# Patient Record
Sex: FEMALE | Race: WHITE | NOT HISPANIC OR LATINO | Employment: OTHER | URBAN - METROPOLITAN AREA
[De-identification: names, ages, dates, MRNs, and addresses within clinical notes are randomized per-mention and may not be internally consistent; named-entity substitution may affect disease eponyms.]

---

## 2018-06-13 ENCOUNTER — TELEPHONE (OUTPATIENT)
Dept: TRANSPLANT | Facility: CLINIC | Age: 78
End: 2018-06-13

## 2018-06-13 NOTE — TELEPHONE ENCOUNTER
----- Message from Anay Hyde sent at 6/13/2018 10:52 AM CDT -----  Contact: patient   Sounds like a private pt of Marlon    She has no [facility] records.   ----- Message -----  From: Therese Lin  Sent: 6/13/2018  10:47 AM  To: Yancy CHATMAN Staff    Please call pt at [phone]. Patient was treated by Dr Haines at [facility] about 3 years ago    Thank you

## 2018-07-06 ENCOUNTER — INITIAL CONSULT (OUTPATIENT)
Dept: TRANSPLANT | Facility: CLINIC | Age: 78
End: 2018-07-06
Attending: INTERNAL MEDICINE
Payer: MEDICARE

## 2018-07-06 VITALS
WEIGHT: 172.38 LBS | SYSTOLIC BLOOD PRESSURE: 142 MMHG | BODY MASS INDEX: 30.54 KG/M2 | OXYGEN SATURATION: 93 % | DIASTOLIC BLOOD PRESSURE: 65 MMHG | HEART RATE: 94 BPM | HEIGHT: 63 IN

## 2018-07-06 DIAGNOSIS — G06.1 EPIDURAL INTRASPINAL ABSCESS: ICD-10-CM

## 2018-07-06 DIAGNOSIS — Z79.4 TYPE 2 DIABETES MELLITUS WITH DIABETIC POLYNEUROPATHY, WITH LONG-TERM CURRENT USE OF INSULIN: ICD-10-CM

## 2018-07-06 DIAGNOSIS — E03.9 ACQUIRED HYPOTHYROIDISM: ICD-10-CM

## 2018-07-06 DIAGNOSIS — E11.42 TYPE 2 DIABETES MELLITUS WITH DIABETIC POLYNEUROPATHY, WITH LONG-TERM CURRENT USE OF INSULIN: ICD-10-CM

## 2018-07-06 DIAGNOSIS — I35.0 NONRHEUMATIC AORTIC VALVE STENOSIS: Primary | ICD-10-CM

## 2018-07-06 DIAGNOSIS — E78.5 HYPERLIPIDEMIA LDL GOAL <70: ICD-10-CM

## 2018-07-06 DIAGNOSIS — I25.118 CORONARY ARTERY DISEASE OF NATIVE ARTERY OF NATIVE HEART WITH STABLE ANGINA PECTORIS: ICD-10-CM

## 2018-07-06 DIAGNOSIS — I10 ESSENTIAL HYPERTENSION: ICD-10-CM

## 2018-07-06 PROCEDURE — 99204 OFFICE O/P NEW MOD 45 MIN: CPT | Mod: S$GLB,,, | Performed by: INTERNAL MEDICINE

## 2018-07-06 PROCEDURE — 99999 PR PBB SHADOW E&M-EST. PATIENT-LVL IV: CPT | Mod: PBBFAC,,, | Performed by: INTERNAL MEDICINE

## 2018-07-06 RX ORDER — LETROZOLE 2.5 MG/1
2.5 TABLET, FILM COATED ORAL DAILY
COMMUNITY

## 2018-07-06 RX ORDER — CHOLECALCIFEROL (VITAMIN D3) 25 MCG
TABLET,CHEWABLE ORAL DAILY
Status: ON HOLD | COMMUNITY
End: 2019-11-21 | Stop reason: HOSPADM

## 2018-07-06 RX ORDER — INSULIN ASPART 100 [IU]/ML
INJECTION, SOLUTION INTRAVENOUS; SUBCUTANEOUS
COMMUNITY

## 2018-07-06 RX ORDER — CHOLECALCIFEROL (VITAMIN D3) 25 MCG
1000 TABLET ORAL DAILY
COMMUNITY
End: 2021-01-05

## 2018-07-06 RX ORDER — CLOPIDOGREL BISULFATE 75 MG/1
75 TABLET ORAL DAILY
COMMUNITY
End: 2021-01-05

## 2018-07-06 RX ORDER — ATORVASTATIN CALCIUM 80 MG/1
80 TABLET, FILM COATED ORAL NIGHTLY
COMMUNITY

## 2018-07-06 RX ORDER — ALPRAZOLAM 0.5 MG/1
0.5 TABLET ORAL NIGHTLY PRN
COMMUNITY
Start: 2018-06-23

## 2018-07-06 RX ORDER — FUROSEMIDE 20 MG/1
40 TABLET ORAL 2 TIMES DAILY
Status: ON HOLD | COMMUNITY
End: 2021-05-18 | Stop reason: SDUPTHER

## 2018-07-06 RX ORDER — ACETAMINOPHEN AND PHENYLEPHRINE HCL 325; 5 MG/1; MG/1
1 TABLET ORAL DAILY
Status: ON HOLD | COMMUNITY
End: 2019-11-21 | Stop reason: HOSPADM

## 2018-07-06 RX ORDER — ASPIRIN 81 MG/1
81 TABLET ORAL DAILY
Status: ON HOLD | COMMUNITY
End: 2020-07-28 | Stop reason: HOSPADM

## 2018-07-06 RX ORDER — GABAPENTIN 600 MG/1
1200 TABLET ORAL 3 TIMES DAILY
COMMUNITY
Start: 2018-06-07 | End: 2019-06-21 | Stop reason: SDUPTHER

## 2018-07-06 RX ORDER — RAMIPRIL 10 MG/1
10 CAPSULE ORAL DAILY
COMMUNITY

## 2018-07-06 RX ORDER — LEVOTHYROXINE SODIUM 112 UG/1
112 TABLET ORAL
COMMUNITY
End: 2021-01-05 | Stop reason: SDUPTHER

## 2018-07-06 NOTE — PATIENT INSTRUCTIONS
When aortic velocity above 4 m/sec then would review again    For second opinion for back Dr. Tee Rider [phone]

## 2018-07-08 PROBLEM — E11.42 TYPE 2 DIABETES MELLITUS WITH DIABETIC POLYNEUROPATHY, WITH LONG-TERM CURRENT USE OF INSULIN: Status: ACTIVE | Noted: 2018-07-08

## 2018-07-08 PROBLEM — I10 ESSENTIAL HYPERTENSION: Status: ACTIVE | Noted: 2018-07-08

## 2018-07-08 PROBLEM — E03.9 ACQUIRED HYPOTHYROIDISM: Status: ACTIVE | Noted: 2018-07-08

## 2018-07-08 PROBLEM — Z79.4 TYPE 2 DIABETES MELLITUS WITH DIABETIC POLYNEUROPATHY, WITH LONG-TERM CURRENT USE OF INSULIN: Status: ACTIVE | Noted: 2018-07-08

## 2018-07-08 PROBLEM — G06.1 EPIDURAL INTRASPINAL ABSCESS: Status: ACTIVE | Noted: 2018-07-08

## 2018-07-08 PROBLEM — E78.5 HYPERLIPIDEMIA LDL GOAL <70: Status: ACTIVE | Noted: 2018-07-08

## 2018-07-08 PROBLEM — I25.118 CORONARY ARTERY DISEASE OF NATIVE ARTERY OF NATIVE HEART WITH STABLE ANGINA PECTORIS: Status: ACTIVE | Noted: 2018-07-08

## 2018-07-08 PROBLEM — I35.0 NONRHEUMATIC AORTIC VALVE STENOSIS: Status: ACTIVE | Noted: 2018-07-08

## 2018-07-08 NOTE — PROGRESS NOTES
Subjective:     Patient ID:  Mary Prince is a 77 y.o. female who presents for evaluation of Coronary Artery Disease; Hypertension; Aortic Stenosis; Hyperlipidemia; and Diabetes Mellitus    HPI:  76 yo WF was followed for me for a number of years with CAD and had 2 coronary stents placed while I cared for her though I don't have those records.  Since my departure from [facility] over 4 years ago she has been followed by Dr. JUNO Velazco.  She had 2 additional stents placed in 2015 by Dr. Velazco but not sure if same lesions and no records.  Recently dx with aortic stenosis and was told that when she needed AVR she would be evaluated here for TAVR so she came today to see if that was needed now or could wait as Dr. Velazco recommended.  At this time she has stable angina provoked by anxiety, no CHF, no syncope or pre-syncope.      She underwent spine surgery by Dr. Anguiano complicated by Klebsiella infection of spine requiring 3 additional surgeries by NS from [facility] performed at [facility], spent 15 days in ICU and upon discharge went to [facility] with total hosp+LTAC stay of 3 months.  She has bilateral foot drops, bilateral brachial plexus injury (she dates this to original surgery as well), has considerable pain and debility since that time.  These problems limit her at this time, not her heart.    Past Medical History:   Diagnosis Date    Cancer 2010    left breast XRT + lumpectomy; right lunpectomy + Chemotherapy (Dr. Santana) 8 of 12 treatments, stopped for side effects    Cataract     right eye    Coronary artery disease     total 4 stents over several years, last 2015    Diabetes mellitus, type 2     Hyperlipidemia     Hypertension     Thyroid disease        Past Surgical History:   Procedure Laterality Date    APPENDECTOMY      BREAST SURGERY Bilateral     lumpectomy    EYE SURGERY Bilateral     cataract    HYSTERECTOMY      JOINT REPLACEMENT Right     hip    SPINE SURGERY      laminectomy       History  reviewed. No pertinent family history.    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    atorvastatin (LIPITOR) 80 MG tablet atorvastatin 80 mg tablet    biotin 10,000 mcg Cap Take by mouth once daily.    clopidogrel (PLAVIX) 75 mg tablet clopidogrel 75 mg tablet    cyanocobalamin, vitamin B-12, 2,500 mcg Chew Take by mouth once daily.    furosemide (LASIX) 20 MG tablet Take 20 mg by mouth daily as needed.    gabapentin (NEURONTIN) 600 MG tablet 1,200 mg 3 (three) times daily.     insulin aspart U-100 (NOVOLOG U-100 INSULIN ASPART) 100 unit/mL injection Novolog U-100 Insulin aspart 100 unit/mL subcutaneous solution    letrozole (FEMARA) 2.5 mg Tab Femara 2.5 mg tablet   Take 2 tablets every day by oral route.    levothyroxine (SYNTHROID) 112 MCG tablet Synthroid 112 mcg tablet   Take 1 tablet every day by oral route.    potassium aminobenzoate 500 mg Cap Take by mouth once daily.    ramipril (ALTACE) 10 MG capsule Altace 10 mg capsule   Take 1 capsule every day by oral route.    vitamin D 1000 units Tab Take 1,000 Units by mouth once daily.     Review of patient's allergies indicates:  No Known Allergies      Review of Systems   Constitution: Positive for weakness, malaise/fatigue and weight loss (with illness lost weight, then gained wt but went on diet and lost 6# ). Negative for chills, decreased appetite, diaphoresis, fever,  "night sweats and weight gain.   HENT: Negative for ear discharge, ear pain, hearing loss and hoarse voice.    Eyes: Negative for photophobia and visual disturbance.   Cardiovascular: Positive for chest pain. Negative for dyspnea on exertion, irregular heartbeat, leg swelling, orthopnea, palpitations, paroxysmal nocturnal dyspnea and syncope.   Respiratory: Negative for cough, hemoptysis, shortness of breath, sputum production and wheezing.    Endocrine: Negative for cold intolerance, heat intolerance, polydipsia and polyuria.        A1c now in 6's; thyroid up to date; followed by Dr. Barreto for both   Hematologic/Lymphatic: Negative for bleeding problem. Does not bruise/bleed easily.   Musculoskeletal: Positive for back pain and stiffness. Negative for arthritis, falls (fall risk with bilateral foot drops, wears braces) and joint pain.   Gastrointestinal: Positive for bowel incontinence. Negative for abdominal pain, anorexia, change in bowel habit, dysphagia, heartburn, hematochezia and melena.   Genitourinary: Negative for dysuria, frequency and hematuria.   Neurological: Positive for focal weakness, numbness, paresthesias, sensory change and tremors. Negative for brief paralysis, headaches and seizures.     Objective:   Physical Exam   Constitutional: She is oriented to person, place, and time. She appears well-developed and well-nourished. No distress.   BP (!) 142/65 (BP Location: Right arm, Patient Position: Sitting, BP Method: Medium (Automatic))   Pulse 94   Ht 5' 3" (1.6 m)   Wt 78.2 kg (172 lb 6.4 oz)   SpO2 (!) 93%   BMI 30.54 kg/m²    HENT:   Head: Normocephalic and atraumatic.   Eyes: Conjunctivae are normal. Right eye exhibits no discharge. Left eye exhibits no discharge. No scleral icterus.   Neck: No JVD present. No thyromegaly present.   Cardiovascular: Normal rate and regular rhythm.  Exam reveals no gallop and no friction rub.    Murmur (Grade 2-3/6 ALEX peaking mid systole without definite " diastolic murmur.  Murmur radiates into the neck) heard.  Pulmonary/Chest: Effort normal. No respiratory distress. She has no wheezes. She has no rales.   Abdominal: Soft. Bowel sounds are normal. She exhibits no distension and no mass. There is no tenderness. There is no rebound and no guarding.   Musculoskeletal: She exhibits no edema or tenderness.   Neurological: She is alert and oriented to person, place, and time.   I did not perform neuro exam and am not documenting or evaluating her neurological deficits   Skin: Skin is warm and dry. She is not diaphoretic.   Psychiatric: She has a normal mood and affect. Her behavior is normal. Judgment and thought content normal.      5/30/18 [facility] lab:  CBC WNL  CMP glu 193, BUN 34, Cr 0.9, rest is normal   Total chol 128    HDL 45  LDL 59  6/1/2018 ECHO doppler at [facility]  Peak aortic gradient 38 mm Hg, mean gradient 21 mm Hg, mild AI, marked Ca++ MVA, trace MR, mild TR, normal PA pressure, mild to moderate LVH (in summary though reported wall thickness normal 0.9), normal wall motion and normal LVEF 60-65%.  Mild to moderate LAE, grade I diastolic dysfunction.     Assessment:     1. Nonrheumatic aortic valve stenosis    2. Coronary artery disease of native artery of native heart with stable angina pectoris    3. Hyperlipidemia LDL goal <70    4. Essential hypertension    5. Type 2 diabetes mellitus with diabetic polyneuropathy, with long-term current use of insulin    6. Acquired hypothyroidism    7. Epidural intraspinal abscess      Plan:   At this time I concur with Dr. Velazco that she does not require TAVR.  Would repeat echo yearly and refer once peak velocity exceeds 4 m/sec and if symptomatic  She will continue under the care of Dr. Velazco and her physicians at [facility].  Her neurosurgeon left [facility] and the area but she heard of Dr. Rider here and request consult so I entered one on her behalf.    Thank you for allowing me to see this nice woman in  consultation and do not hesitate to contact me if any questions arise.    Zurdo Haines MD, FACC, VANITA  Medical Director, Cardiomyopathy and Heart Failure Program    CC: Estevan Ortiz and Mora

## 2019-02-27 ENCOUNTER — TELEPHONE (OUTPATIENT)
Dept: SPINE | Facility: CLINIC | Age: 79
End: 2019-02-27

## 2019-02-27 NOTE — TELEPHONE ENCOUNTER
----- Message from Lisa Arambula sent at 2/27/2019  1:10 PM CST -----  Contact: Self/ [phone]  Patient would like a call back to make a appt with the doctor. Patient does have a referral in the system  from Dr. Doty.

## 2019-04-02 ENCOUNTER — TELEPHONE (OUTPATIENT)
Dept: SPINE | Facility: CLINIC | Age: 79
End: 2019-04-02

## 2019-04-02 NOTE — TELEPHONE ENCOUNTER
----- Message from Irene Noel RN sent at 4/2/2019  4:32 PM CDT -----  Contact: Patient @ [phone]      ----- Message -----  From: Abran Walter  Sent: 4/2/2019   3:31 PM  To: Tereso Oliveira Staff    Patient returning a missed and to get an update on the NP appt from the referral, she does have a current MRI, pls call to advise

## 2019-04-02 NOTE — TELEPHONE ENCOUNTER
Staff contacted patient and left a message on VM informing patient that she has been scheduled to see Shu Bowie's PA 05/07/19 at 9am while he's also in clinic on this date. Patient was informed to contact the office if there were in questions or concerns

## 2019-04-26 DIAGNOSIS — M65.811 OTHER SYNOVITIS AND TENOSYNOVITIS, RIGHT SHOULDER: Primary | ICD-10-CM

## 2019-04-26 DIAGNOSIS — M65.812 OTHER SYNOVITIS AND TENOSYNOVITIS, LEFT SHOULDER: ICD-10-CM

## 2019-05-06 ENCOUNTER — TELEPHONE (OUTPATIENT)
Dept: SPINE | Facility: CLINIC | Age: 79
End: 2019-05-06

## 2019-05-06 DIAGNOSIS — M54.5 LOW BACK PAIN, UNSPECIFIED BACK PAIN LATERALITY, UNSPECIFIED CHRONICITY, WITH SCIATICA PRESENCE UNSPECIFIED: Primary | ICD-10-CM

## 2019-05-07 ENCOUNTER — OFFICE VISIT (OUTPATIENT)
Dept: SPINE | Facility: CLINIC | Age: 79
End: 2019-05-07
Attending: INTERNAL MEDICINE
Payer: MEDICARE

## 2019-05-07 VITALS
BODY MASS INDEX: 30.48 KG/M2 | HEART RATE: 83 BPM | TEMPERATURE: 98 F | HEIGHT: 63 IN | DIASTOLIC BLOOD PRESSURE: 64 MMHG | SYSTOLIC BLOOD PRESSURE: 139 MMHG | WEIGHT: 172 LBS

## 2019-05-07 DIAGNOSIS — M54.42 CHRONIC BILATERAL LOW BACK PAIN WITH BILATERAL SCIATICA: ICD-10-CM

## 2019-05-07 DIAGNOSIS — M54.16 LUMBAR RADICULOPATHY: ICD-10-CM

## 2019-05-07 DIAGNOSIS — G89.29 CHRONIC BILATERAL LOW BACK PAIN WITH BILATERAL SCIATICA: ICD-10-CM

## 2019-05-07 DIAGNOSIS — M51.36 DDD (DEGENERATIVE DISC DISEASE), LUMBAR: ICD-10-CM

## 2019-05-07 DIAGNOSIS — M54.41 CHRONIC BILATERAL LOW BACK PAIN WITH BILATERAL SCIATICA: ICD-10-CM

## 2019-05-07 DIAGNOSIS — R29.898 BILATERAL ARM WEAKNESS: Primary | ICD-10-CM

## 2019-05-07 DIAGNOSIS — M47.26 OTHER SPONDYLOSIS WITH RADICULOPATHY, LUMBAR REGION: ICD-10-CM

## 2019-05-07 PROCEDURE — 99204 PR OFFICE/OUTPT VISIT, NEW, LEVL IV, 45-59 MIN: ICD-10-PCS | Mod: S$GLB,,, | Performed by: PHYSICIAN ASSISTANT

## 2019-05-07 PROCEDURE — 99204 OFFICE O/P NEW MOD 45 MIN: CPT | Mod: S$GLB,,, | Performed by: PHYSICIAN ASSISTANT

## 2019-05-07 PROCEDURE — 3078F PR MOST RECENT DIASTOLIC BLOOD PRESSURE < 80 MM HG: ICD-10-PCS | Mod: CPTII,S$GLB,, | Performed by: PHYSICIAN ASSISTANT

## 2019-05-07 PROCEDURE — 99999 PR PBB SHADOW E&M-EST. PATIENT-LVL IV: ICD-10-PCS | Mod: PBBFAC,,, | Performed by: PHYSICIAN ASSISTANT

## 2019-05-07 PROCEDURE — 3075F PR MOST RECENT SYSTOLIC BLOOD PRESS GE 130-139MM HG: ICD-10-PCS | Mod: CPTII,S$GLB,, | Performed by: PHYSICIAN ASSISTANT

## 2019-05-07 PROCEDURE — 1101F PT FALLS ASSESS-DOCD LE1/YR: CPT | Mod: CPTII,S$GLB,, | Performed by: PHYSICIAN ASSISTANT

## 2019-05-07 PROCEDURE — 3078F DIAST BP <80 MM HG: CPT | Mod: CPTII,S$GLB,, | Performed by: PHYSICIAN ASSISTANT

## 2019-05-07 PROCEDURE — 99999 PR PBB SHADOW E&M-EST. PATIENT-LVL IV: CPT | Mod: PBBFAC,,, | Performed by: PHYSICIAN ASSISTANT

## 2019-05-07 PROCEDURE — 3075F SYST BP GE 130 - 139MM HG: CPT | Mod: CPTII,S$GLB,, | Performed by: PHYSICIAN ASSISTANT

## 2019-05-07 PROCEDURE — 1101F PR PT FALLS ASSESS DOC 0-1 FALLS W/OUT INJ PAST YR: ICD-10-PCS | Mod: CPTII,S$GLB,, | Performed by: PHYSICIAN ASSISTANT

## 2019-05-07 NOTE — PROGRESS NOTES
Subjective:     Patient ID:  Mary Prince is a 78 y.o. female.    [facility]    Chief Complaint:  Bilateral arm weakness,  Low back pain and bilateral foot drop    HPI    Mary Prince is a 78 y.o. female who presents with the above CC.  She states she is here for a second opinion from Dr. Maurer for brachial plexus evaluation and low back pain.    Patient had a lumbar laminectomy in September 2016 with Dr. Anguiano.  After that surgery she had a post op wound infection and severe pain.  She states she went to the ED at [facility] and had three lumbar wound washouts with Dr. Maurer.  Was in the ICU and the hospital for three months from sepsis and then to rehab.  She had been on antibiotics for a year after that and has not been on them since the beginning of 2018.  She has improved with her back and legs and feet through PT as far as strength and conditioning.  She has bilateral footdrop, right greater than left that has improved some also.  She was in a wheelchair and now usually uses a cane and walker to get around but can walk alone if she has something to hold onto.  She continues to have chronic low back pain that comes and goes and worse with standing and better with sitting.  Pain in the bilateral lateral legs that comes and goes and electrical shocking in the right anterior and lateral leg to touch.    After her lumbar spine surgeries she noticed that she could not lift her arms above her head or could not put her arms behind her back to wipe herself.  She has had numbness from the shoulders to the hand bilaterally that has been constant since the lumbar spine surgeries.  Neck or radiating arm pain.      The bilateral arm weakness bother her the most at this time.    She has bilateral essential tremors that got worse at the lumbar spine surgeries also.    One cervical fusion spine surgery in 1970.      Patient denies any recent accidents or trauma, no saddle anesthesias, and no bowel or bladder incontinence.    Patient  has difficulty with balance or gait, difficulty tying shoes or buttoning clothes, is dropping things, has difficulty opening containers, and has had a change in handwriting.      Review of Systems:    Review of Systems   Constitutional: Negative for chills, diaphoresis, fever, malaise/fatigue and weight loss.   HENT: Negative for congestion, ear discharge, ear pain, hearing loss, nosebleeds, sinus pain, sore throat and tinnitus.    Eyes: Negative for blurred vision, double vision, photophobia, pain, discharge and redness.   Respiratory: Negative for cough, hemoptysis, sputum production, shortness of breath, wheezing and stridor.    Cardiovascular: Negative for chest pain, palpitations, orthopnea, leg swelling and PND.   Gastrointestinal: Negative for abdominal pain, blood in stool, constipation, diarrhea, heartburn, melena, nausea and vomiting.   Genitourinary: Negative for dysuria, flank pain, frequency, hematuria and urgency.   Musculoskeletal: Positive for back pain, joint pain and myalgias. Negative for falls and neck pain.   Skin: Negative for itching and rash.   Neurological: Positive for tingling, tremors and weakness. Negative for dizziness, sensory change, speech change, seizures, loss of consciousness and headaches.   Endo/Heme/Allergies: Negative for environmental allergies and polydipsia. Does not bruise/bleed easily.   Psychiatric/Behavioral: Negative for depression, hallucinations, memory loss and substance abuse. The patient is not nervous/anxious and does not have insomnia.          Past Medical History:   Diagnosis Date    Cancer 2010    left breast XRT + lumpectomy; right lunpectomy + Chemotherapy (Dr. Santana) 8 of 12 treatments, stopped for side effects    Cataract     right eye    Coronary artery disease     total 4 stents over several years, last 2015    Diabetes mellitus, type 2     Hyperlipidemia     Hypertension     Thyroid disease      Past Surgical History:   Procedure Laterality Date     APPENDECTOMY      BREAST SURGERY Bilateral     lumpectomy    EYE SURGERY Bilateral     cataract    HYSTERECTOMY      JOINT REPLACEMENT Right     hip    SPINE SURGERY      laminectomy     Current Outpatient Medications on File Prior to Visit   Medication Sig Dispense Refill    ALPRAZolam (XANAX) 0.5 MG tablet       aspirin (ECOTRIN LOW STRENGTH) 81 MG EC tablet aspirin 81 mg capsule   Take by oral route.      atorvastatin (LIPITOR) 80 MG tablet atorvastatin 80 mg tablet      biotin 10,000 mcg Cap Take by mouth once daily.      clopidogrel (PLAVIX) 75 mg tablet clopidogrel 75 mg tablet      cyanocobalamin, vitamin B-12, 2,500 mcg Chew Take by mouth once daily.      furosemide (LASIX) 20 MG tablet Take 20 mg by mouth daily as needed.      insulin aspart U-100 (NOVOLOG U-100 INSULIN ASPART) 100 unit/mL injection Novolog U-100 Insulin aspart 100 unit/mL subcutaneous solution      letrozole (FEMARA) 2.5 mg Tab Femara 2.5 mg tablet   Take 2 tablets every day by oral route.      levothyroxine (SYNTHROID) 112 MCG tablet Synthroid 112 mcg tablet   Take 1 tablet every day by oral route.      potassium aminobenzoate 500 mg Cap Take by mouth once daily.      ramipril (ALTACE) 10 MG capsule Altace 10 mg capsule   Take 1 capsule every day by oral route.      vitamin D 1000 units Tab Take 1,000 Units by mouth once daily.       No current facility-administered medications on file prior to visit.      Review of patient's allergies indicates:  No Known Allergies  Social History     Socioeconomic History    Marital status:      Spouse name: Not on file    Number of children: Not on file    Years of education: Not on file    Highest education level: Not on file   Occupational History    Not on file   Social Needs    Financial resource strain: Not on file    Food insecurity:     Worry: Not on file     Inability: Not on file    Transportation needs:     Medical: Not on file     Non-medical: Not on file  "  Tobacco Use    Smoking status: Never Smoker    Smokeless tobacco: Never Used   Substance and Sexual Activity    Alcohol use: Yes     Comment: social    Drug use: No    Sexual activity: Not on file   Lifestyle    Physical activity:     Days per week: Not on file     Minutes per session: Not on file    Stress: Not on file   Relationships    Social connections:     Talks on phone: Not on file     Gets together: Not on file     Attends Anabaptism service: Not on file     Active member of club or organization: Not on file     Attends meetings of clubs or organizations: Not on file     Relationship status: Not on file   Other Topics Concern    Not on file   Social History Narrative    Registered nurse now retired; 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 No pertinent family history.    Objective:      Vitals:    05/07/19 0857   BP: 139/64   Pulse: 83   Temp: 97.9 °F (36.6 °C)   TempSrc: Oral   Weight: 78 kg (172 lb)   Height: 5' 3" (1.6 m)   PainSc:   1   PainLoc: Back         Physical Exam:    General:  Mary Prince is well-developed, well-nourished, appears stated age, in no acute distress, alert and oriented to person, place, and time.    Pulmonary/Chest:  Respiratory effort normal  Abdominal: Exhibits no distension  Psychiatric:  Normal mood and affect.  Behavior is normal.  Judgement and thought content normal    Musculoskeletal:    Patient arises from a sitting to standing position without difficulty.  Patient walks to the door holding onto things.  She can stand on her toes and take a few step.  She cannot stand on her heels.    Cervical ROM:   No pain in cervical spine flexion, extension, right lateral bending, left lateral bending, right rotation, and left rotation.    Cervical Spine Palpation:  No tenderness to cervical spine palpation.    Cervical Spine Inspection:  Healed surgical scars and no visible " rashes.    Lumbar ROM:   No pain in lumbar flexion.  Pain in extension, right lateral bending, and left lateral bending.    Lumbar Spine Inspection:  Healed midline surgical scars and no visible rashes.    Lumbar Spine Palpation:  No tenderness to low back palpation.    SI Joint Palpation:  No tenderness to SI Joint palpation.    Straight Leg Raise:  Negative right and left SLR for pain but positive for paresthesias.    Neurological:  Alert and oriented to person, place, and time    Muscle strength against resistance:     Right Left   Deltoid  5 / 5 4 / 5   Biceps 4 / 5 4 / 5   Triceps 4 / 5 5 / 5   Wrist flexion  4 / 5 4 / 5   Wrist extension 5 / 5 5 / 5   Finger abduction 3 / 5 3 / 5   Thumb opposition 3 / 5 3 / 5   Handgrip 3 / 5 3 / 5   Hip flexion  5 / 5 5 / 5   Hip extension 3 / 5 3 / 5   Hip abduction 4 / 5 4 / 5   Hip adduction 5/ 5 5 / 5   Knee extension  5 / 5 5 / 5   Knee flexion  4 / 5 5 / 5   Dorsiflexion  4 / 5 5 / 5   EHL  3 / 5 4 / 5   Plantar flexion  4 / 5 5 / 5   Inversion of the feet 3 / 5 4 / 5   Eversion of the feet 3 / 5 4 / 5     Reflexes:     Right Left   Triceps 2+ 2+   Biceps 2+ 2+   Brachioradialis 2+ 2+   Patellar 2+ 2+   Achilles 2+ 2+     Babinski: Negative bilaterally  Clonus:  Negative bilaterally  Saucedo: Negative bilaterally  Negative lhermittes sign      On gross examination of the bilateral upper and lower extremities, patient has no signs of clubbing, cyanosis, or edema.     Imaging/EMG results:     Brachial plexus right arm report from 04/2018 states no definite brachial plexus space occupying lesion or edema to suggest nerve injury.    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    EMG/NCS of the bilateral LE from 08/2017 states sensorimotor polyneuropathy of the lower extremities.  Bilateral L5-S1 radiculopathy cannot be excluded.    Assessment:          1. Bilateral arm weakness    2. Chronic bilateral low back  pain with bilateral sciatica    3. DDD (degenerative disc disease), lumbar    4. Other spondylosis with radiculopathy, lumbar region    5. Lumbar radiculopathy            Plan:          Orders Placed This Encounter    EMG W/ ULTRASOUND AND NERVE CONDUCTION TEST 2 Extremities       Patient with bilateral arm weakness after lumbar spine surgery in 2016.  Possibly due to positioning in surgery?.  Will get updated EMG/NCS of the bilateral UE and compare to the previous EMG done in 2017.  Will need to get report of the previous EMG/NCS done in 2017.    I will review this with Dr. Rider once it has been completed.    She will fu with Dr. Maurer regarding her lumbar spine.    Recommend continuing with PT/OT for her UE also.    All of the above discussed and reviewed with Dr. Rider who came in to see the patient today.    Follow-Up:  Follow up if symptoms worsen or fail to improve. If there are any questions prior to this, the patient was instructed to contact the office.       TORRI Chavis PA-C  Neurosurgery  [facility]  [facility]    Addendum:  05/30/19    EMG of the right arm reviewed from 2017 showed right median nerve and ulnar nerve motor and sensory neuroapthies.  Right radial nerve sensory neuropathy, and right C6, C7, C8 radiculopathy cannot be excluded    Left arm showed left median and sensory neuropathy, left ulnar nerve and radial nerve sensory neuropathies, and left C7-8 radiculopathy cannot be excluded..    TORRI Chavis PA-C  [facility]  [facility]  [facility]    Addendum:  08/09/19    I reviewed everything with Dr. Rider including the old EMG and the new EMG.  He stated that there may be brachial plexus injuries based off the EMG finding but no surgery is recommended at this time as it has been too long and surgery for this will not help now.    TORRI Chavis PA-C  Neurosurgery  [facility]  [facility]

## 2019-05-15 ENCOUNTER — CLINICAL SUPPORT (OUTPATIENT)
Dept: REHABILITATION | Facility: HOSPITAL | Age: 79
End: 2019-05-15
Payer: MEDICARE

## 2019-05-15 DIAGNOSIS — R29.898 BILATERAL ARM WEAKNESS: ICD-10-CM

## 2019-05-15 DIAGNOSIS — M96.1 POSTLAMINECTOMY SYNDROME, CERVICAL REGION: ICD-10-CM

## 2019-05-15 DIAGNOSIS — M48.02 SPINAL STENOSIS IN CERVICAL REGION: Primary | ICD-10-CM

## 2019-05-15 PROCEDURE — 97162 PT EVAL MOD COMPLEX 30 MIN: CPT | Mod: PO

## 2019-05-15 PROCEDURE — G8990 OTHER PT/OT CURRENT STATUS: HCPCS | Mod: CM,PO

## 2019-05-15 PROCEDURE — G8991 OTHER PT/OT GOAL STATUS: HCPCS | Mod: CK,PO

## 2019-05-15 NOTE — PLAN OF CARE
Physical Therapy Initial Evaluation     Name: Mary Prince  [facility] Number: [id number]    Diagnosis:   Encounter Diagnosis   Name Primary?    Bilateral arm weakness      Physician: Poncho Saunders MD  Treatment Orders: PT Eval and Treat  Past Medical History:   Diagnosis Date    Cancer 2010    left breast XRT + lumpectomy; right lunpectomy + Chemotherapy (Dr. Santana) 8 of 12 treatments, stopped for side effects    Cataract     right eye    Coronary artery disease     total 4 stents over several years, last 2015    Diabetes mellitus, type 2     Hyperlipidemia     Hypertension     Thyroid disease       Current Outpatient Medications   Medication Sig    ALPRAZolam (XANAX) 0.5 MG tablet     aspirin (ECOTRIN LOW STRENGTH) 81 MG EC tablet aspirin 81 mg capsule   Take by oral route.    atorvastatin (LIPITOR) 80 MG tablet atorvastatin 80 mg tablet    biotin 10,000 mcg Cap Take by mouth once daily.    clopidogrel (PLAVIX) 75 mg tablet clopidogrel 75 mg tablet    cyanocobalamin, vitamin B-12, 2,500 mcg Chew Take by mouth once daily.    furosemide (LASIX) 20 MG tablet Take 20 mg by mouth daily as needed.    gabapentin (NEURONTIN) 600 MG tablet 1,200 mg 3 (three) times daily.     insulin aspart U-100 (NOVOLOG U-100 INSULIN ASPART) 100 unit/mL injection Novolog U-100 Insulin aspart 100 unit/mL subcutaneous solution    letrozole (FEMARA) 2.5 mg Tab Femara 2.5 mg tablet   Take 2 tablets every day by oral route.    levothyroxine (SYNTHROID) 112 MCG tablet Synthroid 112 mcg tablet   Take 1 tablet every day by oral route.    potassium aminobenzoate 500 mg Cap Take by mouth once daily.    ramipril (ALTACE) 10 MG capsule Altace 10 mg capsule   Take 1 capsule every day by oral route.    vitamin D 1000 units Tab Take 1,000 Units by mouth once daily.     No current facility-administered medications for this visit.      Review of patient's allergies indicates:  No Known  "Allergies    Evaluation Date: 5-15-19  Visit # authorized: 1  Authorization period: 4-26-19 to 4-25-19  Plan of care Expiration: 7-15-19  Evaluation Time in/Out: see POC    Subjective     History of condition/Onset Date:  pt presenting with chief complaint of B UE weakness chronic and ongoing since having some medical complications after her back surgery in 2016.   Says she was in the hospital for 3 months but doesn't remember any of it bc she was in the ICU. Notes she was ambulating with a rollator, then a walker now a quad cane but she would like to not be handicapped.  Says her doctor wanted her to come to PT and OT for her brachial plexus injury. Pt does not have an eval for OT currently scheduled but brought the paper script from her doctor with her today.   Pt with extensive history of complications and long term hospitalization following her lumbar laminectomy surgery in 9-2016. See chart for further information.    PRECAUTIONS: insulin pump, previous cancer (not currently active), lumbar laminectomy  Primary complaint: weakness of B UEs.  Current Functional Limitations:  Must use adaptive devices for all ADLs. Cannot reach up or carry household items. Unable to cook or clean as she is used to. Had to install a chair lift.  Prior Functional Status: very active in her Religious, walked without an AD  Easing factors:  n/a  Prior Treatment: PT inpatient rehab following ICU in the hospital. HHPT when discharged from hospital.   Had OT for her hands (in 2018 and 2019) and the brachial plexus injury she says the doctor thinks she has from when she was lying prone on the table to have the "washouts" from sepsis.    Occupation:  Does not work                       Pain Scale 0-10:  Current: 4/10 rated as LBP     Best: 2/10      Worst: 6/10  Pts goals:  To get stronger. To be able to reach up to get clothes out of her closet with her hands normally. Doesn't want to rely on all the devices she has to help her complete " her ADLs.    Objective     Observation/Posture: rounded shoulders  GAIT: using quad cane in L UE    AROM Right Left Comment Normal   Shoulder Flexion: 74 degrees 80 degrees  180 deg   Shoulder Extension: 32 40 degrees  60 deg   Shoulder Abduction: 70 72 degrees *painful in top of shoulder 180 deg   Shoulder ER: Back of head CT junction pain 90 deg   Shoulder IR: L1 sacrum  90 deg     MMT   Right Left Comment   Shoulder flexion: 3/5 3/5    Shoulder Abduction: 3/5 3/5    Shoulder ER: 3/5 3/5    Shoulder IR: 3/5 3/5    Lower Trap: 3+/5 3+/5    Middle Trap: 3+/5 3+/5      Pt/family was provided educational information, including: role of PT, goals for PT, scheduling - pt verbalized understanding.   Discussed insurance limitations with pt.     Functional Limitations Reports - G Codes  Category: Other PT    Tool: FOTO Cervical Survey   Modifier  Impairment Limitation Restriction    CH  0 % impaired, limited or restricted    CI  @ least 1% but less than 20% impaired, limited or restricted    CJ  @ least 20%<40% impaired, limited or restricted    CK  @ least 40%<60% impaired, limited or restricted    CL  @ least 60% <80% impaired, limited or restricted    CM  @ least 80%<100% impaired limited or restricted    CN  100% impaired, limited or restricted     Current/: CM = 80% Limitation   Goal/ : CK = 50% Limitation      History  Co-morbidities and personal factors that may impact the plan of care Co-morbidities:   CAD, diabetes, HTN and prior lumbar surgery    Personal Factors:   attitudes     moderate   Examination  Body Structures and Functions, activity limitations and participation restrictions that may impact the plan of care Body Regions:   upper extremities    Body Systems:    gross symmetry  ROM  strength  balance  gait  transfers    Participation Restrictions:   none    Activity limitations:   Learning and applying knowledge  no deficits    General Tasks and Commands  no deficits    Communication  no  deficits    Mobility  walking    Self care  washing oneself (bathing, drying, washing hands)  caring for body parts (brushing teeth, shaving, grooming)  dressing  looking after one's health     Domestic Life  no deficits    Interactions/Relationships  no deficits    Life Areas  no deficits    Community and Social Life  no deficits         moderate   Clinical Presentation evolving clinical presentation with changing clinical characteristics moderate   Decision Making/ Complexity Score: moderate         TREATMENT     Time In: 1130  Time Out: 1200    PT Evaluation Completed? Yes  Educated pt on treatment goals and plan of care. Pt demo good understanding along with good return demonstration of home exercise program.    Mary received 0 minutes of therapeutic exercise & instruction to improve LOF per flowsheet.   Educated in rationale behind PT POC.    NEXT VISIT:  - UBE fwd and trial bkwds for warm up  - progress B arm strengthening beginning with AAROM  Supine dowel overhead  Flies   Red stick chest press    Pulleys  Standing PB roll on table CW, CCW    Written Home Exercises Provided: Yes  Mary verbalized good understanding of the education provided.   Patient demo good return demo with skill during exercises.    Assessment     Patient displays limitations in B UE mobility and function.  Pt will benefit from skilled outpatient physical therapy to address the above stated deficits, provide pt/family education and to maximize pt's level of independence.  Pt prognosis is Good.      Medical necessity is demonstrated by the following IMPAIRMENTS/PROBLEMS:  - Pain limiting function  - Decreased Segmental Mobility & Decreased ROM  - Decreased Core & B UE strength  - Decreased Flexibility of B UEs  - Decreased Tolerance to Functional Activities; driving, wlaking, volunteering at [employer]  - Inability to participate in vocational pursuits  - Requires skilled supervision to complete and progress HEP    Pt's spiritual,  cultural and educational needs considered and pt agreeable to plan of care and goals as stated below:     Anticipated Barriers for physical therapy: depressed mentality    Short Term GOALS:  1. Pt to report average pain <1/10 with regular ADLs.   2. Pt to be able to sleep >4hrs without interruption due to pain.  3. Pt to begin HEP including specific stretching and strengthening as per their diagnosis to decrease pain and improve flexibility.  Pt to display >100deg B shoulder flexion and ABD, be able to display WFL IR/ER without pain.    Long Term GOALS:   1. Pt to report <1/10, no longer limiting functional activities.  2. Pt to report return to >75% of regular ADLs, work and recreational tasks.   3. Pt to be compliant in advanced HEP to maintain progress gained in therapy thus far.   4. Pt to display >4+/5 gross MMT for B UE and trunk in order to support head and neck throughout ADLs.   5. Pt to report resolution of headaches.    PLAN     Outpatient physical therapy 1-2 times per week x 8 weeks to include: patient education, therapeutic exercises, neuromuscular re-education, pain management/modalities prn, joint mobilizations, and regular update of pt's home exercise program.  Pt may be seen by PTA as part of the rehabilitation team.     Myla Maurer DPT

## 2019-05-15 NOTE — PROGRESS NOTES
Physical Therapy Initial Evaluation     Name: Mary Prince  [facility] Number: [id number]    Diagnosis:   Encounter Diagnosis   Name Primary?    Bilateral arm weakness      Physician: Poncho Saunders MD  Treatment Orders: PT Eval and Treat  Past Medical History:   Diagnosis Date    Cancer 2010    left breast XRT + lumpectomy; right lunpectomy + Chemotherapy (Dr. Santana) 8 of 12 treatments, stopped for side effects    Cataract     right eye    Coronary artery disease     total 4 stents over several years, last 2015    Diabetes mellitus, type 2     Hyperlipidemia     Hypertension     Thyroid disease       Current Outpatient Medications   Medication Sig    ALPRAZolam (XANAX) 0.5 MG tablet     aspirin (ECOTRIN LOW STRENGTH) 81 MG EC tablet aspirin 81 mg capsule   Take by oral route.    atorvastatin (LIPITOR) 80 MG tablet atorvastatin 80 mg tablet    biotin 10,000 mcg Cap Take by mouth once daily.    clopidogrel (PLAVIX) 75 mg tablet clopidogrel 75 mg tablet    cyanocobalamin, vitamin B-12, 2,500 mcg Chew Take by mouth once daily.    furosemide (LASIX) 20 MG tablet Take 20 mg by mouth daily as needed.    gabapentin (NEURONTIN) 600 MG tablet 1,200 mg 3 (three) times daily.     insulin aspart U-100 (NOVOLOG U-100 INSULIN ASPART) 100 unit/mL injection Novolog U-100 Insulin aspart 100 unit/mL subcutaneous solution    letrozole (FEMARA) 2.5 mg Tab Femara 2.5 mg tablet   Take 2 tablets every day by oral route.    levothyroxine (SYNTHROID) 112 MCG tablet Synthroid 112 mcg tablet   Take 1 tablet every day by oral route.    potassium aminobenzoate 500 mg Cap Take by mouth once daily.    ramipril (ALTACE) 10 MG capsule Altace 10 mg capsule   Take 1 capsule every day by oral route.    vitamin D 1000 units Tab Take 1,000 Units by mouth once daily.     No current facility-administered medications for this visit.      Review of patient's allergies indicates:  No Known  "Allergies    Evaluation Date: 5-15-19  Visit # authorized: 1  Authorization period: 4-26-19 to 4-25-19  Plan of care Expiration: 7-15-19  Evaluation Time in/Out: see POC    Subjective     History of condition/Onset Date:  pt presenting with chief complaint of B UE weakness chronic and ongoing since having some medical complications after her back surgery in 2016.   Says she was in the hospital for 3 months but doesn't remember any of it bc she was in the ICU. Notes she was ambulating with a rollator, then a walker now a quad cane but she would like to not be handicapped.  Says her doctor wanted her to come to PT and OT for her brachial plexus injury. Pt does not have an eval for OT currently scheduled but brought the paper script from her doctor with her today.   Pt with extensive history of complications and long term hospitalization following her lumbar laminectomy surgery in 9-2016. See chart for further information.    PRECAUTIONS: insulin pump, previous cancer (not currently active), lumbar laminectomy  Primary complaint: weakness of B UEs.  Current Functional Limitations:  Must use adaptive devices for all ADLs. Cannot reach up or carry household items. Unable to cook or clean as she is used to. Had to install a chair lift.  Prior Functional Status: very active in her [employer], walked without an AD  Easing factors:  n/a  Prior Treatment: PT inpatient rehab following ICU in the hospital. HHPT when discharged from hospital.   Had OT for her hands (in 2018 and 2019) and the brachial plexus injury she says the doctor thinks she has from when she was lying prone on the table to have the "washouts" from sepsis.    Occupation:  Does not work                       Pain Scale 0-10:  Current: 4/10 rated as LBP     Best: 2/10      Worst: 6/10  Pts goals:  To get stronger. To be able to reach up to get clothes out of her closet with her hands normally. Doesn't want to rely on all the devices she has to help her complete " her ADLs.    Objective     Observation/Posture: rounded shoulders  GAIT: using quad cane in L UE    AROM Right Left Comment Normal   Shoulder Flexion: 74 degrees 80 degrees  180 deg   Shoulder Extension: 32 40 degrees  60 deg   Shoulder Abduction: 70 72 degrees *painful in top of shoulder 180 deg   Shoulder ER: Back of head CT junction pain 90 deg   Shoulder IR: L1 sacrum  90 deg     MMT   Right Left Comment   Shoulder flexion: 3/5 3/5    Shoulder Abduction: 3/5 3/5    Shoulder ER: 3/5 3/5    Shoulder IR: 3/5 3/5    Lower Trap: 3+/5 3+/5    Middle Trap: 3+/5 3+/5      Pt/family was provided educational information, including: role of PT, goals for PT, scheduling - pt verbalized understanding.   Discussed insurance limitations with pt.     Functional Limitations Reports - G Codes  Category: Other PT    Tool: FOTO Cervical Survey   Modifier  Impairment Limitation Restriction    CH  0 % impaired, limited or restricted    CI  @ least 1% but less than 20% impaired, limited or restricted    CJ  @ least 20%<40% impaired, limited or restricted    CK  @ least 40%<60% impaired, limited or restricted    CL  @ least 60% <80% impaired, limited or restricted    CM  @ least 80%<100% impaired limited or restricted    CN  100% impaired, limited or restricted     Current/: CM = 80% Limitation   Goal/ : CK = 50% Limitation      History  Co-morbidities and personal factors that may impact the plan of care Co-morbidities:   CAD, diabetes, HTN and prior lumbar surgery    Personal Factors:   attitudes     moderate   Examination  Body Structures and Functions, activity limitations and participation restrictions that may impact the plan of care Body Regions:   upper extremities    Body Systems:    gross symmetry  ROM  strength  balance  gait  transfers    Participation Restrictions:   none    Activity limitations:   Learning and applying knowledge  no deficits    General Tasks and Commands  no deficits    Communication  no  deficits    Mobility  walking    Self care  washing oneself (bathing, drying, washing hands)  caring for body parts (brushing teeth, shaving, grooming)  dressing  looking after one's health     Domestic Life  no deficits    Interactions/Relationships  no deficits    Life Areas  no deficits    Community and Social Life  no deficits         moderate   Clinical Presentation evolving clinical presentation with changing clinical characteristics moderate   Decision Making/ Complexity Score: moderate         TREATMENT     Time In: 1130  Time Out: 1200    PT Evaluation Completed? Yes  Educated pt on treatment goals and plan of care. Pt demo good understanding along with good return demonstration of home exercise program.    Mary received 0 minutes of therapeutic exercise & instruction to improve LOF per flowsheet.   Educated in rationale behind PT POC.    NEXT VISIT:  - UBE fwd and trial bkwds for warm up  - progress B arm strengthening beginning with AAROM  Supine dowel overhead  Flies   Red stick chest press    Pulleys  Standing PB roll on table CW, CCW    Written Home Exercises Provided: Yes  Mary verbalized good understanding of the education provided.   Patient demo good return demo with skill during exercises.    Assessment     Patient displays limitations in B UE mobility and function.  Pt will benefit from skilled outpatient physical therapy to address the above stated deficits, provide pt/family education and to maximize pt's level of independence.  Pt prognosis is Good.      Medical necessity is demonstrated by the following IMPAIRMENTS/PROBLEMS:  - Pain limiting function  - Decreased Segmental Mobility & Decreased ROM  - Decreased Core & B UE strength  - Decreased Flexibility of B UEs  - Decreased Tolerance to Functional Activities; driving, wlaking, volunteering at [employer]  - Inability to participate in vocational pursuits  - Requires skilled supervision to complete and progress HEP    Pt's spiritual,  cultural and educational needs considered and pt agreeable to plan of care and goals as stated below:     Anticipated Barriers for physical therapy: depressed mentality    Short Term GOALS:  1. Pt to report average pain <1/10 with regular ADLs.   2. Pt to be able to sleep >4hrs without interruption due to pain.  3. Pt to begin HEP including specific stretching and strengthening as per their diagnosis to decrease pain and improve flexibility.  Pt to display >100deg B shoulder flexion and ABD, be able to display WFL IR/ER without pain.    Long Term GOALS:   1. Pt to report <1/10, no longer limiting functional activities.  2. Pt to report return to >75% of regular ADLs, work and recreational tasks.   3. Pt to be compliant in advanced HEP to maintain progress gained in therapy thus far.   4. Pt to display >4+/5 gross MMT for B UE and trunk in order to support head and neck throughout ADLs.   5. Pt to report resolution of headaches.    PLAN     Outpatient physical therapy 1-2 times per week x 8 weeks to include: patient education, therapeutic exercises, neuromuscular re-education, pain management/modalities prn, joint mobilizations, and regular update of pt's home exercise program.  Pt may be seen by PTA as part of the rehabilitation team.     Myla Maurer DPT

## 2019-05-20 NOTE — PROGRESS NOTES
Physical Therapy Daily Treatment Note     Name: Mary Prince  Clinic Number: [id number]    Therapy Diagnosis:   Encounter Diagnosis   Name Primary?    Bilateral arm weakness      Physician: Poncho Saunders MD    Visit Date: 5/21/2019  Medical Diagnosis: B arm weakness  Evaluation Date: 5-15-19  DC due: 6-15-19  Authorization Period Expiration: 4-25-20  Plan of Care Certification Period: 7-15-19      Visit #/Visits authorized: 2/      Time In: 0800  Time Out: 0900  Total Billable Time: 30 minutes  TE    Precautions: Standard, Diabetes, Fall and previous cancer, insulin pump.    Subjective     Pt reports: she feels fine today, no pain.  She not given HEP yet. (compliant with home exercise program.)    Patient reports their pain to be 0/10 on a 0-10 scale with 0 being no pain and 10 being the worst pain imaginable.    Functional change: none mentioned yet      Objective     Mary received therapeutic exercises per flowsheet to develop strength, endurance, ROM and flexibility for 30 minutes.   all exercises performed bilat  Sitting:  Bicep curls 4# hammer and in supination 3x10 ea  Standing orange PB roll on medium height stool CW, CCW x30 ea  2# Wrist flexion and ext sitting at plinth edge.3x10  Pulleys flexion x6 mins  UBE, AAROM for B shouldes    Reclined on table:  Supine dowel overhead x30  Flies 2# weights 3x10- very difficult  Red stick chest press 3x10      NEXT VISIT:  - UBE trial bkwds for warm up  - progress B arm strengthening as ginette      Written Home Exercises Provided: Patient instructed to cont prior HEP.  Exercises were reviewed and Mary was able to demonstrate them prior to the end of the session.  Mary demonstrated good  understanding of the education provided.       Assessment     Pt complained of some LBP with too many standing activities in succession this morning. Tolerated better when switching between standing and sitting exercises to strengthen her arms.  Mary is progressing well towards  her goals.   Pt prognosis is Excellent.     Pt will continue to benefit from skilled outpatient physical therapy to address the deficits listed in the problem list box on initial evaluation, provide pt/family education and to maximize pt's level of independence in the home and community environment.     Pt's spiritual, cultural and educational needs considered and pt agreeable to plan of care and goals.    Anticipated barriers to physical therapy: none    Goals:   Short Term GOALS:  1. Pt to report average pain <1/10 with regular ADLs.   2. Pt to be able to sleep >4hrs without interruption due to pain.  3. Pt to begin HEP including specific stretching and strengthening as per their diagnosis to decrease pain and improve flexibility.  Pt to display >100deg B shoulder flexion and ABD, be able to display WFL IR/ER without pain.     Long Term GOALS:   1. Pt to report <1/10, no longer limiting functional activities.  2. Pt to report return to >75% of regular ADLs, work and recreational tasks.   3. Pt to be compliant in advanced HEP to maintain progress gained in therapy thus far.   4. Pt to display >4+/5 gross MMT for B UE and trunk in order to support head and neck throughout ADLs.   5. Pt to report resolution of headaches.     Plan   Cont per POC.    Myla Maurer, PT

## 2019-05-21 ENCOUNTER — CLINICAL SUPPORT (OUTPATIENT)
Dept: REHABILITATION | Facility: HOSPITAL | Age: 79
End: 2019-05-21
Payer: MEDICARE

## 2019-05-21 DIAGNOSIS — R29.898 BILATERAL ARM WEAKNESS: ICD-10-CM

## 2019-05-21 PROCEDURE — 97110 THERAPEUTIC EXERCISES: CPT | Mod: PO

## 2019-05-31 ENCOUNTER — CLINICAL SUPPORT (OUTPATIENT)
Dept: REHABILITATION | Facility: HOSPITAL | Age: 79
End: 2019-05-31
Payer: MEDICARE

## 2019-05-31 DIAGNOSIS — R29.898 BILATERAL ARM WEAKNESS: ICD-10-CM

## 2019-05-31 PROCEDURE — 97110 THERAPEUTIC EXERCISES: CPT | Mod: PO

## 2019-05-31 NOTE — PROGRESS NOTES
"  Physical Therapy Daily Treatment Note     Name: Mary Prince  Clinic Number: [id number]    Therapy Diagnosis:   Encounter Diagnosis   Name Primary?    Bilateral arm weakness      Physician: Poncho Saunders MD    Visit Date: 5/31/2019  Medical Diagnosis: B arm weakness  Evaluation Date: 5-15-19  MT due: 6-15-19  Authorization Period Expiration: 4-25-20  Plan of Care Certification Period: 7-15-19      Visit #/Visits authorized: 3/      Time In: 1100  Time Out: 1200   Total Billable Time: 50 minutes  TE    Precautions: Standard, Diabetes, Fall and previous cancer, insulin pump.    Subjective     Pt reports: she was alittle sore after last session but feels good that she is strenthening muscles in her arms that she hasnt used in awhile.  She not given HEP yet. (compliant with home exercise program.)    Patient reports their pain to be 0/10 on a 0-10 scale with 0 being no pain and 10 being the worst pain imaginable.    Functional change: none mentioned yet      Objective     Mary received therapeutic exercises per flowsheet to develop strength, endurance, ROM and flexibility for 50 minutes.   all exercises performed bilat  Sitting:  Pulleys flexion x6 mins  UBE, AAROM for B shoulders, fwd 8 mins  Bicep curls 4# supinated 3x10 ea  Sitting shoulder flexion rollout onto blue PB 10x10"  NP- 2# Wrist flexion and ext sitting at plinth edge.3x10    standing  Shoulder B ABD 2x10  YTB brueggers 2x10   Shoulder press no weight 2x10    Reclined on table:  Supine dowel overhead x30  Flies 2# weights 3x10- very difficult  Red stick chest press 3x10      NEXT VISIT:  - UBE trial bkwds also  - progress B arm strengthening as ginette      Written Home Exercises Provided: Patient instructed to cont prior HEP.  Exercises were reviewed and Mary was able to demonstrate them prior to the end of the session.  Mary demonstrated good  understanding of the education provided.       Assessment   Pt tolerated session very well and reported she " liked the challenges of various exercises. No complaint of shoulder pain today.    Mary is progressing well towards her goals.   Pt prognosis is Excellent.     Pt will continue to benefit from skilled outpatient physical therapy to address the deficits listed in the problem list box on initial evaluation, provide pt/family education and to maximize pt's level of independence in the home and community environment.     Pt's spiritual, cultural and educational needs considered and pt agreeable to plan of care and goals.    Anticipated barriers to physical therapy: none    Goals:   Short Term GOALS:  1. Pt to report average pain <1/10 with regular ADLs.   2. Pt to be able to sleep >4hrs without interruption due to pain.  3. Pt to begin HEP including specific stretching and strengthening as per their diagnosis to decrease pain and improve flexibility.  Pt to display >100deg B shoulder flexion and ABD, be able to display WFL IR/ER without pain.     Long Term GOALS:   1. Pt to report <1/10, no longer limiting functional activities.  2. Pt to report return to >75% of regular ADLs, work and recreational tasks.   3. Pt to be compliant in advanced HEP to maintain progress gained in therapy thus far.   4. Pt to display >4+/5 gross MMT for B UE and trunk in order to support head and neck throughout ADLs.   5. Pt to report resolution of headaches.     Plan   Cont per POC.    Myla Maurer, PT

## 2019-06-10 DIAGNOSIS — G65.0 SEQUELAE OF GUILLAIN-BARRE SYNDROME: ICD-10-CM

## 2019-06-10 DIAGNOSIS — M21.371 RIGHT FOOT DROP: ICD-10-CM

## 2019-06-10 DIAGNOSIS — M48.02 SPINAL STENOSIS IN CERVICAL REGION: ICD-10-CM

## 2019-06-10 DIAGNOSIS — M21.371 FOOT DROP, BILATERAL: Primary | ICD-10-CM

## 2019-06-10 DIAGNOSIS — M96.1 POSTLAMINECTOMY SYNDROME, CERVICAL REGION: ICD-10-CM

## 2019-06-10 DIAGNOSIS — M21.372 FOOT DROP, BILATERAL: Primary | ICD-10-CM

## 2019-06-11 ENCOUNTER — CLINICAL SUPPORT (OUTPATIENT)
Dept: REHABILITATION | Facility: HOSPITAL | Age: 79
End: 2019-06-11
Payer: MEDICARE

## 2019-06-11 DIAGNOSIS — R29.898 BILATERAL ARM WEAKNESS: ICD-10-CM

## 2019-06-11 PROCEDURE — 97110 THERAPEUTIC EXERCISES: CPT | Mod: PO

## 2019-06-11 NOTE — PROGRESS NOTES
"  Physical Therapy Daily Treatment Note     Name: Mary Prince  Clinic Number: [id number]    Therapy Diagnosis:   Encounter Diagnosis   Name Primary?    Bilateral arm weakness      Physician: Ahmet Maurer MD    Visit Date: 6/11/2019  Medical Diagnosis: B arm weakness  Evaluation Date: 5-15-19  OK due: 6-15-19  Authorization Period Expiration: 4-25-20  Plan of Care Certification Period: 7-15-19      Visit #/Visits authorized: 5/      Time In: 1100  Time Out: 1200   Total Billable Time: 60 minutes  TE    Precautions: Standard, Diabetes, Fall and previous cancer, insulin pump.    Subjective     Pt reports: she felt fine after last session. Was having a little shoulder pain yesterday. Says the doctor had to re-write the script for her hands so they cancelled her PT appt yesterday since the order was incorrect.   She not given HEP yet. (compliant with home exercise program.)    Patient reports their pain to be 0/10 on a 0-10 scale with 0 being no pain and 10 being the worst pain imaginable.    Functional change: none mentioned yet      Objective     Mary received therapeutic exercises per flowsheet to develop strength, endurance, ROM and flexibility for 50 minutes.   all exercises performed bilat    Sitting:  Pulleys flexion x6 mins  blue PB rollout for B shoulder flexion 10x10"  UBE, AAROM for B shoulders, fwd 10 mins  Bicep curls 4# supinated 3x10 ea  Sitting shoulder flexion rollout onto blue PB 10x10"    standing  Shoulder B ABD 2x10 using mirror for cueing not to overcompensate with upper traps.  YTB brueggers 3x10 with scap retract  Shoulder press using red stick 2x10    Reclined on table:  Supine dowel overhead x30  Flies 1# weights 3x10  Red stick chest press 3x10      NEXT VISIT:  - UBE trial bkwds also  - progress B arm strengthening as ginette      Written Home Exercises Provided: Patient instructed to cont prior HEP.  Exercises were reviewed and Mary was able to demonstrate them prior to the end of the " session.  Mary demonstrated good  understanding of the education provided.       Assessment   Pt tolerated session very well and reported she liked the challenges of various exercises. No complaint of shoulder pain today.    Mary is progressing well towards her goals.   Pt prognosis is Excellent.     Pt will continue to benefit from skilled outpatient physical therapy to address the deficits listed in the problem list box on initial evaluation, provide pt/family education and to maximize pt's level of independence in the home and community environment.     Pt's spiritual, cultural and educational needs considered and pt agreeable to plan of care and goals.    Anticipated barriers to physical therapy: none    Goals:   Short Term GOALS:  1. Pt to report average pain <1/10 with regular ADLs.   2. Pt to be able to sleep >4hrs without interruption due to pain.  3. Pt to begin HEP including specific stretching and strengthening as per their diagnosis to decrease pain and improve flexibility.  Pt to display >100deg B shoulder flexion and ABD, be able to display WFL IR/ER without pain.     Long Term GOALS:   1. Pt to report <1/10, no longer limiting functional activities.  2. Pt to report return to >75% of regular ADLs, work and recreational tasks.   3. Pt to be compliant in advanced HEP to maintain progress gained in therapy thus far.   4. Pt to display >4+/5 gross MMT for B UE and trunk in order to support head and neck throughout ADLs.   5. Pt to report resolution of headaches.     Plan   Cont per POC.    Myla Maurer, PT

## 2019-06-14 ENCOUNTER — PROCEDURE VISIT (OUTPATIENT)
Dept: NEUROLOGY | Facility: CLINIC | Age: 79
End: 2019-06-14
Payer: MEDICARE

## 2019-06-14 VITALS — TEMPERATURE: 90 F | WEIGHT: 171.94 LBS | HEIGHT: 63 IN | BODY MASS INDEX: 30.46 KG/M2

## 2019-06-14 DIAGNOSIS — R29.898 BILATERAL ARM WEAKNESS: ICD-10-CM

## 2019-06-14 PROCEDURE — 95886 MUSC TEST DONE W/N TEST COMP: CPT | Mod: S$GLB,,, | Performed by: NEUROLOGICAL SURGERY

## 2019-06-14 PROCEDURE — 95886 PR EMG COMPLETE, W/ NERVE CONDUCTION STUDIES, 5+ MUSCLES: ICD-10-PCS | Mod: S$GLB,,, | Performed by: NEUROLOGICAL SURGERY

## 2019-06-14 PROCEDURE — 95911 NRV CNDJ TEST 9-10 STUDIES: CPT | Mod: S$GLB,,, | Performed by: NEUROLOGICAL SURGERY

## 2019-06-14 PROCEDURE — 99204 PR OFFICE/OUTPT VISIT, NEW, LEVL IV, 45-59 MIN: ICD-10-PCS | Mod: 25,S$GLB,, | Performed by: NEUROLOGICAL SURGERY

## 2019-06-14 PROCEDURE — 95911 PR NERVE CONDUCTION STUDY; 9-10 STUDIES: ICD-10-PCS | Mod: S$GLB,,, | Performed by: NEUROLOGICAL SURGERY

## 2019-06-14 PROCEDURE — 99204 OFFICE O/P NEW MOD 45 MIN: CPT | Mod: 25,S$GLB,, | Performed by: NEUROLOGICAL SURGERY

## 2019-06-14 PROCEDURE — 95885 MUSC TST DONE W/NERV TST LIM: CPT | Mod: 59,S$GLB,, | Performed by: NEUROLOGICAL SURGERY

## 2019-06-14 PROCEDURE — 95885 PR MUSC TST DONE W/NERV TST LIM: ICD-10-PCS | Mod: 59,S$GLB,, | Performed by: NEUROLOGICAL SURGERY

## 2019-06-14 NOTE — PROCEDURES
Chief Complaint   Patient presents with    Other Misc     EMG        Mary Prince is a 78 y.o. female with a history of multiple medical diagnoses as listed below that presents for EMG/nerve conduction studies to evaluate for bilateral.  The patient says that she has surgery in 2016 after was noticed that she was having weakness in her arms.  After spinal surgery she was having difficulty with elevation of her arms in particular she tried to elevate them above the head she did not have the strength to do so.  She has also been having difficulty with caring for her own room a hygiene due to the weakness.  She did complains of numbness from the shoulder down to the fingers on both sides as well.  She has no history of muscle or nerve diseases.  She feels that her strength has been improving though is still a significant impediment on her activities of daily living.  She can no longer eat as fluently and fluidly as she had in the past.  The patient is also having difficulty with lifting her arms enough to operate a motor vehicle    PAST MEDICAL HISTORY:  Past Medical History:   Diagnosis Date    Cancer 2010    left breast XRT + lumpectomy; right lunpectomy + Chemotherapy (Dr. Santana) 8 of 12 treatments, stopped for side effects    Cataract     right eye    Coronary artery disease     total 4 stents over several years, last 2015    Diabetes mellitus, type 2     Hyperlipidemia     Hypertension     Thyroid disease        PAST SURGICAL HISTORY:  Past Surgical History:   Procedure Laterality Date    APPENDECTOMY      BREAST SURGERY Bilateral     lumpectomy    EYE SURGERY Bilateral     cataract    HYSTERECTOMY      JOINT REPLACEMENT Right     hip    SPINE SURGERY      laminectomy       SOCIAL HISTORY:  Social History     Socioeconomic History    Marital status:      Spouse name: Not on file    Number of children: Not on file    Years of education: Not on file    Highest education level: Not on file    Occupational History    Not on file   Social Needs    Financial resource strain: Not on file    Food insecurity:     Worry: Not on file     Inability: Not on file    Transportation needs:     Medical: Not on file     Non-medical: Not on file   Tobacco Use    Smoking status: Never Smoker    Smokeless tobacco: Never Used   Substance and Sexual Activity    Alcohol use: Yes     Comment: social    Drug use: No    Sexual activity: Not on file   Lifestyle    Physical activity:     Days per week: Not on file     Minutes per session: Not on file    Stress: Not on file   Relationships    Social connections:     Talks on phone: Not on file     Gets together: Not on file     Attends Rastafari service: Not on file     Active member of club or organization: Not on file     Attends meetings of clubs or organizations: Not on file     Relationship status: Not on file   Other Topics Concern    Not on file   Social History Narrative    Registered nurse now retired; 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    ALLERGIES AND MEDICATIONS: updated and reviewed.  Review of patient's allergies indicates:  No Known Allergies  Current Outpatient Medications   Medication Sig Dispense Refill    ALPRAZolam (XANAX) 0.5 MG tablet       aspirin (ECOTRIN LOW STRENGTH) 81 MG EC tablet aspirin 81 mg capsule   Take by oral route.      atorvastatin (LIPITOR) 80 MG tablet atorvastatin 80 mg tablet      biotin 10,000 mcg Cap Take by mouth once daily.      clopidogrel (PLAVIX) 75 mg tablet clopidogrel 75 mg tablet      cyanocobalamin, vitamin B-12, 2,500 mcg Chew Take by mouth once daily.      furosemide (LASIX) 20 MG tablet Take 20 mg by mouth daily as needed.      gabapentin (NEURONTIN) 600 MG tablet 1,200 mg 3 (three) times daily.       insulin aspart U-100 (NOVOLOG U-100 INSULIN ASPART) 100 unit/mL injection Novolog  U-100 Insulin aspart 100 unit/mL subcutaneous solution      letrozole (FEMARA) 2.5 mg Tab Femara 2.5 mg tablet   Take 2 tablets every day by oral route.      levothyroxine (SYNTHROID) 112 MCG tablet Synthroid 112 mcg tablet   Take 1 tablet every day by oral route.      potassium aminobenzoate 500 mg Cap Take by mouth once daily.      ramipril (ALTACE) 10 MG capsule Altace 10 mg capsule   Take 1 capsule every day by oral route.      vitamin D 1000 units Tab Take 1,000 Units by mouth once daily.       No current facility-administered medications for this visit.        Review of Systems   Constitutional: Negative for activity change, appetite change, fever and unexpected weight change.   HENT: Negative for trouble swallowing and voice change.    Eyes: Negative for photophobia and visual disturbance.   Respiratory: Negative for apnea and shortness of breath.    Cardiovascular: Negative for chest pain and leg swelling.   Gastrointestinal: Negative for constipation and nausea.   Genitourinary: Negative for difficulty urinating.   Musculoskeletal: Negative for back pain, gait problem and neck pain.   Skin: Negative for color change and pallor.   Neurological: Positive for weakness. Negative for dizziness, seizures, syncope and numbness.   Hematological: Negative for adenopathy.   Psychiatric/Behavioral: Negative for agitation, confusion and decreased concentration.       Neurologic Exam     Mental Status   Oriented to person, place, and time.   Registration: recalls 3 of 3 objects.   Attention: normal. Concentration: normal.   Speech: speech is normal   Level of consciousness: alert  Knowledge: good.     Cranial Nerves     CN II   Visual fields full to confrontation.   Right visual field deficit: none  Left visual field deficit: none     CN III, IV, VI   Pupils are equal, round, and reactive to light.  Extraocular motions are normal.   Right pupil: Size: 3 mm. Shape: regular. Accommodation: intact.   Left pupil: Size:  3 mm. Shape: regular. Accommodation: intact.   CN III: no CN III palsy  CN VI: no CN VI palsy  Nystagmus: none   Diplopia: none  Ophthalmoparesis: none  Upgaze: normal  Downgaze: normal  Conjugate gaze: present    CN V   Facial sensation intact.   Right facial sensation deficit: none  Left facial sensation deficit: none    CN VII   Facial expression full, symmetric.   Right facial weakness: none  Left facial weakness: none    CN VIII   CN VIII normal.     CN IX, X   CN IX normal.   CN X normal.   Palate: symmetric    CN XI   CN XI normal.   Right sternocleidomastoid strength: normal  Left sternocleidomastoid strength: normal  Right trapezius strength: normal  Left trapezius strength: normal    CN XII   CN XII normal.   Tongue deviation: none    Motor Exam   Muscle bulk: decreased  Overall muscle tone: normal  Right arm tone: normal  Left arm tone: normal  Right leg tone: normal  Left leg tone: normal    Strength   Right deltoid: 4/5  Left deltoid: 4/5  Right biceps: 4/5  Left biceps: 4/5  Right triceps: 4/5  Left triceps: 4/5  Right wrist flexion: 4/5  Left wrist flexion: 4/5  Right wrist extension: 4/5  Left wrist extension: 4/5  Right interossei: 3/5  Left interossei: 3/5    Sensory Exam   Right arm light touch: decreased from elbow  Left arm light touch: decreased from elbow  Right leg light touch: normal  Left leg light touch: normal  Right arm vibration: normal  Left arm vibration: normal  Right leg vibration: normal  Left leg vibration: normal  Right arm proprioception: normal  Left arm proprioception: normal  Right leg proprioception: normal  Left leg proprioception: normal  Right arm pinprick: decreased from elbow  Left arm pinprick: decreased from elbow  Right leg pinprick: normal  Left leg pinprick: normal    Gait, Coordination, and Reflexes     Gait  Gait: wide-based (foot drop)    Coordination   Finger to nose coordination: abnormal    Tremor   Resting tremor: present    Reflexes   Right brachioradialis:  "1+  Left brachioradialis: 1+  Right biceps: 1+  Left biceps: 1+  Right triceps: 1+  Left triceps: 1+  Right plantar: normal  Left plantar: normal      Physical Exam   Constitutional: She is oriented to person, place, and time. She appears well-developed and well-nourished.   HENT:   Head: Normocephalic and atraumatic.   Eyes: Pupils are equal, round, and reactive to light. EOM are normal.   Neck: Normal range of motion.   Cardiovascular: Normal rate and intact distal pulses.   Pulmonary/Chest: Effort normal. No apnea. No respiratory distress.   Musculoskeletal: Normal range of motion.   Neurological: She is alert and oriented to person, place, and time. She has an abnormal Finger-Nose-Finger Test.   Reflex Scores:       Tricep reflexes are 1+ on the right side and 1+ on the left side.       Bicep reflexes are 1+ on the right side and 1+ on the left side.       Brachioradialis reflexes are 1+ on the right side and 1+ on the left side.  Skin: Skin is warm and dry.   Psychiatric: She has a normal mood and affect. Her speech is normal and behavior is normal. Thought content normal.   Vitals reviewed.      Vitals:    06/14/19 1018   Temp: (!) 89.8 °F (32.1 °C)   Weight: 78 kg (171 lb 15.3 oz)   Height: 5' 3" (1.6 m)       Assessment & Plan:    Problem List Items Addressed This Visit     Bilateral arm weakness          Follow-up: Follow up if symptoms worsen or fail to improve.      "

## 2019-06-17 ENCOUNTER — CLINICAL SUPPORT (OUTPATIENT)
Dept: REHABILITATION | Facility: HOSPITAL | Age: 79
End: 2019-06-17
Payer: MEDICARE

## 2019-06-17 DIAGNOSIS — R29.898 BILATERAL ARM WEAKNESS: ICD-10-CM

## 2019-06-17 PROCEDURE — 97110 THERAPEUTIC EXERCISES: CPT | Mod: PO

## 2019-06-17 NOTE — PROGRESS NOTES
"  Physical Therapy Daily Treatment Note     Name: Mary Prince  Clinic Number: [id number]    Therapy Diagnosis:   Encounter Diagnosis   Name Primary?    Bilateral arm weakness      Physician: Ahmet Maurer MD    Visit Date: 6/17/2019  Medical Diagnosis: B arm weakness  Evaluation Date: 5-15-19  IN due: 6-15-19  Authorization Period Expiration: 4-25-20  Plan of Care Certification Period: 7-15-19  Visit #/Visits authorized: 5/      Time In: 2p  Time Out: 3p  Total Billable Time: 30 minutes    Precautions: Standard, Diabetes, Fall and previous cancer, insulin pump.    Subjective     Pt reports: feeling alright with no pain, but was requesting to work on lower body strengthening and balance.  She was compliant with HEP    Patient reports their pain to be 0/10 on a 0-10 scale with 0 being no pain and 10 being the worst pain imaginable.    Functional change: none mentioned yet      Objective     Mary received therapeutic exercises per flowsheet to develop strength, endurance, ROM and flexibility for 50 minutes.   all exercises performed bilat  Bold = Done today  Sitting:  Pulleys flexion x6 mins  blue PB rollout for B shoulder flexion 10x10"  UBE, AAROM for B shoulders, fwd 10 mins  Bicep curls 4# supinated 3x10 ea  Sitting shoulder flexion rollout onto blue PB 10x10"    standing  Shoulder B ABD 2x10 using mirror for cueing not to overcompensate with upper traps.  YTB brueggers 3x10 with scap retract  Shoulder press using red stick 2x10    All below with BUE support (pt loses balance with 1UE hand hold - SBA to CGA for safety)  Hip ext 2 x 10  Hip abd 2 x 10  Marches 2 x 10  Toe taps forward and laterally 2 x 10  Lateral step overs small rich 1 x 20 each side    Reclined on table:  Supine dowel overhead x30  Flies 1# weights 3x10  Red stick chest press 3x10    Bike L2 10 minutes    Mary received manual therapy on the B neck/shoulders for increased ROM for 10 minutes:  - STM upper traps, pecs, lat dorsi, subscap, " levator scap, scalenes  - B 1st rib mobilization  - PROM >AROM of finger pinching, thumb abd/add/flex/ext, wrist flexion/ext, wrist rad/ulnar deviation, sup/pronation        Written Home Exercises Provided: Patient instructed to cont prior HEP.  Exercises were reviewed and Mary was able to demonstrate them prior to the end of the session.  Mary demonstrated good  understanding of the education provided.       Assessment   Pt tolerated session very well and reported she liked the challenges of various exercises, especially lower body therex including the balance and activity tolerance/endurance. The pt demonstrates increased essential tremors with fine motor movement, reaching, lifting and increased right foot drop with toe taps and hurdles - more so when the pt is tired. The pt requested to continue with lower body strengthening and balance training and discussed with PT about possibly transferring to the neuro PT department to continue PT with them. The PT also discussed completing OT for BUE hand and UE strengthening and coordination. Pt agreed to both and is eager to start with both as soon as possible.     Mary is progressing well towards her goals.   Pt prognosis is Fair.     Pt will continue to benefit from skilled outpatient physical therapy to address the deficits listed in the problem list box on initial evaluation, provide pt/family education and to maximize pt's level of independence in the home and community environment.     Pt's spiritual, cultural and educational needs considered and pt agreeable to plan of care and goals.    Anticipated barriers to physical therapy: none    Goals:   Short Term GOALS:  1. Pt to report average pain <1/10 with regular ADLs.   2. Pt to be able to sleep >4hrs without interruption due to pain.  3. Pt to begin HEP including specific stretching and strengthening as per their diagnosis to decrease pain and improve flexibility.  Pt to display >100deg B shoulder flexion  and ABD, be able to display WFL IR/ER without pain.     Long Term GOALS:   1. Pt to report <1/10, no longer limiting functional activities.  2. Pt to report return to >75% of regular ADLs, work and recreational tasks.   3. Pt to be compliant in advanced HEP to maintain progress gained in therapy thus far.   4. Pt to display >4+/5 gross MMT for B UE and trunk in order to support head and neck throughout ADLs.   5. Pt to report resolution of headaches.     Plan   Discuss POC with supervisor and neuro PT and OT to determine if pt is able to continue therapy there for remainder of sessions.    Shu Carias, PT

## 2019-06-19 ENCOUNTER — CLINICAL SUPPORT (OUTPATIENT)
Dept: REHABILITATION | Facility: HOSPITAL | Age: 79
End: 2019-06-19
Payer: MEDICARE

## 2019-06-19 DIAGNOSIS — R29.898 BILATERAL ARM WEAKNESS: ICD-10-CM

## 2019-06-19 PROCEDURE — 97140 MANUAL THERAPY 1/> REGIONS: CPT | Mod: PO

## 2019-06-19 NOTE — PROGRESS NOTES
"  Physical Therapy Daily Treatment Note     Name: Mary Prince  Clinic Number: [id number]    Therapy Diagnosis:   Encounter Diagnosis   Name Primary?    Bilateral arm weakness      Physician: Ahmet Maurer MD    Visit Date: 6/19/2019  Medical Diagnosis: B arm weakness  Evaluation Date: 5-15-19  WA due: 6-15-19  Authorization Period Expiration: 4-25-20  Plan of Care Certification Period: 7-15-19  Visit #/Visits authorized: 6      Time In: 12p  Time Out: 1p  Total Billable Time: 30 minutes    Precautions: Standard, Diabetes, Fall and previous cancer, insulin pump.    Subjective     Pt reports: im feeling a lot of back pain, and feeling really tired but im going to try and get on the bike today and do some work.  She was compliant with HEP    Patient reports their pain to be 5/10 on a 0-10 scale in the lower back    Functional change: none mentioned yet      Objective     Mary received therapeutic exercises per flowsheet to develop strength, endurance, ROM and flexibility for 10 minutes.   all exercises performed bilat  Bold = Done today  Sitting:  Pulleys flexion x6 mins  blue PB rollout for B shoulder flexion 10x10"  UBE, AAROM for B shoulders, fwd 10 mins  Bicep curls 4# supinated 3x10 ea  Sitting shoulder flexion rollout onto blue PB 10x10"    standing  Shoulder B ABD 2x10 using mirror for cueing not to overcompensate with upper traps.  YTB brueggers 3x10 with scap retract  Shoulder press using red stick 2x10    All below with BUE support (pt loses balance with 1UE hand hold - SBA to [facility] for safety)  Hip ext 2 x 10  Hip abd 2 x 10  Marches 2 x 10  Toe taps forward and laterally 2 x 10  Lateral step overs small rich 1 x 20 each side    Reclined on table:  Supine dowel overhead x30  Flies 1# weights 3x10  Red stick chest press 3x10    Bike L2 10 minutes    Mary received manual therapy on the B neck/shoulders for increased ROM for 30 minutes:  - STM upper traps, pecs, lat dorsi, subscap, levator scap, " scalenes  - B 1st rib mobilization  - PROM >AROM of finger pinching, thumb abd/add/flex/ext, wrist flexion/ext, wrist rad/ulnar deviation, sup/pronation  - lower back scar mobilization with cupping f/b STM to paraspinals and QL and to B gluteal musculature  - f/b hamstring and piriformis stretches 2 x 30s f/b B knee to chest with gentle overpressure        Written Home Exercises Provided: Patient instructed to cont prior HEP.  Exercises were reviewed and Mary was able to demonstrate them prior to the end of the session.  Mary demonstrated good  understanding of the education provided.       Assessment   From Last visit - (Pt reported that last session went very well and reported she liked the challenges of various exercises, especially lower body therex including the balance and activity tolerance/endurance. The pt demonstrates increased essential tremors with fine motor movement, reaching, lifting and increased right foot drop with toe taps and hurdles - more so when the pt is tired. The pt requested to continue with lower body strengthening and balance training and discussed with PT about possibly transferring to the neuro PT department to continue PT with them. The PT also discussed completing OT for BUE hand and UE strengthening and coordination. Pt agreed to both and is eager to start with both as soon as possible.)    The pt today reported that she is looking forward to her visits with the Neuro PT and to help out with her generalized balance and overall weakness. The pt was able to walk with decreased symptoms after manual therapy today and after being on the bike for 10mins. The pt reported that she feels frustrated and depressed that she cant self toilet herself due to UE weakness and lack of ROM, and has limited herself with activities like going out to eat with friends because fear of falling, and unable to get up steps to get the pulpit in [employer]    Mary is progressing well towards her goals.   Pt  prognosis is Fair.     Pt will continue to benefit from skilled outpatient physical therapy to address the deficits listed in the problem list box on initial evaluation, provide pt/family education and to maximize pt's level of independence in the home and community environment.     Pt's spiritual, cultural and educational needs considered and pt agreeable to plan of care and goals.    Anticipated barriers to physical therapy: none    Goals:   Short Term GOALS:  1. Pt to report average pain <1/10 with regular ADLs.   2. Pt to be able to sleep >4hrs without interruption due to pain.  3. Pt to begin HEP including specific stretching and strengthening as per their diagnosis to decrease pain and improve flexibility.  Pt to display >100deg B shoulder flexion and ABD, be able to display WFL IR/ER without pain.     Long Term GOALS:   1. Pt to report <1/10, no longer limiting functional activities.  2. Pt to report return to >75% of regular ADLs, work and recreational tasks.   3. Pt to be compliant in advanced HEP to maintain progress gained in therapy thus far.   4. Pt to display >4+/5 gross MMT for B UE and trunk in order to support head and neck throughout ADLs.   5. Pt to report resolution of headaches.     Plan   Pt is to transfer [facility] with [facility] PT and to schedule a OT appointment for decreased ROM, coordination, and weakness in BUE/hands. Pt is to call Dr. Maurer to request an order for OT evaluation and treatments.     Shu Carias, PT

## 2019-06-20 ENCOUNTER — TELEPHONE (OUTPATIENT)
Dept: NEUROLOGY | Facility: CLINIC | Age: 79
End: 2019-06-20

## 2019-06-20 NOTE — TELEPHONE ENCOUNTER
Returned call no answer.      ----- Message from Carrie Hirsch sent at 6/20/2019 12:03 PM CDT -----  Contact: Self/  [phone]    Type: Patient Call Back    Who called:  Patient    What is the request in detail:  Patient would like staff to give her a call regarding the increase on her gabapentin (NEURONTIN) 600 MG tablet.  She would also like to know about the results of her EMG.  Thank you    Would the patient rather a call back or a response via [facility]?  Call back    Best call back number:  [phone]      Patient stated she uses [facility] in [place]  [phone]

## 2019-06-21 ENCOUNTER — TELEPHONE (OUTPATIENT)
Dept: NEUROLOGY | Facility: CLINIC | Age: 79
End: 2019-06-21

## 2019-06-21 DIAGNOSIS — M48.02 SPINAL STENOSIS IN CERVICAL REGION: ICD-10-CM

## 2019-06-21 DIAGNOSIS — M21.371 BILATERAL FOOT-DROP: ICD-10-CM

## 2019-06-21 DIAGNOSIS — M96.1 POSTLAMINECTOMY SYNDROME, CERVICAL REGION: ICD-10-CM

## 2019-06-21 DIAGNOSIS — S14.3XXA BRACHIAL PLEXUS INJURY: Primary | ICD-10-CM

## 2019-06-21 DIAGNOSIS — M21.372 BILATERAL FOOT-DROP: ICD-10-CM

## 2019-06-21 RX ORDER — GABAPENTIN 600 MG/1
1800 TABLET ORAL 3 TIMES DAILY
Qty: 270 TABLET | Refills: 11 | Status: ON HOLD | OUTPATIENT
Start: 2019-06-21 | End: 2021-05-18

## 2019-06-25 ENCOUNTER — CLINICAL SUPPORT (OUTPATIENT)
Dept: REHABILITATION | Facility: HOSPITAL | Age: 79
End: 2019-06-25
Payer: MEDICARE

## 2019-06-25 DIAGNOSIS — R29.898 BILATERAL ARM WEAKNESS: ICD-10-CM

## 2019-06-25 DIAGNOSIS — Z74.09 IMPAIRED FUNCTIONAL MOBILITY, BALANCE, GAIT, AND ENDURANCE: Primary | ICD-10-CM

## 2019-06-25 PROCEDURE — 97112 NEUROMUSCULAR REEDUCATION: CPT | Mod: PO

## 2019-06-25 NOTE — PLAN OF CARE
"  Physical Therapy Daily Treatment Note     Name: Mary Prince  Clinic Number: [id number]    Therapy Diagnosis:   Encounter Diagnoses   Name Primary?    Bilateral arm weakness     Impaired functional mobility, balance, gait, and endurance Yes     Physician: Poncho Saunders MD    Visit Date: 6/25/2019  Medical Diagnosis: B arm weakness  Evaluation Date: 5-15-19  CO due: 6-15-19  Authorization Period Expiration: 4-25-20  NEW Plan of Care Certification Period: 6/25/2019-9/25/2019  Visit #/Visits authorized: 7       Time In: 734  Time Out: 815  Total Billable Time: 41 minutes    Precautions: Standard, Diabetes, Fall and previous cancer, insulin pump.    Subjective     Pt reports: She wants her body to work and it just doesn't work like it should. She has struggled with foot drop since Dec 2016, it was in B LE but now only in R LE. She states that she typically falls a lot at home, but her last fall was 6 months ago. She hangs onto furniture when walking. Always uses hurri-cane both in and outside the home. Overall feels very off balance, "if I close my eyes I know I'm going to fall." She has a constant burning sensation in hands and legs.     She was compliant with HEP    Patient reports their pain to be 5/10 on a 0-10 scale in the lower back    Functional change: none mentioned yet      Objective     Mary received therapeutic exercises per flowsheet to develop strength, endurance, ROM and flexibility for 0 minutes.   all exercises performed bilat    Sitting:  Pulleys flexion x6 mins  blue PB rollout for B shoulder flexion 10x10"  UBE, AAROM for B shoulders, fwd 10 mins  Bicep curls 4# supinated 3x10 ea  Sitting shoulder flexion rollout onto blue PB 10x10"    standing  Shoulder B ABD 2x10 using mirror for cueing not to overcompensate with upper traps.  YTB brueggers 3x10 with scap retract  Shoulder press using red stick 2x10    All below with BUE support (pt loses balance with 1UE hand hold - SBA to CGA for " safety)  Hip ext 2 x 10  Hip abd 2 x 10  Marches 2 x 10  Toe taps forward and laterally 2 x 10  Lateral step overs small rich 1 x 20 each side    Reclined on table:  Supine dowel overhead x30  Flies 1# weights 3x10  Red stick chest press 3x10    Bike L2 10 minutes    Mary received manual therapy on the B neck/shoulders for increased ROM for 0 minutes:  - STM upper traps, pecs, lat dorsi, subscap, levator scap, scalenes  - B 1st rib mobilization  - PROM >AROM of finger pinching, thumb abd/add/flex/ext, wrist flexion/ext, wrist rad/ulnar deviation, sup/pronation  - lower back scar mobilization with cupping f/b STM to paraspinals and QL and to B gluteal musculature  - f/b hamstring and piriformis stretches 2 x 30s f/b B knee to chest with gentle overpressure    Mary participated in neuromuscular reeducation for 41 minutes to work on balance, proprioception, kinesthetic sense, and posture including the following:     TU.37 seconds with hurri-cane   26.08 seconds without AD    30 second sit to stand: 7 without UE use    Self selected walking speed: 6m/11.15sec = .54 m/sec  Fast walking speed: 6m/8.47sec= .71 m/sec    RUIZ Assessment  1. Sitting to Standin - able to stand without using hands and stabilize independently  2. Standing Unsupported: 4 - able to stand safely 2 minutes without hold  3. Sitting Unsupported: 4 - able to sit safely and securely 2 minutes  4. Standing to Sittin - sits safely with minimal use of hands  5. Pivot Transfer: 4 - able to trnasfer safely with minor use of hands  6. Standing with Eyes Closed: 0 - needs help to keep from falling.   7. Standing with Feet Together: 3 - able to place feet together independently and stand for 1 minute with supervision  8. Reaching Forward with Outstretched Arm: 3 - can reach forward 12 cm/5 inches safely  9. Retrieving Object from Floor: 3 - able to pick slipper but needs supervision  10. Turning to Look Behind: 2 - turns sideways only but  maintains balance  11. Turning 360 Degrees: 2 - able to turn 360 safely but slowly  12. Placing Alternate Foot on Step: 4 - able to stand independently safely and complete 8 steps in 20 seconds  13. Standing with One Foot in Front: 0 - Looses balance while stepping or standing  14. Standing on One Foot: 1 - tries to lift leg and unable to hold 3 seconds but remains standing independently  Total: 38  Maximum: 56  (moderate fall risk)    0-20= high fall risk  21-40= moderate fall risk  41-56= low fall risk    Fall risk cut-off scores:   Elderly and History of falls: <51/56   Elderly and No history of falls: <42/56   CVA: <45/56       DYNAMIC GAIT INDEX:    1. Gait level surface= 2   3) Normal: walks 20', no AD, good speed, no evidence for imbalance,   normal gait pattern   2) Mild impairment: walks 20', uses AD, slower speed, mild gait deviations   1)  moderate impairment: walks 20', slow speed, abnormal gait pattern,   evidence for imbalance.   0)  severe impairment: cannot walk 20' without assistance, severe gait   deviations or imbalance  2. Change in gait speed: 1   3) Normal: able to smoothly change walking speed without loss of balance or gait deviation. Shows a significant difference in walking speeds    between fast, slow, and normal speeds.    2) Mild impairment: is able to change speeds but demonstrates mild gait   deviations, or not gait deviations but unable to achieve a significant   change in velocity, or uses AD   1) Moderate impairment: makes only minor adjustments in walking speed,   or accomplishes a change in speed with significant gait deviation, or   changes speed but loses balance and is able to recover and keep    walking    0) Severe impairment: cannot change speeds, or loses balance and has to   reach for wall or be caught.  3. Gait with horizontal head turns: 1   3) Normal: performs head turns smoothly with no change in gait.   2) Mild impairment: performs head turns smoothly with slight  "velocity, I.e.   Minor disruption to smooth gait path or uses AD   1) Moderate impairment: performs head turns with moderate change in gait   velocity, slows down, staggers but recovers, can continue to walk   0) Severe impairment: performs task with severe disruption of gait, i.e.   Staggers outside of 15" path, loses balance , stops, reaches for wall  4. Gait with vertical head turns: 1   3) Normal: performs head turns smoothly with no change in gait   2) Mild impairment: performs head turns smoothly with slight velocity, I.e.   Minor disruption to smooth gait path or uses AD   1) Moderate impairment: performs head turns with moderate change in gait   velocity, slows down, staggers but recovers, can continue to walk   0) Severe impairment: performs task with severe disruption of gait, i.e.   Staggers outside of 15" path, loses balance , stops, reaches for wall  5. Gait and pivot turn: 2   3)  Normal: pivot turns safely within 3 sec and stops quickly with no LOB   2)  Mild impairment: pivot turns safely in > 3 sec and stops with no LOB   1) Moderate impairment: turns slowly, requires verbal cueing, requires   several small steps to catch balance following turn and stop   0)  Severe impairment: cannot turn safely, requires assistance to stop and   Turn  6. Step over obstacle:  1   3) normal: is able to step over box without changing speed, no LOB   2)  Mild impairment: is able to step over box, but must slow down and adjust   steps to clear box safely   1)  Moderate impairment: is able to step over box but must stop, then step   over. May require verbal cueing   0) Severe impairment: cannot perform without assistance  7. Step around obstacle: 1   3) Normal: able to walk around cones safely without changing gait speed;   no LOB   2)  Mild impairment: able to step around cones, but must slow down to adjust   steps to clear cones   1) moderate impairment: able to clear cones but must significantly slow   speed to " "accomplish or requires verbal cueing   0)  Severe impairment: unable to clear cones, walks into 1 or both cones, or   requires physical assistance  8. Steps: 1   3) Normal: alternating feet, no rail   2) Mild impairment: alternating feet, must use rail   1) Moderate impairment: 2 feet a stair, must use rail   0)  Severe impairment: cannot perform safely  Total: 10/24        KIZZY SENSORY TESTING:  (P= Pass, F= Fail; note any sway; hold each position for 30")  Condition 1: (firm surface/feet together/eyes open) P, 30 seconds with moderate sway  Condition 2: (firm surface/feet together/eyes closed) F, 9 seconds with moderate sway  Condition 3: (firm surface/feet in tandem/eyes open) F, 5 seconds with moderate sway  Condition 4: (firm surface/feet in tandem/eyes closed) NT  Condition 5: (soft surface/feet together/eyes open) P, 30 seconds with min sway  Condition 6: (soft surface/feet together/eyes closed) F, 3 seconds max sway with PT assist to remain standing  Condition 7: (Fakuda step test), measure distance varied from center starting position NT      Lower Extremity Strength  Right LE  Left LE    Hip flexion:  3+/5 Hip flexion: 3+/5   Knee extension: 4-/5 Knee extension: 4-/5   Knee flexion: 3/5 Knee flexion: 3/5   Ankle dorsiflexion:  3-/5 Ankle dorsiflexion: 3+/5   Ankle plantarflexion:  3+/5 Ankle plantarflexion: 3+/5   Hip abduction: 3-/5 Hip abduction: 3-/5   Hip adduction: 3-/5 Hip adduction 3-/5   Hip extension: 3/5 Hip extension: 3/5       Written Home Exercises Provided: Patient instructed to cont prior HEP.  Exercises were reviewed and Mary was able to demonstrate them prior to the end of the session.  Mary demonstrated good  understanding of the education provided.       Assessment   Ms. Prince presents to PT as a fall risk and would continue to benefit from skilled PT services for balance training. She reports that her ultimate goal for coming to therapy is to be able to attend a pilgrimage next June " that requires a good amount of walking. Based on her SLS time, Davidson Balance score, DGI score, and TUG times pt is a risk for fall. She will benefit from skilled PT services in order to address her decreased strength, decreased balance (pierre. With vision eliminated), decreased endurance, and decreased mobility. She will benefit from skilled PT services 2x per week and her goals have been adjusted accordingly. PT to extend POC through 9/25/2019.     Mary is progressing well towards her goals.   Pt prognosis is Fair.     Pt will continue to benefit from skilled outpatient physical therapy to address the deficits listed in the problem list box on initial evaluation, provide pt/family education and to maximize pt's level of independence in the home and community environment.     Pt's spiritual, cultural and educational needs considered and pt agreeable to plan of care and goals.    Anticipated barriers to physical therapy: none    Goals:   Short Term GOALS: (5 weeks)  1. Pt to increase 30 second sit to stand score to 9 without use of UE.  2. Pt to increase Davidson Balance Score to 43 in order to decrease her risk for fall.   3. Pt to increase TUG score with use of AD to </= 20 seconds.   4. Pt will increase Chama condition 2 to 20 seconds with SBA.  Long Term GOALS: (10 weeks)  1. Pt will increase 30 second sit to stand score to 11 without use of UEs.   2. Pt will increase DGI score to 16 in order to decrease her risk for fall.  3. Pt will pass Chama condition 2 with minimal sway.   4. Pt will increase self selected walking speed to .7 in order to better be able to participate in pilgrimage.   5. Pt will increase TUG score with use of AD to </= 15 seconds in order to better participate in pilgrimage.   6. Pt will increase Davidson Balance score to 51 in order to decrease her risk for fall.      Plan   PT to see pt 2x/wk for 10 weeks in order to decrease her risk for fall. PT to include but not limited to neuromuscular  re-education, therapeutic exercise, therapeutic activities, manual therapy, and gait training.    Efrain David, PT

## 2019-06-27 NOTE — PROGRESS NOTES
"  Physical Therapy Daily Treatment Note     Name: Mary Prince  Clinic Number: [id number]    Therapy Diagnosis:   Encounter Diagnoses   Name Primary?    Bilateral arm weakness     Impaired functional mobility, balance, gait, and endurance Yes     Physician: Poncho Saunders MD    Visit Date: 6/28/2019    Medical Diagnosis: B arm weakness  Evaluation Date: 5-15-19  MS due: 6-15-19  Authorization Period Expiration: 4-25-20  NEW Plan of Care Certification Period: 6/25/2019-9/25/2019  Visit #/Visits authorized: 3/12   (7 total visits 2019)    Time In: 1027  Time Out: 1114  Total Billable Time: 47 minutes    Precautions: Standard and Fall    Subjective     Pt reports: She has had a busy morning with doctors appointments and is a bit tired. She has decided that she will go on her pilgrimage to [place] and she thinks that she will be "cured" by then.    She was provided home exercise program today.  Response to previous treatment: none to report  Functional change: none to report    Pain: 4/10  Location: low back and L shoulder     Objective     Mary received therapeutic exercises to develop strength, endurance, posture and core stabilization for 27 minutes including:  NuStep lv 7.0 for 8 minutes   Standing hip abduction 3x10 reps with peach theraband  Standing hip extension 3x10 reps with peach theraband  Standing marching 3x10 reps   Standing mini squats 3x15      Mary participated in neuromuscular re-education activities to improve: Balance, Coordination, Kinesthetic, Proprioception and Posture for 20 minutes. The following activities were included:  // bars:    Feet together, eyes open, on AirEx, 3x30 seconds, SBA, no UE support   WBOS, eyes closed, solid ground, 3 x 30 seconds, CGA-Hemal, no UE support   Modified tandem stance with step forward, 3 x 30 seconds ea., CGA, no UE support    Mary participated in dynamic functional therapeutic activities to improve functional performance for 0  minutes, " including:      Mary participated in gait training to improve functional mobility and safety for 0  minutes, including:      Home Exercises Provided and Patient Education Provided     Education provided:   - HEP provided    Written Home Exercises Provided: yes.  Exercises were reviewed and Mary was able to demonstrate them prior to the end of the session.  Mary demonstrated good  understanding of the education provided.     See EMR under Patient Instructions for exercises provided 6/28/2019.    Assessment     Mary tolerated first session since evaluation well. She received TherEx and HEP well, all questions answered. She continues to require CGA for safety with balance tasks and short breaks between each task due to endurance deficits. She is motivated and appropriate for skilled PT services.    Mary is progressing well towards her goals.   Pt prognosis is Good.     Pt will continue to benefit from skilled outpatient physical therapy to address the deficits listed in the problem list box on initial evaluation, provide pt/family education and to maximize pt's level of independence in the home and community environment.     Pt's spiritual, cultural and educational needs considered and pt agreeable to plan of care and goals.     Anticipated barriers to physical therapy: none    Goals:   Short Term GOALS: (5 weeks)  1. Pt to increase 30 second sit to stand score to 9 without use of UE.  2. Pt to increase Davidson Balance Score to 43 in order to decrease her risk for fall.   3. Pt to increase TUG score with use of AD to </= 20 seconds.   4. Pt will increase [place] condition 2 to 20 seconds with SBA.  Long Term GOALS: (10 weeks)  1. Pt will increase 30 second sit to stand score to 11 without use of UEs.   2. Pt will increase DGI score to 16 in order to decrease her risk for fall.  3. Pt will pass Nathanael condition 2 with minimal sway.   4. Pt will increase self selected walking speed to .7 in order to better be able to  participate in pilgrimage.   5. Pt will increase TUG score with use of AD to </= 15 seconds in order to better participate in pilgrimage.   6. Pt will increase Davidson Balance score to 51 in order to decrease her risk for fall.     Plan     Continue with current POC, progress balance as tolerated and continue to work on endurance and strengthening.    Efrain David, PT

## 2019-06-28 ENCOUNTER — CLINICAL SUPPORT (OUTPATIENT)
Dept: REHABILITATION | Facility: HOSPITAL | Age: 79
End: 2019-06-28
Payer: MEDICARE

## 2019-06-28 DIAGNOSIS — R29.898 BILATERAL ARM WEAKNESS: ICD-10-CM

## 2019-06-28 DIAGNOSIS — Z74.09 IMPAIRED FUNCTIONAL MOBILITY, BALANCE, GAIT, AND ENDURANCE: Primary | ICD-10-CM

## 2019-06-28 PROCEDURE — 97112 NEUROMUSCULAR REEDUCATION: CPT | Mod: PO

## 2019-06-28 PROCEDURE — 97110 THERAPEUTIC EXERCISES: CPT | Mod: PO

## 2019-07-01 NOTE — PROGRESS NOTES
Physical Therapy Daily Treatment Note     Name: Mary Prince  Clinic Number: [id number]    Therapy Diagnosis:   Encounter Diagnoses   Name Primary?    Impaired functional mobility, balance, gait, and endurance Yes    Bilateral arm weakness      Physician: Poncho Saunders MD    Visit Date: 7/2/2019    Medical Diagnosis: B arm weakness  Evaluation Date: 5-15-19  IA due: 6-15-19  Authorization Period Expiration: 4-25-20  NEW Plan of Care Certification Period: 6/25/2019-9/25/2019  Visit #/Visits authorized: 4/12   (7 total visits 2019)    Time In: 1102  Time Out: 1148  Total Billable Time: 46 minutes    Precautions: Standard and Fall    Subjective     Pt reports:She is doing well today, no complaints. She fell on Saturday when going to the bathroom in the middle of the night. She knocked her shoulder onto the bathroom counter, but aside from bruises is unharmed. She did not hit her head. She started going to the gym yesterday and did exercise for about 1 hour.    She was provided home exercise program today.  Response to previous treatment: none to report  Functional change: none to report    Pain: 0/10  Location: n/a    Objective     Italicized = not performed today    Mary received therapeutic exercises to develop strength, endurance, posture and core stabilization for 20 minutes including:  NuStep lv 7.0 for 9 minutes   Standing hip abduction 3x10 reps with peach theraband  Standing hip extension 3x10 reps with peach theraband  Standing marching 3x10 reps   Standing mini squats 3x15 reps  Standing heel raises 3x15 reps  Standing marching with 3 second hold at top 1 lap of // bars    Mary participated in neuromuscular re-education activities to improve: Balance, Coordination, Kinesthetic, Proprioception and Posture for 26 minutes. The following activities were included:  // bars:    Feet together, eyes open, on AirEx, 3x30 seconds, SBA, no UE support   WBOS, eyes closed, solid ground, 3 x 30 seconds, CGA, no UE  "support   Tandem stance with intermittent UE use, 2 x 30 seconds ea., CGA-Hemal    Alternating taps onto 6" step with no UE support, CGA, x20 reps   Step-up onto fitter first board, x15 reps, CGA   Modified SLS with alternate foot on fitter first board, 3x30 seconds    Self selected walking speed: .5 m/s (6m/11.6seconds)   >G-code = CK    Mary participated in dynamic functional therapeutic activities to improve functional performance for 0  minutes, including:      Mary participated in gait training to improve functional mobility and safety for 0  minutes, including:      Home Exercises Provided and Patient Education Provided     Education provided:   - HEP provided    Written Home Exercises Provided: yes.  Exercises were reviewed and Mary was able to demonstrate them prior to the end of the session.  Mary demonstrated good  understanding of the education provided.     See EMR under Patient Instructions for exercises provided 6/28/2019.    Assessment     Mary continues to tolerate sessions well and demonstrated some improvement in balance tasks already. She requires CGA for most tasks today. Pt continues to demonstrate foot drop with ambulation though she compensates well with short steps and using slight steppage gait. Pt is highly motivated to return to PLOF but PT is concerned she may have unrealistic expectations for her recovery. Pt is compliant with HEP and demonstrates initiative outside of sessions by going to gym. She remains appropriate for skilled PT services.     Mary is progressing well towards her goals.   Pt prognosis is Good.     Pt will continue to benefit from skilled outpatient physical therapy to address the deficits listed in the problem list box on initial evaluation, provide pt/family education and to maximize pt's level of independence in the home and community environment.     Pt's spiritual, cultural and educational needs considered and pt agreeable to plan of care and goals.   "   Anticipated barriers to physical therapy: none    Goals:   Short Term GOALS: (5 weeks)  1. Pt to increase 30 second sit to stand score to 9 without use of UE. progressing  2. Pt to increase Davidson Balance Score to 43 in order to decrease her risk for fall. progressing  3. Pt to increase TUG score with use of AD to </= 20 seconds. progressing  4. Pt will increase Nathanael condition 2 to 20 seconds with SBA. progressing  Long Term GOALS: (10 weeks)  1. Pt will increase 30 second sit to stand score to 11 without use of UEs. progressing  2. Pt will increase DGI score to 16 in order to decrease her risk for fall.progressing  3. Pt will pass Nathanael condition 2 with minimal sway. progressing  4. Pt will increase self selected walking speed to .7 in order to better be able to participate in pilgrimage. progressing  5. Pt will increase TUG score with use of AD to </= 15 seconds in order to better participate in pilgrimage. progressing  6. Pt will increase Davidson Balance score to 51 in order to decrease her risk for fall. progressing    Plan     Continue with current POC, progress balance as tolerated and continue to work on endurance and strengthening.    Efrain David, PT

## 2019-07-02 ENCOUNTER — CLINICAL SUPPORT (OUTPATIENT)
Dept: REHABILITATION | Facility: HOSPITAL | Age: 79
End: 2019-07-02
Payer: MEDICARE

## 2019-07-02 ENCOUNTER — TELEPHONE (OUTPATIENT)
Dept: SPINE | Facility: CLINIC | Age: 79
End: 2019-07-02

## 2019-07-02 DIAGNOSIS — Z74.09 IMPAIRED FUNCTIONAL MOBILITY, BALANCE, GAIT, AND ENDURANCE: Primary | ICD-10-CM

## 2019-07-02 DIAGNOSIS — R29.898 BILATERAL ARM WEAKNESS: ICD-10-CM

## 2019-07-02 PROCEDURE — G8979 MOBILITY GOAL STATUS: HCPCS | Mod: CJ,PO

## 2019-07-02 PROCEDURE — G8978 MOBILITY CURRENT STATUS: HCPCS | Mod: CK,PO

## 2019-07-02 PROCEDURE — 97110 THERAPEUTIC EXERCISES: CPT | Mod: PO

## 2019-07-02 PROCEDURE — 97112 NEUROMUSCULAR REEDUCATION: CPT | Mod: PO

## 2019-07-02 NOTE — TELEPHONE ENCOUNTER
Called and left message for patient that her information will be reviewed with  upon his return in August.  Patient to call if any questions or concerns.

## 2019-07-02 NOTE — TELEPHONE ENCOUNTER
Please let patient know that I will review her EMGs with Dr. Rider in August when he returns.    Thanks,  Shu Quiñones, TORRI, PA-C  Neurosurgery  [facility]  [facility]

## 2019-07-02 NOTE — PROGRESS NOTES
"  Physical Therapy Daily Treatment Note     Name: Mary Prince  Clinic Number: [id number]    Therapy Diagnosis:   Encounter Diagnoses   Name Primary?    Impaired functional mobility, balance, gait, and endurance Yes    Bilateral arm weakness      Physician: Poncho Saunders MD    Visit Date: 7/5/2019    Medical Diagnosis: B arm weakness  Evaluation Date: 5-15-19  LA due: 6-15-19  Authorization Period Expiration: 4-25-20  NEW Plan of Care Certification Period: 6/25/2019-9/25/2019  Visit #/Visits authorized: 5/12   (8 total visits 2019)    Time In: 1115  Time Out: 1200  Total Billable Time: 45 minutes    Precautions: Standard and Fall    Subjective     Pt reports:She is doing well today, no complaints. No falls since last session, and she has gone to the gym twice more since I last saw her. She walked on the treadmill at the gym.    She was provided home exercise program today.  Response to previous treatment: none to report  Functional change: none to report    Pain: 0/10  Location: n/a    Objective     Italicized = not performed today    Mary received therapeutic exercises to develop strength, endurance, posture and core stabilization for 15 minutes including:  SciFit StepOne, lv. 1.0 for 9 minutes   Standing hip abduction 3x10 reps with peach theraband  Standing hip extension 3x10 reps with peach theraband  Standing marching 3x10 reps   Standing mini squats 3x15 reps  Standing heel raises 3x15 reps  Standing marching with 3 second hold at top 1 lap of // bars    Mary participated in neuromuscular re-education activities to improve: Balance, Coordination, Kinesthetic, Proprioception and Posture for 30 minutes. The following activities were included:  // bars:    Feet together, eyes open, on AirEx, 3x30 seconds, SBA, no UE support   WBOS, eyes closed, solid ground, 3 x 30 seconds, CGA, no UE support   Tandem stance with intermittent UE use, 2 x 30 seconds ea., CGA-Hemal    Alternating taps onto 6" step with no UE " "support, CGA, x20 reps   Step-up onto 6" step with 1 UE support to simulate home environment, x10 reps   Step-up onto fitter first board, x15 reps, CGA   Modified SLS with alternate foot on fitter first board, 3x30 seconds    Side stepping, 20' x 2 trials ea direction, CGA  x20 sit to stand from EOM without UE support, SBA      Mary participated in dynamic functional therapeutic activities to improve functional performance for 0  minutes, including:      Mary participated in gait training to improve functional mobility and safety for 0  minutes, including:      Home Exercises Provided and Patient Education Provided     Education provided:   - HEP provided    Written Home Exercises Provided: yes.  Exercises were reviewed and Mary was able to demonstrate them prior to the end of the session.  Mary demonstrated good  understanding of the education provided.     See EMR under Patient Instructions for exercises provided 6/28/2019.    Assessment     Mary continues to tolerate sessions well. She did well with balance progressions but continues to struggle with tasks involving eyes closed tasks.  She is motivated and continues to participate well both in and outside of sessions. She remains appropriate for skilled PT services.     Mary is progressing well towards her goals.   Pt prognosis is Good.     Pt will continue to benefit from skilled outpatient physical therapy to address the deficits listed in the problem list box on initial evaluation, provide pt/family education and to maximize pt's level of independence in the home and community environment.     Pt's spiritual, cultural and educational needs considered and pt agreeable to plan of care and goals.     Anticipated barriers to physical therapy: none    Goals:   Short Term GOALS: (5 weeks)  1. Pt to increase 30 second sit to stand score to 9 without use of UE. progressing  2. Pt to increase Davidson Balance Score to 43 in order to decrease her risk for fall. " progressing  3. Pt to increase TUG score with use of AD to </= 20 seconds. progressing  4. Pt will increase Nathanael condition 2 to 20 seconds with SBA. progressing  Long Term GOALS: (10 weeks)  1. Pt will increase 30 second sit to stand score to 11 without use of UEs. progressing  2. Pt will increase DGI score to 16 in order to decrease her risk for fall.progressing  3. Pt will pass Nathanael condition 2 with minimal sway. progressing  4. Pt will increase self selected walking speed to .7 in order to better be able to participate in pilgrimage. progressing  5. Pt will increase TUG score with use of AD to </= 15 seconds in order to better participate in pilgrimage. progressing  6. Pt will increase Davidson Balance score to 51 in order to decrease her risk for fall. progressing    Plan     Continue with current POC, progress balance as tolerated and continue to work on endurance and strengthening.    Efrain David, PT

## 2019-07-05 ENCOUNTER — CLINICAL SUPPORT (OUTPATIENT)
Dept: REHABILITATION | Facility: HOSPITAL | Age: 79
End: 2019-07-05
Payer: MEDICARE

## 2019-07-05 DIAGNOSIS — R29.898 BILATERAL ARM WEAKNESS: ICD-10-CM

## 2019-07-05 DIAGNOSIS — Z74.09 IMPAIRED FUNCTIONAL MOBILITY, BALANCE, GAIT, AND ENDURANCE: Primary | ICD-10-CM

## 2019-07-05 PROCEDURE — 97110 THERAPEUTIC EXERCISES: CPT | Mod: PO

## 2019-07-05 PROCEDURE — 97112 NEUROMUSCULAR REEDUCATION: CPT | Mod: PO

## 2019-07-09 ENCOUNTER — CLINICAL SUPPORT (OUTPATIENT)
Dept: REHABILITATION | Facility: HOSPITAL | Age: 79
End: 2019-07-09
Attending: NEUROLOGICAL SURGERY
Payer: MEDICARE

## 2019-07-09 ENCOUNTER — CLINICAL SUPPORT (OUTPATIENT)
Dept: REHABILITATION | Facility: HOSPITAL | Age: 79
End: 2019-07-09
Payer: MEDICARE

## 2019-07-09 DIAGNOSIS — M25.511 CHRONIC PAIN OF BOTH SHOULDERS: ICD-10-CM

## 2019-07-09 DIAGNOSIS — R29.898 BILATERAL ARM WEAKNESS: ICD-10-CM

## 2019-07-09 DIAGNOSIS — M25.612 SHOULDER JOINT STIFFNESS, BILATERAL: ICD-10-CM

## 2019-07-09 DIAGNOSIS — R29.898 BILATERAL ARM WEAKNESS: Primary | ICD-10-CM

## 2019-07-09 DIAGNOSIS — M25.611 SHOULDER JOINT STIFFNESS, BILATERAL: ICD-10-CM

## 2019-07-09 DIAGNOSIS — G89.29 CHRONIC PAIN OF BOTH SHOULDERS: ICD-10-CM

## 2019-07-09 DIAGNOSIS — Z74.09 IMPAIRED FUNCTIONAL MOBILITY, BALANCE, GAIT, AND ENDURANCE: ICD-10-CM

## 2019-07-09 DIAGNOSIS — M25.512 CHRONIC PAIN OF BOTH SHOULDERS: ICD-10-CM

## 2019-07-09 PROCEDURE — 97166 OT EVAL MOD COMPLEX 45 MIN: CPT | Mod: PO

## 2019-07-09 PROCEDURE — 97110 THERAPEUTIC EXERCISES: CPT | Mod: PO

## 2019-07-09 PROCEDURE — 97112 NEUROMUSCULAR REEDUCATION: CPT | Mod: PO

## 2019-07-09 NOTE — PLAN OF CARE
[facility] Occupational Therapy  Initial Evaluation     Date: 7/9/2019  Name: Mary Prince  Clinic Number: [id number]    Therapy Diagnosis:   Encounter Diagnoses   Name Primary?    Shoulder joint stiffness, bilateral     Bilateral arm weakness Yes    Chronic pain of both shoulders      Physician: Ahmet Maurer MD    Physician Orders: Eval and treat  Medical Diagnosis: S14.3XXA (ICD-10-CM) - Brachial plexus injury  Surgical Procedure and Date: 2016  Evaluation Date: 7/9/2019  Insurance Authorization Period Expiration: 6/20/20  Plan of Care Certification Period: 8/7/19  Date of Return to MD: TBD    Visit # / Visits authorized: 1/ 1   Time In:10:00AM  Time Out: 11:00AM  Total Billable Time: 60 minutes    Precautions:  Standard and Diabetes    Subjective     Involved Side: Bilateral,   Dominant Side: Right  Date of Onset: Condition progressing since 2016  History of Current Condition/Mechanism of Injury: Pt referred to OT for UE strengthening due to brachial plexus injury. She underwent spinal surgery in 2016 and required extensive hospitalization afterward due to multiple complications and infection. She was diagnosed with injury to her brachial plexus, affecting her motion and strength, which significantly limits functional use of BUE during all ADLs.  Imaging: Not available yet via [facility]  Previous Therapy: yes    Past Medical History/Physical Systems Review:   Mary Prince  has a past medical history of Cancer, Cataract, Coronary artery disease, Diabetes mellitus, type 2, Hyperlipidemia, Hypertension, and Thyroid disease.    Mary Prince  has a past surgical history that includes Eye surgery (Bilateral); Hysterectomy; Spine surgery; Appendectomy; Joint replacement (Right); and Breast surgery (Bilateral).    Mary has a current medication list which includes the following prescription(s): alprazolam, aspirin, atorvastatin, biotin, clopidogrel, cyanocobalamin (vitamin b-12), furosemide,  "gabapentin, insulin aspart u-100, letrozole, levothyroxine, potassium aminobenzoate, ramipril, and vitamin d.    Review of patient's allergies indicates:  No Known Allergies     Patient's Goals for Therapy: "I want to learn how to wipe my own butt and to be able to use my arms and hands again."    Pain:  Functional Pain Scale Rating 0-10:   8/10 on average  Location: L Shoulder  Description: Burning    Occupation:  Nurse     Functional Limitations/Social History:    Previous functional status includes: Independent with all ADLs.     Current FunctionalStatus   Home/Living environment : lives with an adult       Limitation of Functional Status as follows:    ADLs: Wiping after using the bathroom, Donning shirts, Difficulty washing hair and back, Eating with utensils, Styling hair    IADLs: Unable to turning pages of a book, Moderate difficulty with all other IADLs    Objective     Observation: Pt presents ambulating with a cane. She demos thoracic kyphosis with shoulder internal rotation and forward head posturing.     [place]-Joshua Monofilament Test:  LUE: Diminished light touch in the ulnar and median nerve distributions. Diminished protective sensation in the radial nerve distribution.  RUE: Diminished light touch in the ulnar, median, and radial nerve distributions.    9-Hole Peg Test:  LUE: 33 seconds  RUE: 33 seconds    Bilateral shoulder flexion AROM approximately 75 degrees.  Bileral shoulder abduction AROM approximately 85 degrees.  Bilateral AROM of elbows, wrist, and hands WFL.      Strength: (LANDON Dynamometer in lbs.) Average 3 trials, Position II:  7/10/2019 Left Right    28 32     Pinch Strength (Measured in psi)  7/10/19 Left Right   Lateral 6 5     CMS Impairment/Limitation/Restriction for FOTO Upper Arm Survey    Therapist reviewed FOTO scores for Mary Prince on 7/9/2019.   FOTO documents entered into [employer] - see Media section.    Limitation Score: 52%  Category: Self " Care    Current : CK = at least 40% but < 60% impaired, limited or restricted  Goal: CK = at least 40% but < 60% impaired, limited or restricted       Treatment     Treatment Time In: 10:50AM  Treatment Time Out: 11:00AM  Total Treatment time separate from Evaluation time:10 minutes    Mary received therapeutic exercises for 10 minutes including:  -Patient education  -Initial HEP instruction and demonstration    Home Exercise Program/Education:  Issued HEP (see patient instructions in EMR) and educated on modality use for pain management . Exercises were reviewed and Mary was able to demonstrate them prior to the end of the session.   Pt received a written copy of exercises to perform at home. Mary demonstrated good  understanding of the education provided.  Pt was advised to perform these exercises free of pain, and to stop performing them if pain occurs.    Patient/Family Education: role of OT, goals for OT, scheduling/cancellations - pt verbalized understanding. Discussed insurance limitations with patient.    Additional Education provided: Emphasis on proximal strengthening    Assessment     Mary Prince is a 78 y.o. female referred to outpatient occupational therapy with a medical diagnosis of brachial plexus injury resulting in Decreased ROM, Decreased  strength, Decreased pinch strength, Decreased muscle strength, Decreased functional hand use, Increased pain, Joint Stiffness, Diminished/Impaired Sensation and Diminished/Impaired Coordination and demonstrates limitations as described in the chart below. Following medical record review it is determined that pt will benefit from occupational therapy services in order to maximize pain free and/or functional use of bilateral UE. The following goals were discussed with the patient and patient is in agreement with them as to be addressed in the treatment plan. The patient's rehab potential is Fair.     Anticipated barriers to occupational therapy:  chronicity and severity of injury, multiple anatomical structures affected  Pt has no cultural, educational or language barriers to learning provided.    Profile and History Assessment of Occupational Performance Level of Clinical Decision Making Complexity Score   Occupational Profile:   Mary Prince is a 78 y.o. female who lives with an adult  and is not working. Mary Prince has difficulty with  ADLs and IADLs as listed previously, which  affecting his/her daily functional abilities.      Comorbidities:    has a past medical history of Cancer, Cataract, Coronary artery disease, Diabetes mellitus, type 2, Hyperlipidemia, Hypertension, and Thyroid disease.    Medical and Therapy History Review:   Brief Performance Deficits    Physical:  Joint Mobility  Muscle Power/Strength   Strength  Pinch Strength  Fine Motor Coordination  Proprioception Functions  Tactile Functions  Postural Control  Pain    Cognitive:  No Deficits    Psychosocial:    No Deficits     Clinical Decision Making:  moderate    Assessment Process:  Detailed Assessments    Modification/Need for Assistance:  Not Necessary    Intervention Selection:  Several Treatment Options       moderate  Based on PMHX, co morbidities , data from assessments and functional level of assistance required with task and clinical presentation directly impacting function.       The following goals were discussed with the patient and patient is in agreement with them as to be addressed in the treatment plan.     Short Term Goals:   To be completed in 1 week:  1) Patient will be independent in HEP   To be completed in 2-4 weeks:  2) Decrease shoulder pain to 5/10 to increase ADL and IADL participation  3) Increase  strength by at least 12 pounds each to improve safety during ADL performance  4) Increase shoulder flexion and abduction AROM to at least 100 degrees to improve dressing and bathing performance  5) Improve timed 9-Hole Peg Test by 10 seconds,  indicative of increased dexterity for IADLs  Long Term Goals:   To be completed by discharge:  6) Decrease FOTO limitation score to 44%, indicative of improved functional independence  7) Patient will perform all ADLs and most IADLs with independence or modified independence      Plan   Certification Period/Plan of care expiration: 7/9/2019 to 8/7/19.    Outpatient Occupational Therapy 1 times weekly for 6 visits to include the following interventions: Manual therapy/joint mobilizations, Modalities for pain management, Therapeutic exercises/activities., Strengthening and Joint Protection.      Beverley Yates, OT

## 2019-07-09 NOTE — PROGRESS NOTES
"  Physical Therapy Daily Treatment Note     Name: Mary Prince  Clinic Number: [id number]    Therapy Diagnosis:   Encounter Diagnoses   Name Primary?    Impaired functional mobility, balance, gait, and endurance     Bilateral arm weakness      Physician: Poncho Saunders MD    Visit Date: 7/9/2019    Medical Diagnosis: B arm weakness  Evaluation Date: 5-15-19  [place] due: 6-15-19  Authorization Period Expiration: 4-25-20  NEW Plan of Care Certification Period: 6/25/2019-9/25/2019  Visit #/Visits authorized: 6/12   (8 total visits 2019)    Time In: 1115  Time Out: 1200  Total Billable Time: 45 minutes    Precautions: Standard and Fall    Subjective     Pt reports: " I feel yesterday and hit my left knee and foot.  I'm getting these electric shocks on my foot.  I don't know what that is."   She was provided home exercise program today.  Response to previous treatment: none to report  Functional change: none to report    Pain: 0/10  Location: n/a    Objective       Mary received therapeutic exercises to develop strength, endurance, posture and core stabilization for 10 minutes including:  SciFit StepOne, lv. 3.0 for 10 minutes, B UE/B LE.      Mary participated in neuromuscular re-education activities to improve: Balance, Coordination, Kinesthetic, Proprioception and Posture for 30 minutes. The following activities were included:  // bars:    Airex foam pad:    4 x 30 sec of tandem stance with no UE support and CGA    X 60 sec of static standing with arms crossed and head turns right to left    X 60 sec of static standing with arms crossed and head turns up/down     X 30 reps of B UE chest press with soccer ball     X 30 reps of B LE alternating toe taps to large orange cone with 1 UE support and CGA.    3 x 30 sec of static standing with eyes closed and CGA.        Mary participated in dynamic functional therapeutic activities to improve functional performance for 0  minutes, including:      Mary participated in " "gait training to improve functional mobility and safety for 0  minutes, including:      Home Exercises Provided and Patient Education Provided     Education provided:   - HEP provided, 5 mins speaking to patient about her fall, her symptoms, and educated pt to go to clinic is problems persist.   Ice and elevation of L foot at home for swelling.      Written Home Exercises Provided: yes.  Exercises were reviewed and Mary was able to demonstrate them prior to the end of the session.  Mary demonstrated good  understanding of the education provided.     See EMR under Patient Instructions for exercises provided 6/28/2019.    Assessment     aMry continues to tolerate sessions well despite her recent fall and new symptoms of "electric shocks" in her left foot randomly.  Efrain David, PT present to speak to patient and both therapists encouraged pt to go urgent care or ER if symptoms persist or get worse.  Pt agreeable to standing balance activities and was able to complete entire tx session despite her shocks in her left foot.  No loss of balance noted during tx session.  Cont with Plan of care.      Mary is progressing well towards her goals.   Pt prognosis is Good.     Pt will continue to benefit from skilled outpatient physical therapy to address the deficits listed in the problem list box on initial evaluation, provide pt/family education and to maximize pt's level of independence in the home and community environment.     Pt's spiritual, cultural and educational needs considered and pt agreeable to plan of care and goals.     Anticipated barriers to physical therapy: none    Goals:   Short Term GOALS: (5 weeks)  1. Pt to increase 30 second sit to stand score to 9 without use of UE. progressing  2. Pt to increase Davidson Balance Score to 43 in order to decrease her risk for fall. progressing  3. Pt to increase TUG score with use of AD to </= 20 seconds. progressing  4. Pt will increase Nathanael condition 2 to 20 " seconds with SBA. progressing  Long Term GOALS: (10 weeks)  1. Pt will increase 30 second sit to stand score to 11 without use of UEs. progressing  2. Pt will increase DGI score to 16 in order to decrease her risk for fall.progressing  3. Pt will pass [place] condition 2 with minimal sway. progressing  4. Pt will increase self selected walking speed to .7 in order to better be able to participate in pilgrimage. progressing  5. Pt will increase TUG score with use of AD to </= 15 seconds in order to better participate in pilgrimage. progressing  6. Pt will increase Davidson Balance score to 51 in order to decrease her risk for fall. progressing    Plan     Continue with current POC, progress balance as tolerated and continue to work on endurance and strengthening.    Kate Taveras, PTA

## 2019-07-09 NOTE — PATIENT INSTRUCTIONS
Shoulder (Scapula) Retraction        Lleve los hombros hacia atrás juntando las escápulas.  Repita ___ veces/sesión. Jorden ___ sesiones/semana.   Posición: De pie     Copyright © I. All rights reserved.

## 2019-07-10 NOTE — PROGRESS NOTES
"  Physical Therapy Daily Treatment Note     Name: Mary Prince  Clinic Number: [id number]    Therapy Diagnosis:   Encounter Diagnoses   Name Primary?    Impaired functional mobility, balance, gait, and endurance     Bilateral arm weakness      Physician: Poncho Saunders MD    Visit Date: 7/11/2019    Medical Diagnosis: B arm weakness  Evaluation Date: 5-15-19  MS due: 6-15-19  Authorization Period Expiration: 4-25-20  NEW Plan of Care Certification Period: 6/25/2019-9/25/2019  Visit #/Visits authorized: 7/12   (10 total visits 2019)    Time In: 1116  Time Out: 1201  Total Billable Time: 45 minutes    Precautions: Standard and Fall    Subjective     Pt reports: " I wan't to do more aggressive stuff, like walking without a device." Pt reports her L knee and foot feels better since a recent fall. " No more electric shocks in my foot."  She was provided home exercise program previously.  Response to previous treatment: none to report  Functional change: none to report    Pain: 4-5/10  Location: lower back    Objective           Mary participated in neuromuscular re-education activities to improve: Balance, Coordination, Kinesthetic, Proprioception and Posture for 30 minutes. The following activities were included:  // bars:     2 x 10 B LE step ups/downs on foam fitter, no UE support, min A  2 x 10 alternate LE step overs on foam fitter, no UE support, min A  2 x 10 1/2 squats while standing on foam fitter, light B UE support, CGA    1 x 10 B alternate LE toe taps against large cone while standing on foam fitter, B UE support, CGA  1 x 10 B alternate LE toe taps against large cone while standing on foam fitter, B 1 finger support, CGA  1 x 10 B alternate LE toe taps against large cone while standing on foam fitter, no UE support, CGA/min A    Standing at Pilates Box:  2 x 10 B LE alternate hip and knee flex/ext with 2 white and 2 black springs set on level 1, no UE support, min A for balance with cues for postural " corrections         Mary participated in gait training to improve functional mobility and safety for 15  minutes, including:  //bars:  X 8 laps forward ambulation with no UE support, CGA  X 4 laps sideward ambulation with no UE support, CGA  X 4 laps backward ambulation with no UE support, CGA    Pt ascends and descends 16 six inch steps with use of rails: 1st 8 steps performed with L LE leading to ascend and R LE leading to descend; 2nd 8 steps performed with R LE leading to ascend and L LE leading to descend. Pt performs step-to pattern in each direction with CGA/SBA to ascend and CGA to descend.       Home Exercises Provided and Patient Education Provided     Education provided:   - HEP provided, 5 mins speaking to patient about her fall, her symptoms, and educated pt to go to clinic is problems persist.   Ice and elevation of L foot at home for swelling.      Written Home Exercises Provided: yes.  Exercises were reviewed and Mary was able to demonstrate them prior to the end of the session.  Mary demonstrated good  understanding of the education provided.     See EMR under Patient Instructions for exercises provided 6/28/2019.    Assessment     Mary tolerated today's session well, which was intended to challenge pt to her level of satisfaction. Pt is no longer bothered by L knee and foot pain from a recent fall and was eager to perform more aggressive balance and gait tasks today. Along with this, PT reminded pt that the level of difficulty needs to be appropriate for the activity to be truly meaningful. Pt seems to understand this and is very motivated to perform. PT chose several activities which challenge pt's ability to perform SLS or a version of this( cone tapping on foam fitter, Pilates box task, etc.) Pt will also continue to benefit from additional gait challenges without AD( with gradual progressions as appropriate). Pt remains appropriate for continued OPPT to address current limitations with  LE strength, balance and gait.  Cont with current plan of care.      Mary is progressing well towards her goals.   Pt prognosis is Good.     Pt will continue to benefit from skilled outpatient physical therapy to address the deficits listed in the problem list box on initial evaluation, provide pt/family education and to maximize pt's level of independence in the home and community environment.     Pt's spiritual, cultural and educational needs considered and pt agreeable to plan of care and goals.     Anticipated barriers to physical therapy: none    Goals:   Short Term GOALS: (5 weeks)  1. Pt to increase 30 second sit to stand score to 9 without use of UE. progressing  2. Pt to increase Davidson Balance Score to 43 in order to decrease her risk for fall. progressing  3. Pt to increase TUG score with use of AD to </= 20 seconds. progressing  4. Pt will increase [place] condition 2 to 20 seconds with SBA. progressing  Long Term GOALS: (10 weeks)  1. Pt will increase 30 second sit to stand score to 11 without use of UEs. progressing  2. Pt will increase DGI score to 16 in order to decrease her risk for fall.progressing  3. Pt will pass [place] condition 2 with minimal sway. progressing  4. Pt will increase self selected walking speed to .7 in order to better be able to participate in pilgrimage. progressing  5. Pt will increase TUG score with use of AD to </= 15 seconds in order to better participate in pilgrimage. progressing  6. Pt will increase Davidson Balance score to 51 in order to decrease her risk for fall. progressing    Plan   Plan for next session: assess pt's goal achievement.    Continue with current POC, progress balance as tolerated and continue to work on endurance and strengthening.    Graeme Castillo, PT

## 2019-07-11 ENCOUNTER — CLINICAL SUPPORT (OUTPATIENT)
Dept: REHABILITATION | Facility: HOSPITAL | Age: 79
End: 2019-07-11
Payer: MEDICARE

## 2019-07-11 DIAGNOSIS — Z74.09 IMPAIRED FUNCTIONAL MOBILITY, BALANCE, GAIT, AND ENDURANCE: ICD-10-CM

## 2019-07-11 DIAGNOSIS — R29.898 BILATERAL ARM WEAKNESS: ICD-10-CM

## 2019-07-11 PROCEDURE — 97116 GAIT TRAINING THERAPY: CPT | Mod: PO

## 2019-07-11 PROCEDURE — 97112 NEUROMUSCULAR REEDUCATION: CPT | Mod: PO

## 2019-07-15 NOTE — PROGRESS NOTES
Physical Therapy Daily Treatment Note     Name: Mary Prince  Clinic Number: [id number]    Therapy Diagnosis:   Encounter Diagnoses   Name Primary?    Impaired functional mobility, balance, gait, and endurance Yes    Bilateral arm weakness      Physician: Poncho Saunders MD    Visit Date: 7/16/2019    Medical Diagnosis: B arm weakness  Evaluation Date: 5-15-19  Authorization Period Expiration: 4-25-20  NEW Plan of Care Certification Period: 6/25/2019-9/25/2019  Visit #/Visits authorized: 7/12   (10 total visits 2019)    Time In: 1350  Time Out: 1435  Total Billable Time: 45 minutes    Precautions: Standard and Fall    Subjective     Pt reports: She continues to go to the gym and perform HEP. She has not fallen since last session. She really enjoyed how much she was challenged last session.    She was provided home exercise program previously.  Response to previous treatment: she enjoyed the aggressiveness of last session  Functional change: none to report    Pain: 4/10  Location: lower back    Objective     Mary participated in neuromuscular re-education activities to improve: Balance, Coordination, Kinesthetic, Proprioception and Posture for 15 minutes. The following activities were included:  // bars:     2 x 10 B LE step ups/downs on foam fitter, no UE support, min A  2 x 10 alternate LE step overs on foam fitter, no UE support, min A  2 x 15 1/2 squats while standing on foam fitter, light B UE support, CGA    1 x 10 ea B LE toe taps against large cone while standing on foam fitter, no UE support, CGA  1 x 10 B alternate LE toe taps against large cone while standing on foam fitter, B 1 finger support, CGA    x5 sit to stands from EOM and on fitter board    Standing at Pilates Box: (not performed today due to time restrictions)  2 x 10 B LE alternate hip and knee flex/ext with 2 white and 2 black springs set on level 1, no UE support, min A for balance with cues for postural corrections    Mary participated  "in therapeutic exercises to improve functional mobility and safety for 15  minutes, includin minutes on NuStep, workload 7  x10 drop down from 6" step for increased eccentric control at the knee     Mary participated in gait training to improve functional mobility and safety for 15  minutes, including:  //bars:  X 8 laps forward ambulation with no UE support, CGA  X 4 laps sideward ambulation with no UE support, CGA  X 4 laps backward ambulation with no UE support, CGA    Pt ascends and descends 12 six inch steps with use of rails: she uses step through pattern with increased time for completion of task and uses R hand rail only. During stair ascension pt has to brace her L UE on thigh to get to next step.       Home Exercises Provided and Patient Education Provided     Education provided:   - continuation of exercise outside of sessions and safety to prevent further falls    Written Home Exercises Provided: yes.  Exercises were reviewed and Mary was able to demonstrate them prior to the end of the session.  Mary demonstrated good  understanding of the education provided.     See EMR under Patient Instructions for exercises provided 2019.    Assessment     Mary tolerated today's session well. She continues to require education on level of activity and properly challenging. She tolerates addition of eccentric exercise from steps well and continues to concentrate on the fact that she wants to be "normal" again. PT attempts to educate pt on realistic expectations. She continues to struggle with dynamic balance and endurance throughout session. Cont with current plan of care.      Mary is progressing well towards her goals.   Pt prognosis is Good.     Pt will continue to benefit from skilled outpatient physical therapy to address the deficits listed in the problem list box on initial evaluation, provide pt/family education and to maximize pt's level of independence in the home and community " environment.     Pt's spiritual, cultural and educational needs considered and pt agreeable to plan of care and goals.     Anticipated barriers to physical therapy: none    Goals:   Short Term GOALS: (5 weeks)  1. Pt to increase 30 second sit to stand score to 9 without use of UE. progressing  2. Pt to increase Davidson Balance Score to 43 in order to decrease her risk for fall. progressing  3. Pt to increase TUG score with use of AD to </= 20 seconds. progressing  4. Pt will increase [place] condition 2 to 20 seconds with SBA. progressing  Long Term GOALS: (10 weeks)  1. Pt will increase 30 second sit to stand score to 11 without use of UEs. progressing  2. Pt will increase DGI score to 16 in order to decrease her risk for fall.progressing  3. Pt will pass [place] condition 2 with minimal sway. progressing  4. Pt will increase self selected walking speed to .7 in order to better be able to participate in pilgrimage. progressing  5. Pt will increase TUG score with use of AD to </= 15 seconds in order to better participate in pilgrimage. progressing  6. Pt will increase Davidson Balance score to 51 in order to decrease her risk for fall. progressing    Plan     Continue with current POC, progress balance as tolerated and continue to work on endurance and strengthening.    Efrain David, PT

## 2019-07-16 ENCOUNTER — CLINICAL SUPPORT (OUTPATIENT)
Dept: REHABILITATION | Facility: HOSPITAL | Age: 79
End: 2019-07-16
Payer: MEDICARE

## 2019-07-16 DIAGNOSIS — Z74.09 IMPAIRED FUNCTIONAL MOBILITY, BALANCE, GAIT, AND ENDURANCE: Primary | ICD-10-CM

## 2019-07-16 DIAGNOSIS — R29.898 BILATERAL ARM WEAKNESS: ICD-10-CM

## 2019-07-16 PROCEDURE — 97116 GAIT TRAINING THERAPY: CPT | Mod: PO

## 2019-07-16 PROCEDURE — 97110 THERAPEUTIC EXERCISES: CPT | Mod: PO

## 2019-07-16 PROCEDURE — 97112 NEUROMUSCULAR REEDUCATION: CPT | Mod: PO

## 2019-07-18 NOTE — PROGRESS NOTES
Physical Therapy Daily Treatment Note     Name: Mary Prince  Clinic Number: [id number]    Therapy Diagnosis:   Encounter Diagnoses   Name Primary?    Impaired functional mobility, balance, gait, and endurance Yes    Bilateral arm weakness      Physician: Poncho Saunders MD    Visit Date: 7/19/2019    Medical Diagnosis: B arm weakness  Evaluation Date: 5-15-19  Authorization Period Expiration: 4-25-20  NEW Plan of Care Certification Period: 6/25/2019-9/25/2019  Visit #/Visits authorized: 8/12   (10 total visits 2019)    Time In: 1115  Time Out: 1203  Total Billable Time: 48 minutes    Precautions: Standard and Fall    Subjective     Pt reports: Doing well, no new complaints. She had to put a pain patch (biofreeze) on this morning.    She was provided home exercise program previously.  Response to previous treatment: she enjoyed the aggressiveness of last session  Functional change: none to report    Pain: 2/10  Location: lower back    Objective     Mary participated in neuromuscular re-education activities to improve: Balance, Coordination, Kinesthetic, Proprioception and Posture for 15 minutes. The following activities were included:  // bars:     2 x 10 B LE step ups/downs on foam fitter, no UE support, min   2 x 15 1/2 squats while standing on foam fitter, no UE support, CGA  2x 30 seconds eyes close, feet together, CGA, moderate sway  1 x 10 B alternate LE toe taps against large cone while standing on foam fitter, B 1 finger support, CGA-Hemal     x10 sit to stands from EOM and on fitter board, pt struggles to complete final 4 and requires several attempts    Standing at Pilates Box:   2 x 10 B LE alternate hip and knee flex/ext with 2 white and 2 black springs set on level 1, no UE support, min A for balance with cues for postural corrections    Mary participated in therapeutic exercises to improve functional mobility and safety for 15  minutes, including:  10 minutes on [employer] StepOne, level  2.0     Mary participated in gait training to improve functional mobility and safety for 15  minutes, including:  //bars:  X 4 laps sideward ambulation with no UE support, CGA    Pt ambulates between activities during session with no AD. She uses wide [place] and often holds arms out to brace herself. PT explains to pt that she is more safe when using AD and should not attempt to ambulate without one at this time to avoid future falls.        Home Exercises Provided and Patient Education Provided     Education provided:   - continuation of exercise outside of sessions and safety to prevent further falls    Written Home Exercises Provided: yes.  Exercises were reviewed and Mary was able to demonstrate them prior to the end of the session.  Mary demonstrated good  understanding of the education provided.     See EMR under Patient Instructions for exercises provided 6/28/2019.    Assessment     Mary tolerated today's session well. She struggles with eyes closed tasks and requires frequent rest breaks. Pt reports normal muscular fatigue. Pt continues to benefit from balance, gait, and endurance training.     Mary is progressing well towards her goals.   Pt prognosis is Good.     Pt will continue to benefit from skilled outpatient physical therapy to address the deficits listed in the problem list box on initial evaluation, provide pt/family education and to maximize pt's level of independence in the home and community environment.     Pt's spiritual, cultural and educational needs considered and pt agreeable to plan of care and goals.     Anticipated barriers to physical therapy: none    Goals:   Short Term GOALS: (5 weeks)  1. Pt to increase 30 second sit to stand score to 9 without use of UE. progressing  2. Pt to increase Davidson Balance Score to 43 in order to decrease her risk for fall. progressing  3. Pt to increase TUG score with use of AD to </= 20 seconds. progressing  4. Pt will increase [place] condition 2 to  20 seconds with SBA. progressing  Long Term GOALS: (10 weeks)  1. Pt will increase 30 second sit to stand score to 11 without use of UEs. progressing  2. Pt will increase DGI score to 16 in order to decrease her risk for fall.progressing  3. Pt will pass [place] condition 2 with minimal sway. progressing  4. Pt will increase self selected walking speed to .7 in order to better be able to participate in pilgrimage. progressing  5. Pt will increase TUG score with use of AD to </= 15 seconds in order to better participate in pilgrimage. progressing  6. Pt will increase Davidson Balance score to 51 in order to decrease her risk for fall. progressing    Plan     Incorporate more eyes closed tasks    Efrain David, PT

## 2019-07-19 ENCOUNTER — CLINICAL SUPPORT (OUTPATIENT)
Dept: REHABILITATION | Facility: HOSPITAL | Age: 79
End: 2019-07-19
Payer: MEDICARE

## 2019-07-19 DIAGNOSIS — R29.898 BILATERAL ARM WEAKNESS: ICD-10-CM

## 2019-07-19 DIAGNOSIS — Z74.09 IMPAIRED FUNCTIONAL MOBILITY, BALANCE, GAIT, AND ENDURANCE: Primary | ICD-10-CM

## 2019-07-19 PROCEDURE — 97110 THERAPEUTIC EXERCISES: CPT | Mod: PO

## 2019-07-19 PROCEDURE — 97116 GAIT TRAINING THERAPY: CPT | Mod: PO

## 2019-07-19 PROCEDURE — 97112 NEUROMUSCULAR REEDUCATION: CPT | Mod: PO

## 2019-07-23 ENCOUNTER — CLINICAL SUPPORT (OUTPATIENT)
Dept: REHABILITATION | Facility: HOSPITAL | Age: 79
End: 2019-07-23
Payer: MEDICARE

## 2019-07-23 DIAGNOSIS — M25.611 SHOULDER JOINT STIFFNESS, BILATERAL: ICD-10-CM

## 2019-07-23 DIAGNOSIS — Z74.09 IMPAIRED FUNCTIONAL MOBILITY, BALANCE, GAIT, AND ENDURANCE: Primary | ICD-10-CM

## 2019-07-23 DIAGNOSIS — M25.612 SHOULDER JOINT STIFFNESS, BILATERAL: ICD-10-CM

## 2019-07-23 DIAGNOSIS — R29.898 BILATERAL ARM WEAKNESS: ICD-10-CM

## 2019-07-23 PROCEDURE — 97110 THERAPEUTIC EXERCISES: CPT | Mod: PO

## 2019-07-23 PROCEDURE — 97112 NEUROMUSCULAR REEDUCATION: CPT | Mod: PO

## 2019-07-23 PROCEDURE — G8978 MOBILITY CURRENT STATUS: HCPCS | Mod: CK,PO

## 2019-07-23 NOTE — PLAN OF CARE
Physical Therapy Daily Treatment Note     Name: Mary Prince  Clinic Number: [id number]    Therapy Diagnosis:   Encounter Diagnoses   Name Primary?    Shoulder joint stiffness, bilateral     Impaired functional mobility, balance, gait, and endurance Yes    Bilateral arm weakness      Physician: Poncho Saunders MD    Visit Date: 2019    Medical Diagnosis: B arm weakness  Evaluation Date: 5-15-19  Authorization Period Expiration: 20  NEW Plan of Care Certification Period: 2019-2019  Visit #/Visits authorized:    (11 total visits 2019)    Time In: 1345  Time Out: 1430  Total Billable Time: 45 minutes    Precautions: Standard and Fall    Subjective     Pt reports: She is feeling tired of trying so hard to move. Feeling very discouraged today.    She was provided home exercise program previously.  Response to previous treatment: she enjoyed the aggressiveness of last session  Functional change: none to report    Pain: 2/10  Location: lower back    Objective     Mary participated in neuromuscular re-education activities to improve: Balance, Coordination, Kinesthetic, Proprioception and Posture for 35 minutes. The following activities were included:  // bars:      2019   TUG 26.37 seconds with cane  26.08 seconds without AD 13.7 seconds with cane  10.7 seconds without AD   30 second sit to stand 7 without UE use 14 without UE use   Self selected walking speed .54 m/sec .75 m/s (6m/8.0 seconds)  G-code = CK   Fast walking speed .71 m/sec .89 m/s (6m/6.7 seconds)   RUIZ 38/56 41/56   DGI 10/24 14/24     RUIZ Assessment  1. Sitting to Standin - able to stand without using hands and stabilize independently  2. Standing Unsupported: 4 - able to stand safely 2 minutes without hold  3. Sitting Unsupported: 4 - able to sit safely and securely 2 minutes  4. Standing to Sittin - sits safely with minimal use of hands  5. Pivot Transfer: 4 - able to trnasfer safely with minor use of  hands  6. Standing with Eyes Closed: 3 - able to stand 10 seconds with supervision  7. Standing with Feet Together: 4 - able to place feet together independently and stand 1 minute safely  8. Reaching Forward with Outstretched Arm: 3 - can reach forward 12 cm/5 inches safely  9. Retrieving Object from Floor: 3 - able to pick slipper but needs supervision  10. Turning to Look Behind: 2 - turns sideways only but maintains balance  11. Turning 360 Degrees: 2 - able to turn 360 safely but slowly  12. Placing Alternate Foot on Step: 3 - able to stand independently and completely 8 steps > 20 seconds  13. Standing with One Foot in Front: 0 - Looses balance while stepping or standing  14. Standing on One Foot: 1 - tries to lift leg and unable to hold 3 seconds but remains standing independently  Total: 41  Maximum: 56  (fall risk)    Fall risk cut-off scores:   Elderly and History of falls: <51/56   Elderly and No history of falls: <42/56   CVA: <45/56       DYNAMIC GAIT INDEX:    1. Gait level surface= 2   3) Normal: walks 20', no AD, good speed, no evidence for imbalance,   normal gait pattern   2) Mild impairment: walks 20', uses AD, slower speed, mild gait deviations   1)  moderate impairment: walks 20', slow speed, abnormal gait pattern,   evidence for imbalance.   0)  severe impairment: cannot walk 20' without assistance, severe gait   deviations or imbalance  2. Change in gait speed: 1   3) Normal: able to smoothly change walking speed without loss of balance   or gait deviation. Shows a significant difference in walking speeds    between fast, slow, and normal speeds.    2) Mild impairment: is able to change speeds but demonstrates mild gait   deviations, or not gait deviations but unable to achieve a significant   change in velocity, or uses AD   1) Moderate impairment: makes only minor adjustments in walking speed,   or accomplishes a change in speed with significant gait deviation, or   changes speed but loses  "balance and is able to recover and keep    walking    0) Severe impairment: cannot change speeds, or loses balance and has to   reach for wall or be caught.  3. Gait with horizontal head turns: 2   3) Normal: performs head turns smoothly with no change in gait.   2) Mild impairment: performs head turns smoothly with slight velocity, I.e.   Minor disruption to smooth gait path or uses AD   1) Moderate impairment: performs head turns with moderate change in gait   velocity, slows down, staggers but recovers, can continue to walk   0) Severe impairment: performs task with severe disruption of gait, i.e.   Staggers outside of 15" path, loses balance , stops, reaches for wall  4. Gait with vertical head turns: 2   3) Normal: performs head turns smoothly with no change in gait   2) Mild impairment: performs head turns smoothly with slight velocity, I.e.   Minor disruption to smooth gait path or uses AD   1) Moderate impairment: performs head turns with moderate change in gait   velocity, slows down, staggers but recovers, can continue to walk   0) Severe impairment: performs task with severe disruption of gait, i.e.   Staggers outside of 15" path, loses balance , stops, reaches for wall  5. Gait and pivot turn: 2   3)  Normal: pivot turns safely within 3 sec and stops quickly with no LOB   2)  Mild impairment: pivot turns safely in > 3 sec and stops with no LOB   1) Moderate impairment: turns slowly, requires verbal cueing, requires   several small steps to catch balance following turn and stop   0)  Severe impairment: cannot turn safely, requires assistance to stop and   Turn  6. Step over obstacle:  1   3) normal: is able to step over box without changing speed, no LOB   2)  Mild impairment: is able to step over box, but must slow down and adjust   steps to clear box safely   1)  Moderate impairment: is able to step over box but must stop, then step   over. May require verbal cueing   0) Severe impairment: cannot " "perform without assistance  7. Step around obstacle: 2   3) Normal: able to walk around cones safely without changing gait speed;   no LOB   2)  Mild impairment: able to step around cones, but must slow down to adjust   steps to clear cones   1) moderate impairment: able to clear cones but must significantly slow   speed to accomplish or requires verbal cueing   0)  Severe impairment: unable to clear cones, walks into 1 or both cones, or   requires physical assistance  8. Steps: 2   3) Normal: alternating feet, no rail   2) Mild impairment: alternating feet, must use rail   1) Moderate impairment: 2 feet a stair, must use rail   0)  Severe impairment: cannot perform safely  Total:     KIZZY SENSORY TESTING:  (P= Pass, F= Fail; note any sway; hold each position for 30")  Condition 1: (firm surface/feet together/eyes open) P  Condition 2: (firm surface/feet together/eyes closed) F; 8 seconds  Condition 3: (firm surface/feet in tandem/eyes open) F; 4 seconds  Condition 4: (firm surface/feet in tandem/eyes closed) NT  Condition 5: (soft surface/feet together/eyes open) P  Condition 6: (soft surface/feet together/eyes closed) F; 4 seconds  Condition 7: (Fakuda step test), measure distance varied from center starting position NT    Lower Extremity Strength  Right LE    Left LE      Hip flexion:  3+/5 3+/5 Hip flexion: 3+/5 4-/5   Knee extension: 4-/5 4-/5 Knee extension: 4-/5 4-/5   Knee flexion: 3/5 4-/5 Knee flexion: 3/5 4-/5   Ankle dorsiflexion:  3-/5 3/5 Ankle dorsiflexion: 3+/5 3+/5   Ankle plantarflexion:  3+/5 3+/5 Ankle plantarflexion: 3+/5 4-/5   Hip abduction: 3-/5 3/5 Hip abduction: 3-/5 3/5   Hip adduction: 3-/5 3/5 Hip adduction 3-/5 3/5   Hip extension: 3/5 3/5 Hip extension: 3/5 3/5        Mary participated in therapeutic exercises to improve functional mobility and safety for 10  minutes, includin minutes on [employer], level 2.0     Mary participated in gait training to improve " functional mobility and safety for 0  minutes, including:  //bars:      Home Exercises Provided and Patient Education Provided     Education provided:   - continuation of exercise outside of sessions and safety to prevent further falls    Written Home Exercises Provided: yes.  Exercises were reviewed and Mary was able to demonstrate them prior to the end of the session.  Mary demonstrated good  understanding of the education provided.     See EMR under Patient Instructions for exercises provided 6/28/2019.    Assessment     Ms. Prince has made great progress in her first month of therapy. She showed great improvement in all functional tests (Davidson, 30 seconds sit to stand, TUG, DGI, and walking speeds). Pt continues to present as a fall risk despite these improvements as evidenced by her score on DGI and Davidson balance tests. Ms. Prince continues to struggle greatly with single leg stance and B dorsiflexion. She remains appropriate for skilled PT services to continue to address her balance difficulties, endurance deficits, and decreased strength in B LE. Her goals have been addressed and remain appropriate for skilled PT services.     Mary is progressing well towards her goals.   Pt prognosis is Good.     Pt will continue to benefit from skilled outpatient physical therapy to address the deficits listed in the problem list box on initial evaluation, provide pt/family education and to maximize pt's level of independence in the home and community environment.     Pt's spiritual, cultural and educational needs considered and pt agreeable to plan of care and goals.     Anticipated barriers to physical therapy: none    Goals:   Short Term GOALS: (5 weeks)  1. Pt to increase 30 second sit to stand score to 9 without use of UE. MET (7/23/2019)  2. Pt to increase Davidson Balance Score to 43 in order to decrease her risk for fall. progressing  3. Pt to increase TUG score with use of AD to </= 20 seconds.MET (7/23/2019)  4. Pt  will increase [place] condition 2 to 20 seconds with SBA. progressing  Long Term GOALS: (10 weeks)  1. Pt will increase 30 second sit to stand score to 11 without use of UEs. MET (7/23/2019)  2. Pt will increase DGI score to 16 in order to decrease her risk for fall.progressing  3. Pt will pass [place] condition 2 with minimal sway. progressing  4. Pt will increase self selected walking speed to .7 in order to better be able to participate in pilgrimage. MET (7/23/2019)  4a (new goal 7/23/2019) Pt will increase self selected walking speed without AD to .9 in order to better be able to participate in pilgrimage.  5. Pt will increase TUG score with use of AD to </= 15 seconds in order to better participate in pilgrimage. MET (7/23/2019)  6. Pt will increase Davidson Balance score to 51 in order to decrease her risk for fall. progressing    Plan     Continue with current POC. Focus on SLS and eyes closed balance tasks. Introduce strengthening with theraband for ankle strengthening.    Efrain David, PT

## 2019-07-25 ENCOUNTER — CLINICAL SUPPORT (OUTPATIENT)
Dept: REHABILITATION | Facility: HOSPITAL | Age: 79
End: 2019-07-25
Payer: MEDICARE

## 2019-07-25 DIAGNOSIS — R29.898 BILATERAL ARM WEAKNESS: ICD-10-CM

## 2019-07-25 DIAGNOSIS — Z74.09 IMPAIRED FUNCTIONAL MOBILITY, BALANCE, GAIT, AND ENDURANCE: ICD-10-CM

## 2019-07-25 PROCEDURE — 97116 GAIT TRAINING THERAPY: CPT | Mod: PO

## 2019-07-25 PROCEDURE — 97112 NEUROMUSCULAR REEDUCATION: CPT | Mod: PO

## 2019-07-25 PROCEDURE — 97110 THERAPEUTIC EXERCISES: CPT | Mod: PO

## 2019-07-25 NOTE — PROGRESS NOTES
"  Physical Therapy Daily Treatment Note     Name: Mary Prince  Clinic Number: [id number]    Therapy Diagnosis:   Encounter Diagnoses   Name Primary?    Impaired functional mobility, balance, gait, and endurance     Bilateral arm weakness      Physician: Poncho Saunders MD    Visit Date: 7/25/2019    Medical Diagnosis: B arm weakness  Evaluation Date: 5-15-19  Authorization Period Expiration: 4-25-20  NEW Plan of Care Certification Period: 6/25/2019-9/25/2019  Visit #/Visits authorized: 10/12   (12 total visits 2019)    Time In: 1115  Time Out: 1200  Total Billable Time: 45 minutes    Precautions: Standard and Fall    Subjective     Pt reports: " I'm doing pretty well."    She was provided home exercise program previously.  Response to previous treatment: she enjoyed the aggressiveness of last session  Functional change: none to report    Pain: 5/10  Location: lower back    Objective   Mary participated in therapeutic exercises to improve functional mobility and safety for 10  minutes, including:  X 10 minutes on Nu Step recumbent stepper, B UE/B LE  level 6.0    Mary participated in neuromuscular re-education activities to improve: Balance, Coordination, Kinesthetic, Proprioception and Posture for 25  minutes. The following activities were included:  // bars:   4 x 30 sec of single leg stance with 1 UE support and CGA    4 point foam fitter board:   4 x 30 sec of B LE single leg stance with unilateral support on floor with no UE support and CGA   X 20 reps each of B LE single leg step ups with unilateral hip flexion while on top of step with 1 UE support to no UE support and CGA.     2 x 30 sec of static standing with eyes open and no UE support   2 x 30 sec of static standing with eyes closed and no UE support, CGA for safety    Ascended/Descended 4 steps x 6 trials with 1 UE support and CGA.       Mary participated in gait training to improve functional mobility and safety for 8  minutes, including:   " Ballet bar:  X 6 laps of forward ambulation w/o UE support and CGA.         Home Exercises Provided and Patient Education Provided     Education provided:   - continuation of exercise outside of sessions and safety to prevent further falls    Written Home Exercises Provided: yes.  Exercises were reviewed and Mary was able to demonstrate them prior to the end of the session.  Mary demonstrated good  understanding of the education provided.     See EMR under Patient Instructions for exercises provided 6/28/2019.    Assessment     Mary tolerated today's session well and did not have any problems noted.  Pt focused on single leg stance and challenged pt with eyes open and eyes closed.  Pt requires 1 UE support to no UE support for safety.  Cont with Plan of Care.     Mary is progressing well towards her goals.   Pt prognosis is Good.     Pt will continue to benefit from skilled outpatient physical therapy to address the deficits listed in the problem list box on initial evaluation, provide pt/family education and to maximize pt's level of independence in the home and community environment.     Pt's spiritual, cultural and educational needs considered and pt agreeable to plan of care and goals.     Anticipated barriers to physical therapy: none    Goals:   Short Term GOALS: (5 weeks)  1. Pt to increase 30 second sit to stand score to 9 without use of UE. MET (7/23/2019)  2. Pt to increase Davidson Balance Score to 43 in order to decrease her risk for fall. progressing  3. Pt to increase TUG score with use of AD to </= 20 seconds.MET (7/23/2019)  4. Pt will increase Nathanael condition 2 to 20 seconds with SBA. progressing  Long Term GOALS: (10 weeks)  1. Pt will increase 30 second sit to stand score to 11 without use of UEs. MET (7/23/2019)  2. Pt will increase DGI score to 16 in order to decrease her risk for fall.progressing  3. Pt will pass [place] condition 2 with minimal sway. progressing  4. Pt will increase self  selected walking speed to .7 in order to better be able to participate in pilgrimage. MET (7/23/2019)  4a (new goal 7/23/2019) Pt will increase self selected walking speed without AD to .9 in order to better be able to participate in pilgrimage.  5. Pt will increase TUG score with use of AD to </= 15 seconds in order to better participate in pilgrimage. MET (7/23/2019)  6. Pt will increase Davidson Balance score to 51 in order to decrease her risk for fall. progressing    Plan     Incorporate more eyes closed tasks    Kate Taveras, PTA

## 2019-07-30 ENCOUNTER — CLINICAL SUPPORT (OUTPATIENT)
Dept: REHABILITATION | Facility: HOSPITAL | Age: 79
End: 2019-07-30
Payer: MEDICARE

## 2019-07-30 DIAGNOSIS — R29.898 BILATERAL ARM WEAKNESS: ICD-10-CM

## 2019-07-30 DIAGNOSIS — Z74.09 IMPAIRED FUNCTIONAL MOBILITY, BALANCE, GAIT, AND ENDURANCE: ICD-10-CM

## 2019-07-30 PROCEDURE — 97112 NEUROMUSCULAR REEDUCATION: CPT | Mod: PO

## 2019-07-30 PROCEDURE — 97110 THERAPEUTIC EXERCISES: CPT | Mod: PO

## 2019-07-30 NOTE — PROGRESS NOTES
"  Physical Therapy Daily Treatment Note     Name: Mary Prince  Clinic Number: [id number]    Therapy Diagnosis:   Encounter Diagnoses   Name Primary?    Impaired functional mobility, balance, gait, and endurance     Bilateral arm weakness      Physician: Poncho Saunders MD    Visit Date: 7/30/2019    Medical Diagnosis: B arm weakness  Evaluation Date: 5-15-19  Authorization Period Expiration: 4-25-20  NEW Plan of Care Certification Period: 6/25/2019-9/25/2019  Visit #/Visits authorized: 11/12   (12 total visits 2019)    Time In: 1115  Time Out: 1200  Total Billable Time: 45 minutes    Precautions: Standard and Fall    Subjective     Pt reports: " I'm doing pretty well."    She was provided home exercise program previously.  Response to previous treatment: she enjoyed the aggressiveness of last session  Functional change: none to report    Pain: 5/10  Location: lower back    Objective   Mary participated in therapeutic exercises to improve functional mobility and safety for 10  minutes, including:  X 10 minutes on recumbent bike, B LE  level 5.0    Mary participated in neuromuscular re-education activities to improve: Balance, Coordination, Kinesthetic, Proprioception and Posture for 35  minutes. The following activities were included:  // bars:   X 6 laps of forward ambulation with out UE support and CGA/SBA  X 6 laps of side stepping with out UE support and SBA  X 6 laps of backward ambulation with out UE support and SBA    2 point wooden fitter board:   4 x 60 sec of static standing in middle of board with occasional 1 UE support and CGA   3 x 60 sec of medial/lateral weight shifting with occasional 1 UE support and CGA   3 x 30 sec of anterior/posterior weight shifting with occasional 1 UE support and CGA    4 point foam fitter board:    2 x 30 sec of static standing with eyes open and no UE support   2 x 30 sec of static standing with eyes closed and no UE support, CGA for safety       Mary participated in " gait training to improve functional mobility and safety for 0 minutes, including:       Home Exercises Provided and Patient Education Provided     Education provided:   - continuation of exercise outside of sessions and safety to prevent further falls    Written Home Exercises Provided: yes.  Exercises were reviewed and Mary was able to demonstrate them prior to the end of the session.  Mary demonstrated good  understanding of the education provided.     See EMR under Patient Instructions for exercises provided 6/28/2019.    Assessment     Mary tolerated today's session well and did not have any problems noted.  Pt was able to balance better on the 2 point foam fitter board and perform activities unsupported in the bars.  No loss of balance noted.   Cont with Plan of Care.     Mary is progressing well towards her goals.   Pt prognosis is Good.     Pt will continue to benefit from skilled outpatient physical therapy to address the deficits listed in the problem list box on initial evaluation, provide pt/family education and to maximize pt's level of independence in the home and community environment.     Pt's spiritual, cultural and educational needs considered and pt agreeable to plan of care and goals.     Anticipated barriers to physical therapy: none    Goals:   Short Term GOALS: (5 weeks)  1. Pt to increase 30 second sit to stand score to 9 without use of UE. MET (7/23/2019)  2. Pt to increase Davidson Balance Score to 43 in order to decrease her risk for fall. progressing  3. Pt to increase TUG score with use of AD to </= 20 seconds.MET (7/23/2019)  4. Pt will increase Nathanael condition 2 to 20 seconds with SBA. progressing  Long Term GOALS: (10 weeks)  1. Pt will increase 30 second sit to stand score to 11 without use of UEs. MET (7/23/2019)  2. Pt will increase DGI score to 16 in order to decrease her risk for fall.progressing  3. Pt will pass Nathanael condition 2 with minimal sway. progressing  4. Pt will  increase self selected walking speed to .7 in order to better be able to participate in pilgrimage. MET (7/23/2019)  4a (new goal 7/23/2019) Pt will increase self selected walking speed without AD to .9 in order to better be able to participate in pilgrimage.  5. Pt will increase TUG score with use of AD to </= 15 seconds in order to better participate in pilgrimage. MET (7/23/2019)  6. Pt will increase Davidson Balance score to 51 in order to decrease her risk for fall. progressing    Plan     Incorporate more eyes closed tasks    Kate Taveras, PTA

## 2019-08-01 ENCOUNTER — CLINICAL SUPPORT (OUTPATIENT)
Dept: REHABILITATION | Facility: HOSPITAL | Age: 79
End: 2019-08-01
Payer: MEDICARE

## 2019-08-01 DIAGNOSIS — M25.511 CHRONIC PAIN OF BOTH SHOULDERS: ICD-10-CM

## 2019-08-01 DIAGNOSIS — M25.512 CHRONIC PAIN OF BOTH SHOULDERS: ICD-10-CM

## 2019-08-01 DIAGNOSIS — Z74.09 IMPAIRED FUNCTIONAL MOBILITY, BALANCE, GAIT, AND ENDURANCE: ICD-10-CM

## 2019-08-01 DIAGNOSIS — R29.898 BILATERAL ARM WEAKNESS: ICD-10-CM

## 2019-08-01 DIAGNOSIS — G89.29 CHRONIC PAIN OF BOTH SHOULDERS: ICD-10-CM

## 2019-08-01 DIAGNOSIS — M25.611 SHOULDER JOINT STIFFNESS, BILATERAL: ICD-10-CM

## 2019-08-01 DIAGNOSIS — M25.612 SHOULDER JOINT STIFFNESS, BILATERAL: ICD-10-CM

## 2019-08-01 PROCEDURE — 97112 NEUROMUSCULAR REEDUCATION: CPT | Mod: PO

## 2019-08-01 PROCEDURE — 97110 THERAPEUTIC EXERCISES: CPT | Mod: PO

## 2019-08-01 NOTE — PROGRESS NOTES
"  Physical Therapy Daily Treatment Note     Name: Mary Prince  Clinic Number: [id number]    Therapy Diagnosis:   Encounter Diagnoses   Name Primary?    Impaired functional mobility, balance, gait, and endurance     Bilateral arm weakness      Physician: Poncho Saunders MD    Visit Date: 8/1/2019    Medical Diagnosis: B arm weakness  Evaluation Date: 5-15-19  Authorization Period Expiration: 4-25-20  NEW Plan of Care Certification Period: 6/25/2019-9/25/2019  Visit #/Visits authorized: 11/12   (12 total visits 2019)    Time In: 1115  Time Out: 1200  Total Billable Time: 45 minutes    Precautions: Standard and Fall    Subjective     Pt reports: " I'm doing pretty well."    She was provided home exercise program previously.  Response to previous treatment: she enjoyed the aggressiveness of last session  Functional change: none to report    Pain: 5/10  Location: lower back    Objective   Mary participated in therapeutic exercises to improve functional mobility and safety for 10  minutes, including:  X 10 minutes on recumbent bike, B LE  level 5.0    Mary participated in neuromuscular re-education activities to improve: Balance, Coordination, Kinesthetic, Proprioception and Posture for 35  minutes. The following activities were included:  // bars:   Working on the floor ladder practicing balance, coordination, agility and cognition.  Pt was able to perform activities with 1 UE support to no UE support and CGA    2 point wooden fitter board:   4 x 60 sec of static standing in middle of board with occasional 1 UE support and CGA   3 x 60 sec of medial/lateral weight shifting with occasional 1 UE support and CGA   3 x 30 sec of anterior/posterior weight shifting with occasional 1 UE support and CGA         Mary participated in gait training to improve functional mobility and safety for 0 minutes, including:       Home Exercises Provided and Patient Education Provided     Education provided:   - continuation of " exercise outside of sessions and safety to prevent further falls    Written Home Exercises Provided: yes.  Exercises were reviewed and Mray was able to demonstrate them prior to the end of the session.  Mary demonstrated good  understanding of the education provided.     See EMR under Patient Instructions for exercises provided 6/28/2019.    Assessment     Mary tolerated today's session well and did not have any problems noted.  Pt did well with floor ladder activities and was able to progress from 1 UE support to no UE support.  Pt progressing well with balance activities.   Cont with Plan of Care.     Mary is progressing well towards her goals.   Pt prognosis is Good.     Pt will continue to benefit from skilled outpatient physical therapy to address the deficits listed in the problem list box on initial evaluation, provide pt/family education and to maximize pt's level of independence in the home and community environment.     Pt's spiritual, cultural and educational needs considered and pt agreeable to plan of care and goals.     Anticipated barriers to physical therapy: none    Goals:   Short Term GOALS: (5 weeks)  1. Pt to increase 30 second sit to stand score to 9 without use of UE. MET (7/23/2019)  2. Pt to increase Davidson Balance Score to 43 in order to decrease her risk for fall. progressing  3. Pt to increase TUG score with use of AD to </= 20 seconds.MET (7/23/2019)  4. Pt will increase Nathanael condition 2 to 20 seconds with SBA. progressing  Long Term GOALS: (10 weeks)  1. Pt will increase 30 second sit to stand score to 11 without use of UEs. MET (7/23/2019)  2. Pt will increase DGI score to 16 in order to decrease her risk for fall.progressing  3. Pt will pass [place] condition 2 with minimal sway. progressing  4. Pt will increase self selected walking speed to .7 in order to better be able to participate in [employer]!. MET (7/23/2019)  4a (new goal 7/23/2019) Pt will increase self selected walking  speed without AD to .9 in order to better be able to participate in pilgrimage.  5. Pt will increase TUG score with use of AD to </= 15 seconds in order to better participate in pilgrimage. MET (7/23/2019)  6. Pt will increase Davidson Balance score to 51 in order to decrease her risk for fall. progressing    Plan     Incorporate more eyes closed tasks    Kate Taveras, PTA

## 2019-08-01 NOTE — PATIENT INSTRUCTIONS
SUPINE: Shoulder Flexion Bilateral (Cane)        Lie on back with knees bent. Hold cane with both hands. Raise both arms overhead, keep elbows straight. ___ reps per set, ___ sets per day, ___ days per week      Copyright © I. All rights reserved.   Shoulder: Abduction (Supine)        Apoye el brazo derecho en el suelo. Sostenga un azul en la garrett.  Mueva lentamente el brazo hacia arriba a un costado de la timi empujando el azul con la otra mano. No permita que se doble el codo.  Mantenga ____ segundos. Repita ____ veces. Jorden ____ sesiones por día.  CUIDADO: Elongue lenta y suavemente.    Copyright © I. All rights reserved.

## 2019-08-02 NOTE — PROGRESS NOTES
Occupational Therapy Daily Treatment Note     Date: 8/1/2019  Name: Mary Prince  Clinic Number: [id number]    Therapy Diagnosis:   Encounter Diagnoses   Name Primary?    Shoulder joint stiffness, bilateral     Chronic pain of both shoulders      Physician: Ahmet Maurer MD    Physician Orders: Eval and treat  Medical Diagnosis: S14.3XXA (ICD-10-CM) - Brachial plexus injury  Surgical Procedure and Date: 2016  Evaluation Date: 7/9/2019  Insurance Authorization Period Expiration: 6/20/20  Plan of Care Certification Period: 8/7/19  Date of Return to MD: TBD     Visit # / Visits authorized: 2/ 1   Time In:1:00PM  Time Out: 2:00PM  Total Billable Time: 50 minutes     Precautions:  Standard and Diabetes      Subjective     Patient reports compliance with HEP. She states she hasn't been able to follow up with OT due to insurance reasons.    Pain: 8/10  Location: L shoulder    Objective     Mary received the following supervised modalities after being cleared for contradictions for 10 minutes:   -Patient received MHP to bilateral shoulders to increase blood flow, circulation and tissue elasticity prior to therex    Mary received therapeutic exercises for 45 minutes including: All exercises performed bilaterally  -Supine dowel exercises for shoulder AAROM: scaption, flexion, external rotation  -Supine chest pushes with 2# dowel x 10  -Isometric scapular retraction x 10  -UBE L1 3' forward 3' backward  -Recriprocal pulleys internal rotation x 10    Home Exercises and Education Provided     Education provided: Added supine dowel exercises to HEP    Written Home Exercises Provided: Yes  Exercises were reviewed and Mary was able to demonstrate them prior to the end of the session.  Mary demonstrated good  understanding of the HEP provided.   .   See EMR under Patient Instructions for exercises provided during initial evaluation.     Assessment     Patient able to gain more ROM with active assisted exercises.      Mary is progressing well towards her goals and there are no updates to goals at this time. Pt prognosis is Good.     Pt will continue to benefit from skilled outpatient occupational therapy to address the deficits listed in the problem list on initial evaluation provide pt/family education and to maximize pt's level of independence in the home and community environment.     Pt's spiritual, cultural and educational needs considered and pt agreeable to plan of care and goals.    Goals:   Short Term Goals:   To be completed in 1 week:  1) Patient will be independent in HEP-Ongoing  To be completed in 2-4 weeks:  2) Decrease shoulder pain to 5/10 to increase ADL and IADL participation-Not met  3) Increase  strength by at least 12 pounds each to improve safety during ADL performance-Not met  4) Increase shoulder flexion and abduction AROM to at least 100 degrees to improve dressing and bathing performance-Progressing  5) Improve timed 9-Hole Peg Test by 10 seconds, indicative of increased dexterity for IADLs-Not met  Long Term Goals:   To be completed by discharge:  6) Decrease FOTO limitation score to 44%, indicative of improved functional independence  7) Patient will perform all ADLs and most IADLs with independence or modified independence         Plan   Outpatient Occupational Therapy 1 times weekly for 6 visits     Updates/Grading for next session:      Beverley Yates, OT

## 2019-08-05 NOTE — PROGRESS NOTES
"  Physical Therapy Daily Treatment Note     Name: Mary Prince  Clinic Number: [id number]    Therapy Diagnosis:   Encounter Diagnoses   Name Primary?    Impaired functional mobility, balance, gait, and endurance     Bilateral arm weakness      Physician: Poncho Saunders MD    Visit Date: 8/6/2019    Medical Diagnosis: B arm weakness  Evaluation Date: 5-15-19  Authorization Period Expiration: 4-25-20  NEW Plan of Care Certification Period: 6/25/2019-9/25/2019  Visit #/Visits authorized: 12/12   (13 total visits 2019)    Time In: 1115  Time Out: 1200  Total Billable Time: 45 minutes    Precautions: Standard and Fall    Subjective     Pt reports: "I'm doing okay. I'll be tired after we finish. I had OT at 9 am"    She was provided home exercise program previously.  Response to previous treatment: she enjoyed the aggressiveness of last session  Functional change: none to report    Pain: 2/10  Location: lower back    Objective   Mary participated in therapeutic exercises to improve functional mobility and safety for 10  minutes, including:  X 10 minutes on [employer] stepper, B LE/UE  level 2.5    Mary participated in neuromuscular re-education activities to improve: Balance, Coordination, Kinesthetic, Proprioception and Posture for 30  minutes. The following activities were included:  // bars:    2 point wooden fitter board:   4 x 45 sec of static standing in middle of board with occasional 1 UE support and CGA (2 with board in AP position, 2 with board in M/L position)   2 x 45 sec of medial/lateral weight shifting with occasional 1 UE support and CGA   2 x 45 sec of anterior/posterior weight shifting with occasional 1 UE support and CGA    4 point fitter board:  x15 squats on board with no UE support, SBA  x10 step up onto fitter board, SBA  x8 sit<>stand from EOM on fitter board, occ UE support, SBA       Mary participated in gait training to improve functional mobility and safety for 5 minutes, " including:  Stepping over various size yoga blocks, 2 laps with SBA         Home Exercises Provided and Patient Education Provided     Education provided:   - continuation of exercise outside of sessions and safety to prevent further falls    Written Home Exercises Provided: yes.  Exercises were reviewed and Mary was able to demonstrate them prior to the end of the session.  Mary demonstrated good  understanding of the education provided.     See EMR under Patient Instructions for exercises provided 6/28/2019.    Assessment     Mary tolerated today's session fair today. She was slightly emotional today and required increased encouragement for activities. She was more fatigued due to long break between OT and PT today. Pt progressing well with balance activities. Cont with Plan of Care.     Mary is progressing well towards her goals.   Pt prognosis is Good.     Pt will continue to benefit from skilled outpatient physical therapy to address the deficits listed in the problem list box on initial evaluation, provide pt/family education and to maximize pt's level of independence in the home and community environment.     Pt's spiritual, cultural and educational needs considered and pt agreeable to plan of care and goals.     Anticipated barriers to physical therapy: none    Goals:   Short Term GOALS: (5 weeks)  1. Pt to increase 30 second sit to stand score to 9 without use of UE. MET (7/23/2019)  2. Pt to increase Davidson Balance Score to 43 in order to decrease her risk for fall. progressing  3. Pt to increase TUG score with use of AD to </= 20 seconds.MET (7/23/2019)  4. Pt will increase Nathanael condition 2 to 20 seconds with SBA. progressing  Long Term GOALS: (10 weeks)  1. Pt will increase 30 second sit to stand score to 11 without use of UEs. MET (7/23/2019)  2. Pt will increase DGI score to 16 in order to decrease her risk for fall.progressing  3. Pt will pass [place] condition 2 with minimal sway.  progressing  4. Pt will increase self selected walking speed to .7 in order to better be able to participate in pilgrimage. MET (7/23/2019)  4a (new goal 7/23/2019) Pt will increase self selected walking speed without AD to .9 in order to better be able to participate in pilgrimage.  5. Pt will increase TUG score with use of AD to </= 15 seconds in order to better participate in pilgrimage. MET (7/23/2019)  6. Pt will increase Davidson Balance score to 51 in order to decrease her risk for fall. progressing    Plan     Continue to work on balance and progress as appropriate.    Efrain David, PT

## 2019-08-06 ENCOUNTER — CLINICAL SUPPORT (OUTPATIENT)
Dept: REHABILITATION | Facility: HOSPITAL | Age: 79
End: 2019-08-06
Payer: MEDICARE

## 2019-08-06 DIAGNOSIS — M25.512 CHRONIC PAIN OF BOTH SHOULDERS: ICD-10-CM

## 2019-08-06 DIAGNOSIS — M25.612 SHOULDER JOINT STIFFNESS, BILATERAL: ICD-10-CM

## 2019-08-06 DIAGNOSIS — M25.511 CHRONIC PAIN OF BOTH SHOULDERS: ICD-10-CM

## 2019-08-06 DIAGNOSIS — Z74.09 IMPAIRED FUNCTIONAL MOBILITY, BALANCE, GAIT, AND ENDURANCE: ICD-10-CM

## 2019-08-06 DIAGNOSIS — G89.29 CHRONIC PAIN OF BOTH SHOULDERS: ICD-10-CM

## 2019-08-06 DIAGNOSIS — M25.611 SHOULDER JOINT STIFFNESS, BILATERAL: ICD-10-CM

## 2019-08-06 DIAGNOSIS — R29.898 BILATERAL ARM WEAKNESS: ICD-10-CM

## 2019-08-06 PROCEDURE — 97110 THERAPEUTIC EXERCISES: CPT | Mod: PO

## 2019-08-06 PROCEDURE — 97112 NEUROMUSCULAR REEDUCATION: CPT | Mod: PO

## 2019-08-06 NOTE — PROGRESS NOTES
Occupational Therapy Daily Treatment Note     Date: 8/6/2019  Name: Mary Prince  Clinic Number: [id number]    Therapy Diagnosis:   Encounter Diagnoses   Name Primary?    Shoulder joint stiffness, bilateral     Chronic pain of both shoulders      Physician: Ahmet Maurer MD    Physician Orders: Eval and treat  Medical Diagnosis: S14.3XXA (ICD-10-CM) - Brachial plexus injury  Surgical Procedure and Date: 2016  Evaluation Date: 7/9/2019  Insurance Authorization Period Expiration: 6/20/20  Plan of Care Certification Period: 8/7/19  Date of Return to MD: TBD     Visit # / Visits authorized: 3/ 1   Time In:9:00AM  Time Out: 10:15AM  Total Billable Time: 60 minutes     Precautions:  Standard and Diabetes      Subjective     Patient reports compliance with HEP.     Pain: 8/10  Location: L shoulder    Objective     Mary received the following supervised modalities after being cleared for contradictions for 10 minutes:   -Patient received MHP to bilateral shoulders to increase blood flow, circulation and tissue elasticity prior to therex    Mary received therapeutic exercises for 60 minutes including: All exercises performed bilaterally  -Supine dowel exercises for shoulder AAROM: scaption, flexion, external rotation  -Supine chest pushes with 2# dowel x 10  -Isometric scapular retraction x 10  -UBE L1 3' forward 3' backward  -Recriprocal pulleys internal rotation x 10    Home Exercises and Education Provided     Education provided:     Written Home Exercises Provided: Yes  Exercises were reviewed and Mary was able to demonstrate them prior to the end of the session.  Mary demonstrated good  understanding of the HEP provided.   .   See EMR under Patient Instructions for exercises provided during initial evaluation.     Assessment     Patient able to gain more ROM with active assisted exercises. All exercises performed slowly to cue for technique    Mary is progressing well towards her goals and there are no  updates to goals at this time. Pt prognosis is Good.     Pt will continue to benefit from skilled outpatient occupational therapy to address the deficits listed in the problem list on initial evaluation provide pt/family education and to maximize pt's level of independence in the home and community environment.     Pt's spiritual, cultural and educational needs considered and pt agreeable to plan of care and goals.    Goals:   Short Term Goals:   To be completed in 1 week:  1) Patient will be independent in HEP-Ongoing  To be completed in 2-4 weeks:  2) Decrease shoulder pain to 5/10 to increase ADL and IADL participation-Not met  3) Increase  strength by at least 12 pounds each to improve safety during ADL performance-Not met  4) Increase shoulder flexion and abduction AROM to at least 100 degrees to improve dressing and bathing performance-Progressing  5) Improve timed 9-Hole Peg Test by 10 seconds, indicative of increased dexterity for IADLs-Not met  Long Term Goals:   To be completed by discharge:  6) Decrease FOTO limitation score to 44%, indicative of improved functional independence  7) Patient will perform all ADLs and most IADLs with independence or modified independence         Plan   Outpatient Occupational Therapy 1 times weekly for 6 visits     Updates/Grading for next session:      Beverley Yates, OT

## 2019-08-08 NOTE — PROGRESS NOTES
Physical Therapy Daily Treatment Note     Name: Mary Prince  Clinic Number: [id number]    Therapy Diagnosis:   No diagnosis found.  Physician: Ahmet Maurer MD    Visit Date: 8/9/2019    Medical Diagnosis: B arm weakness  Evaluation Date: 5-15-19  Authorization Period Expiration: 4-25-20  NEW Plan of Care Certification Period: 6/25/2019-9/25/2019  Visit #/Visits authorized: 1/6 new referral  (14 total visits 2019)    Time In: 1115  Time Out: 1200  Total Billable Time: 45 minutes    Precautions: Standard and Fall    Subjective     Pt reports: She is doing well today. Went to the gym once since last session.      She was compliant with home exercise program.  Response to previous treatment: she enjoyed the aggressiveness of last session  Functional change: none to report    Pain: 2/10  Location: lower back    Objective   Mary participated in therapeutic exercises to improve functional mobility and safety for 10  minutes, including:  X 10 minutes on [employer] stepper, B LE/UE  level 2.5    Mary participated in neuromuscular re-education activities to improve: Balance, Coordination, Kinesthetic, Proprioception and Posture for 35  minutes. The following activities were included:  // bars:    2 x 30 seconds tandem stance, eyes open, occ UE support, SBA  2 x 30 seconds feet together, eyes closed, mod sway, no UE support    4 point fitter board:  x15 squats on board with no UE support, SBA  x10 step up onto fitter board ea LE lead, SBA    x10 sit<>stand from EOM with no UE support SBA  x10 sit<>stand from EOM on foam, no UE support, SBA  x20 alternating taps onto medium cone with no UE support and CGA    x10 ea LE pilates chair glute push down with 2 black and 2 white springs on lv 1, CGA     Mary participated in gait training to improve functional mobility and safety for 0 minutes, including:         Home Exercises Provided and Patient Education Provided     Education provided:   - continuation of exercise outside of  sessions and safety to prevent further falls    Written Home Exercises Provided: yes.  Exercises were reviewed and Mary was able to demonstrate them prior to the end of the session.  Mary demonstrated good  understanding of the education provided.     See EMR under Patient Instructions for exercises provided 6/28/2019.    Assessment     Mary tolerated today's session well today. She continues to struggle with sit to stands from EOM on unstable surfaces. She demonstrates increased ability to perform static balance activities and continues to do well with addition of activities. Cont with Plan of Care.     Mary is progressing well towards her goals.   Pt prognosis is Good.     Pt will continue to benefit from skilled outpatient physical therapy to address the deficits listed in the problem list box on initial evaluation, provide pt/family education and to maximize pt's level of independence in the home and community environment.     Pt's spiritual, cultural and educational needs considered and pt agreeable to plan of care and goals.     Anticipated barriers to physical therapy: none    Goals:   Short Term GOALS: (5 weeks)  1. Pt to increase 30 second sit to stand score to 9 without use of UE. MET (7/23/2019)  2. Pt to increase Davidson Balance Score to 43 in order to decrease her risk for fall. progressing  3. Pt to increase TUG score with use of AD to </= 20 seconds.MET (7/23/2019)  4. Pt will increase Nathanael condition 2 to 20 seconds with SBA. progressing  Long Term GOALS: (10 weeks)  1. Pt will increase 30 second sit to stand score to 11 without use of UEs. MET (7/23/2019)  2. Pt will increase DGI score to 16 in order to decrease her risk for fall.progressing  3. Pt will pass Nathanael condition 2 with minimal sway. progressing  4. Pt will increase self selected walking speed to .7 in order to better be able to participate in [place]. MET (7/23/2019)  4a (new goal 7/23/2019) Pt will increase self selected walking  speed without AD to .9 in order to better be able to participate in pilgrimage.  5. Pt will increase TUG score with use of AD to </= 15 seconds in order to better participate in pilgrimage. MET (7/23/2019)  6. Pt will increase Davidson Balance score to 51 in order to decrease her risk for fall. progressing    Plan     Continue to work on balance and progress as appropriate.    Efrain David, PT

## 2019-08-08 NOTE — PROGRESS NOTES
"  Physical Therapy Daily Treatment Note     Name: Mary Prince  Clinic Number: [id number]    Therapy Diagnosis:   No diagnosis found.  Physician: No ref. provider found    Visit Date: 8/16/2019    Medical Diagnosis: B arm weakness  Evaluation Date: 5-15-19  Authorization Period Expiration: 4-25-20  NEW Plan of Care Certification Period: 6/25/2019-9/25/2019  Visit #/Visits authorized: 13/12   new referral pending, (14 total visits 2019)    Time In: ***  Time Out: ***  Total Billable Time: *** minutes    Precautions: Standard and Fall    Subjective     Pt reports: "I'm doing okay. I'll be tired after we finish. I had OT at 9 am"    She was provided home exercise program previously.  Response to previous treatment: she enjoyed the aggressiveness of last session  Functional change: none to report    Pain: 2/10  Location: lower back    Objective   Mary participated in therapeutic exercises to improve functional mobility and safety for 10  minutes, including:  X 10 minutes on [employer] stepper, B LE/UE  level 2.5    Mary participated in neuromuscular re-education activities to improve: Balance, Coordination, Kinesthetic, Proprioception and Posture for 30  minutes. The following activities were included:  // bars:    2 point wooden fitter board:   4 x 45 sec of static standing in middle of board with occasional 1 UE support and CGA (2 with board in AP position, 2 with board in M/L position)   2 x 45 sec of medial/lateral weight shifting with occasional 1 UE support and CGA   2 x 45 sec of anterior/posterior weight shifting with occasional 1 UE support and CGA    4 point fitter board:  x15 squats on board with no UE support, SBA  x10 step up onto fitter board, SBA  x8 sit<>stand from EOM on fitter board, occ UE support, SBA       Mary participated in gait training to improve functional mobility and safety for 5 minutes, including:  Stepping over various size yoga blocks, 2 laps with SBA         Home Exercises Provided and " Patient Education Provided     Education provided:   - continuation of exercise outside of sessions and safety to prevent further falls    Written Home Exercises Provided: yes.  Exercises were reviewed and Mary was able to demonstrate them prior to the end of the session.  Mary demonstrated good  understanding of the education provided.     See EMR under Patient Instructions for exercises provided 6/28/2019.    Assessment     Mary tolerated today's session fair today. She was slightly emotional today and required increased encouragement for activities. She was more fatigued due to long break between OT and PT today. Pt progressing well with balance activities. Cont with Plan of Care.     Mary is progressing well towards her goals.   Pt prognosis is Good.     Pt will continue to benefit from skilled outpatient physical therapy to address the deficits listed in the problem list box on initial evaluation, provide pt/family education and to maximize pt's level of independence in the home and community environment.     Pt's spiritual, cultural and educational needs considered and pt agreeable to plan of care and goals.     Anticipated barriers to physical therapy: none    Goals:   Short Term GOALS: (5 weeks)  1. Pt to increase 30 second sit to stand score to 9 without use of UE. MET (7/23/2019)  2. Pt to increase Davidson Balance Score to 43 in order to decrease her risk for fall. progressing  3. Pt to increase TUG score with use of AD to </= 20 seconds.MET (7/23/2019)  4. Pt will increase [place] condition 2 to 20 seconds with SBA. progressing  Long Term GOALS: (10 weeks)  1. Pt will increase 30 second sit to stand score to 11 without use of UEs. MET (7/23/2019)  2. Pt will increase DGI score to 16 in order to decrease her risk for fall.progressing  3. Pt will pass Nathanael condition 2 with minimal sway. progressing  4. Pt will increase self selected walking speed to .7 in order to better be able to participate in  pilgrimage. MET (7/23/2019)  4a (new goal 7/23/2019) Pt will increase self selected walking speed without AD to .9 in order to better be able to participate in pilgrimage.  5. Pt will increase TUG score with use of AD to </= 15 seconds in order to better participate in pilgrimage. MET (7/23/2019)  6. Pt will increase Davidson Balance score to 51 in order to decrease her risk for fall. progressing    Plan     Continue to work on balance and progress as appropriate.    Efrain David, PT

## 2019-08-09 ENCOUNTER — CLINICAL SUPPORT (OUTPATIENT)
Dept: REHABILITATION | Facility: HOSPITAL | Age: 79
End: 2019-08-09
Payer: MEDICARE

## 2019-08-09 ENCOUNTER — TELEPHONE (OUTPATIENT)
Dept: SPINE | Facility: CLINIC | Age: 79
End: 2019-08-09

## 2019-08-09 DIAGNOSIS — R29.898 BILATERAL ARM WEAKNESS: ICD-10-CM

## 2019-08-09 DIAGNOSIS — Z74.09 IMPAIRED FUNCTIONAL MOBILITY, BALANCE, GAIT, AND ENDURANCE: Primary | ICD-10-CM

## 2019-08-09 PROCEDURE — 97112 NEUROMUSCULAR REEDUCATION: CPT | Mod: PO

## 2019-08-09 PROCEDURE — 97110 THERAPEUTIC EXERCISES: CPT | Mod: PO

## 2019-08-09 NOTE — TELEPHONE ENCOUNTER
Staff contacted patient and left a message on VM informing patient to contact the office regarding her surgical options

## 2019-08-09 NOTE — TELEPHONE ENCOUNTER
Please call patient.    I reviewed everything with Dr. Rider including the old EMG and the new EMG.  He stated that there may be brachial plexus injuries based off the EMG findings but no surgery is recommended at this time as it has been too long and surgery for this will not help now.    Shu Quiñones, [facility], PA-C  Neurosurgery  [facility]  [facility]

## 2019-08-14 ENCOUNTER — TELEPHONE (OUTPATIENT)
Dept: SPINE | Facility: CLINIC | Age: 79
End: 2019-08-14

## 2019-08-14 NOTE — TELEPHONE ENCOUNTER
Staff contacted patient and informed her that both her new and old EMG exmas were reviewed and  stated that there may be brachial plexus injuries but no surgery is recommended at this time. Patient stated that she would like for her EMG to be forwarded to . Staff will contact patient back for this information.

## 2019-08-14 NOTE — TELEPHONE ENCOUNTER
----- Message from Sita Vega sent at 8/14/2019  1:54 PM CDT -----  Contact: TONY KASPER [[id number]]    Name of Who is Calling: TONY KASPER [[id number]]       What is the request in detail: Patient is requesting a call from staff in regards to her test results .....Please contact to further discuss and advise.     Can the clinic reply by [facility]: No     [facility]  [phone]

## 2019-08-16 ENCOUNTER — CLINICAL SUPPORT (OUTPATIENT)
Dept: REHABILITATION | Facility: HOSPITAL | Age: 79
End: 2019-08-16
Payer: MEDICARE

## 2019-08-16 DIAGNOSIS — Z74.09 IMPAIRED FUNCTIONAL MOBILITY, BALANCE, GAIT, AND ENDURANCE: Primary | ICD-10-CM

## 2019-08-16 DIAGNOSIS — R29.898 BILATERAL ARM WEAKNESS: ICD-10-CM

## 2019-08-16 PROCEDURE — 97110 THERAPEUTIC EXERCISES: CPT | Mod: PO

## 2019-08-16 PROCEDURE — 97112 NEUROMUSCULAR REEDUCATION: CPT | Mod: PO

## 2019-08-16 NOTE — PROGRESS NOTES
Physical Therapy Daily Treatment Note     Name: Mary Prince  Clinic Number: [id number]    Therapy Diagnosis:   Encounter Diagnoses   Name Primary?    Impaired functional mobility, balance, gait, and endurance Yes    Bilateral arm weakness      Physician: Poncho Saunders MD    Visit Date: 8/16/2019    Medical Diagnosis: B arm weakness  Evaluation Date: 5-15-19  Authorization Period Expiration: 4-25-20  NEW Plan of Care Certification Period: 6/25/2019-9/25/2019  Visit #/Visits authorized: 2/6 new referral  (15 total visits 2019)    Time In: 1117  Time Out: 1200  Total Billable Time: 43 minutes    Precautions: Standard and Fall    Subjective     Pt reports: She is doing well today. She had to cancel her appt on Tuesday because she had an angiogram on Monday. She does not report falls any time recently. She went to the gym yesterday and did cardio exercise.    She was compliant with home exercise program.  Response to previous treatment: she enjoyed the aggressiveness of last session  Functional change: none to report    Pain: 0/10  Location: lower back    Objective   Mary participated in therapeutic exercises to improve functional mobility and safety for 13 minutes, including:  X 10 minutes on [employer] stepper, B LE/UE  level 2.5    2x15 DF with peach theraband on L, with no resistance on R    Mary participated in neuromuscular re-education activities to improve: Balance, Coordination, Kinesthetic, Proprioception and Posture for 30 minutes. The following activities were included:  // bars:  2 x 30 seconds tandem stance, eyes open, occ UE support, SBA  2 x 30 seconds feet together, eyes closed, mod sway, no UE support  2 x 30 seconds feet together on foam    4 point fitter board:  x15 squats on board with no UE support, SBA    x10 sit<>stand from EOM on foam, no UE support, SBA  x20 alternating taps onto large cone on 4 pt fitter with no UE support and CGA    x10 ea LE pilates chair glute push down with 2 black and  2 Geronimo on lv 1, CGA     Mary participated in gait training to improve functional mobility and safety for 0 minutes, including:       Home Exercises Provided and Patient Education Provided     Education provided:   - continuation of exercise outside of sessions and safety to prevent further falls    Written Home Exercises Provided: yes.  Exercises were reviewed and Mary was able to demonstrate them prior to the end of the session.  Mary demonstrated good  understanding of the education provided.     See EMR under Patient Instructions for exercises provided 6/28/2019.    Assessment     Mary tolerated today's session well today. She was better able to complete sit to stands from EOM today. She struggles with active DF, despite PT noting increased DF while ambulating. She continues to struggle with vision eliminated balance tasks and has unsteady gait. Cont with Plan of Care.     aMry is progressing well towards her goals.   Pt prognosis is Good.     Pt will continue to benefit from skilled outpatient physical therapy to address the deficits listed in the problem list box on initial evaluation, provide pt/family education and to maximize pt's level of independence in the home and community environment.     Pt's spiritual, cultural and educational needs considered and pt agreeable to plan of care and goals.     Anticipated barriers to physical therapy: none    Goals:   Short Term GOALS: (5 weeks)  1. Pt to increase 30 second sit to stand score to 9 without use of UE. MET (7/23/2019)  2. Pt to increase Davidson Balance Score to 43 in order to decrease her risk for fall. progressing  3. Pt to increase TUG score with use of AD to </= 20 seconds.MET (7/23/2019)  4. Pt will increase Nathanael condition 2 to 20 seconds with SBA. progressing  Long Term GOALS: (10 weeks)  1. Pt will increase 30 second sit to stand score to 11 without use of UEs. MET (7/23/2019)  2. Pt will increase DGI score to 16 in order to decrease  her risk for fall.progressing  3. Pt will pass Nathanael condition 2 with minimal sway. progressing  4. Pt will increase self selected walking speed to .7 in order to better be able to participate in pilgrimage. MET (7/23/2019)  4a (new goal 7/23/2019) Pt will increase self selected walking speed without AD to .9 in order to better be able to participate in pilgrimage.  5. Pt will increase TUG score with use of AD to </= 15 seconds in order to better participate in pilgrimage. MET (7/23/2019)  6. Pt will increase Davidson Balance score to 51 in order to decrease her risk for fall. progressing    Plan     Continue to work on balance and progress as appropriate.    Efrain David, PT

## 2019-08-20 ENCOUNTER — CLINICAL SUPPORT (OUTPATIENT)
Dept: REHABILITATION | Facility: HOSPITAL | Age: 79
End: 2019-08-20
Payer: MEDICARE

## 2019-08-20 DIAGNOSIS — M25.512 CHRONIC PAIN OF BOTH SHOULDERS: ICD-10-CM

## 2019-08-20 DIAGNOSIS — Z74.09 IMPAIRED FUNCTIONAL MOBILITY, BALANCE, GAIT, AND ENDURANCE: Primary | ICD-10-CM

## 2019-08-20 DIAGNOSIS — M25.612 SHOULDER JOINT STIFFNESS, BILATERAL: ICD-10-CM

## 2019-08-20 DIAGNOSIS — M25.611 SHOULDER JOINT STIFFNESS, BILATERAL: ICD-10-CM

## 2019-08-20 DIAGNOSIS — M25.511 CHRONIC PAIN OF BOTH SHOULDERS: ICD-10-CM

## 2019-08-20 DIAGNOSIS — G89.29 CHRONIC PAIN OF BOTH SHOULDERS: ICD-10-CM

## 2019-08-20 DIAGNOSIS — R29.898 BILATERAL ARM WEAKNESS: ICD-10-CM

## 2019-08-20 PROCEDURE — 97112 NEUROMUSCULAR REEDUCATION: CPT | Mod: PO

## 2019-08-20 PROCEDURE — 97110 THERAPEUTIC EXERCISES: CPT | Mod: PO

## 2019-08-20 NOTE — PROGRESS NOTES
Occupational Therapy Daily Treatment Note     Date: 8/20/2019  Name: Mary Prince  Clinic Number: [id number]    Therapy Diagnosis:   Encounter Diagnoses   Name Primary?    Shoulder joint stiffness, bilateral     Chronic pain of both shoulders      Physician: Ahmet Maurer MD    Physician Orders: Eval and treat  Medical Diagnosis: S14.3XXA (ICD-10-CM) - Brachial plexus injury  Surgical Procedure and Date: 2016  Evaluation Date: 7/9/2019  Insurance Authorization Period Expiration: 6/20/20  Plan of Care Certification Period: 8/7/19  Date of Return to MD: TBD     Visit # / Visits authorized: 3/ 1   Time In:9:00AM  Time Out: 10:05AM  Total Billable Time: 50 minutes     Precautions:  Standard and Diabetes      Subjective     Patient reports compliance with HEP.     Pain: 8/10  Location: L shoulder    Objective     Mary received the following supervised modalities after being cleared for contradictions for 10 minutes:   -Patient received MHP to bilateral shoulders to increase blood flow, circulation and tissue elasticity prior to therex    Mary received therapeutic exercises for 55 minutes including: All exercises performed bilaterally  -Supine dowel exercises for shoulder AAROM: scaption, flexion, external rotation  -Active shoulder horizontal abd/add in supine x 10, (cueing for slow, controlled motion and to keep elbows extended)  -Isometric scapular retraction in sitting x 10  -UBE L1.5: 3' forward 3' backward  -Towel slides  X 10, CW and CWW (cueing to keep elbows extended)  -Shoulder extension 2# x 10 (using mirror for visual feedback on posture)  -Recriprocal pulleys 2' flex, 2' abd, IR x 10 each    Home Exercises and Education Provided     Education provided: Continue HEP  -Progress with goals    Written Home Exercises Provided: Yes  Exercises were reviewed and Mary was able to demonstrate them prior to the end of the session.  Mary demonstrated good  understanding of the HEP provided.   .   See EMR  under Patient Instructions for exercises provided during initial evaluation.     Assessment     Patient able to gain more ROM with active assisted exercises. All exercises performed slowly to cue for technique    Mary is progressing well towards her goals and there are no updates to goals at this time. Pt prognosis is Good.     Pt will continue to benefit from skilled outpatient occupational therapy to address the deficits listed in the problem list on initial evaluation provide pt/family education and to maximize pt's level of independence in the home and community environment.     Pt's spiritual, cultural and educational needs considered and pt agreeable to plan of care and goals.    Goals:   Short Term Goals:   To be completed in 1 week:  1) Patient will be independent in HEP-Ongoing  To be completed in 2-4 weeks:  2) Decrease shoulder pain to 5/10 to increase ADL and IADL participation-Not met  3) Increase  strength by at least 12 pounds each to improve safety during ADL performance-Not met  4) Increase shoulder flexion and abduction AROM to at least 100 degrees to improve dressing and bathing performance-Progressing  5) Improve timed 9-Hole Peg Test by 10 seconds, indicative of increased dexterity for IADLs-Not met  Long Term Goals:   To be completed by discharge:  6) Decrease FOTO limitation score to 44%, indicative of improved functional independence  7) Patient will perform all ADLs and most IADLs with independence or modified independence         Plan   Outpatient Occupational Therapy 1 times weekly for 6 visits     Updates/Grading for next session:      Beverley Yates, OT

## 2019-08-20 NOTE — PROGRESS NOTES
Physical Therapy Daily Treatment Note     Name: Mary Prince  Clinic Number: [id number]    Therapy Diagnosis:   Encounter Diagnoses   Name Primary?    Impaired functional mobility, balance, gait, and endurance Yes    Bilateral arm weakness      Physician: Poncho Saunders MD    Visit Date: 8/20/2019    Medical Diagnosis: B arm weakness  Evaluation Date: 5-15-19  Authorization Period Expiration: 4-25-20  NEW Plan of Care Certification Period: 6/25/2019-9/25/2019  Visit #/Visits authorized: 3/6 new referral  (16 total visits 2019)    Time In: 1115  Time Out: 1200  Total Billable Time: 45 minutes    Precautions: Standard and Fall    Subjective     Pt reports: She is doing well today. She continues to use CBD cream to reduce back pain.    She was compliant with home exercise program.  Response to previous treatment: she enjoyed the aggressiveness of last session  Functional change: none to report    Pain: 0/10  Location: lower back    Objective   Mary participated in therapeutic exercises to improve functional mobility and safety for 15 minutes, including:  X 10 minutes on [employer] stepper, B LE/UE  level 2.5    Mary participated in neuromuscular re-education activities to improve: Balance, Coordination, Kinesthetic, Proprioception and Posture for 30 minutes. The following activities were included:  // bars:  2 x 30 seconds tandem stance, eyes open, occ UE support, SBA  2 x 30 seconds feet together, eyes closed, min sway, no UE support  2 x 30 seconds feet together on foam  2 x 30 seconds on foam feet together, eyes closed, Hemal  4 laps side stepping through ladder  2 laps forward ambulation through ladder with 2 feet in each rung    4 point fitter board:  x15 squats on board with no UE support, SBA  x15 step ups onto fitter board  x10 tap down and step back up onto fitter board    x10 sit<>stand from EOM on foam, no UE support, SBA, pt requires use of stepping strategy once during this activity  x20 alternating taps  onto large cone on 4 pt fitter with no UE support and CGA     Mary participated in gait training to improve functional mobility and safety for 0 minutes, including:       Home Exercises Provided and Patient Education Provided     Education provided:   - continuation of exercise outside of sessions and safety to prevent further falls    Written Home Exercises Provided: yes.  Exercises were reviewed and Mary was able to demonstrate them prior to the end of the session.  Mary demonstrated good  understanding of the education provided.     See EMR under Patient Instructions for exercises provided 6/28/2019.    Assessment     Mary tolerated today's session well today. She continues to be challenged with endurance training on [employer]. She tolerated addition of new balance activities well today. Although she had to use stepping strategy during sit<>stand activity pt recovered independently without falling. Plan to incorporate more ambulatory balance tasks in the coming sessions. Continue with POC.     Mary is progressing well towards her goals.   Pt prognosis is Good.     Pt will continue to benefit from skilled outpatient physical therapy to address the deficits listed in the problem list box on initial evaluation, provide pt/family education and to maximize pt's level of independence in the home and community environment.     Pt's spiritual, cultural and educational needs considered and pt agreeable to plan of care and goals.     Anticipated barriers to physical therapy: none    Goals:   Short Term GOALS: (5 weeks)  1. Pt to increase 30 second sit to stand score to 9 without use of UE. MET (7/23/2019)  2. Pt to increase Davidson Balance Score to 43 in order to decrease her risk for fall. progressing  3. Pt to increase TUG score with use of AD to </= 20 seconds.MET (7/23/2019)  4. Pt will increase Nathanael condition 2 to 20 seconds with SBA. progressing  Long Term GOALS: (10 weeks)  1. Pt will increase 30 second sit to  stand score to 11 without use of UEs. MET (7/23/2019)  2. Pt will increase DGI score to 16 in order to decrease her risk for fall.progressing  3. Pt will pass Nathanael condition 2 with minimal sway. progressing  4. Pt will increase self selected walking speed to .7 in order to better be able to participate in pilgrimage. MET (7/23/2019)  4a (new goal 7/23/2019) Pt will increase self selected walking speed without AD to .9 in order to better be able to participate in pilgrimage.  5. Pt will increase TUG score with use of AD to </= 15 seconds in order to better participate in pilgrimage. MET (7/23/2019)  6. Pt will increase Davidson Balance score to 51 in order to decrease her risk for fall. progressing    Plan     Continue to work on balance and progress as appropriate.    Efrain David, PT

## 2019-08-23 ENCOUNTER — CLINICAL SUPPORT (OUTPATIENT)
Dept: REHABILITATION | Facility: HOSPITAL | Age: 79
End: 2019-08-23
Payer: MEDICARE

## 2019-08-23 DIAGNOSIS — R29.898 BILATERAL ARM WEAKNESS: ICD-10-CM

## 2019-08-23 DIAGNOSIS — Z74.09 IMPAIRED FUNCTIONAL MOBILITY, BALANCE, GAIT, AND ENDURANCE: ICD-10-CM

## 2019-08-23 PROCEDURE — 97112 NEUROMUSCULAR REEDUCATION: CPT | Mod: PO

## 2019-08-23 PROCEDURE — 97110 THERAPEUTIC EXERCISES: CPT | Mod: PO

## 2019-08-23 NOTE — PLAN OF CARE
Physical Therapy Daily Treatment Note     Name: Mary Prince  Clinic Number: [id number]    Therapy Diagnosis:   Encounter Diagnoses   Name Primary?    Impaired functional mobility, balance, gait, and endurance     Bilateral arm weakness      Physician: Poncho Saunders MD    Visit Date: 8/23/2019    Medical Diagnosis: B arm weakness  Evaluation Date: 5-15-19  Authorization Period Expiration: 4-25-20  NEW Plan of Care Certification Period: 6/25/2019-9/25/2019  Visit #/Visits authorized: 4/6 new referral  (17 total visits 2019)    Time In: 1345  Time Out: 1430  Total Billable Time: 45 minutes    Precautions: Standard and Fall    Subjective     Pt reports: She is doing well today. No falls recently. No new complaints.     She was compliant with home exercise program.  Response to previous treatment: she enjoyed the aggressiveness of last session  Functional change: none to report    Pain: 0/10  Location: lower back    Objective   Mary participated in therapeutic exercises to improve functional mobility and safety for 15 minutes, including:  X 10 minutes on [employer] stepper, B LE/UE  level 3.0    Lower Extremity Strength (tested in seated)  Right LE  karen; 7/23 8/23 Left LE  eval 7/23 8/23   Hip flexion:  3+/5 3+/5 4-/5 Hip flexion: 3+/5 4-/5 4/5   Knee extension: 4-/5 4-/5 4+/5 Knee extension: 4-/5 4-/5 4+/5   Knee flexion: 3/5 4-/5 4+/5 Knee flexion: 3/5 4-/5 4+/5   Ankle dorsiflexion:  3-/5 3/5 3/5 Ankle dorsiflexion: 3+/5 3+/5 4-/5   Ankle plantarflexion:  3+/5 3+/5 NT Ankle plantarflexion: 3+/5 4-/5 NT   Hip abduction: 3-/5 3/5 4/5 Hip abduction: 3-/5 3/5 4/5   Hip adduction: 3-/5 3/5 4/5 Hip adduction 3-/5 3/5 4/5   Hip extension: 3/5 3/5 4-/5 Hip extension: 3/5 3/5 4/5          Mary participated in neuromuscular re-education activities to improve: Balance, Coordination, Kinesthetic, Proprioception and Posture for 30 minutes. The following activities were included:         6/25/2019 7/23/2019 8/23/2019   TUG  26.37 seconds with cane  26.08 seconds without AD 13.7 seconds with cane  10.7 seconds without AD 9.4 seconds with cane  9.6 seconds without AD   30 second sit to stand 7 without UE use 14 without UE use 18 without UE use   Self selected walking speed .54 m/sec .75 m/s (6m/8.0 seconds)  G-code = CK .83 m/s (6m/7.2sec)  G-code = CJ   Fast walking speed .71 m/sec .89 m/s (6m/6.7 seconds) .94 m/s (6m/6.4sec)   RUIZ 38/56 41/56 49/56   DGI 10/24 14/24 15/24     RUIZ Assessment  1. Sitting to Standin - able to stand without using hands and stabilize independently  2. Standing Unsupported: 4 - able to stand safely 2 minutes without hold  3. Sitting Unsupported: 4 - able to sit safely and securely 2 minutes  4. Standing to Sittin - sits safely with minimal use of hands  5. Pivot Transfer: 4 - able to trnasfer safely with minor use of hands  6. Standing with Eyes Closed: 3 - able to stand 10 seconds with supervision  7. Standing with Feet Together: 4 - able to place feet together independently and stand 1 minute safely  8. Reaching Forward with Outstretched Arm: 4 - can reach forward confidently 25 cm/10 inches  9. Retrieving Object from Floor: 3 - able to pick slipper but needs supervision  10. Turning to Look Behind: 3 - looks behind one side only, other side less weight shift  11. Turning 360 Degrees: 4 - able to turn 360 in seconds or less  12. Placing Alternate Foot on Step: 4 - able to stand independently safely and complete 8 steps in 20 seconds  13. Standing with One Foot in Front: 3 - able to place foot ahead of other independently and hold 30 seconds  14. Standing on One Foot: 1 - tries to lift leg and unable to hold 3 seconds but remains standing independently  Total: 49  Maximum: 56    Fall risk cut-off scores:   Elderly and History of falls: <51/56   Elderly and No history of falls: <42/56   CVA: <45/56       DYNAMIC GAIT INDEX:    1. Gait level surface= 1   3) Normal: walks 20', no AD, good speed, no  "evidence for imbalance,   normal gait pattern   2) Mild impairment: walks 20', uses AD, slower speed, mild gait deviations   1)  moderate impairment: walks 20', slow speed, abnormal gait pattern,   evidence for imbalance.   0)  severe impairment: cannot walk 20' without assistance, severe gait   deviations or imbalance  2. Change in gait speed: 2   3) Normal: able to smoothly change walking speed without loss of balance   or gait deviation. Shows a significant difference in walking speeds    between fast, slow, and normal speeds.    2) Mild impairment: is able to change speeds but demonstrates mild gait   deviations, or not gait deviations but unable to achieve a significant   change in velocity, or uses AD   1) Moderate impairment: makes only minor adjustments in walking speed,   or accomplishes a change in speed with significant gait deviation, or   changes speed but loses balance and is able to recover and keep    walking    0) Severe impairment: cannot change speeds, or loses balance and has to   reach for wall or be caught.  3. Gait with horizontal head turns: 2   3) Normal: performs head turns smoothly with no change in gait.   2) Mild impairment: performs head turns smoothly with slight velocity, I.e.   Minor disruption to smooth gait path or uses AD   1) Moderate impairment: performs head turns with moderate change in gait   velocity, slows down, staggers but recovers, can continue to walk   0) Severe impairment: performs task with severe disruption of gait, i.e.   Staggers outside of 15" path, loses balance , stops, reaches for wall  4. Gait with vertical head turns: 1   3) Normal: performs head turns smoothly with no change in gait   2) Mild impairment: performs head turns smoothly with slight velocity, I.e.   Minor disruption to smooth gait path or uses AD   1) Moderate impairment: performs head turns with moderate change in gait   velocity, slows down, staggers but recovers, can continue to " "walk   0) Severe impairment: performs task with severe disruption of gait, i.e.   Staggers outside of 15" path, loses balance , stops, reaches for wall  5. Gait and pivot turn: 2   3)  Normal: pivot turns safely within 3 sec and stops quickly with no LOB   2)  Mild impairment: pivot turns safely in > 3 sec and stops with no LOB   1) Moderate impairment: turns slowly, requires verbal cueing, requires   several small steps to catch balance following turn and stop   0)  Severe impairment: cannot turn safely, requires assistance to stop and   Turn  6. Step over obstacle:  2   3) normal: is able to step over box without changing speed, no LOB   2)  Mild impairment: is able to step over box, but must slow down and adjust   steps to clear box safely   1)  Moderate impairment: is able to step over box but must stop, then step   over. May require verbal cueing   0) Severe impairment: cannot perform without assistance  7. Step around obstacle: 3   3) Normal: able to walk around cones safely without changing gait speed;   no LOB   2)  Mild impairment: able to step around cones, but must slow down to adjust   steps to clear cones   1) moderate impairment: able to clear cones but must significantly slow   speed to accomplish or requires verbal cueing   0)  Severe impairment: unable to clear cones, walks into 1 or both cones, or   requires physical assistance  8. Steps: 2   3) Normal: alternating feet, no rail   2) Mild impairment: alternating feet, must use rail   1) Moderate impairment: 2 feet a stair, must use rail   0)  Severe impairment: cannot perform safely  Total: 15/24    KIZZY SENSORY TESTING:  (P= Pass, F= Fail; note any sway; hold each position for 30")  Condition 1: (firm surface/feet together/eyes open) P  Condition 2: (firm surface/feet together/eyes closed) P  Condition 3: (firm surface/feet in tandem/eyes open) P  Condition 4: (firm surface/feet in tandem/eyes closed) F; 9 seconds  Condition 5: (soft surface/feet " together/eyes open) P  Condition 6: (soft surface/feet together/eyes closed) F; 3 seconds  Condition 7: (Fakuda step test), measure distance varied from center starting position NT     Mary participated in gait training to improve functional mobility and safety for 0 minutes, including:       Home Exercises Provided and Patient Education Provided     Education provided:   - continuation of exercise outside of sessions and safety to prevent further falls    Written Home Exercises Provided: yes.  Exercises were reviewed and Mary was able to demonstrate them prior to the end of the session.  Mary demonstrated good  understanding of the education provided.     See EMR under Patient Instructions for exercises provided 6/28/2019.    Assessment     Assessment Period 7/23/2019-8/23/2019: Mary continues to make steady progress in skilled PT services. She was able to increase scores on [place] sensory testing, TUG, 30 second sit to stand, and Davidson today. She also continues to demonstrate increased strength in B LE. Pt continues to struggle with static standing balance with vision eliminated and with ambulatory balance tasks. She has met all STGs at this time and 4/7 LTGs. She remains appropriate for skilled PT services in order to continue to make strength gains, improve balance, and decrease her risk for fall.     Mary is progressing well towards her goals.   Pt prognosis is Good.     Pt will continue to benefit from skilled outpatient physical therapy to address the deficits listed in the problem list box on initial evaluation, provide pt/family education and to maximize pt's level of independence in the home and community environment.     Pt's spiritual, cultural and educational needs considered and pt agreeable to plan of care and goals.     Anticipated barriers to physical therapy: none    Goals:   Short Term GOALS: (5 weeks)  1. Pt to increase 30 second sit to stand score to 9 without use of UE. MET  (7/23/2019)  2. Pt to increase Davidson Balance Score to 43 in order to decrease her risk for fall. MET (8/23/2019)  3. Pt to increase TUG score with use of AD to </= 20 seconds.MET (7/23/2019)  4. Pt will increase Nathanael condition 2 to 20 seconds with SBA. MET (8/23/2019)  Long Term GOALS: (10 weeks)  1. Pt will increase 30 second sit to stand score to 11 without use of UEs. MET (7/23/2019)  2. Pt will increase DGI score to 16 in order to decrease her risk for fall. progressing  3. Pt will pass Nathanael condition 2 with minimal sway. MET  4. Pt will increase self selected walking speed to .7 in order to better be able to participate in pilgrimage. MET (7/23/2019)  4a (new goal 7/23/2019) Pt will increase self selected walking speed without AD to .9 in order to better be able to participate in pilgrimage. progressing  5. Pt will increase TUG score with use of AD to </= 15 seconds in order to better participate in pilgrimage. MET (7/23/2019)  6. Pt will increase Davidson Balance score to 51 in order to decrease her risk for fall. progressing    Plan     Focus on vision eliminated tasks, SLS, and ambulatory balance tasks.    Efrain David, PT

## 2019-08-23 NOTE — PROGRESS NOTES
Physical Therapy Daily Treatment Note     Name: Mary Prince  Clinic Number: [id number]    Therapy Diagnosis:   Encounter Diagnoses   Name Primary?    Impaired functional mobility, balance, gait, and endurance     Bilateral arm weakness      Physician: Poncho Saunders MD    Visit Date: 8/23/2019    Medical Diagnosis: B arm weakness  Evaluation Date: 5-15-19  Authorization Period Expiration: 4-25-20  NEW Plan of Care Certification Period: 6/25/2019-9/25/2019  Visit #/Visits authorized: 4/6 new referral  (17 total visits 2019)    Time In: 1345  Time Out: 1430  Total Billable Time: 45 minutes    Precautions: Standard and Fall    Subjective     Pt reports: She is doing well today. No falls recently. No new complaints.     She was compliant with home exercise program.  Response to previous treatment: she enjoyed the aggressiveness of last session  Functional change: none to report    Pain: 0/10  Location: lower back    Objective   Mary participated in therapeutic exercises to improve functional mobility and safety for 15 minutes, including:  X 10 minutes on [employer] stepper, B LE/UE  level 3.0    Lower Extremity Strength (tested in seated)  Right LE  karen; 7/23 8/23 Left LE  eval 7/23 8/23   Hip flexion:  3+/5 3+/5 4-/5 Hip flexion: 3+/5 4-/5 4/5   Knee extension: 4-/5 4-/5 4+/5 Knee extension: 4-/5 4-/5 4+/5   Knee flexion: 3/5 4-/5 4+/5 Knee flexion: 3/5 4-/5 4+/5   Ankle dorsiflexion:  3-/5 3/5 3/5 Ankle dorsiflexion: 3+/5 3+/5 4-/5   Ankle plantarflexion:  3+/5 3+/5 NT Ankle plantarflexion: 3+/5 4-/5 NT   Hip abduction: 3-/5 3/5 4/5 Hip abduction: 3-/5 3/5 4/5   Hip adduction: 3-/5 3/5 4/5 Hip adduction 3-/5 3/5 4/5   Hip extension: 3/5 3/5 4-/5 Hip extension: 3/5 3/5 4/5          Mary participated in neuromuscular re-education activities to improve: Balance, Coordination, Kinesthetic, Proprioception and Posture for 30 minutes. The following activities were included:         6/25/2019 7/23/2019 8/23/2019   TUG  26.37 seconds with cane  26.08 seconds without AD 13.7 seconds with cane  10.7 seconds without AD 9.4 seconds with cane  9.6 seconds without AD   30 second sit to stand 7 without UE use 14 without UE use 18 without UE use   Self selected walking speed .54 m/sec .75 m/s (6m/8.0 seconds)  G-code = CK .83 m/s (6m/7.2sec)  G-code = CJ   Fast walking speed .71 m/sec .89 m/s (6m/6.7 seconds) .94 m/s (6m/6.4sec)   RUIZ 38/56 41/56 49/56   DGI 10/24 14/24 15/24     RUIZ Assessment  1. Sitting to Standin - able to stand without using hands and stabilize independently  2. Standing Unsupported: 4 - able to stand safely 2 minutes without hold  3. Sitting Unsupported: 4 - able to sit safely and securely 2 minutes  4. Standing to Sittin - sits safely with minimal use of hands  5. Pivot Transfer: 4 - able to trnasfer safely with minor use of hands  6. Standing with Eyes Closed: 3 - able to stand 10 seconds with supervision  7. Standing with Feet Together: 4 - able to place feet together independently and stand 1 minute safely  8. Reaching Forward with Outstretched Arm: 4 - can reach forward confidently 25 cm/10 inches  9. Retrieving Object from Floor: 3 - able to pick slipper but needs supervision  10. Turning to Look Behind: 3 - looks behind one side only, other side less weight shift  11. Turning 360 Degrees: 4 - able to turn 360 in seconds or less  12. Placing Alternate Foot on Step: 4 - able to stand independently safely and complete 8 steps in 20 seconds  13. Standing with One Foot in Front: 3 - able to place foot ahead of other independently and hold 30 seconds  14. Standing on One Foot: 1 - tries to lift leg and unable to hold 3 seconds but remains standing independently  Total: 49  Maximum: 56    Fall risk cut-off scores:   Elderly and History of falls: <51/56   Elderly and No history of falls: <42/56   CVA: <45/56       DYNAMIC GAIT INDEX:    1. Gait level surface= 1   3) Normal: walks 20', no AD, good speed, no  "evidence for imbalance,   normal gait pattern   2) Mild impairment: walks 20', uses AD, slower speed, mild gait deviations   1)  moderate impairment: walks 20', slow speed, abnormal gait pattern,   evidence for imbalance.   0)  severe impairment: cannot walk 20' without assistance, severe gait   deviations or imbalance  2. Change in gait speed: 2   3) Normal: able to smoothly change walking speed without loss of balance   or gait deviation. Shows a significant difference in walking speeds    between fast, slow, and normal speeds.    2) Mild impairment: is able to change speeds but demonstrates mild gait   deviations, or not gait deviations but unable to achieve a significant   change in velocity, or uses AD   1) Moderate impairment: makes only minor adjustments in walking speed,   or accomplishes a change in speed with significant gait deviation, or   changes speed but loses balance and is able to recover and keep    walking    0) Severe impairment: cannot change speeds, or loses balance and has to   reach for wall or be caught.  3. Gait with horizontal head turns: 2   3) Normal: performs head turns smoothly with no change in gait.   2) Mild impairment: performs head turns smoothly with slight velocity, I.e.   Minor disruption to smooth gait path or uses AD   1) Moderate impairment: performs head turns with moderate change in gait   velocity, slows down, staggers but recovers, can continue to walk   0) Severe impairment: performs task with severe disruption of gait, i.e.   Staggers outside of 15" path, loses balance , stops, reaches for wall  4. Gait with vertical head turns: 1   3) Normal: performs head turns smoothly with no change in gait   2) Mild impairment: performs head turns smoothly with slight velocity, I.e.   Minor disruption to smooth gait path or uses AD   1) Moderate impairment: performs head turns with moderate change in gait   velocity, slows down, staggers but recovers, can continue to " "walk   0) Severe impairment: performs task with severe disruption of gait, i.e.   Staggers outside of 15" path, loses balance , stops, reaches for wall  5. Gait and pivot turn: 2   3)  Normal: pivot turns safely within 3 sec and stops quickly with no LOB   2)  Mild impairment: pivot turns safely in > 3 sec and stops with no LOB   1) Moderate impairment: turns slowly, requires verbal cueing, requires   several small steps to catch balance following turn and stop   0)  Severe impairment: cannot turn safely, requires assistance to stop and   Turn  6. Step over obstacle:  2   3) normal: is able to step over box without changing speed, no LOB   2)  Mild impairment: is able to step over box, but must slow down and adjust   steps to clear box safely   1)  Moderate impairment: is able to step over box but must stop, then step   over. May require verbal cueing   0) Severe impairment: cannot perform without assistance  7. Step around obstacle: 3   3) Normal: able to walk around cones safely without changing gait speed;   no LOB   2)  Mild impairment: able to step around cones, but must slow down to adjust   steps to clear cones   1) moderate impairment: able to clear cones but must significantly slow   speed to accomplish or requires verbal cueing   0)  Severe impairment: unable to clear cones, walks into 1 or both cones, or   requires physical assistance  8. Steps: 2   3) Normal: alternating feet, no rail   2) Mild impairment: alternating feet, must use rail   1) Moderate impairment: 2 feet a stair, must use rail   0)  Severe impairment: cannot perform safely  Total: 15/24    KIZZY SENSORY TESTING:  (P= Pass, F= Fail; note any sway; hold each position for 30")  Condition 1: (firm surface/feet together/eyes open) P  Condition 2: (firm surface/feet together/eyes closed) P  Condition 3: (firm surface/feet in tandem/eyes open) P  Condition 4: (firm surface/feet in tandem/eyes closed) F; 9 seconds  Condition 5: (soft surface/feet " together/eyes open) P  Condition 6: (soft surface/feet together/eyes closed) F; 3 seconds  Condition 7: (Fakuda step test), measure distance varied from center starting position NT     Mary participated in gait training to improve functional mobility and safety for 0 minutes, including:       Home Exercises Provided and Patient Education Provided     Education provided:   - continuation of exercise outside of sessions and safety to prevent further falls    Written Home Exercises Provided: yes.  Exercises were reviewed and Mary was able to demonstrate them prior to the end of the session.  Mary demonstrated good  understanding of the education provided.     See EMR under Patient Instructions for exercises provided 6/28/2019.    Assessment     Assessment Period 7/23/2019-8/23/2019: Mary continues to make steady progress in skilled PT services. She was able to increase scores on [place] sensory testing, TUG, 30 second sit to stand, and Davidson today. She also continues to demonstrate increased strength in B LE. Pt continues to struggle with static standing balance with vision eliminated and with ambulatory balance tasks. She has met all STGs at this time and 4/7 LTGs. She remains appropriate for skilled PT services in order to continue to make strength gains, improve balance, and decrease her risk for fall.     Mary is progressing well towards her goals.   Pt prognosis is Good.     Pt will continue to benefit from skilled outpatient physical therapy to address the deficits listed in the problem list box on initial evaluation, provide pt/family education and to maximize pt's level of independence in the home and community environment.     Pt's spiritual, cultural and educational needs considered and pt agreeable to plan of care and goals.     Anticipated barriers to physical therapy: none    Goals:   Short Term GOALS: (5 weeks)  1. Pt to increase 30 second sit to stand score to 9 without use of UE. MET  (7/23/2019)  2. Pt to increase Davidson Balance Score to 43 in order to decrease her risk for fall. MET (8/23/2019)  3. Pt to increase TUG score with use of AD to </= 20 seconds.MET (7/23/2019)  4. Pt will increase Nathanael condition 2 to 20 seconds with SBA. MET (8/23/2019)  Long Term GOALS: (10 weeks)  1. Pt will increase 30 second sit to stand score to 11 without use of UEs. MET (7/23/2019)  2. Pt will increase DGI score to 16 in order to decrease her risk for fall. progressing  3. Pt will pass Nathanael condition 2 with minimal sway. MET  4. Pt will increase self selected walking speed to .7 in order to better be able to participate in pilgrimage. MET (7/23/2019)  4a (new goal 7/23/2019) Pt will increase self selected walking speed without AD to .9 in order to better be able to participate in pilgrimage. progressing  5. Pt will increase TUG score with use of AD to </= 15 seconds in order to better participate in pilgrimage. MET (7/23/2019)  6. Pt will increase Davidson Balance score to 51 in order to decrease her risk for fall. progressing    Plan     Focus on vision eliminated tasks, SLS, and ambulatory balance tasks.    Efrain David, PT

## 2019-08-26 NOTE — PROGRESS NOTES
Physical Therapy Daily Treatment Note     Name: Mary Prince  Clinic Number: [id number]    Therapy Diagnosis:   Encounter Diagnoses   Name Primary?    Impaired functional mobility, balance, gait, and endurance Yes    Bilateral arm weakness      Physician: Poncho Saunders MD    Visit Date: 8/27/2019    Medical Diagnosis: B arm weakness  Evaluation Date: 5-15-19  Authorization Period Expiration: 4-25-20  NEW Plan of Care Certification Period: 6/25/2019-9/25/2019  Visit #/Visits authorized: 5/6 new referral  (18 total visits 2019)    Time In: 1122  Time Out: 1201  Total Billable Time: 39 minutes    Precautions: Standard and Fall    Subjective     Pt reports: She is doing well today. She enjoyed her occupational therapy appointment. No falls recently.    She was compliant with home exercise program.  Response to previous treatment: she enjoyed the aggressiveness of last session  Functional change: none to report    Pain: 0/10  Location: lower back    Objective   Mary participated in therapeutic exercises to improve functional mobility and safety for 15 minutes, including:  X 10 minutes on [employer] stepper, B LE/UE  level 3.0    Mary participated in neuromuscular re-education activities to improve: Balance, Coordination, Kinesthetic, Proprioception and Posture for 15 minutes. The following activities were included:  x10 sit to stand from EOM on airex without UE support, SBA  x5 on 4-pt fitter with use of 1 UE, CGA  x10 ea LE step onto 4 pt fitter  x20 taps alternating feet onto med cone while standing on 4 pt fitter      Mary participated in gait training to improve functional mobility and safety for 15 minutes, including:  3 laps tandem ambulation, pt struggles to place one foot in front of the other  2 laps backward ambulation, no UE support, SBA  3 laps marching with 3 second hold with occ UE support  3 laps side stepping with no UE support       Home Exercises Provided and Patient Education Provided      Education provided:   - continuation of exercise outside of sessions and safety to prevent further falls    Written Home Exercises Provided: yes.  Exercises were reviewed and Mary was able to demonstrate them prior to the end of the session.  Mary demonstrated good  understanding of the education provided.     See EMR under Patient Instructions for exercises provided 6/28/2019.    Assessment     Pt continues to do well with skilled PT services. She tolerates addition of activities on 4 pt fitter well today and requires CGA to close supervision only for these activities. She struggles with tandem ambulation often rushing through the activity in order to complete. She reports that she often feels as though if she goes quickly she feels like she won't fall She remains appropriate for skilled PT services to continue to improve balance, endurance, and strength in B LE.    Mary is progressing well towards her goals.   Pt prognosis is Good.     Pt will continue to benefit from skilled outpatient physical therapy to address the deficits listed in the problem list box on initial evaluation, provide pt/family education and to maximize pt's level of independence in the home and community environment.     Pt's spiritual, cultural and educational needs considered and pt agreeable to plan of care and goals.     Anticipated barriers to physical therapy: none    Goals:   Short Term GOALS: (5 weeks)  1. Pt to increase 30 second sit to stand score to 9 without use of UE. MET (7/23/2019)  2. Pt to increase Davidson Balance Score to 43 in order to decrease her risk for fall. MET (8/23/2019)  3. Pt to increase TUG score with use of AD to </= 20 seconds.MET (7/23/2019)  4. Pt will increase [place] condition 2 to 20 seconds with SBA. MET (8/23/2019)  Long Term GOALS: (10 weeks)  1. Pt will increase 30 second sit to stand score to 11 without use of UEs. MET (7/23/2019)  2. Pt will increase DGI score to 16 in order to decrease her risk  for fall. progressing  3. Pt will pass [place] condition 2 with minimal sway. MET  4. Pt will increase self selected walking speed to .7 in order to better be able to participate in pilgrimage. MET (7/23/2019)  4a (new goal 7/23/2019) Pt will increase self selected walking speed without AD to .9 in order to better be able to participate in pilgrimage. progressing  5. Pt will increase TUG score with use of AD to </= 15 seconds in order to better participate in pilgrimage. MET (7/23/2019)  6. Pt will increase Davidson Balance score to 51 in order to decrease her risk for fall. progressing    Plan     Focus on vision eliminated tasks, SLS, and ambulatory balance tasks.    Efrain David, PT

## 2019-08-27 ENCOUNTER — CLINICAL SUPPORT (OUTPATIENT)
Dept: REHABILITATION | Facility: HOSPITAL | Age: 79
End: 2019-08-27
Payer: MEDICARE

## 2019-08-27 DIAGNOSIS — Z74.09 IMPAIRED FUNCTIONAL MOBILITY, BALANCE, GAIT, AND ENDURANCE: Primary | ICD-10-CM

## 2019-08-27 DIAGNOSIS — G89.29 CHRONIC PAIN OF BOTH SHOULDERS: ICD-10-CM

## 2019-08-27 DIAGNOSIS — M25.612 SHOULDER JOINT STIFFNESS, BILATERAL: ICD-10-CM

## 2019-08-27 DIAGNOSIS — R29.898 BILATERAL ARM WEAKNESS: ICD-10-CM

## 2019-08-27 DIAGNOSIS — M25.611 SHOULDER JOINT STIFFNESS, BILATERAL: ICD-10-CM

## 2019-08-27 DIAGNOSIS — M25.512 CHRONIC PAIN OF BOTH SHOULDERS: ICD-10-CM

## 2019-08-27 DIAGNOSIS — M25.511 CHRONIC PAIN OF BOTH SHOULDERS: ICD-10-CM

## 2019-08-27 PROCEDURE — 97110 THERAPEUTIC EXERCISES: CPT | Mod: PO

## 2019-08-27 PROCEDURE — 97112 NEUROMUSCULAR REEDUCATION: CPT | Mod: PO

## 2019-08-27 PROCEDURE — 97116 GAIT TRAINING THERAPY: CPT | Mod: PO

## 2019-08-27 NOTE — PROGRESS NOTES
"  Occupational Therapy Daily Treatment Note     Date: 8/27/2019  Name: Mary Prince  Clinic Number: [id number]    Therapy Diagnosis:   Encounter Diagnoses   Name Primary?    Shoulder joint stiffness, bilateral     Chronic pain of both shoulders      Physician: Ahmet Maurer MD    Physician Orders: Eval and treat  Medical Diagnosis: S14.3XXA (ICD-10-CM) - Brachial plexus injury  Surgical Procedure and Date: 2016  Evaluation Date: 7/9/2019  Insurance Authorization Period Expiration: 6/20/20  Plan of Care Certification Period: 8/7/19  Date of Return to MD: MICHAEL     Visit # / Visits authorized: 4/ TBD  Time In:10:30am  Time Out: 11:15am  Total Billable Time: 45 minutes     Precautions:  Standard and Diabetes      Subjective     "I can really feel the stretch in my shoulders. I feel I have a bit more movement now"    Pain: 3/10  Location: L shoulder    Objective       Mary received therapeutic exercises for 45 minutes including: All exercises performed bilaterally  - NuStep x8 minutes for increased strength and endurance  - Recriprocal pulleys 2' flex, 2' abd, x 20 each  - Supine 5# dowel exercises for shoulder AAROM: flexion, external rotation 2x15  - Side lying sleeper stretch B 2x15  - Seated IR B 2x15 with OTB (towel underarm for tactile cueing)     Home Exercises and Education Provided     Education provided: Continue HEP  -Progress with goals    Written Home Exercises Provided: Yes  Exercises were reviewed and Mary was able to demonstrate them prior to the end of the session.  Mary demonstrated good  understanding of the HEP provided.   .   See EMR under Patient Instructions for exercises provided during initial evaluation.     Assessment     Mary demonstrated good tolerance to treatment this date. ROM grossly improved after stretching and AAROM. She cont's with ROM and strength deficits affecting her ability to fully participate in ADL/IADLs.      Mary is progressing well towards her goals and there are " no updates to goals at this time. Pt prognosis is Good.     Pt will continue to benefit from skilled outpatient occupational therapy to address the deficits listed in the problem list on initial evaluation provide pt/family education and to maximize pt's level of independence in the home and community environment.     Pt's spiritual, cultural and educational needs considered and pt agreeable to plan of care and goals.    Goals:   Short Term Goals:   To be completed in 1 week:  1) Patient will be independent in HEP-Ongoing  To be completed in 2-4 weeks:  2) Decrease shoulder pain to 5/10 to increase ADL and IADL participation-Not met  3) Increase  strength by at least 12 pounds each to improve safety during ADL performance-Not met  4) Increase shoulder flexion and abduction AROM to at least 100 degrees to improve dressing and bathing performance-Progressing  5) Improve timed 9-Hole Peg Test by 10 seconds, indicative of increased dexterity for IADLs-Not met  Long Term Goals:   To be completed by discharge:  6) Decrease FOTO limitation score to 44%, indicative of improved functional independence  7) Patient will perform all ADLs and most IADLs with independence or modified independence         Plan   Cont with established POC     Updates/Grading for next session: progress as tolerated      JUAN Freeman

## 2019-08-29 NOTE — PROGRESS NOTES
Physical Therapy Daily Treatment Note     Name: Mary Prince  Clinic Number: [id number]    Therapy Diagnosis:   Encounter Diagnoses   Name Primary?    Impaired functional mobility, balance, gait, and endurance     Bilateral arm weakness      Physician: Poncho Saunders MD    Visit Date: 8/30/2019    Medical Diagnosis: B arm weakness  Evaluation Date: 5-15-19  Authorization Period Expiration: 4-25-20  NEW Plan of Care Certification Period: 6/25/2019-9/25/2019  Visit #/Visits authorized: 6/6 new referral  -- KX modifier (19 total visits 2019)    Time In: 1115  Time Out: 1200  Total Billable Time: 45 minutes    Precautions: Standard and Fall    Subjective     Pt reports: She is doing well today. She enjoyed her occupational therapy appointment. No falls recently.    She was compliant with home exercise program.  Response to previous treatment: she enjoyed the aggressiveness of last session  Functional change: none to report    Pain: 0/10  Location: lower back    Objective   Mary participated in therapeutic exercises to improve functional mobility and safety for 15 minutes, including:  X 10 minutes on [employer] stepper, B LE/UE  level 3.0    Mary participated in neuromuscular re-education activities to improve: Balance, Coordination, Kinesthetic, Proprioception and Posture for 15 minutes. The following activities were included:  x10 sit to stand from EOM on airex without UE support, SBA  x5 on 4-pt fitter with use of 1 UE, CGA-Hemal    //bars:  x10 ea LE step onto 4 pt fitter, no UE support, CGA  x20 taps alternating feet onto med cone while standing on 4 pt fitter    Standing at [facility] box, [place] on 2, [place] on 1, x10 ea LE with CGA     Mary participated in gait training to improve functional mobility and safety for 15 minutes, including:  3 laps tandem ambulation, pt struggles to place one foot in front of the other  2 laps backward ambulation, no UE support, SBA  3 laps marching with 3 second hold  with occ UE support  3 laps side stepping with no UE support       Home Exercises Provided and Patient Education Provided     Education provided:   - continuation of exercise outside of sessions and safety to prevent further falls    Written Home Exercises Provided: yes.  Exercises were reviewed and Mary was able to demonstrate them prior to the end of the session.  Mary demonstrated good  understanding of the education provided.     See EMR under Patient Instructions for exercises provided 6/28/2019.    Assessment     Mary continues to tolerate sessions well. She demonstrates increased balance with stepping onto 4-pt fitter as well as when tapping cone while on fitter board. She continues to demonstrate decreased endurance and requires seated breaks throughout session though pt is willing to work, through these difficulties. She remains appropriate for skilled PT services.     Mary is progressing well towards her goals.   Pt prognosis is Good.     Pt will continue to benefit from skilled outpatient physical therapy to address the deficits listed in the problem list box on initial evaluation, provide pt/family education and to maximize pt's level of independence in the home and community environment.     Pt's spiritual, cultural and educational needs considered and pt agreeable to plan of care and goals.     Anticipated barriers to physical therapy: none    Goals:   Short Term GOALS: (5 weeks)  1. Pt to increase 30 second sit to stand score to 9 without use of UE. MET (7/23/2019)  2. Pt to increase Davidson Balance Score to 43 in order to decrease her risk for fall. MET (8/23/2019)  3. Pt to increase TUG score with use of AD to </= 20 seconds.MET (7/23/2019)  4. Pt will increase Nathanael condition 2 to 20 seconds with SBA. MET (8/23/2019)  Long Term GOALS: (10 weeks)  1. Pt will increase 30 second sit to stand score to 11 without use of UEs. MET (7/23/2019)  2. Pt will increase DGI score to 16 in order to decrease  her risk for fall. progressing  3. Pt will pass [place] condition 2 with minimal sway. MET  4. Pt will increase self selected walking speed to .7 in order to better be able to participate in pilgrimage. MET (7/23/2019)  4a (new goal 7/23/2019) Pt will increase self selected walking speed without AD to .9 in order to better be able to participate in pilgrimage. progressing  5. Pt will increase TUG score with use of AD to </= 15 seconds in order to better participate in pilgrimage. MET (7/23/2019)  6. Pt will increase Davidson Balance score to 51 in order to decrease her risk for fall. progressing    Plan     Focus on vision eliminated tasks, SLS, and ambulatory balance tasks.    Efrain David, PT

## 2019-08-30 ENCOUNTER — CLINICAL SUPPORT (OUTPATIENT)
Dept: REHABILITATION | Facility: HOSPITAL | Age: 79
End: 2019-08-30
Payer: MEDICARE

## 2019-08-30 DIAGNOSIS — R29.898 BILATERAL ARM WEAKNESS: ICD-10-CM

## 2019-08-30 DIAGNOSIS — Z74.09 IMPAIRED FUNCTIONAL MOBILITY, BALANCE, GAIT, AND ENDURANCE: ICD-10-CM

## 2019-08-30 PROCEDURE — 97112 NEUROMUSCULAR REEDUCATION: CPT | Mod: KX,PO

## 2019-08-30 PROCEDURE — 97116 GAIT TRAINING THERAPY: CPT | Mod: KX,PO

## 2019-08-30 PROCEDURE — 97110 THERAPEUTIC EXERCISES: CPT | Mod: KX,PO

## 2019-09-03 ENCOUNTER — CLINICAL SUPPORT (OUTPATIENT)
Dept: REHABILITATION | Facility: HOSPITAL | Age: 79
End: 2019-09-03
Payer: MEDICARE

## 2019-09-03 ENCOUNTER — DOCUMENTATION ONLY (OUTPATIENT)
Dept: REHABILITATION | Facility: HOSPITAL | Age: 79
End: 2019-09-03

## 2019-09-03 DIAGNOSIS — R29.898 BILATERAL ARM WEAKNESS: ICD-10-CM

## 2019-09-03 DIAGNOSIS — Z74.09 IMPAIRED FUNCTIONAL MOBILITY, BALANCE, GAIT, AND ENDURANCE: Primary | ICD-10-CM

## 2019-09-03 PROCEDURE — 97112 NEUROMUSCULAR REEDUCATION: CPT | Mod: KX,PO

## 2019-09-03 PROCEDURE — 97110 THERAPEUTIC EXERCISES: CPT | Mod: KX,PO

## 2019-09-03 NOTE — PROGRESS NOTES
PT/PTA met face to face to discuss pt's treatment plan and progress towards established goals.  Continue with current PT POC with focus on endurance, ambulatory higher level balance.  Patient will be seen by physical therapist at least every sixth treatment or 30 days, whichever occurs first.    Kate Taveras, PTA  09/03/2019

## 2019-09-03 NOTE — PROGRESS NOTES
Physical Therapy Daily Treatment Note     Name: Mary Prince  Clinic Number: [id number]    Therapy Diagnosis:   Encounter Diagnoses   Name Primary?    Impaired functional mobility, balance, gait, and endurance Yes    Bilateral arm weakness      Physician: Poncho Saunders MD    Visit Date: 9/3/2019    Medical Diagnosis: B arm weakness  Evaluation Date: 5-15-19  Authorization Period Expiration: 4-25-20  NEW Plan of Care Certification Period: 6/25/2019-9/25/2019  Visit #/Visits authorized: 7/18 new referral  -- KX modifier (20 total visits 2019)    Time In: 1120  Time Out: 1200  Total Billable Time: 40 minutes    Precautions: Standard and Fall    Subjective     Pt reports: She is feeling tired today. She was out of town this weekend at a family reunion and had a lot of trouble with her sinuses. She sat a lot over the weekend and just feeling overall rough. She almost fell when she got home yesterday when attempting to get off of the sofa.    She was compliant with home exercise program.  Response to previous treatment: she enjoyed the aggressiveness of last session  Functional change: none to report    Pain: 0/10  Location: lower back    Objective   Mary participated in therapeutic exercises to improve functional mobility and safety for 15 minutes, including:  X 10 minutes on SciFit stepper, B LE/UE  level 2.5 on hills progressive setting    Mary participated in neuromuscular re-education activities to improve: Balance, Coordination, Kinesthetic, Proprioception and Posture for 25 minutes. The following activities were included:  x10 sit to stand from EOM on airex without UE support, SBA  x5 on 4-pt fitter with use of 1 UE, CGA    //bars:  2 x 30 seconds eyes closed, on foam, Hemal  2 x 30 seconds eye open on foam, CGA  x10 ea LE step onto foam, no UE support, CGA     Mary participated in gait training to improve functional mobility and safety for 0 minutes, including:         Home Exercises Provided and Patient  Education Provided     Education provided:   - continuation of exercise outside of sessions and safety to prevent further falls    Written Home Exercises Provided: yes.  Exercises were reviewed and Mary was able to demonstrate them prior to the end of the session.  Mary demonstrated good  understanding of the education provided.     See EMR under Patient Instructions for exercises provided 6/28/2019.    Assessment     Mary tolerated session fair today. She struggled more today with decreased endurance and reports that she feels very fatigued following her trip. Pt had one incidence of knee buckling while in parallel bars and used UEs to prevent fall. Pt required increased time for completion of all tasks today. She remains appropriate for skilled PT services.      Mary is progressing well towards her goals.   Pt prognosis is Good.     Pt will continue to benefit from skilled outpatient physical therapy to address the deficits listed in the problem list box on initial evaluation, provide pt/family education and to maximize pt's level of independence in the home and community environment.     Pt's spiritual, cultural and educational needs considered and pt agreeable to plan of care and goals.     Anticipated barriers to physical therapy: none    Goals:   Short Term GOALS: (5 weeks)  1. Pt to increase 30 second sit to stand score to 9 without use of UE. MET (7/23/2019)  2. Pt to increase Davidson Balance Score to 43 in order to decrease her risk for fall. MET (8/23/2019)  3. Pt to increase TUG score with use of AD to </= 20 seconds.MET (7/23/2019)  4. Pt will increase [place] condition 2 to 20 seconds with SBA. MET (8/23/2019)  Long Term GOALS: (10 weeks)  1. Pt will increase 30 second sit to stand score to 11 without use of UEs. MET (7/23/2019)  2. Pt will increase DGI score to 16 in order to decrease her risk for fall. progressing  3. Pt will pass [place] condition 2 with minimal sway. MET  4. Pt will increase self  selected walking speed to .7 in order to better be able to participate in pilgrimage. MET (7/23/2019)  4a (new goal 7/23/2019) Pt will increase self selected walking speed without AD to .9 in order to better be able to participate in pilgrimage. progressing  5. Pt will increase TUG score with use of AD to </= 15 seconds in order to better participate in pilgrimage. MET (7/23/2019)  6. Pt will increase Davidson Balance score to 51 in order to decrease her risk for fall. progressing    Plan     Focus on vision eliminated tasks, SLS, and ambulatory balance tasks.    Efrain David, PT

## 2019-09-05 NOTE — PROGRESS NOTES
Physical Therapy Daily Treatment Note     Name: Mary Prince  Clinic Number: [id number]    Therapy Diagnosis:   Encounter Diagnoses   Name Primary?    Impaired functional mobility, balance, gait, and endurance Yes    Bilateral arm weakness      Physician: Poncho Saunders MD    Visit Date: 9/6/2019    Medical Diagnosis: B arm weakness  Evaluation Date: 5-15-19  Authorization Period Expiration: 4-25-20  NEW Plan of Care Certification Period: 6/25/2019-9/25/2019  Visit #/Visits authorized: 8/18 new referral  -- KX modifier (21 total visits 2019)    Time In: 1115  Time Out: 1200  Total Billable Time: 45 minutes    Precautions: Standard and Fall    Subjective     Pt reports: She has been having trouble with R knee pain lately. This morning she was unsure if she would make it to therapy she was in so much pain. She was able to put CBD oil on it and is no longer in pain.     She was compliant with home exercise program.  Response to previous treatment: she enjoyed the aggressiveness of last session  Functional change: none to report    Pain: 0/10  Location: lower back    Objective   Mary participated in therapeutic exercises to improve functional mobility and safety for 15 minutes, including:  X 10 minutes on [place] stepper, B LE/UE  level 3.0 on hills progressive setting    Mary participated in neuromuscular re-education activities to improve: Balance, Coordination, Kinesthetic, Proprioception and Posture for 22 minutes. The following activities were included:  x10 sit to stand from EOM on airex without UE support, SBA  x5 on 4-pt fitter with use of 1 UE, SBA    //bars:  Step onto fitter with opposite LE brought into hip and knee flexion, x10 ea  x30 seconds eyes open on foam, CGA  3 x 30 seconds eyes closed on foam, CGA to Hemal  x20 taps onto medium cone while standing on foam       Mary participated in gait training to improve functional mobility and safety for 8 minutes, including:  Marching with 3 second hold,  2 laps // bars, occ UE support  Tandem ambulation, 3 laps // bars, occ UE support       Home Exercises Provided and Patient Education Provided     Education provided:   - continuation of exercise outside of sessions and safety to prevent further falls    Written Home Exercises Provided: yes.  Exercises were reviewed and Mary was able to demonstrate them prior to the end of the session.  Mary demonstrated good  understanding of the education provided.     See EMR under Patient Instructions for exercises provided 6/28/2019.    Assessment     Mary tolerated session well today. She showed improvement with sit to stand from EOM on compliant surfaces. She may benefit from addition of hip abduction exercises in order to decrease her knee pain, if it persists. She continues to demonstrate improvements with balance though her deficits are still evident with vision eliminated tasks and ambulatory tasks. She remains appropriate for skilled PT services.     Mary is progressing well towards her goals.   Pt prognosis is Good.     Pt will continue to benefit from skilled outpatient physical therapy to address the deficits listed in the problem list box on initial evaluation, provide pt/family education and to maximize pt's level of independence in the home and community environment.     Pt's spiritual, cultural and educational needs considered and pt agreeable to plan of care and goals.     Anticipated barriers to physical therapy: none    Goals:   Short Term GOALS: (5 weeks)  1. Pt to increase 30 second sit to stand score to 9 without use of UE. MET (7/23/2019)  2. Pt to increase Davidson Balance Score to 43 in order to decrease her risk for fall. MET (8/23/2019)  3. Pt to increase TUG score with use of AD to </= 20 seconds.MET (7/23/2019)  4. Pt will increase [place] condition 2 to 20 seconds with SBA. MET (8/23/2019)  Long Term GOALS: (10 weeks)  1. Pt will increase 30 second sit to stand score to 11 without use of UEs. MET  (7/23/2019)  2. Pt will increase DGI score to 16 in order to decrease her risk for fall. progressing  3. Pt will pass [place] condition 2 with minimal sway. MET  4. Pt will increase self selected walking speed to .7 in order to better be able to participate in pilgrimage. MET (7/23/2019)  4a (new goal 7/23/2019) Pt will increase self selected walking speed without AD to .9 in order to better be able to participate in pilgrimage. progressing  5. Pt will increase TUG score with use of AD to </= 15 seconds in order to better participate in pilgrimage. MET (7/23/2019)  6. Pt will increase Davidson Balance score to 51 in order to decrease her risk for fall. progressing    Plan     Focus on vision eliminated tasks, SLS, and ambulatory balance tasks.    Efrain David, PT

## 2019-09-06 ENCOUNTER — CLINICAL SUPPORT (OUTPATIENT)
Dept: REHABILITATION | Facility: HOSPITAL | Age: 79
End: 2019-09-06
Payer: MEDICARE

## 2019-09-06 DIAGNOSIS — Z74.09 IMPAIRED FUNCTIONAL MOBILITY, BALANCE, GAIT, AND ENDURANCE: Primary | ICD-10-CM

## 2019-09-06 DIAGNOSIS — R29.898 BILATERAL ARM WEAKNESS: ICD-10-CM

## 2019-09-06 PROCEDURE — 97110 THERAPEUTIC EXERCISES: CPT | Mod: KX,PO

## 2019-09-06 PROCEDURE — 97112 NEUROMUSCULAR REEDUCATION: CPT | Mod: KX,PO

## 2019-09-06 PROCEDURE — 97116 GAIT TRAINING THERAPY: CPT | Mod: KX,PO

## 2019-09-06 NOTE — PROGRESS NOTES
Physical Therapy Daily Treatment Note     Name: Mary Prince  Clinic Number: [id number]    Therapy Diagnosis:   No diagnosis found.  Physician: Poncho Saunders MD    Visit Date: 9/10/2019    Medical Diagnosis: B arm weakness  Evaluation Date: 5-15-19  Authorization Period Expiration: 4-25-20  NEW Plan of Care Certification Period: 6/25/2019-9/25/2019  Visit #/Visits authorized: 9/18 new referral  -- KX modifier (22 total visits 2019)    Time In: 1030  Time Out: 1115  Total Billable Time: 45 minutes    Precautions: Standard and Fall    Subjective     Pt reports: She feels as though her knee pain Is getting better but it still bothers her. She put her CBD cream on it this morning and is no longer in pain.    She was compliant with home exercise program.  Response to previous treatment: she enjoyed the aggressiveness of last session  Functional change: none to report    Pain: 0/10  Location: lower back    Objective   Mary participated in therapeutic exercises to improve functional mobility and safety for 30 minutes, including:  X 10 minutes on AMGasFit stepper, B LE/UE  level 3.0 on hills progressive setting    2x15 bridges  3x15 clams with orange theraband on L and peach theraband on R  3x15 mini squats with peach theraband around knees   3 laps length of therapy mat side stepping with peach theraband around knees    Mary participated in neuromuscular re-education activities to improve: Balance, Coordination, Kinesthetic, Proprioception and Posture for 15 minutes. The following activities were included:    //bars:  Step onto fitter with opposite LE brought into hip and knee flexion, x10 ea, CGA  x30 seconds eyes open on foam, CGA  2 x 30 seconds eyes closed on foam, CGA to Hemal  x20 taps onto medium cone while standing on foam       Mary participated in gait training to improve functional mobility and safety for 0 minutes, including:         Home Exercises Provided and Patient Education Provided     Education  provided:   - continuation of exercise outside of sessions and safety to prevent further falls    Written Home Exercises Provided: yes.  Exercises were reviewed and Mary was able to demonstrate them prior to the end of the session.  Mary demonstrated good  understanding of the education provided.     See EMR under Patient Instructions for exercises provided 6/28/2019.    Assessment     Mary tolerated session well today. She demonstrated significant weakness in R hip abduction, which may be contributing to her pain in R knee. PT provided pt with instructions to complete clams at home in order to increase her hip strength, which in turn should continue to improve her balance.She continues to tolerate balance activities well. She remains appropriate for skilled PT services.   Mary is progressing well towards her goals.   Pt prognosis is Good.     Pt will continue to benefit from skilled outpatient physical therapy to address the deficits listed in the problem list box on initial evaluation, provide pt/family education and to maximize pt's level of independence in the home and community environment.     Pt's spiritual, cultural and educational needs considered and pt agreeable to plan of care and goals.     Anticipated barriers to physical therapy: none    Goals:   Short Term GOALS: (5 weeks)  1. Pt to increase 30 second sit to stand score to 9 without use of UE. MET (7/23/2019)  2. Pt to increase Davidson Balance Score to 43 in order to decrease her risk for fall. MET (8/23/2019)  3. Pt to increase TUG score with use of AD to </= 20 seconds.MET (7/23/2019)  4. Pt will increase [place] condition 2 to 20 seconds with SBA. MET (8/23/2019)  Long Term GOALS: (10 weeks)  1. Pt will increase 30 second sit to stand score to 11 without use of UEs. MET (7/23/2019)  2. Pt will increase DGI score to 16 in order to decrease her risk for fall. progressing  3. Pt will pass [place] condition 2 with minimal sway. MET  4. Pt will increase  self selected walking speed to .7 in order to better be able to participate in pilgrimage. MET (7/23/2019)  4a (new goal 7/23/2019) Pt will increase self selected walking speed without AD to .9 in order to better be able to participate in pilgrimage. progressing  5. Pt will increase TUG score with use of AD to </= 15 seconds in order to better participate in pilgrimage. MET (7/23/2019)  6. Pt will increase Davidson Balance score to 51 in order to decrease her risk for fall. progressing    Plan     Focus on vision eliminated tasks, SLS, and ambulatory balance tasks.    Efrain David, PT

## 2019-09-10 ENCOUNTER — CLINICAL SUPPORT (OUTPATIENT)
Dept: REHABILITATION | Facility: HOSPITAL | Age: 79
End: 2019-09-10
Payer: MEDICARE

## 2019-09-10 DIAGNOSIS — Z74.09 IMPAIRED FUNCTIONAL MOBILITY, BALANCE, GAIT, AND ENDURANCE: ICD-10-CM

## 2019-09-10 DIAGNOSIS — R29.898 BILATERAL ARM WEAKNESS: ICD-10-CM

## 2019-09-10 PROCEDURE — 97110 THERAPEUTIC EXERCISES: CPT | Mod: PO

## 2019-09-10 PROCEDURE — 97112 NEUROMUSCULAR REEDUCATION: CPT | Mod: PO

## 2019-09-12 ENCOUNTER — CLINICAL SUPPORT (OUTPATIENT)
Dept: REHABILITATION | Facility: HOSPITAL | Age: 79
End: 2019-09-12
Payer: MEDICARE

## 2019-09-12 DIAGNOSIS — M25.511 CHRONIC PAIN OF BOTH SHOULDERS: ICD-10-CM

## 2019-09-12 DIAGNOSIS — R29.898 BILATERAL ARM WEAKNESS: ICD-10-CM

## 2019-09-12 DIAGNOSIS — I35.0 NODULAR CALCIFIC AORTIC VALVE STENOSIS: Primary | ICD-10-CM

## 2019-09-12 DIAGNOSIS — M25.611 SHOULDER JOINT STIFFNESS, BILATERAL: ICD-10-CM

## 2019-09-12 DIAGNOSIS — M25.512 CHRONIC PAIN OF BOTH SHOULDERS: ICD-10-CM

## 2019-09-12 DIAGNOSIS — N18.9 CHRONIC KIDNEY DISEASE, UNSPECIFIED CKD STAGE: ICD-10-CM

## 2019-09-12 DIAGNOSIS — Z74.09 IMPAIRED FUNCTIONAL MOBILITY, BALANCE, GAIT, AND ENDURANCE: ICD-10-CM

## 2019-09-12 DIAGNOSIS — G89.29 CHRONIC PAIN OF BOTH SHOULDERS: ICD-10-CM

## 2019-09-12 DIAGNOSIS — M25.612 SHOULDER JOINT STIFFNESS, BILATERAL: ICD-10-CM

## 2019-09-12 PROCEDURE — 97110 THERAPEUTIC EXERCISES: CPT | Mod: PO

## 2019-09-12 PROCEDURE — 97530 THERAPEUTIC ACTIVITIES: CPT | Mod: PO,59

## 2019-09-12 PROCEDURE — 97112 NEUROMUSCULAR REEDUCATION: CPT | Mod: PO

## 2019-09-12 PROCEDURE — 97140 MANUAL THERAPY 1/> REGIONS: CPT | Mod: PO

## 2019-09-12 NOTE — PROGRESS NOTES
"  Occupational Therapy Daily Treatment Note     Date: 9/12/2019  Name: Mary Prince  Clinic Number: [id number]    Therapy Diagnosis:   Encounter Diagnoses   Name Primary?    Shoulder joint stiffness, bilateral     Chronic pain of both shoulders      Physician: Poncho Saunders MD    Physician Orders: Eval and treat  Medical Diagnosis: S14.3XXA (ICD-10-CM) - Brachial plexus injury  Surgical Procedure and Date: 2016  Evaluation Date: 7/9/2019  Insurance Authorization Period Expiration: 6/20/20  Plan of Care Certification Period: 8/7/19  Date of Return to MD: TBD     Visit # / Visits authorized: 5/ TBD  Time In: 11:15am  Time Out: 12:00pm  Total Billable Time: 45 minutes     Precautions:  Standard and Diabetes      Subjective     Patient Reports: "R knee is not behaving today"    Pain: 4/10  Location: L shoulder    Objective     Mary received therapeutic exercises for 15 minutes including:   - Supine 5# dowel exercises for shoulder AAROM: flexion 2x20  - Side lying sleeper stretch B 2x15  - Seated IR/ER R 2x20 with OTB (towel underarm for tactile cueing)     Mary participated in therapeutic activities for 15 minutes:  - Discussed toileting routine to explore barriers in her independence during perianal hygiene     Mary received manual therapy for 15 minutes:  - Supine PROM to B shoulders FF/Abd with hold at end range  - Supine STM to subscap R    Home Exercises and Education Provided     Education provided: Continue HEP  -Progress with goals    Written Home Exercises Provided: Yes  Exercises were reviewed and Mary was able to demonstrate them prior to the end of the session.  Mary demonstrated good  understanding of the HEP provided.   .   See EMR under Patient Instructions for exercises provided during initial evaluation.     Assessment     Mary had fair tolerance to treatment this date. She had increased ataxic movements and was slow to complete exercises. ROM grossly improved after stretching and AAROM " again this date. She cont's with ROM and strength deficits affecting her ability to fully participate in ADL/IADLs.      Mary is progressing well towards her goals and there are no updates to goals at this time. Pt prognosis is Good.     Pt will continue to benefit from skilled outpatient occupational therapy to address the deficits listed in the problem list on initial evaluation provide pt/family education and to maximize pt's level of independence in the home and community environment.     Pt's spiritual, cultural and educational needs considered and pt agreeable to plan of care and goals.    Goals:   Short Term Goals:   To be completed in 1 week:  1) Patient will be independent in HEP-Ongoing  To be completed in 2-4 weeks:  2) Decrease shoulder pain to 5/10 to increase ADL and IADL participation-Not met  3) Increase  strength by at least 12 pounds each to improve safety during ADL performance-Not met  4) Increase shoulder flexion and abduction AROM to at least 100 degrees to improve dressing and bathing performance-Progressing  5) Improve timed 9-Hole Peg Test by 10 seconds, indicative of increased dexterity for IADLs-Not met  Long Term Goals:   To be completed by discharge:  6) Decrease FOTO limitation score to 44%, indicative of improved functional independence  7) Patient will perform all ADLs and most IADLs with independence or modified independence         Plan   Cont with established POC     Updates/Grading for next session: progress as tolerated      JUAN Freeman

## 2019-09-12 NOTE — PROGRESS NOTES
"  Physical Therapy Daily Treatment Note     Name: Mary Prince  Clinic Number: [id number]    Therapy Diagnosis:   Encounter Diagnoses   Name Primary?    Impaired functional mobility, balance, gait, and endurance     Bilateral arm weakness      Physician: Poncho Saunders MD    Visit Date: 9/12/2019    Medical Diagnosis: B arm weakness  Evaluation Date: 5-15-19  Authorization Period Expiration: 4-25-20  NEW Plan of Care Certification Period: 6/25/2019-9/25/2019  Visit #/Visits authorized: 10/18 new referral  -- KX modifier (23 total visits 2019)    Time In: 1030  Time Out: 1115  Total Billable Time: 45 minutes    Precautions: Standard and Fall    Subjective     Pt reports: " My knee has been bothering me since last week.  I've been putting CBD oil on it."     She was compliant with home exercise program.  Response to previous treatment: little soreness  Functional change: none to report    Pain: 0/10  Location: lower back    Objective   Mary participated in therapeutic exercises to improve functional mobility and safety for 30 minutes, including:  X 12 minutes on [employer] stepper, B LE/UE  level 3.0 on hills progressive setting    2x15 bridges    Mary participated in neuromuscular re-education activities to improve: Balance, Coordination, Kinesthetic, Proprioception and Posture for 15 minutes. The following activities were included:    Near ballet bar:   4 point foam fitter board: CGA    2 x 60 sec of static standing with eyes opened and 1 UE support      2 x 30 sec of static standing with eyes closed and 1 UE support    2 x 60 sec of medial/lateral weight shifting with 1 UE support    2 x 60 sec of anterior/posterior weight shifting with 1 UE support    X 60 sec of B UE chest press with soccer ball    Mary participated in gait training to improve functional mobility and safety for 0 minutes, including:         Home Exercises Provided and Patient Education Provided     Education provided:   - continuation of " exercise outside of sessions and safety to prevent further falls    Written Home Exercises Provided: yes.  Exercises were reviewed and Mary was able to demonstrate them prior to the end of the session.  Mary demonstrated good  understanding of the education provided.     See EMR under Patient Instructions for exercises provided 6/28/2019.    Assessment     Mary tolerated session fairly today.  Pt arrived requiring more assistance with gait and had multiple episodes of right knee buckling.  Pt required increased time to ambulate from place to place and was more ataxic with gait.    Mary is progressing well towards her goals.   Pt prognosis is Good.     Pt will continue to benefit from skilled outpatient physical therapy to address the deficits listed in the problem list box on initial evaluation, provide pt/family education and to maximize pt's level of independence in the home and community environment.     Pt's spiritual, cultural and educational needs considered and pt agreeable to plan of care and goals.     Anticipated barriers to physical therapy: none    Goals:   Short Term GOALS: (5 weeks)  1. Pt to increase 30 second sit to stand score to 9 without use of UE. MET (7/23/2019)  2. Pt to increase Davidson Balance Score to 43 in order to decrease her risk for fall. MET (8/23/2019)  3. Pt to increase TUG score with use of AD to </= 20 seconds.MET (7/23/2019)  4. Pt will increase [place] condition 2 to 20 seconds with SBA. MET (8/23/2019)  Long Term GOALS: (10 weeks)  1. Pt will increase 30 second sit to stand score to 11 without use of UEs. MET (7/23/2019)  2. Pt will increase DGI score to 16 in order to decrease her risk for fall. progressing  3. Pt will pass [place] condition 2 with minimal sway. MET  4. Pt will increase self selected walking speed to .7 in order to better be able to participate in [employer]. MET (7/23/2019)  4a (new goal 7/23/2019) Pt will increase self selected walking speed without AD to .9  in order to better be able to participate in [employer]. progressing  5. Pt will increase TUG score with use of AD to </= 15 seconds in order to better participate in [employer]. MET (7/23/2019)  6. Pt will increase Davidson Balance score to 51 in order to decrease her risk for fall. progressing    Plan     Focus on vision eliminated tasks, SLS, and ambulatory balance tasks.    Kate Taveras, PTA

## 2019-09-17 ENCOUNTER — CLINICAL SUPPORT (OUTPATIENT)
Dept: REHABILITATION | Facility: HOSPITAL | Age: 79
End: 2019-09-17
Payer: MEDICARE

## 2019-09-17 DIAGNOSIS — Z74.09 IMPAIRED FUNCTIONAL MOBILITY, BALANCE, GAIT, AND ENDURANCE: Primary | ICD-10-CM

## 2019-09-17 DIAGNOSIS — R29.898 BILATERAL ARM WEAKNESS: ICD-10-CM

## 2019-09-17 PROCEDURE — 97112 NEUROMUSCULAR REEDUCATION: CPT | Mod: KX,PO

## 2019-09-17 PROCEDURE — 97110 THERAPEUTIC EXERCISES: CPT | Mod: KX,PO

## 2019-09-17 PROCEDURE — G8978 MOBILITY CURRENT STATUS: HCPCS | Mod: CJ,PO

## 2019-09-17 NOTE — PLAN OF CARE
Physical Therapy Daily Treatment Note     Name: Mary Prince  Clinic Number: [id number]    Therapy Diagnosis:   Encounter Diagnoses   Name Primary?    Impaired functional mobility, balance, gait, and endurance Yes    Bilateral arm weakness      Physician: Poncho Saunders MD    Visit Date: 9/17/2019    Medical Diagnosis: B arm weakness  Evaluation Date: 5-15-19  Authorization Period Expiration: 4-25-20  NEW Plan of Care Certification Period: 9/17/2019-11/17/2019  Visit #/Visits authorized: 11/18 new referral  -- KX modifier (23 total visits 2019)    Time In: 1114  Time Out: 1200  Total Billable Time: 46 minutes    Precautions: Standard and Fall    Subjective     Pt reports: She fell on Friday at a friends house. She was attempting to carry a chair and the chair got caught on another chair causing her to fall down. She presents with R knee with hematoma surrounding entire knee. She had to stay in bed all weekend due to pain. She is feeling better today, but has not gone to MD to have it seen.     She was compliant with home exercise program.  Response to previous treatment: little soreness  Functional change: none to report    Pain: 0/10  Location: lower back    Objective   Mary participated in therapeutic exercises to improve functional mobility and safety for 15 minutes, including:  X 10 minutes on [employer] stepper, B LE/UE  level 3.0 on hills progressive setting    Lower Extremity Strength (tested in seated)  Right LE  eval 7/23 8/23 9/17 Left LE  eval 7/23 8/23 9/17   Hip flexion:  3+/5 3+/5 4-/5 4/5 Hip flexion: 3+/5 4-/5 4/5 4+/5   Knee extension: 4-/5 4-/5 4+/5 4+/5 Knee extension: 4-/5 4-/5 4+/5 5/5   Knee flexion: 3/5 4-/5 4+/5 4/5 Knee flexion: 3/5 4-/5 4+/5 4+/5   Ankle dorsiflexion:  3-/5 3/5 3/5 3+/5 Ankle dorsiflexion: 3+/5 3+/5 4-/5 4-/5   Ankle plantarflexion:  3+/5 3+/5 NT 4/5 Ankle plantarflexion: 3+/5 4-/5 NT 5/5   Hip abduction: 3-/5 3/5 4/5 4/5 Hip abduction: 3-/5 3/5 4/5 4+/5   Hip adduction:  3-/5 3/5 4/5 4/5 Hip adduction 3-/5 3/5 4/5 4/5   Hip extension: 3/ 3/5 4-/5 4/5 Hip extension: 3/5 3/5 4/5 4/5          Mary participated in neuromuscular re-education activities to improve: Balance, Coordination, Kinesthetic, Proprioception and Posture for 31 minutes. The following activities were included:         2019   TUG 26.37 seconds with cane  26.08 seconds without AD 13.7 seconds with cane  10.7 seconds without AD 9.4 seconds with cane  9.6 seconds without AD 8.6 seconds without AD   30 second sit to stand 7 without UE use 14 without UE use 18 without UE use 19 without UE use (cues to come fully to standing)   Self selected walking speed .54 m/sec .75 m/s (6m/8.0 seconds)  G-code = CK .83 m/s (6m/7.2sec)  G-code = CJ .83 m/s (6m/7.2sec)  G-code = CJ   Fast walking speed .71 m/sec .89 m/s (6m/6.7 seconds) .94 m/s (6m/6.4sec) 1.01 m/s (6m/5.9sec)   RUIZ 38/56 41/56 49/56 51/56   DGI 10/24 14/24 15/24 15/24      RUIZ Assessment  1. Sitting to Standin - able to stand without using hands and stabilize independently  2. Standing Unsupported: 4 - able to stand safely 2 minutes without hold  3. Sitting Unsupported: 4 - able to sit safely and securely 2 minutes  4. Standing to Sittin - sits safely with minimal use of hands  5. Pivot Transfer: 4 - able to trnasfer safely with minor use of hands  6. Standing with Eyes Closed: 4 - albe to stand 10 seconds safely  7. Standing with Feet Together: 4 - able to place feet together independently and stand 1 minute safely  8. Reaching Forward with Outstretched Arm: 3 - can reach forward 12 cm/5 inches safely  9. Retrieving Object from Floor: 3 - able to pick slipper but needs supervision  10. Turning to Look Behind: 4 - looks behind from both sides and weights shifts well  11. Turning 360 Degrees: 4 - able to turn 360 in seconds or less  12. Placing Alternate Foot on Step: 4 - able to stand independently safely and complete 8  "steps in 20 seconds  13. Standing with One Foot in Front: 3 - able to place foot ahead of other independently and hold 30 seconds  14. Standing on One Foot: 2 - able to lift leg indepentdently and hold = or < 3 seconds  Total: 51  Maximum: 56      DYNAMIC GAIT INDEX:    1. Gait level surface= 2   3) Normal: walks 20', no AD, good speed, no evidence for imbalance,   normal gait pattern   2) Mild impairment: walks 20', uses AD, slower speed, mild gait deviations   1)  moderate impairment: walks 20', slow speed, abnormal gait pattern,   evidence for imbalance.   0)  severe impairment: cannot walk 20' without assistance, severe gait   deviations or imbalance  2. Change in gait speed: 2   3) Normal: able to smoothly change walking speed without loss of balance   or gait deviation. Shows a significant difference in walking speeds    between fast, slow, and normal speeds.    2) Mild impairment: is able to change speeds but demonstrates mild gait   deviations, or not gait deviations but unable to achieve a significant   change in velocity, or uses AD   1) Moderate impairment: makes only minor adjustments in walking speed,   or accomplishes a change in speed with significant gait deviation, or   changes speed but loses balance and is able to recover and keep    walking    0) Severe impairment: cannot change speeds, or loses balance and has to   reach for wall or be caught.  3. Gait with horizontal head turns: 1   3) Normal: performs head turns smoothly with no change in gait.   2) Mild impairment: performs head turns smoothly with slight velocity, I.e.   Minor disruption to smooth gait path or uses AD   1) Moderate impairment: performs head turns with moderate change in gait   velocity, slows down, staggers but recovers, can continue to walk   0) Severe impairment: performs task with severe disruption of gait, i.e.   Staggers outside of 15" path, loses balance , stops, reaches for wall  4. Gait with vertical head turns: " "2   3) Normal: performs head turns smoothly with no change in gait   2) Mild impairment: performs head turns smoothly with slight velocity, I.e.   Minor disruption to smooth gait path or uses AD   1) Moderate impairment: performs head turns with moderate change in gait   velocity, slows down, staggers but recovers, can continue to walk   0) Severe impairment: performs task with severe disruption of gait, i.e.   Staggers outside of 15" path, loses balance , stops, reaches for wall  5. Gait and pivot turn: 2   3)  Normal: pivot turns safely within 3 sec and stops quickly with no LOB   2)  Mild impairment: pivot turns safely in > 3 sec and stops with no LOB   1) Moderate impairment: turns slowly, requires verbal cueing, requires   several small steps to catch balance following turn and stop   0)  Severe impairment: cannot turn safely, requires assistance to stop and   Turn  6. Step over obstacle:  2   3) normal: is able to step over box without changing speed, no LOB   2)  Mild impairment: is able to step over box, but must slow down and adjust   steps to clear box safely   1)  Moderate impairment: is able to step over box but must stop, then step   over. May require verbal cueing   0) Severe impairment: cannot perform without assistance  7. Step around obstacle: 2   3) Normal: able to walk around cones safely without changing gait speed;   no LOB   2)  Mild impairment: able to step around cones, but must slow down to adjust   steps to clear cones   1) moderate impairment: able to clear cones but must significantly slow   speed to accomplish or requires verbal cueing   0)  Severe impairment: unable to clear cones, walks into 1 or both cones, or   requires physical assistance  8. Steps: 2   3) Normal: alternating feet, no rail   2) Mild impairment: alternating feet, must use rail   1) Moderate impairment: 2 feet a stair, must use rail   0)  Severe impairment: cannot perform safely  Total: 15/24    KIZZY SENSORY " "TESTING:  (P= Pass, F= Fail; note any sway; hold each position for 30")  Condition 1: (firm surface/feet together/eyes open) P  Condition 2: (firm surface/feet together/eyes closed) P  Condition 3: (firm surface/feet in tandem/eyes open) F; 5 seconds  Condition 4: (firm surface/feet in tandem/eyes closed) NT  Condition 5: (soft surface/feet together/eyes open) P  Condition 6: (soft surface/feet together/eyes closed) F; 8 seconds  Condition 7: (Fakuda step test), measure distance varied from center starting position NT      Mary participated in gait training to improve functional mobility and safety for 0 minutes, including:         Home Exercises Provided and Patient Education Provided     Education provided:   - continuation of exercise outside of sessions and safety to prevent further falls    Written Home Exercises Provided: yes.  Exercises were reviewed and Mary was able to demonstrate them prior to the end of the session.  Mary demonstrated good  understanding of the education provided.     See EMR under Patient Instructions for exercises provided 6/28/2019.    Assessment     Assessment period 8/23/2019-9/17/2019: Mary continues to make good progress with skilled PT services. She has met one additional LTG during this assessment period. She continues to struggle with decreased balance as evidenced by score on DGI and Davidson. Pt continues to be a fall risk as evidenced by recent fall as well, though she was attempting to carry a chair while ambulate, resulting in the fall. She remains appropriate for skilled PT services in order to increase her strength in B LE, increase balance, and increase endurance. Pt remains appropriate for skilled PT services and POC has been extended through 11/17/2019.      Mary is progressing well towards her goals.   Pt prognosis is Good.     Pt will continue to benefit from skilled outpatient physical therapy to address the deficits listed in the problem list box on initial " evaluation, provide pt/family education and to maximize pt's level of independence in the home and community environment.     Pt's spiritual, cultural and educational needs considered and pt agreeable to plan of care and goals.     Anticipated barriers to physical therapy: none    Goals:   Short Term GOALS: (5 weeks)  1. Pt to increase 30 second sit to stand score to 9 without use of UE. MET (7/23/2019)  2. Pt to increase Davidson Balance Score to 43 in order to decrease her risk for fall. MET (8/23/2019)  3. Pt to increase TUG score with use of AD to </= 20 seconds.MET (7/23/2019)  4. Pt will increase Nathanael condition 2 to 20 seconds with SBA. MET (8/23/2019)  Long Term GOALS: (10 weeks)  1. Pt will increase 30 second sit to stand score to 11 without use of UEs. MET (7/23/2019)  2. Pt will increase DGI score to 16 in order to decrease her risk for fall. progressing  3. Pt will pass [place] condition 2 with minimal sway. MET  4. Pt will increase self selected walking speed to .7 in order to better be able to participate in pilgrimage. MET (7/23/2019)  4a (new goal 7/23/2019) Pt will increase self selected walking speed without AD to .9 in order to better be able to participate in pilgrimage. progressing  5. Pt will increase TUG score with use of AD to </= 15 seconds in order to better participate in pilgrimage. MET (7/23/2019)  6. Pt will increase Davidson Balance score to 51 in order to decrease her risk for fall. MET (9/17/2019)    Plan     NEW Plan of Care Certification Period: 9/17/2019-11/17/2019    Focus on vision eliminated tasks, SLS, and ambulatory balance tasks.    Efrain David, PT

## 2019-09-19 ENCOUNTER — CLINICAL SUPPORT (OUTPATIENT)
Dept: REHABILITATION | Facility: HOSPITAL | Age: 79
End: 2019-09-19
Payer: MEDICARE

## 2019-09-19 DIAGNOSIS — R29.898 BILATERAL ARM WEAKNESS: ICD-10-CM

## 2019-09-19 DIAGNOSIS — M25.612 SHOULDER JOINT STIFFNESS, BILATERAL: ICD-10-CM

## 2019-09-19 DIAGNOSIS — G89.29 CHRONIC PAIN OF BOTH SHOULDERS: ICD-10-CM

## 2019-09-19 DIAGNOSIS — M25.511 CHRONIC PAIN OF BOTH SHOULDERS: ICD-10-CM

## 2019-09-19 DIAGNOSIS — Z74.09 IMPAIRED FUNCTIONAL MOBILITY, BALANCE, GAIT, AND ENDURANCE: ICD-10-CM

## 2019-09-19 DIAGNOSIS — M25.512 CHRONIC PAIN OF BOTH SHOULDERS: ICD-10-CM

## 2019-09-19 DIAGNOSIS — M25.611 SHOULDER JOINT STIFFNESS, BILATERAL: ICD-10-CM

## 2019-09-19 PROCEDURE — 97112 NEUROMUSCULAR REEDUCATION: CPT | Mod: PO

## 2019-09-19 PROCEDURE — 97110 THERAPEUTIC EXERCISES: CPT | Mod: PO

## 2019-09-19 NOTE — PROGRESS NOTES
"  Physical Therapy Daily Treatment Note     Name: Mary Prince  Clinic Number: [id number]    Therapy Diagnosis:   Encounter Diagnoses   Name Primary?    Impaired functional mobility, balance, gait, and endurance     Bilateral arm weakness      Physician: Poncho Saunders MD    Visit Date: 9/19/2019    Medical Diagnosis: B arm weakness  Evaluation Date: 5-15-19  Authorization Period Expiration: 4-25-20  NEW Plan of Care Certification Period: 9/17/2019-11/17/2019  Visit #/Visits authorized: 12/18 new referral  -- KX modifier (24 total visits 2019)    Time In: 1030  Time Out: 1115  Total Billable Time: 45 minutes    Precautions: Standard and Fall    Subjective     Pt reports: "I'm going to see the orthopedist this afternoon for my knee, you know I fell last last week carrying a chair."     She was compliant with home exercise program.  Response to previous treatment: little soreness  Functional change: none to report    Pain: 0/10  Location: lower back    Objective   Mary participated in therapeutic exercises to improve functional mobility and safety for 12 minutes, including:  X 10 minutes on [employer] stepper, B LE/UE  level 4.0       Mary participated in neuromuscular re-education activities to improve: Balance, Coordination, Kinesthetic, Proprioception and Posture for 33 minutes. The following activities were included:    [employer] foam pad: CGA   2 x 30 sec of static standing with no UE support   2 x 30 sec of static standing with eyes closed, no UE support   2 x 30 sec of B LE single leg stance with eyes opened and 1 finger support   2 x 30 sec of B LE single leg stance with eyes closed and 2 finger support   X 60 sec of medial/lateral weight shifting with arms crossed and occasional 1 finger support   X 60 sec of anterior/posterior weight shifting with arms crossed and occasional 1 finger support    1 point foam fitter board:   2 x 10 reps of B LE single leg step ups onto fitter board.  Pt requires occasional 1 UE " support and stands statically about 2-3 sec unsupported at a time.      Mary participated in gait training to improve functional mobility and safety for 0 minutes, including:         Home Exercises Provided and Patient Education Provided     Education provided:   - continuation of exercise outside of sessions and safety to prevent further falls    Written Home Exercises Provided: yes.  Exercises were reviewed and Mary was able to demonstrate them prior to the end of the session.  Mary demonstrated good  understanding of the education provided.     See EMR under Patient Instructions for exercises provided 6/28/2019.    Assessment     Mary tolerated session fairly well today and did not have any problems noted.  Pt with ecchymosis noted on R knee distally and proximally and up but able to perform all activities w/o complaints of pain.  Pt will be going to see an MD this afternoon about her knee.  Pt with better balance noted today.    Mary is progressing well towards her goals.   Pt prognosis is Good.     Pt will continue to benefit from skilled outpatient physical therapy to address the deficits listed in the problem list box on initial evaluation, provide pt/family education and to maximize pt's level of independence in the home and community environment.     Pt's spiritual, cultural and educational needs considered and pt agreeable to plan of care and goals.     Anticipated barriers to physical therapy: none    Goals:   Short Term GOALS: (5 weeks)  1. Pt to increase 30 second sit to stand score to 9 without use of UE. MET (7/23/2019)  2. Pt to increase Davidson Balance Score to 43 in order to decrease her risk for fall. MET (8/23/2019)  3. Pt to increase TUG score with use of AD to </= 20 seconds.MET (7/23/2019)  4. Pt will increase [place] condition 2 to 20 seconds with SBA. MET (8/23/2019)  Long Term GOALS: (10 weeks)  1. Pt will increase 30 second sit to stand score to 11 without use of UEs. MET  (7/23/2019)  2. Pt will increase DGI score to 16 in order to decrease her risk for fall. progressing  3. Pt will pass [place] condition 2 with minimal sway. MET  4. Pt will increase self selected walking speed to .7 in order to better be able to participate in pilgrimage. MET (7/23/2019)  4a (new goal 7/23/2019) Pt will increase self selected walking speed without AD to .9 in order to better be able to participate in pilgrimage. progressing  5. Pt will increase TUG score with use of AD to </= 15 seconds in order to better participate in pilgrimage. MET (7/23/2019)  6. Pt will increase Davidson Balance score to 51 in order to decrease her risk for fall. MET (9/17/2019)    Plan     Focus on vision eliminated tasks, SLS, and ambulatory balance tasks.  Kate Taveras, PTA

## 2019-09-19 NOTE — PROGRESS NOTES
"  Physical Therapy Daily Treatment Note     Name: Mary Prince  Clinic Number: [id number]    Therapy Diagnosis:   Encounter Diagnoses   Name Primary?    Impaired functional mobility, balance, gait, and endurance Yes    Bilateral arm weakness      Physician: Ahmet Maurer MD    Visit Date: 9/24/2019    Medical Diagnosis: B arm weakness  Evaluation Date: 5-15-19  Authorization Period Expiration: 4-25-20  NEW Plan of Care Certification Period: 9/17/2019-11/17/2019  Visit #/Visits authorized: 13/18 new referral  -- KX modifier (24 total visits 2019)    Time In: 1020  Time Out: 1105  Total Billable Time: 45 minutes    Precautions: Standard and Fall    Subjective     Pt reports: "I saw the orthopedist and he wants to see me again next week because he is worried for infection. He took an xray though and said it is not broken."    She was compliant with home exercise program.  Response to previous treatment: little soreness  Functional change: none to report    Pain: 0/10  Location: lower back    Objective   Mary participated in therapeutic exercises to improve functional mobility and safety for 12 minutes, including:  X 10 minutes on [employer] stepper,[employer] setting progressive setting level 3.0      Mary participated in neuromuscular re-education activities to improve: Balance, Coordination, Kinesthetic, Proprioception and Posture for 33 minutes. The following activities were included:  x10 sit to stands from EOM    [employer] foam pad: CGA   2 x 30 sec of static standing with no UE support   2 x 30 sec of static standing with eyes closed, no UE support   2 x 30 sec of B LE single leg stance with eyes opened and 1 finger support   2 x 20 taps onto med cone with occ UE support   X 60 sec of medial/lateral weight shifting with arms crossed and occasional 1 finger support   X 60 sec of anterior/posterior weight shifting with arms crossed and occasional 1 finger support    2 x 30 seconds SLS with opposing foot on ball for " additional support, CGA    1 point foam fitter board:   2 x 10 reps of B LE single leg step ups onto fitter board.  Pt requires occasional 1 UE support and stands statically about 2-3 sec unsupported at a time.      Mary participated in gait training to improve functional mobility and safety for 0 minutes, including:         Home Exercises Provided and Patient Education Provided     Education provided:   - increased joint tension due to swelling, use of ice to decrease swelling    Written Home Exercises Provided: yes.  Exercises were reviewed and Mary was able to demonstrate them prior to the end of the session.  Mary demonstrated good  understanding of the education provided.     See EMR under Patient Instructions for exercises provided 6/28/2019.    Assessment     Mary tolerated session well today. She does well with many SLS activities today and continues to demonstrate increased balance. She continues to display ecchymosis to R knee and c/o tight feeling in the knee though ROM appears to be similar to L knee. PT educates pt on use of ice to assist in decreasing swelling. She remains appropriate for skilled PT services.       Mary is progressing well towards her goals.   Pt prognosis is Good.     Pt will continue to benefit from skilled outpatient physical therapy to address the deficits listed in the problem list box on initial evaluation, provide pt/family education and to maximize pt's level of independence in the home and community environment.     Pt's spiritual, cultural and educational needs considered and pt agreeable to plan of care and goals.     Anticipated barriers to physical therapy: none    Goals:   Short Term GOALS: (5 weeks)  1. Pt to increase 30 second sit to stand score to 9 without use of UE. MET (7/23/2019)  2. Pt to increase Davidson Balance Score to 43 in order to decrease her risk for fall. MET (8/23/2019)  3. Pt to increase TUG score with use of AD to </= 20 seconds.MET  (7/23/2019)  4. Pt will increase Nathanael condition 2 to 20 seconds with SBA. MET (8/23/2019)  Long Term GOALS: (10 weeks)  1. Pt will increase 30 second sit to stand score to 11 without use of UEs. MET (7/23/2019)  2. Pt will increase DGI score to 16 in order to decrease her risk for fall. progressing  3. Pt will pass [place] condition 2 with minimal sway. MET  4. Pt will increase self selected walking speed to .7 in order to better be able to participate in pilgrimage. MET (7/23/2019)  4a (new goal 7/23/2019) Pt will increase self selected walking speed without AD to .9 in order to better be able to participate in pilgrimage. progressing  5. Pt will increase TUG score with use of AD to </= 15 seconds in order to better participate in pilgrimage. MET (7/23/2019)  6. Pt will increase Davidson Balance score to 51 in order to decrease her risk for fall. MET (9/17/2019)    Plan     Focus on vision eliminated tasks, SLS, and ambulatory balance tasks.  Efrain David, PT

## 2019-09-19 NOTE — PLAN OF CARE
[facility] Occupational Therapy   Treatment and updated POC     Date: 9/19/2019  Patient: Mary Prince  Chart Number: [id number]    Therapy Diagnosis:   Encounter Diagnoses   Name Primary?    Shoulder joint stiffness, bilateral     Chronic pain of both shoulders      Physician: Ahmet Maurer MD    Physician Orders: Eval and treat  Medical Diagnosis: Brachial Plexus injury  Evaluation Date: 9/19/2019  Plan of Care Expiration Period: 12/02/2019  Insurance Authorization period Expiration: 6/20/20  Date of Return to MD: MICHAEL      Visit # / Visits Authorized: 6 / 10  Time In: 11:15am  Time Out: 12:00pm  Total Billable (one on one) Time: 45 minutes    Precautions: Standard and Fall    Subjective     History of Current Condition: Had back surgery and says she was not positioned correctly. Has had weakness and increase in tremors in her hands/shoulders since.    Involved Side: b/l  Dominant Side: Right  Date of Onset: September 2016  Surgical Procedure: back sx 2016      Patient's Goals for Therapy: Increased independence and be able to toilet independently      Pain:  Pain Related Behaviors Observed:   Functional Pain Scale Rating 0-10:   6/10 on average  1/10 at best  8/10 at worst  Location: Knees and back  Description: Throbbing and Deep  Aggravating Factors: Walking and use  Easing Factors: rest    Occupation:  retired      Functional Limitations/Social History:    Prior Level of Function: I  Current Level of Function:Min A Mod I    Home/Living environment : lives with their spouse  Home Access: 2 steps  DME: single point cane     Leisure: Sports: watching saints game    Driving: Not driving but wants to    Past Medical History/Physical Systems Review:     Past Medical History:  Mary Prince  has a past medical history of Cancer, Cataract, Coronary artery disease, Diabetes mellitus, type 2, Hyperlipidemia, Hypertension, and Thyroid disease.    Past Surgical History:  Mary Prince  has a past  surgical history that includes Eye surgery (Bilateral); Hysterectomy; Spine surgery; Appendectomy; Joint replacement (Right); and Breast surgery (Bilateral).    Current Medications:  Mary has a current medication list which includes the following prescription(s): alprazolam, aspirin, atorvastatin, biotin, clopidogrel, cyanocobalamin (vitamin b-12), furosemide, gabapentin, insulin aspart u-100, letrozole, levothyroxine, potassium aminobenzoate, ramipril, and vitamin d.    Allergies:  Review of patient's allergies indicates:  No Known Allergies     Other:    Objective     Mary participated in therapeutic exercises for 45 minutes:      Physical Exam:  Postural examination/scapula alignment: Slouched posture  Joint integrity: Soft end feel  Skin integrity: Bruising of R knee  Edema: Mild in L knee        Joint Evaluation  AROM  9/19/2019 PROM   9/19/2019 AROM  9/19/2019 PROM   9/19/2019    Left Left Right Right   Shoulder flex 0-180 95 150 110 WNL   Shoulder Abd 0-180 110 150 115 175   Shoulder ER 0-90 55 90 50 90   Shoulder IR 0-90 10 50 50 80   Shoulder Extension 0-80 30 70 35 70   Shoulder Horizontal adduction 0-90 45 WNL 45 WNL   Elbow flex/ext 0-150 WNL WNL WNL WNL   Wrist flex 0-80 35 WNL 10 WNL   Wrist ext 0-70 50 WNL 50 WNL   Supination 0-80 WFL WFL 62 WNL   Pronation 0-80 WNL WNL WNL WNL   UD WFL WNL WFL WNL   RD WFL WNL WFL WNL         Strength 9/19/2019 9/19/2019   **within available ROM** Left Right   Shoulder flex 4-/5 4-/5   Shoulder abd 3/5 3/5   Shoulder ER 3/5 4-/5   Shoulder IR 4+/5 5/5   Shoulder Extension 5/5 5/5   Shoulder Horizontal adduction 4+/5 4+/5   Elbow flex 4-/5 4/5   Elbow ext 4+/5 4+/5   Wrist flex 3-/5 3-/5   Wrist ext 3-/5 3-/5   Supination 3/5 3/5   Pronation 4/5 4/5   UD 4/5 4-/5   RD 4/5 4/5      Strength: (LANDON Dynamometer in lbs.) Average 3 trials, Position II:     9/19/2019 9/19/2019    Left Right   Rung II 40# 40#     Pinch Strength (Measured in psi)     9/19/2019  9/19/2019    Left Right   Key Pinch 7 psi 5 psi   3pt Pinch 3 psi 3 psi   2pt Pinch 4 psi 1 psi     Fine Motor Coordination: 9 Hole Peg Test  Left 9/19/2019 Right 9/19/2019    44 1 drop  40 sec 2 drops     Gross motor coordination:   - ACACIA (Rapid Alternating Movements): Ataxic  - Finger to Nose (5 times): impaired   - Finger Flicks (coordination moving from digit flexion to digit extension): ataxic       Sensation:  Mary  reports normal sensation      Balance:   Static Sit - GOOD: Takes MODERATE challenges from all directions  Dynamic sit- GOOD: Takes MODERATE challenges from all directions    Endurance Deficit: moderate                    Functional Status      Functional Mobility:  Bed mobility: I  Roll to left: I  Roll to right: I  Supine to sit: I  Sit to supine: I  Transfers to bed: Mod I  Transfers to toilet: I  Car transfers: I      ADL's:  Feeding: Mod I (using spoon instead of fork or has to change hand position constantly)  Grooming: I  Hygiene: I  UB Dressing: Min A  LB Dressing: Min A  Toileting: Min A  Bathing: Mod I    IADL's:  Homecare: n/a  Cooking: Mod A  Laundry: Mod A  Yard work: n/a      Assessment     Mary Prince is a 78 y.o. female referred to outpatient occupational therapy and presents with a medical diagnosis of Brachial plexus injury, resulting in decreased range of motion, decreased muscle strength and impaired function and demonstrates limitations as described in the chart below. Following medical record review it is determined that pt will benefit from occupational therapy services in order to maximize pain free and/or functional use of bilateral UEs.    Pt prognosis is Good  Pt will benefit from skilled outpatient Occupational Therapy to address the deficits stated above and in the chart below, provide pt/family education, and to maximize pt's level of independence.     Plan of care discussed with patient: Yes  Pt's spiritual, cultural and educational needs considered and patient is  agreeable to the plan of care and goals as stated below:     Anticipated Barriers for therapy: length of time since injury    The following goals were discussed with the patient and patient is in agreement with them as to be addressed in the treatment plan.     Goals:  Short Term Goals: 5 weeks   - Pt. Will be educated in HEP and be able to return demonstrate with visual and verbal cues  - Pt. Will have WNL PROM of B shoulders in FF/Ext/Abd/IR/ER with no greater than 2/10 pain for increased ability to perform functional tasks  - Pt. Will improve  strength in both hands by at least 10# for increased functional performance during carrying tasks  - Pt. Will educated in adaptive strategies to help improve her independence around the home    Long Term Goals: 10 weeks   - Pt. Will be I with HEP  - Pt. Will have an increase of at least 140 degrees AROM in both shoulders in FF/Abd for increased ability to perform functional tasks   - Pt. Will improve L IR AROM by 40 degrees and R IR to WFL for improved ability to perform dressing tasks  - Pt. Will have improved Flex/Ext AROM of both wrists by at least 20 degrees for increased functional use  - Pt, will improve  strength in both hands by at least 15#  - Pt. Will improve 9hpt by 10 seconds in each hand for increased hand manipulation  - Pt. Will improve R pinch strength to WNL compared to L   - Pt. Will be able to successfully complete all areas of toileting Mod I    Plan   Certification Period/Plan of care expiration: 9/19/2019 to 12/2/2019.    Outpatient Occupational Therapy 2 times weekly for 10 weeks to include the following interventions: Manual Therapy, Moist Heat/ Ice, Neuromuscular Re-ed, Patient Education, Self Care, Therapeutic Activites and Therapeutic Exercise.    JUAN Freeman      I certify the need for these services furnished under this plan of treatment and while under my care.  ____________________________________ Physician/Referring  Practitioner   Date of Signature

## 2019-09-20 DIAGNOSIS — S80.01XA CONTUSION OF RIGHT KNEE, INITIAL ENCOUNTER: Primary | ICD-10-CM

## 2019-09-20 DIAGNOSIS — M65.811 OTHER SYNOVITIS AND TENOSYNOVITIS, RIGHT SHOULDER: ICD-10-CM

## 2019-09-20 DIAGNOSIS — M65.812 OTHER SYNOVITIS AND TENOSYNOVITIS, LEFT SHOULDER: ICD-10-CM

## 2019-09-24 ENCOUNTER — CLINICAL SUPPORT (OUTPATIENT)
Dept: REHABILITATION | Facility: HOSPITAL | Age: 79
End: 2019-09-24
Payer: MEDICARE

## 2019-09-24 DIAGNOSIS — R29.898 BILATERAL ARM WEAKNESS: ICD-10-CM

## 2019-09-24 DIAGNOSIS — Z74.09 IMPAIRED FUNCTIONAL MOBILITY, BALANCE, GAIT, AND ENDURANCE: Primary | ICD-10-CM

## 2019-09-24 PROCEDURE — 97110 THERAPEUTIC EXERCISES: CPT | Mod: KX,PO

## 2019-09-24 PROCEDURE — 97112 NEUROMUSCULAR REEDUCATION: CPT | Mod: KX,PO

## 2019-09-26 ENCOUNTER — CLINICAL SUPPORT (OUTPATIENT)
Dept: REHABILITATION | Facility: HOSPITAL | Age: 79
End: 2019-09-26
Payer: MEDICARE

## 2019-09-26 DIAGNOSIS — M25.612 SHOULDER JOINT STIFFNESS, BILATERAL: ICD-10-CM

## 2019-09-26 DIAGNOSIS — M25.611 SHOULDER JOINT STIFFNESS, BILATERAL: ICD-10-CM

## 2019-09-26 DIAGNOSIS — G89.29 CHRONIC PAIN OF BOTH SHOULDERS: ICD-10-CM

## 2019-09-26 DIAGNOSIS — Z74.09 IMPAIRED FUNCTIONAL MOBILITY, BALANCE, GAIT, AND ENDURANCE: ICD-10-CM

## 2019-09-26 DIAGNOSIS — M25.512 CHRONIC PAIN OF BOTH SHOULDERS: ICD-10-CM

## 2019-09-26 DIAGNOSIS — R29.898 BILATERAL ARM WEAKNESS: ICD-10-CM

## 2019-09-26 DIAGNOSIS — M25.511 CHRONIC PAIN OF BOTH SHOULDERS: ICD-10-CM

## 2019-09-26 PROCEDURE — 97530 THERAPEUTIC ACTIVITIES: CPT | Mod: PO

## 2019-09-26 PROCEDURE — 97110 THERAPEUTIC EXERCISES: CPT | Mod: PO

## 2019-09-26 PROCEDURE — 97112 NEUROMUSCULAR REEDUCATION: CPT | Mod: PO

## 2019-09-26 NOTE — PROGRESS NOTES
"  Occupational Therapy Daily Treatment Note     Date: 9/26/2019  Name: Mary Prince  Clinic Number: [id number]    Therapy Diagnosis:   Encounter Diagnoses   Name Primary?    Shoulder joint stiffness, bilateral     Chronic pain of both shoulders      Physician: Ahmet Maurer MD    Physician Orders: Eval and treat  Medical Diagnosis: Brachial Plexus injury  Evaluation Date: 9/19/2019  Plan of Care Expiration Period: 12/02/2019  Insurance Authorization period Expiration: 6/20/20  Date of Return to MD: MICHAEL        Visit # / Visits Authorized: 7 / 10  Time In: 11:15am  Time Out: 12:00pm  Total Billable (one on one) Time: 45 minutes     Precautions: Standard and Fall      Subjective     Pt reports: "Went to the gym the other day. Did my exercises for about 2 hours"  she was compliant with home exercise program given last session.   Response to previous treatment: positive  Functional change: ongoing    Pain: 4/10  Location: R leg     Objective       Mary received therapeutic exercises for 30 minutes including:  - HEP Yellow and orange Theraputty   - Tip Pinch 1x20 B   - Lat Pinch 1x20 B   - Three Jaw 1x20 B    - Grasp 1x20 B   - MP flex 1x20 B   - Digit Extension 1x5 B    Mary participated in dynamic functional therapeutic activities to improve functional performance for 15  minutes, including:  - Discussed eating difficulties at home; discussed weighted utensils; tried cuff weight placed to R wrist during simulated eating task      Home Exercises and Education Provided     Education provided:   -  HEP  - Progress towards goals     Written Home Exercises Provided: yes.  Exercises were reviewed and Mary was able to demonstrate them prior to the end of the session.  Mary demonstrated good  understanding of the HEP provided.        Assessment     Mary tolerated treatment session well today. She did require frequent verbal cues to maintain correct form and to slow down. She is very determined to be "back to " "normal" which may not be possible to reach. Pt. Was told to that 100% return may not happen but we will try our best. Pt. Was agreeable to that. She continues with bilateral UE weakness affecting her independence in ADL/IADLs.      Mary is progressing well towards her goals and there are no updates to goals at this time. Pt prognosis is Fair.     Pt will continue to benefit from skilled outpatient occupational therapy to address the deficits listed in the problem list on initial evaluation provide pt/family education and to maximize pt's level of independence in the home and community environment.     Anticipated barriers to occupational therapy: none at this time    Pt's spiritual, cultural and educational needs considered and pt agreeable to plan of care and goals.    Goals:  Short Term Goals: 5 weeks   - Pt. Will be educated in HEP and be able to return demonstrate with visual and verbal cues  - Pt. Will have WNL PROM of B shoulders in FF/Ext/Abd/IR/ER with no greater than 2/10 pain for increased ability to perform functional tasks  - Pt. Will improve  strength in both hands by at least 10# for increased functional performance during carrying tasks  - Pt. Will educated in adaptive strategies to help improve her independence around the home     Long Term Goals: 10 weeks   - Pt. Will be I with HEP  - Pt. Will have an increase of at least 140 degrees AROM in both shoulders in FF/Abd for increased ability to perform functional tasks   - Pt. Will improve L IR AROM by 40 degrees and R IR to WFL for improved ability to perform dressing tasks  - Pt. Will have improved Flex/Ext AROM of both wrists by at least 20 degrees for increased functional use  - Pt, will improve  strength in both hands by at least 15#  - Pt. Will improve 9hpt by 10 seconds in each hand for increased hand manipulation  - Pt. Will improve R pinch strength to WNL compared to L   - Pt. Will be able to successfully complete all areas of " toileting Mod I    Plan   Continue with established POC  Updates/Grading for next session: as tolerated       JUAN Freeman

## 2019-09-26 NOTE — PROGRESS NOTES
"  Physical Therapy Daily Treatment Note     Name: Mary Prince  Clinic Number: [id number]    Therapy Diagnosis:   Encounter Diagnoses   Name Primary?    Impaired functional mobility, balance, gait, and endurance     Bilateral arm weakness      Physician: Poncho Saunders MD    Visit Date: 9/26/2019    Medical Diagnosis: B arm weakness  Evaluation Date: 5-15-19  Authorization Period Expiration: 4-25-20  NEW Plan of Care Certification Period: 9/17/2019-11/17/2019  Visit #/Visits authorized: 14/18 new referral  -- KX modifier (25 total visits 2019)    Time In: 1030  Time Out: 1115  Total Billable Time: 45 minutes    Precautions: Standard and Fall    Subjective     Pt reports: "I'm going back to the orthopedist next week to check up on my knee."     She was compliant with home exercise program.  Response to previous treatment: little soreness  Functional change: none to report    Pain: 5/10  Location: Right knee    Objective   Mary participated in therapeutic exercises to improve functional mobility and safety for 10 minutes, including:  X 10 minutes on Nu Step stepper, level 3.0      Mary participated in neuromuscular re-education activities to improve: Balance, Coordination, Kinesthetic, Proprioception and Posture for 35 minutes. The following activities were included:  // bars:   1 x 15 reps of B LE single leg step over 1/2 foam pad with no UE support and CGA   1 x 15 reps of B LE single leg side step over 1/2 foam pad with no UE support and CGA   X 8 trials of stepping over 3 1/2 foam pads placed in a line with no UE support and CGA/SBA    4 point foam fitter board:   1 x 15 reps of B LE single leg step up and over with occasional 1 UE support and CGA   1 x 15 reps of B LE single leg step up with unilateral hip flexion on top of board with CGA   2 x 30 sec of static standing with eyes opened   2 x 30 sec of static standing with eyes closed    Mary participated in gait training to improve functional mobility and " safety for 0 minutes, including:         Home Exercises Provided and Patient Education Provided     Education provided:   - increased joint tension due to swelling, use of ice to decrease swelling    Written Home Exercises Provided: yes.  Exercises were reviewed and Mary was able to demonstrate them prior to the end of the session.  Mary demonstrated good  understanding of the education provided.     See EMR under Patient Instructions for exercises provided 6/28/2019.    Assessment     Mary tolerated session well today and cont to practice dynamic balance.  Pt did not have any loss of balance during tx session.        Mary is progressing well towards her goals.   Pt prognosis is Good.     Pt will continue to benefit from skilled outpatient physical therapy to address the deficits listed in the problem list box on initial evaluation, provide pt/family education and to maximize pt's level of independence in the home and community environment.     Pt's spiritual, cultural and educational needs considered and pt agreeable to plan of care and goals.     Anticipated barriers to physical therapy: none    Goals:   Short Term GOALS: (5 weeks)  1. Pt to increase 30 second sit to stand score to 9 without use of UE. MET (7/23/2019)  2. Pt to increase Davidson Balance Score to 43 in order to decrease her risk for fall. MET (8/23/2019)  3. Pt to increase TUG score with use of AD to </= 20 seconds.MET (7/23/2019)  4. Pt will increase Nathanael condition 2 to 20 seconds with SBA. MET (8/23/2019)  Long Term GOALS: (10 weeks)  1. Pt will increase 30 second sit to stand score to 11 without use of UEs. MET (7/23/2019)  2. Pt will increase DGI score to 16 in order to decrease her risk for fall. progressing  3. Pt will pass [place] condition 2 with minimal sway. MET  4. Pt will increase self selected walking speed to .7 in order to better be able to participate in [employer]. MET (7/23/2019)  4a (new goal 7/23/2019) Pt will increase self  selected walking speed without AD to .9 in order to better be able to participate in pilgrimage. progressing  5. Pt will increase TUG score with use of AD to </= 15 seconds in order to better participate in pilgrimage. MET (7/23/2019)  6. Pt will increase Davidson Balance score to 51 in order to decrease her risk for fall. MET (9/17/2019)    Plan     Focus on vision eliminated tasks, SLS, and ambulatory balance tasks.  Kate Taveras, PTA

## 2019-10-03 ENCOUNTER — CLINICAL SUPPORT (OUTPATIENT)
Dept: REHABILITATION | Facility: HOSPITAL | Age: 79
End: 2019-10-03
Payer: MEDICARE

## 2019-10-03 DIAGNOSIS — M25.512 CHRONIC PAIN OF BOTH SHOULDERS: ICD-10-CM

## 2019-10-03 DIAGNOSIS — M25.511 CHRONIC PAIN OF BOTH SHOULDERS: ICD-10-CM

## 2019-10-03 DIAGNOSIS — G89.29 CHRONIC PAIN OF BOTH SHOULDERS: ICD-10-CM

## 2019-10-03 DIAGNOSIS — M25.611 SHOULDER JOINT STIFFNESS, BILATERAL: ICD-10-CM

## 2019-10-03 DIAGNOSIS — M25.612 SHOULDER JOINT STIFFNESS, BILATERAL: ICD-10-CM

## 2019-10-03 PROCEDURE — 97110 THERAPEUTIC EXERCISES: CPT | Mod: PO

## 2019-10-03 NOTE — PROGRESS NOTES
"  Occupational Therapy Daily Treatment Note     Date: 10/3/2019  Name: Mary Prince  Clinic Number: [id number]    Therapy Diagnosis:   Encounter Diagnoses   Name Primary?    Shoulder joint stiffness, bilateral     Chronic pain of both shoulders      Physician: Ahmet Maurer MD    Physician Orders: Eval and treat  Medical Diagnosis: Brachial Plexus injury  Evaluation Date: 9/19/2019  Plan of Care Expiration Period: 12/02/2019  Insurance Authorization period Expiration: 6/20/20  Date of Return to MD: TBD        Visit # / Visits Authorized: 8 / 10  Time In: 10:40am  Time Out: 11:25am  Total Billable (one on one) Time: 45 minutes     Precautions: Standard and Fall      Subjective     Pt reports: "Went to the gym on the day I didn't have therapy. Tried to do as much as I can"  she was compliant with home exercise program given last session.   Response to previous treatment: positive  Functional change: ongoing    Pain: 4/10  Location: R leg     Objective       Mary received therapeutic exercises for 45 minutes including:  - Shoulder HEP 1x20 GTB B UEs (rest breaks and verbal cues needed for proper technique)    - Supine FF with 5# dowel with 3 sec hold at end range   - Seated Rows   - Seated shoulder ext   - Seated IR/ER with towel under arm for tactile cue     - Add/Abd with no resistance x5 each for technique training      Home Exercises and Education Provided     Education provided:   - Shoulder HEP  -  HEP  - Progress towards goals     Written Home Exercises Provided: yes.  Exercises were reviewed and Mary was able to demonstrate them prior to the end of the session.  Mary demonstrated good  understanding of the HEP provided.        Assessment     Mary tolerated treatment session well today. She was very talkative and required verbal cue to focus on the exercises. She demonstrated good technique after some practice. She was instructed to lower reps of exercise if she feel pain but to continue if she " feels only muscle soreness. She continues with bilateral UE weakness affecting her independence in ADL/IADLs.      Mary is progressing well towards her goals and there are no updates to goals at this time. Pt prognosis is Fair.     Pt will continue to benefit from skilled outpatient occupational therapy to address the deficits listed in the problem list on initial evaluation provide pt/family education and to maximize pt's level of independence in the home and community environment.     Anticipated barriers to occupational therapy: none at this time    Pt's spiritual, cultural and educational needs considered and pt agreeable to plan of care and goals.    Goals:  Short Term Goals: 5 weeks   - Pt. Will be educated in HEP and be able to return demonstrate with visual and verbal cues  - Pt. Will have WNL PROM of B shoulders in FF/Ext/Abd/IR/ER with no greater than 2/10 pain for increased ability to perform functional tasks  - Pt. Will improve  strength in both hands by at least 10# for increased functional performance during carrying tasks  - Pt. Will educated in adaptive strategies to help improve her independence around the home     Long Term Goals: 10 weeks   - Pt. Will be I with HEP  - Pt. Will have an increase of at least 140 degrees AROM in both shoulders in FF/Abd for increased ability to perform functional tasks   - Pt. Will improve L IR AROM by 40 degrees and R IR to WFL for improved ability to perform dressing tasks  - Pt. Will have improved Flex/Ext AROM of both wrists by at least 20 degrees for increased functional use  - Pt, will improve  strength in both hands by at least 15#  - Pt. Will improve 9hpt by 10 seconds in each hand for increased hand manipulation  - Pt. Will improve R pinch strength to WNL compared to L   - Pt. Will be able to successfully complete all areas of toileting Mod I    Plan   Continue with established POC  Updates/Grading for next session: as tolerated       Daern KRUGER  JUAN Draper

## 2019-10-07 NOTE — PROGRESS NOTES
"  Occupational Therapy Daily Treatment Note     Date: 10/8/2019  Name: Mary Prince  Clinic Number: [id number]    Therapy Diagnosis:   Encounter Diagnoses   Name Primary?    Shoulder joint stiffness, bilateral     Chronic pain of both shoulders      Physician: Ahmet Maurer MD    Physician Orders: Eval and treat  Medical Diagnosis: Brachial Plexus injury  Evaluation Date: 9/19/2019  Plan of Care Expiration Period: 12/02/2019  Insurance Authorization period Expiration: 6/20/20  Date of Return to MD: TBD        Visit # / Visits Authorized: 9 / 10  Time In: 12:00pm  Time Out: 12:45pm  Total Billable (one on one) Time: 45 minutes     Precautions: Standard and Fall      Subjective     Pt reports: "I had a busy weekend and did not all of my exercises"  she was somewhat compliant with home exercise program given last session.   Response to previous treatment: positive  Functional change: ongoing    Pain: 1/10  Location: L shoulder     Objective       Mary received therapeutic exercises for 45 minutes including:  - Supine FF with 7# dowel with 3 sec hold at end range  - Supine chest press 7# dowel  - 2x20 GTB B UEs (rest breaks and verbal cues needed for proper technique)    - Seated Rows   - Seated shoulder ext   - Seated IR/ER with towel under arm for tactile cue    Home Exercises and Education Provided     Education provided:   - Shoulder HEP  -  HEP  - Progress towards goals     Written Home Exercises Provided: yes.  Exercises were reviewed and Mary was able to demonstrate them prior to the end of the session.  Mary demonstrated good  understanding of the HEP provided.        Assessment     Mary tolerated treatment session well today. She needs verbal cues to stay on task. She perseverates on her desire to purchase an anti-tremor glove she has heard about from a friend. She continues with bilateral UE weakness affecting her independence in ADL/IADLs.      Mary is progressing well towards her goals and " there are no updates to goals at this time. Pt prognosis is Fair.     Pt will continue to benefit from skilled outpatient occupational therapy to address the deficits listed in the problem list on initial evaluation provide pt/family education and to maximize pt's level of independence in the home and community environment.     Anticipated barriers to occupational therapy: none at this time    Pt's spiritual, cultural and educational needs considered and pt agreeable to plan of care and goals.    Goals:  Short Term Goals: 5 weeks   - Pt. Will be educated in HEP and be able to return demonstrate with visual and verbal cues  - Pt. Will have WNL PROM of B shoulders in FF/Ext/Abd/IR/ER with no greater than 2/10 pain for increased ability to perform functional tasks  - Pt. Will improve  strength in both hands by at least 10# for increased functional performance during carrying tasks  - Pt. Will educated in adaptive strategies to help improve her independence around the home     Long Term Goals: 10 weeks   - Pt. Will be I with HEP  - Pt. Will have an increase of at least 140 degrees AROM in both shoulders in FF/Abd for increased ability to perform functional tasks   - Pt. Will improve L IR AROM by 40 degrees and R IR to WFL for improved ability to perform dressing tasks  - Pt. Will have improved Flex/Ext AROM of both wrists by at least 20 degrees for increased functional use  - Pt, will improve  strength in both hands by at least 15#  - Pt. Will improve 9hpt by 10 seconds in each hand for increased hand manipulation  - Pt. Will improve R pinch strength to WNL compared to L   - Pt. Will be able to successfully complete all areas of toileting Mod I    Plan   Continue with established POC  Updates/Grading for next session: as tolerated       JUAN Freeman

## 2019-10-07 NOTE — PROGRESS NOTES
Physical Therapy Daily Treatment Note     Name: Mary Prince  Clinic Number: [id number]    Therapy Diagnosis:   Encounter Diagnoses   Name Primary?    Impaired functional mobility, balance, gait, and endurance Yes    Bilateral arm weakness      Physician: Ahmet Maurer MD    Visit Date: 10/8/2019    Medical Diagnosis: B arm weakness  Evaluation Date: 5-15-19  Authorization Period Expiration: 4-25-20  NEW Plan of Care Certification Period: 9/17/2019-11/17/2019  Visit #/Visits authorized: 15/18 new referral  -- KX modifier (26 total visits 2019)    Time In: 1115  Time Out: 1200  Total Billable Time: 45 minutes    Precautions: Standard and Fall    Subjective     Pt reports: I'm doing well. The brusing in my knee is going down the leg. I have to go back to see the orthopedist in a couple weeks.     She was compliant with home exercise program.  Response to previous treatment: little soreness  Functional change: none to report    Pain: 1/10  Location: R leg    Objective   Mary participated in therapeutic exercises to improve functional mobility and safety for 10 minutes, including:  X 10 minutes on Nu Step stepper, level 3.0    Mary participated in neuromuscular re-education activities to improve: Balance, Coordination, Kinesthetic, Proprioception and Posture for 35 minutes. The following activities were included:  // bars:    x 20 reps of B LE single leg side step over 1/2 foam pad with B UE support, red band around knees, CGA, then x 10 reps of B LE single leg side step over 1/2 foam pad with no UE support and red band   x 10 ea reps of B LE single leg step over 1/2 foam pad with no UE support, CGA, and red band around knees     4 point foam fitter board:    x 20 reps of B LE single leg step up and over with occasional 1 UE support and CGA   x 10 ea reps of B LE single leg step up with unilateral hip flexion on top of board with CGA   2 x 30 sec of static standing with eyes closed    Bosu:  2 x 30 static  stance, no UE support, CGA-Hemal  x30 seconds taps L/R, Hemal  x30 seconds taps fwd/bwd, Hemal    Mary participated in gait training to improve functional mobility and safety for 0 minutes, including:         Home Exercises Provided and Patient Education Provided     Education provided:   - increased joint tension due to swelling, use of ice to decrease swelling    Written Home Exercises Provided: yes.  Exercises were reviewed and Mary was able to demonstrate them prior to the end of the session.  Mary demonstrated good  understanding of the education provided.     See EMR under Patient Instructions for exercises provided 6/28/2019.    Assessment     Mary tolerated session well today. She tolerated addition of new balance tasks today on Bosu and with use of resistance while side stepping. She expresses want to attempt elliptical in future sessions. Pt continues to struggle with DF on R>L but overall progressing well with balance and strengthening. She remains appropriate for skilled PT services.         Mary is progressing well towards her goals.   Pt prognosis is Good.     Pt will continue to benefit from skilled outpatient physical therapy to address the deficits listed in the problem list box on initial evaluation, provide pt/family education and to maximize pt's level of independence in the home and community environment.     Pt's spiritual, cultural and educational needs considered and pt agreeable to plan of care and goals.     Anticipated barriers to physical therapy: none    Goals:   Short Term GOALS: (5 weeks)  1. Pt to increase 30 second sit to stand score to 9 without use of UE. MET (7/23/2019)  2. Pt to increase Davidson Balance Score to 43 in order to decrease her risk for fall. MET (8/23/2019)  3. Pt to increase TUG score with use of AD to </= 20 seconds.MET (7/23/2019)  4. Pt will increase [place] condition 2 to 20 seconds with SBA. MET (8/23/2019)  Long Term GOALS: (10 weeks)  1. Pt will increase 30  second sit to stand score to 11 without use of UEs. MET (7/23/2019)  2. Pt will increase DGI score to 16 in order to decrease her risk for fall. progressing  3. Pt will pass Nathanael condition 2 with minimal sway. MET  4. Pt will increase self selected walking speed to .7 in order to better be able to participate in pilgrimage. MET (7/23/2019)  4a (new goal 7/23/2019) Pt will increase self selected walking speed without AD to .9 in order to better be able to participate in pilgrimage. progressing  5. Pt will increase TUG score with use of AD to </= 15 seconds in order to better participate in pilgrimage. MET (7/23/2019)  6. Pt will increase Davidson Balance score to 51 in order to decrease her risk for fall. MET (9/17/2019)    Plan     Focus on vision eliminated tasks, SLS, and ambulatory balance tasks.  Efrain David, PT

## 2019-10-08 ENCOUNTER — CLINICAL SUPPORT (OUTPATIENT)
Dept: REHABILITATION | Facility: HOSPITAL | Age: 79
End: 2019-10-08
Payer: MEDICARE

## 2019-10-08 DIAGNOSIS — M25.612 SHOULDER JOINT STIFFNESS, BILATERAL: ICD-10-CM

## 2019-10-08 DIAGNOSIS — R29.898 BILATERAL ARM WEAKNESS: ICD-10-CM

## 2019-10-08 DIAGNOSIS — Z74.09 IMPAIRED FUNCTIONAL MOBILITY, BALANCE, GAIT, AND ENDURANCE: Primary | ICD-10-CM

## 2019-10-08 DIAGNOSIS — M25.511 CHRONIC PAIN OF BOTH SHOULDERS: ICD-10-CM

## 2019-10-08 DIAGNOSIS — M25.611 SHOULDER JOINT STIFFNESS, BILATERAL: ICD-10-CM

## 2019-10-08 DIAGNOSIS — G89.29 CHRONIC PAIN OF BOTH SHOULDERS: ICD-10-CM

## 2019-10-08 DIAGNOSIS — M25.512 CHRONIC PAIN OF BOTH SHOULDERS: ICD-10-CM

## 2019-10-08 PROCEDURE — 97112 NEUROMUSCULAR REEDUCATION: CPT | Mod: KX,PO

## 2019-10-08 PROCEDURE — 97110 THERAPEUTIC EXERCISES: CPT | Mod: KX,PO

## 2019-10-08 PROCEDURE — 97110 THERAPEUTIC EXERCISES: CPT | Mod: PO

## 2019-10-10 ENCOUNTER — CLINICAL SUPPORT (OUTPATIENT)
Dept: REHABILITATION | Facility: HOSPITAL | Age: 79
End: 2019-10-10
Payer: MEDICARE

## 2019-10-10 DIAGNOSIS — M25.612 SHOULDER JOINT STIFFNESS, BILATERAL: ICD-10-CM

## 2019-10-10 DIAGNOSIS — M25.512 CHRONIC PAIN OF BOTH SHOULDERS: ICD-10-CM

## 2019-10-10 DIAGNOSIS — G89.29 CHRONIC PAIN OF BOTH SHOULDERS: ICD-10-CM

## 2019-10-10 DIAGNOSIS — M25.611 SHOULDER JOINT STIFFNESS, BILATERAL: ICD-10-CM

## 2019-10-10 DIAGNOSIS — M25.511 CHRONIC PAIN OF BOTH SHOULDERS: ICD-10-CM

## 2019-10-10 PROCEDURE — G8987 SELF CARE CURRENT STATUS: HCPCS | Mod: CK,PO

## 2019-10-10 PROCEDURE — 97110 THERAPEUTIC EXERCISES: CPT | Mod: PO

## 2019-10-10 NOTE — PROGRESS NOTES
"  Occupational Therapy Daily Treatment Note     Date: 10/10/2019  Name: Mary Prince  Clinic Number: [id number]    Therapy Diagnosis:   Encounter Diagnoses   Name Primary?    Shoulder joint stiffness, bilateral     Chronic pain of both shoulders      Physician: Ahmet Maurer MD    Physician Orders: Eval and treat  Medical Diagnosis: Brachial Plexus injury  Evaluation Date: 9/19/2019  Plan of Care Expiration Period: 12/02/2019  Insurance Authorization period Expiration: 6/20/20  Date of Return to MD: TBD        Visit # / Visits Authorized: 10 / 10  Time In: 12:00pm  Time Out: 12:45pm  Total Billable (one on one) Time: 45 minutes     Precautions: Standard and Fall      Subjective     Pt reports: "I feel like I am getting stronger and getting more movement"  she was somewhat compliant with home exercise program given last session.   Response to previous treatment: positive  Functional change: Opening jars    Pain: 1/10  Location: L shoulder     Objective       Mary received therapeutic exercises for 45 minutes including:  - Supine FF with 7# dowel with 3 sec hold at end range 2x25  - Supine chest press 7# dowel 2x25  - 1x25 B hands with YTP all, verbal cues needed for correct form   - Tip pinch   - 3 Jaw   - Key Pinch   - Grasp    Home Exercises and Education Provided     Education provided:   - Shoulder HEP  -  HEP  - Progress towards goals     Written Home Exercises Provided: yes.  Exercises were reviewed and Mary was able to demonstrate them prior to the end of the session.  Mary demonstrated good  understanding of the HEP provided.        Assessment       Mary tolerated treatment session well today. AROM has grossly improved which is allowing her to complete functional tasks at home. She continues with bilateral UE weakness affecting her independence in ADL/IADLs.      Mary is progressing well towards her goals and there are no updates to goals at this time. Pt prognosis is Fair.     Pt will " continue to benefit from skilled outpatient occupational therapy to address the deficits listed in the problem list on initial evaluation provide pt/family education and to maximize pt's level of independence in the home and community environment.     Anticipated barriers to occupational therapy: none at this time    Pt's spiritual, cultural and educational needs considered and pt agreeable to plan of care and goals.    Goals:  Short Term Goals: 5 weeks   - Pt. Will be educated in HEP and be able to return demonstrate with visual and verbal cues  - Pt. Will have WNL PROM of B shoulders in FF/Ext/Abd/IR/ER with no greater than 2/10 pain for increased ability to perform functional tasks  - Pt. Will improve  strength in both hands by at least 10# for increased functional performance during carrying tasks  - Pt. Will educated in adaptive strategies to help improve her independence around the home     Long Term Goals: 10 weeks   - Pt. Will be I with HEP  - Pt. Will have an increase of at least 140 degrees AROM in both shoulders in FF/Abd for increased ability to perform functional tasks   - Pt. Will improve L IR AROM by 40 degrees and R IR to WFL for improved ability to perform dressing tasks  - Pt. Will have improved Flex/Ext AROM of both wrists by at least 20 degrees for increased functional use  - Pt, will improve  strength in both hands by at least 15#  - Pt. Will improve 9hpt by 10 seconds in each hand for increased hand manipulation  - Pt. Will improve R pinch strength to WNL compared to L   - Pt. Will be able to successfully complete all areas of toileting Mod I    Plan   Continue with established POC  Updates/Grading for next session: as tolerated       JUAN Freeman

## 2019-10-15 ENCOUNTER — CLINICAL SUPPORT (OUTPATIENT)
Dept: REHABILITATION | Facility: HOSPITAL | Age: 79
End: 2019-10-15
Payer: MEDICARE

## 2019-10-15 DIAGNOSIS — M25.611 SHOULDER JOINT STIFFNESS, BILATERAL: ICD-10-CM

## 2019-10-15 DIAGNOSIS — M25.511 CHRONIC PAIN OF BOTH SHOULDERS: ICD-10-CM

## 2019-10-15 DIAGNOSIS — Z74.09 IMPAIRED FUNCTIONAL MOBILITY, BALANCE, GAIT, AND ENDURANCE: Primary | ICD-10-CM

## 2019-10-15 DIAGNOSIS — G89.29 CHRONIC PAIN OF BOTH SHOULDERS: ICD-10-CM

## 2019-10-15 DIAGNOSIS — M25.512 CHRONIC PAIN OF BOTH SHOULDERS: ICD-10-CM

## 2019-10-15 DIAGNOSIS — R29.898 BILATERAL ARM WEAKNESS: ICD-10-CM

## 2019-10-15 DIAGNOSIS — M25.612 SHOULDER JOINT STIFFNESS, BILATERAL: ICD-10-CM

## 2019-10-15 PROCEDURE — G8978 MOBILITY CURRENT STATUS: HCPCS | Mod: CJ,PO

## 2019-10-15 PROCEDURE — 97110 THERAPEUTIC EXERCISES: CPT | Mod: PO

## 2019-10-15 PROCEDURE — 97112 NEUROMUSCULAR REEDUCATION: CPT | Mod: PO

## 2019-10-15 NOTE — PLAN OF CARE
Physical Therapy Daily Treatment Note     Name: Mary Prince  Clinic Number: [id number]    Therapy Diagnosis:   Encounter Diagnoses   Name Primary?    Impaired functional mobility, balance, gait, and endurance Yes    Bilateral arm weakness      Physician: Ahmet Maurer MD    Visit Date: 10/15/2019    Medical Diagnosis: B arm weakness  Evaluation Date: 5-15-19  Authorization Period Expiration: 4-25-20  NEW Plan of Care Certification Period: 9/17/2019-11/17/2019  Visit #/Visits authorized: 16/18 new referral  -- KX modifier (27 total visits 2019)    Time In: 1115  Time Out: 1200  Total Billable Time: 45 minutes    Precautions: Standard and Fall    Subjective     Pt reports: My knee has really been bothering me. I feel like it is stopping my balance progress. I am going back to see the orthopedist again on Thursday.     She was compliant with home exercise program.  Response to previous treatment: little soreness  Functional change: none to report    Pain: 1/10  Location: R leg, only when standing, 0/10 when seated    Objective   Mary participated in therapeutic exercises to improve functional mobility and safety for 15 minutes, including:  X 10 minutes on Nu Step stepper hills settings, level 3.5    Lower Extremity Strength (tested in seated)  Right LE  eval 7/23  8/23 9/17 10/15 Left LE  eval 7/23 8/23 9/17 10/15   Hip flexion:  3+/5 3+/5 4-/5 4/5 4/5 Hip flexion: 3+/5 4-/5 4/5 4+/5 4/5   Knee extension: 4-/5 4-/5 4+/5 4+/5 5/5* Knee extension: 4-/5 4-/5 4+/5 5/5 5/5   Knee flexion: 3/5 4-/5 4+/5 4/5 4+/5 Knee flexion: 3/5 4-/5 4+/5 4+/5 5/5   Ankle dorsiflexion:  3-/5 3/5 3/5 3+/5 3+/5 Ankle dorsiflexion: 3+/5 3+/5 4-/5 4-/5 4-/5   Ankle plantarflexion:  3+/5 3+/5 NT 4/5 4+/5 Ankle plantarflexion: 3+/5 4-/5 NT 5/5 5/5   Hip abduction: 3-/5 3/5 4/5 4/5 4+/5 Hip abduction: 3-/5 3/5 4/5 4+/5 5/5   Hip adduction: 3-/5 3/5 4/5 4/5 4+/5 Hip adduction 3-/5 3/5 4/5 4/5 4+/5   Hip extension: 3/5 3/5 4-/5 4/5 4/5 Hip  extension: 3/5 3/5 4/5 4/5 4/5    * = pain    Mary participated in neuromuscular re-education activities to improve: Balance, Coordination, Kinesthetic, Proprioception and Posture for 30 minutes. The following activities were included:      2019 2019 2019 2019 10/15/2019   TUG 26.37 seconds with cane  26.08 seconds without AD 13.7 seconds with cane  10.7 seconds without AD 9.4 seconds with cane  9.6 seconds without AD 8.6 seconds without AD 10.9 + 8.9 + 9.0 = 9.6 seconds without AD   30 second sit to stand 7 without UE use 14 without UE use 18 without UE use 19 without UE use (cues to come fully to standing) 22 without UE use (cues to fully sit and stand)   Self selected walking speed .54 m/sec .75 m/s (6m/8.0 seconds)  G-code = CK .83 m/s (6m/7.2sec)  G-code = CJ .83 m/s (6m/7.2sec)  G-code = CJ .91 m/s (6m/6.6sec)  G-code = CJ   Fast walking speed .71 m/sec .89 m/s (6m/6.7 seconds) .94 m/s (6m/6.4sec) 1.01 m/s (6m/5.9sec) 1.01 m/s (6m/5.9sec)   RUIZ 38/56 41/56 49/56 51/56 51/56   DGI 10/24 14/24 15/24 15/24 17/24      RUIZ Assessment  1. Sitting to Standin - able to stand without using hands and stabilize independently  2. Standing Unsupported: 4 - able to stand safely 2 minutes without hold  3. Sitting Unsupported: 4 - able to sit safely and securely 2 minutes  4. Standing to Sittin - sits safely with minimal use of hands  5. Pivot Transfer: 4 - able to trnasfer safely with minor use of hands  6. Standing with Eyes Closed: 4 - albe to stand 10 seconds safely  7. Standing with Feet Together: 4 - able to place feet together independently and stand 1 minute safely  8. Reaching Forward with Outstretched Arm: 4 - can reach forward confidently 25 cm/10 inches  9. Retrieving Object from Floor: 4 - able to  slipper safely and easily  10. Turning to Look Behind: 3 - looks behind one side only, other side less weight shift  11. Turning 360 Degrees: 4 - able to turn 360 in seconds or  less  12. Placing Alternate Foot on Step: 3 - able to stand independently and completely 8 steps > 20 seconds  13. Standing with One Foot in Front: 2 - able to take small step indenpendently and hold 30 seconds  14. Standing on One Foot: 3- able to lift leg independently and hold 5-10 seconds  Total: 51  Maximum: 56  (low fall risk)    DYNAMIC GAIT INDEX:    1. Gait level surface= 2   3) Normal: walks 20', no AD, good speed, no evidence for imbalance,   normal gait pattern   2) Mild impairment: walks 20', uses AD, slower speed, mild gait deviations   1)  moderate impairment: walks 20', slow speed, abnormal gait pattern,   evidence for imbalance.   0)  severe impairment: cannot walk 20' without assistance, severe gait   deviations or imbalance  2. Change in gait speed: 2   3) Normal: able to smoothly change walking speed without loss of balance   or gait deviation. Shows a significant difference in walking speeds    between fast, slow, and normal speeds.    2) Mild impairment: is able to change speeds but demonstrates mild gait   deviations, or not gait deviations but unable to achieve a significant   change in velocity, or uses AD   1) Moderate impairment: makes only minor adjustments in walking speed,   or accomplishes a change in speed with significant gait deviation, or   changes speed but loses balance and is able to recover and keep    walking    0) Severe impairment: cannot change speeds, or loses balance and has to   reach for wall or be caught.  3. Gait with horizontal head turns: 2   3) Normal: performs head turns smoothly with no change in gait.   2) Mild impairment: performs head turns smoothly with slight velocity, I.e.   Minor disruption to smooth gait path or uses AD   1) Moderate impairment: performs head turns with moderate change in gait   velocity, slows down, staggers but recovers, can continue to walk   0) Severe impairment: performs task with severe disruption of gait, i.e.   Staggers outside of  "15" path, loses balance , stops, reaches for wall  4. Gait with vertical head turns: 2   3) Normal: performs head turns smoothly with no change in gait   2) Mild impairment: performs head turns smoothly with slight velocity, I.e.   Minor disruption to smooth gait path or uses AD   1) Moderate impairment: performs head turns with moderate change in gait   velocity, slows down, staggers but recovers, can continue to walk   0) Severe impairment: performs task with severe disruption of gait, i.e.   Staggers outside of 15" path, loses balance , stops, reaches for wall  5. Gait and pivot turn: 2   3)  Normal: pivot turns safely within 3 sec and stops quickly with no LOB   2)  Mild impairment: pivot turns safely in > 3 sec and stops with no LOB   1) Moderate impairment: turns slowly, requires verbal cueing, requires   several small steps to catch balance following turn and stop   0)  Severe impairment: cannot turn safely, requires assistance to stop and   Turn  6. Step over obstacle:  3   3) normal: is able to step over box without changing speed, no LOB   2)  Mild impairment: is able to step over box, but must slow down and adjust   steps to clear box safely   1)  Moderate impairment: is able to step over box but must stop, then step   over. May require verbal cueing   0) Severe impairment: cannot perform without assistance  7. Step around obstacle: 2   3) Normal: able to walk around cones safely without changing gait speed;   no LOB   2)  Mild impairment: able to step around cones, but must slow down to adjust   steps to clear cones   1) moderate impairment: able to clear cones but must significantly slow   speed to accomplish or requires verbal cueing   0)  Severe impairment: unable to clear cones, walks into 1 or both cones, or   requires physical assistance  8. Steps: 2   3) Normal: alternating feet, no rail   2) Mild impairment: alternating feet, must use rail   1) Moderate impairment: 2 feet a stair, must use " "rail   0)  Severe impairment: cannot perform safely  Total: 17/24    NATHANAEL SENSORY TESTING:  (P= Pass, F= Fail; note any sway; hold each position for 30")  Condition 1: (firm surface/feet together/eyes open) P  Condition 2: (firm surface/feet together/eyes closed) P  Condition 3: (firm surface/feet in tandem/eyes open) F; 22 seconds  Condition 4: (firm surface/feet in tandem/eyes closed) F; 3 seconds  Condition 5: (soft surface/feet together/eyes open) P  Condition 6: (soft surface/feet together/eyes closed) F; 12 seconds  Condition 7: (Fakuda step test), measure distance varied from center starting position NT      Mary participated in gait training to improve functional mobility and safety for 0 minutes, including:       Home Exercises Provided and Patient Education Provided     Education provided:   - increased joint tension due to swelling, use of ice to decrease swelling    Written Home Exercises Provided: yes.  Exercises were reviewed and Mary was able to demonstrate them prior to the end of the session.  Mary demonstrated good  understanding of the education provided.     See EMR under Patient Instructions for exercises provided 6/28/2019.    Assessment     Mary tolerated reassessment well today. She demonstrates improvement in DGI, SSWS, and 30 second sit<>stand score. Pt is also steadily improving with Nathanael Sensory testing. She does complain of R knee pain that is likely affecting her mobility. PT discusses with pt the option of seeing an ortho PT to assist with some of this. She continues to be motivated in session to regain independence with mobility. Pt does have a variety of comobidities including cardiac conditions which will also affect her progress. Pt remains appropriate for skilled PT services and her goals have been updated to remain appropriate.       Mary is progressing well towards her goals.   Pt prognosis is Good.     Pt will continue to benefit from skilled outpatient physical " therapy to address the deficits listed in the problem list box on initial evaluation, provide pt/family education and to maximize pt's level of independence in the home and community environment.     Pt's spiritual, cultural and educational needs considered and pt agreeable to plan of care and goals.     Anticipated barriers to physical therapy: none    Goals:   Short Term GOALS: (5 weeks)  1. Pt to increase 30 second sit to stand score to 9 without use of UE. MET (7/23/2019)  2. Pt to increase Davidson Balance Score to 43 in order to decrease her risk for fall. MET (8/23/2019)  3. Pt to increase TUG score with use of AD to </= 20 seconds.MET (7/23/2019)  4. Pt will increase Nathanael condition 2 to 20 seconds with SBA. MET (8/23/2019)  Long Term GOALS: (10 weeks)  1. Pt will increase 30 second sit to stand score to 11 without use of UEs. MET (7/23/2019)  2. Pt will increase DGI score to 16 in order to decrease her risk for fall. MET (10/15/2019)  2a. New goal 10/15/2019: Pt will increase DGI score to at least 19 in order to improve overall mobility and decrease risk for fall.  3. Pt will pass [place] condition 2 with minimal sway. MET  4. Pt will increase self selected walking speed to .7 in order to better be able to participate in pilgrimage. MET (7/23/2019)  4a (new goal 7/23/2019) Pt will increase self selected walking speed without AD to .9 in order to better be able to participate in pilgrimage. MET (10/15/2019)  5. Pt will increase TUG score with use of AD to </= 15 seconds in order to better participate in pilgrimage. MET (7/23/2019)  6. Pt will increase Davidson Balance score to 51 in order to decrease her risk for fall. MET (9/17/2019)  7. New goal 10/15/2019: Pt will pass condition 3 in Nathanael sensory testing in order to improve her overall balance.  8. New goal 10/15/2019: Pt will improve Nathanael sensory testing condition 6 to at least 20 seconds in order to improve balance.     Plan     Focus on ankle strategy, vision  eliminated balance, and SLS.    Efrain David, PT

## 2019-10-15 NOTE — PROGRESS NOTES
"  Occupational Therapy Daily Treatment Note     Date: 10/15/2019  Name: Mary Prince  Clinic Number: [id number]    Therapy Diagnosis:   Encounter Diagnoses   Name Primary?    Shoulder joint stiffness, bilateral     Chronic pain of both shoulders      Physician: Ahmet Maurer MD    Physician Orders: Eval and treat  Medical Diagnosis: Brachial Plexus injury  Evaluation Date: 9/19/2019  Plan of Care Expiration Period: 12/02/2019  Insurance Authorization period Expiration: 6/20/20  Date of Return to MD: MICHAEL        Visit # / Visits Authorized: 11/ TBD  Time In: 2:30pm  Time Out: 3:15pm  Total Billable (one on one) Time: 45 minutes     Precautions: Standard and Fall      Subjective     Pt reports: "Feeling good, I cooked beans and they turned out really well"  she was somewhat compliant with home exercise program given last session.   Response to previous treatment: positive  Functional change: Opening jars    Pain: 1/10  Location: L shoulder     Objective       Mary received therapeutic exercises for 45 minutes including:  - Supine FF with 7# dowel with 3 sec hold at end range 2x25  - Supine chest press 7# dowel 2x25  - 2x25 GTB B UEs (rest breaks and verbal cues needed for proper technique)               - Seated Rows              - Seated shoulder ext  - Seated EOM Dynamic reach with B UEs balloon toss, Tech seated on side to promote FF instead of abd/IR during reaching  - Seated lateral leans with buttocks lift 1x25    Home Exercises and Education Provided     Education provided:   - Shoulder HEP  -  HEP  - Progress towards goals     Written Home Exercises Provided: yes.  Exercises were reviewed and Mary was able to demonstrate them prior to the end of the session.  Mary demonstrated good  understanding of the HEP provided.        Assessment     Mary was able to complete all exercises without c/o of pain. She will require continued skilled OT to retrain her body mechanics and limit her compensation " stratigies. She continues with UE weakness affecting her function in daily life.       Mary is progressing well towards her goals and there are no updates to goals at this time. Pt prognosis is Fair.     Pt will continue to benefit from skilled outpatient occupational therapy to address the deficits listed in the problem list on initial evaluation provide pt/family education and to maximize pt's level of independence in the home and community environment.     Anticipated barriers to occupational therapy: none at this time    Pt's spiritual, cultural and educational needs considered and pt agreeable to plan of care and goals.    Goals:  Short Term Goals: 5 weeks   - Pt. Will be educated in HEP and be able to return demonstrate with visual and verbal cues in progress  - Pt. Will have WNL PROM of B shoulders in FF/Ext/Abd/IR/ER with no greater than 2/10 pain for increased ability to perform functional tasks in progress  - Pt. Will improve  strength in both hands by at least 10# for increased functional performance during carrying tasks in progress  - Pt. Will educated in adaptive strategies to help improve her independence around the home in progress     Long Term Goals: 10 weeks   - Pt. Will be I with HEP in progress  - Pt. Will have an increase of at least 140 degrees AROM in both shoulders in FF/Abd for increased ability to perform functional tasks  in progress  - Pt. Will improve L IR AROM by 40 degrees and R IR to WFL for improved ability to perform dressing tasks in progress  - Pt. Will have improved Flex/Ext AROM of both wrists by at least 20 degrees for increased functional use in progress  - Pt, will improve  strength in both hands by at least 15# in progress  - Pt. Will improve 9hpt by 10 seconds in each hand for increased hand manipulation in progress  - Pt. Will improve R pinch strength to WNL compared to L in progress  - Pt. Will be able to successfully complete all areas of toileting Mod I in  progress    Plan   Continue with established POC  Updates/Grading for next session: as tolerated, plan to include dynamic seated balance with lateral truck movement for increased ability to toilet        JUAN Freeman

## 2019-10-18 ENCOUNTER — CLINICAL SUPPORT (OUTPATIENT)
Dept: REHABILITATION | Facility: HOSPITAL | Age: 79
End: 2019-10-18
Payer: MEDICARE

## 2019-10-18 DIAGNOSIS — R29.898 BILATERAL ARM WEAKNESS: ICD-10-CM

## 2019-10-18 DIAGNOSIS — Z74.09 IMPAIRED FUNCTIONAL MOBILITY, BALANCE, GAIT, AND ENDURANCE: Primary | ICD-10-CM

## 2019-10-18 PROCEDURE — 97112 NEUROMUSCULAR REEDUCATION: CPT | Mod: KX,PO

## 2019-10-18 PROCEDURE — 97116 GAIT TRAINING THERAPY: CPT | Mod: KX,PO

## 2019-10-18 NOTE — PROGRESS NOTES
"  Physical Therapy Daily Treatment Note     Name: Mary Prince  Clinic Number: [id number]    Therapy Diagnosis:   Encounter Diagnoses   Name Primary?    Impaired functional mobility, balance, gait, and endurance Yes    Bilateral arm weakness      Physician: Poncho Saunders MD    Visit Date: 10/18/2019    Medical Diagnosis: B arm weakness  Evaluation Date: 5-15-19  Authorization Period Expiration: 4-25-20  NEW Plan of Care Certification Period: 9/17/2019-11/17/2019  Visit #/Visits authorized: 17/18 new referral  -- KX modifier (28 total visits 2019)    Time In: 1115  Time Out: 1200  Total Billable Time: 45 minutes    Precautions: Standard and Fall    Subjective     Pt reports: Nothing new to report. She is doing well.      She was compliant with home exercise program.  Response to previous treatment: little soreness  Functional change: her balance is improved    Pain: 0/10  Location: R leg    Objective   Mary participated in therapeutic exercises to improve functional mobility and safety for 0 minutes, including:      Mary participated in neuromuscular re-education activities to improve: Balance, Coordination, Kinesthetic, Proprioception and Posture for 35 minutes. The following activities were included:  // bars:    x 20 reps of B LE single leg side step over long foam pad with no UE support   x 10 ea reps of B LE single leg step over long foam pad with no UE support, CGA   4 point foam fitter board:    x 20 reps of B LE single leg step up and over with occasional 1 UE support and CGA     Step up onto 8" step with 1 UE support and opposite side hip flexion, x10 ea LE    Long foam pad:    5 laps, side stepping no UE support   5 laps, marching with 3 second hold 1 UE support    Mary participated in gait training to improve functional mobility and safety for 10 minutes, including:  Ambulation 100' x 2 trials with head turns L/R, mod path deviation, CGA  Ambulation 100' x2 trials with head turns up/down, mod path " deviation, CGA  5 laps in square fwd, L, R, and backwards stepping to encourage increased balance with change in direction with gait       Home Exercises Provided and Patient Education Provided     Education provided:   - increased joint tension due to swelling, use of ice to decrease swelling    Written Home Exercises Provided: yes.  Exercises were reviewed and Mary was able to demonstrate them prior to the end of the session.  Mary demonstrated good  understanding of the education provided.     See EMR under Patient Instructions for exercises provided 6/28/2019.    Assessment     Mary tolerated session well with no adverse reactions noted. She continues to demonstrate increased balance and tolerates addition of new balance activities well. Although she did not perform endurance activity today, pt becomes slightly OOB following step up task between parallel bars. Pt continues to be motivated and remains appropriate for skilled PT services.       Mary is progressing well towards her goals.   Pt prognosis is Good.      Pt will continue to benefit from skilled outpatient physical therapy to address the deficits listed in the problem list box on initial evaluation, provide pt/family education and to maximize pt's level of independence in the home and community environment.      Pt's spiritual, cultural and educational needs considered and pt agreeable to plan of care and goals.     Anticipated barriers to physical therapy: none     Goals:   Short Term GOALS: (5 weeks)  1. Pt to increase 30 second sit to stand score to 9 without use of UE. MET (7/23/2019)  2. Pt to increase Davidson Balance Score to 43 in order to decrease her risk for fall. MET (8/23/2019)  3. Pt to increase TUG score with use of AD to </= 20 seconds.MET (7/23/2019)  4. Pt will increase Nathanael condition 2 to 20 seconds with SBA. MET (8/23/2019)  Long Term GOALS: (10 weeks)  1. Pt will increase 30 second sit to stand score to 11 without use of UEs.  MET (7/23/2019)  2. Pt will increase DGI score to 16 in order to decrease her risk for fall. MET (10/15/2019)  2a. New goal 10/15/2019: Pt will increase DGI score to at least 19 in order to improve overall mobility and decrease risk for fall.  3. Pt will pass Nathanael condition 2 with minimal sway. MET  4. Pt will increase self selected walking speed to .7 in order to better be able to participate in pilgrimage. MET (7/23/2019)  4a (new goal 7/23/2019) Pt will increase self selected walking speed without AD to .9 in order to better be able to participate in pilgrimage. MET (10/15/2019)  5. Pt will increase TUG score with use of AD to </= 15 seconds in order to better participate in pilgrimage. MET (7/23/2019)  6. Pt will increase Davidson Balance score to 51 in order to decrease her risk for fall. MET (9/17/2019)  7. New goal 10/15/2019: Pt will pass condition 3 in [place] sensory testing in order to improve her overall balance.  8. New goal 10/15/2019: Pt will improve [place]vis sensory testing condition 6 to at least 20 seconds in order to improve balance.      Plan      Focus on ankle strategy, vision eliminated balance, and SLS.     Efrain David, PT

## 2019-10-22 ENCOUNTER — CLINICAL SUPPORT (OUTPATIENT)
Dept: REHABILITATION | Facility: HOSPITAL | Age: 79
End: 2019-10-22
Payer: MEDICARE

## 2019-10-22 DIAGNOSIS — M25.612 SHOULDER JOINT STIFFNESS, BILATERAL: ICD-10-CM

## 2019-10-22 DIAGNOSIS — M25.512 CHRONIC PAIN OF BOTH SHOULDERS: ICD-10-CM

## 2019-10-22 DIAGNOSIS — M25.611 SHOULDER JOINT STIFFNESS, BILATERAL: ICD-10-CM

## 2019-10-22 DIAGNOSIS — G89.29 CHRONIC PAIN OF BOTH SHOULDERS: ICD-10-CM

## 2019-10-22 DIAGNOSIS — M25.511 CHRONIC PAIN OF BOTH SHOULDERS: ICD-10-CM

## 2019-10-22 PROCEDURE — 97110 THERAPEUTIC EXERCISES: CPT | Mod: PO

## 2019-10-22 NOTE — PROGRESS NOTES
"  Occupational Therapy Daily Treatment Note     Date: 10/22/2019  Name: Mary Prince  Clinic Number: [id number]    Therapy Diagnosis:   Encounter Diagnoses   Name Primary?    Shoulder joint stiffness, bilateral     Chronic pain of both shoulders      Physician: Ahmet Maurer MD    Physician Orders: Eval and treat  Medical Diagnosis: Brachial Plexus injury  Evaluation Date: 9/19/2019  Plan of Care Expiration Period: 12/02/2019  Insurance Authorization period Expiration: 6/20/20  Date of Return to MD: MICHAEL        Visit # / Visits Authorized: 12/ TBD  Time In: 3:15pm  Time Out: 4:00pm  Total Billable (one on one) Time: 45 minutes     Precautions: Standard and Fall      Subjective     Pt reports: "Feeling really good. Went to eat breakfast this morning and I was able to eat using my R hand with much less compensation. I am really proud of myself"  she was compliant with home exercise program given last session.   Response to previous treatment: positive  Functional change: cooking more    Pain: 3/10  Location: L shoulder     Objective       Mary received therapeutic exercises for 45 minutes including:  - Supine FF with 7# dowel with 3 sec hold at end range 2x25  - Supine chest press 5# db 3/15  - 2x25 GTB B UEs (rest breaks and verbal cues needed for proper technique)               - Seated Rows              - Seated shoulder ext   - IR    Home Exercises and Education Provided     Education provided:   - Shoulder HEP  -  HEP  - Progress towards goals     Written Home Exercises Provided: yes.  Exercises were reviewed and Mary was able to demonstrate them prior to the end of the session.  Mary demonstrated good  understanding of the HEP provided.        Assessment       Mary had very good tolerance to treatment this date. She reported increasing function of bilateral UEs but she still requires Mod I with toileting, She will require continued skilled OT to retrain her body mechanics and limit her compensation " stratigies. She continues with UE weakness affecting her function in daily life.       Mary is progressing well towards her goals and there are no updates to goals at this time. Pt prognosis is Fair.     Pt will continue to benefit from skilled outpatient occupational therapy to address the deficits listed in the problem list on initial evaluation provide pt/family education and to maximize pt's level of independence in the home and community environment.     Anticipated barriers to occupational therapy: none at this time    Pt's spiritual, cultural and educational needs considered and pt agreeable to plan of care and goals.    Goals:  Short Term Goals: 5 weeks   - Pt. Will be educated in HEP and be able to return demonstrate with visual and verbal cues in progress  - Pt. Will have WNL PROM of B shoulders in FF/Ext/Abd/IR/ER with no greater than 2/10 pain for increased ability to perform functional tasks in progress  - Pt. Will improve  strength in both hands by at least 10# for increased functional performance during carrying tasks in progress  - Pt. Will educated in adaptive strategies to help improve her independence around the home in progress     Long Term Goals: 10 weeks   - Pt. Will be I with HEP in progress  - Pt. Will have an increase of at least 140 degrees AROM in both shoulders in FF/Abd for increased ability to perform functional tasks  in progress  - Pt. Will improve L IR AROM by 40 degrees and R IR to WFL for improved ability to perform dressing tasks in progress  - Pt. Will have improved Flex/Ext AROM of both wrists by at least 20 degrees for increased functional use in progress  - Pt, will improve  strength in both hands by at least 15# in progress  - Pt. Will improve 9hpt by 10 seconds in each hand for increased hand manipulation in progress  - Pt. Will improve R pinch strength to WNL compared to L in progress  - Pt. Will be able to successfully complete all areas of toileting Mod I in  progress    Plan   Continue with established POC  Updates/Grading for next session: as tolerated, plan to include dynamic seated balance with lateral truck movement for increased ability to toilet        JUAN Freeman

## 2019-10-23 NOTE — PROGRESS NOTES
"  Occupational Therapy Daily Treatment Note     Date: 10/24/2019  Name: Mary Prince  Clinic Number: [id number]    Therapy Diagnosis:   Encounter Diagnoses   Name Primary?    Shoulder joint stiffness, bilateral     Chronic pain of both shoulders      Physician: Ahmet Maurer MD    Physician Orders: Eval and treat  Medical Diagnosis: Brachial Plexus injury  Evaluation Date: 9/19/2019  Plan of Care Expiration Period: 12/02/2019  Insurance Authorization period Expiration: 6/20/20  Date of Return to MD: MICHAEL        Visit # / Visits Authorized: 13/ TBD  Time In: 10:30am  Time Out: 11:15am  Total Billable (one on one) Time: 45 minutes     Precautions: Standard and Fall      Subjective     Pt reports: "I have company that came in so I have not been doing my exercises"   she was not compliant with home exercise program given last session.   Response to previous treatment: positive  Functional change: cooking more    Pain: 3/10  Location: L shoulder     Objective       Mary received therapeutic exercises for 45 minutes including:  - Supine FF with 7# dowel with 3 sec hold at end range 2x25  - Supine chest press 7# dowel 2x25  - 1x25 B hands with YTP all, verbal cues needed for correct form              - Tip pinch              - 3 Jaw              - [place]oria Pinch              - Grasp    Home Exercises and Education Provided     Education provided:   - Shoulder HEP  -  HEP  - Progress towards goals     Written Home Exercises Provided: yes.  Exercises were reviewed and Mary was able to demonstrate them prior to the end of the session.  Mary demonstrated good  understanding of the HEP provided.        Assessment       Mary had good tolerance to treatment this date. She had difficulty remembering er  HEP but was able to complete with minimal verbal cues. She reported today that she has been inconsistent with her HEPs. She continues with UE weakness affecting her function in daily life.       Mary is progressing " well towards her goals and there are no updates to goals at this time. Pt prognosis is Fair.     Pt will continue to benefit from skilled outpatient occupational therapy to address the deficits listed in the problem list on initial evaluation provide pt/family education and to maximize pt's level of independence in the home and community environment.     Anticipated barriers to occupational therapy: none at this time    Pt's spiritual, cultural and educational needs considered and pt agreeable to plan of care and goals.    Goals:  Short Term Goals: 5 weeks   - Pt. Will be educated in HEP and be able to return demonstrate with visual and verbal cues MET 10/24/2019  - Pt. Will have WNL PROM of B shoulders in FF/Ext/Abd/IR/ER with no greater than 2/10 pain for increased ability to perform functional tasks MET 10/24/2019  - Pt. Will improve  strength in both hands by at least 10# for increased functional performance during carrying tasks in progress  - Pt. Will educated in adaptive strategies to help improve her independence around the home in progress     Long Term Goals: 10 weeks   - Pt. Will be I with HEP in progress  - Pt. Will have an increase of at least 140 degrees AROM in both shoulders in FF/Abd for increased ability to perform functional tasks  in progress  - Pt. Will improve L IR AROM by 40 degrees and R IR to WFL for improved ability to perform dressing tasks in progress  - Pt. Will have improved Flex/Ext AROM of both wrists by at least 20 degrees for increased functional use in progress  - Pt, will improve  strength in both hands by at least 15# in progress  - Pt. Will improve 9hpt by 10 seconds in each hand for increased hand manipulation in progress  - Pt. Will improve R pinch strength to WNL compared to L in progress  - Pt. Will be able to successfully complete all areas of toileting Mod I in progress    Plan   Continue with established POC  Updates/Grading for next session: as tolerated, plan to  include dynamic seated balance with lateral truck movement for increased ability to toilet        JUAN Freeman

## 2019-10-24 ENCOUNTER — CLINICAL SUPPORT (OUTPATIENT)
Dept: REHABILITATION | Facility: HOSPITAL | Age: 79
End: 2019-10-24
Payer: MEDICARE

## 2019-10-24 DIAGNOSIS — G89.29 CHRONIC PAIN OF BOTH SHOULDERS: ICD-10-CM

## 2019-10-24 DIAGNOSIS — M25.612 SHOULDER JOINT STIFFNESS, BILATERAL: ICD-10-CM

## 2019-10-24 DIAGNOSIS — M25.511 CHRONIC PAIN OF BOTH SHOULDERS: ICD-10-CM

## 2019-10-24 DIAGNOSIS — M25.512 CHRONIC PAIN OF BOTH SHOULDERS: ICD-10-CM

## 2019-10-24 DIAGNOSIS — M25.611 SHOULDER JOINT STIFFNESS, BILATERAL: ICD-10-CM

## 2019-10-24 PROCEDURE — 97110 THERAPEUTIC EXERCISES: CPT | Mod: PO

## 2019-10-28 ENCOUNTER — HOSPITAL ENCOUNTER (OUTPATIENT)
Dept: CARDIOLOGY | Facility: CLINIC | Age: 79
Discharge: HOME | End: 2019-10-28
Attending: INTERNAL MEDICINE
Payer: MEDICARE

## 2019-10-28 ENCOUNTER — OFFICE VISIT (OUTPATIENT)
Dept: CARDIOLOGY | Facility: CLINIC | Age: 79
End: 2019-10-28
Payer: MEDICARE

## 2019-10-28 VITALS
WEIGHT: 171 LBS | BODY MASS INDEX: 30.3 KG/M2 | DIASTOLIC BLOOD PRESSURE: 60 MMHG | HEIGHT: 63 IN | HEART RATE: 70 BPM | SYSTOLIC BLOOD PRESSURE: 140 MMHG

## 2019-10-28 VITALS
HEIGHT: 62 IN | SYSTOLIC BLOOD PRESSURE: 136 MMHG | HEART RATE: 84 BPM | WEIGHT: 173.5 LBS | OXYGEN SATURATION: 95 % | DIASTOLIC BLOOD PRESSURE: 81 MMHG | BODY MASS INDEX: 31.93 KG/M2

## 2019-10-28 DIAGNOSIS — G25.0 ESSENTIAL TREMOR: ICD-10-CM

## 2019-10-28 DIAGNOSIS — E78.5 HYPERLIPIDEMIA LDL GOAL <70: ICD-10-CM

## 2019-10-28 DIAGNOSIS — I35.0 NODULAR CALCIFIC AORTIC VALVE STENOSIS: ICD-10-CM

## 2019-10-28 DIAGNOSIS — I10 ESSENTIAL HYPERTENSION: ICD-10-CM

## 2019-10-28 DIAGNOSIS — I35.0 NODULAR CALCIFIC AORTIC VALVE STENOSIS: Primary | ICD-10-CM

## 2019-10-28 DIAGNOSIS — I25.118 CORONARY ARTERY DISEASE OF NATIVE ARTERY OF NATIVE HEART WITH STABLE ANGINA PECTORIS: ICD-10-CM

## 2019-10-28 DIAGNOSIS — I35.0 SEVERE AORTIC STENOSIS: Primary | ICD-10-CM

## 2019-10-28 DIAGNOSIS — Z74.09 IMPAIRED FUNCTIONAL MOBILITY, BALANCE, GAIT, AND ENDURANCE: ICD-10-CM

## 2019-10-28 DIAGNOSIS — Z79.4 TYPE 2 DIABETES MELLITUS WITH DIABETIC POLYNEUROPATHY, WITH LONG-TERM CURRENT USE OF INSULIN: ICD-10-CM

## 2019-10-28 DIAGNOSIS — R09.01: ICD-10-CM

## 2019-10-28 DIAGNOSIS — I35.0 NONRHEUMATIC AORTIC VALVE STENOSIS: ICD-10-CM

## 2019-10-28 DIAGNOSIS — E11.42 TYPE 2 DIABETES MELLITUS WITH DIABETIC POLYNEUROPATHY, WITH LONG-TERM CURRENT USE OF INSULIN: ICD-10-CM

## 2019-10-28 LAB
ASCENDING AORTA: 2.95 CM
AV INDEX (PROSTH): 0.23
AV MEAN GRADIENT: 37 MMHG
AV PEAK GRADIENT: 65 MMHG
AV VALVE AREA: 0.64 CM2
AV VELOCITY RATIO: 0.21
BSA FOR ECHO PROCEDURE: 1.86 M2
CV ECHO LV RWT: 0.4 CM
DOP CALC AO PEAK VEL: 4.02 M/S
DOP CALC AO VTI: 91 CM
DOP CALC LVOT AREA: 2.8 CM2
DOP CALC LVOT DIAMETER: 1.9 CM
DOP CALC LVOT PEAK VEL: 0.85 M/S
DOP CALC LVOT STROKE VOLUME: 58.66 CM3
DOP CALCLVOT PEAK VEL VTI: 20.7 CM
E WAVE DECELERATION TIME: 238.55 MSEC
E/A RATIO: 1.01
E/E' RATIO: 20.83 M/S
ECHO LV POSTERIOR WALL: 0.88 CM (ref 0.6–1.1)
FRACTIONAL SHORTENING: 31 % (ref 28–44)
INTERVENTRICULAR SEPTUM: 0.95 CM (ref 0.6–1.1)
IVRT: 0.05 MSEC
LA MAJOR: 5.77 CM
LA MINOR: 5.76 CM
LA WIDTH: 4.64 CM
LEFT ATRIUM SIZE: 4 CM
LEFT ATRIUM VOLUME INDEX: 50.3 ML/M2
LEFT ATRIUM VOLUME: 90.95 CM3
LEFT INTERNAL DIMENSION IN SYSTOLE: 3.06 CM (ref 2.1–4)
LEFT VENTRICLE DIASTOLIC VOLUME INDEX: 48.73 ML/M2
LEFT VENTRICLE DIASTOLIC VOLUME: 88.16 ML
LEFT VENTRICLE MASS INDEX: 73 G/M2
LEFT VENTRICLE SYSTOLIC VOLUME INDEX: 20.2 ML/M2
LEFT VENTRICLE SYSTOLIC VOLUME: 36.59 ML
LEFT VENTRICULAR INTERNAL DIMENSION IN DIASTOLE: 4.41 CM (ref 3.5–6)
LEFT VENTRICULAR MASS: 131.39 G
LV LATERAL E/E' RATIO: 20.83 M/S
LV SEPTAL E/E' RATIO: 20.83 M/S
MV PEAK A VEL: 1.24 M/S
MV PEAK E VEL: 1.25 M/S
PISA TR MAX VEL: 2.97 M/S
PULM VEIN S/D RATIO: 1.08
PV PEAK D VEL: 0.38 M/S
PV PEAK S VEL: 0.41 M/S
RA MAJOR: 4.91 CM
RA PRESSURE: 3 MMHG
RA WIDTH: 3.06 CM
RIGHT VENTRICULAR END-DIASTOLIC DIMENSION: 3.41 CM
SINUS: 3.14 CM
STJ: 2.6 CM
TDI LATERAL: 0.06 M/S
TDI SEPTAL: 0.06 M/S
TDI: 0.06 M/S
TR MAX PG: 35 MMHG
TRICUSPID ANNULAR PLANE SYSTOLIC EXCURSION: 1.78 CM
TV REST PULMONARY ARTERY PRESSURE: 38 MMHG

## 2019-10-28 PROCEDURE — 99205 OFFICE O/P NEW HI 60 MIN: CPT | Mod: S$GLB,,, | Performed by: THORACIC SURGERY (CARDIOTHORACIC VASCULAR SURGERY)

## 2019-10-28 PROCEDURE — 3079F PR MOST RECENT DIASTOLIC BLOOD PRESSURE 80-89 MM HG: ICD-10-PCS | Mod: CPTII,S$GLB,, | Performed by: INTERNAL MEDICINE

## 2019-10-28 PROCEDURE — 3074F PR MOST RECENT SYSTOLIC BLOOD PRESSURE < 130 MM HG: ICD-10-PCS | Mod: CPTII,S$GLB,, | Performed by: THORACIC SURGERY (CARDIOTHORACIC VASCULAR SURGERY)

## 2019-10-28 PROCEDURE — 99204 OFFICE O/P NEW MOD 45 MIN: CPT | Mod: S$GLB,,, | Performed by: INTERNAL MEDICINE

## 2019-10-28 PROCEDURE — 99999 PR PBB SHADOW E&M-EST. PATIENT-LVL IV: ICD-10-PCS | Mod: PBBFAC,,,

## 2019-10-28 PROCEDURE — 99204 PR OFFICE/OUTPT VISIT, NEW, LEVL IV, 45-59 MIN: ICD-10-PCS | Mod: S$GLB,,, | Performed by: INTERNAL MEDICINE

## 2019-10-28 PROCEDURE — 99999 PR PBB SHADOW E&M-EST. PATIENT-LVL IV: CPT | Mod: PBBFAC,,,

## 2019-10-28 PROCEDURE — 3075F PR MOST RECENT SYSTOLIC BLOOD PRESS GE 130-139MM HG: ICD-10-PCS | Mod: CPTII,S$GLB,, | Performed by: INTERNAL MEDICINE

## 2019-10-28 PROCEDURE — 3078F PR MOST RECENT DIASTOLIC BLOOD PRESSURE < 80 MM HG: ICD-10-PCS | Mod: CPTII,S$GLB,, | Performed by: THORACIC SURGERY (CARDIOTHORACIC VASCULAR SURGERY)

## 2019-10-28 PROCEDURE — 1101F PT FALLS ASSESS-DOCD LE1/YR: CPT | Mod: CPTII,S$GLB,, | Performed by: INTERNAL MEDICINE

## 2019-10-28 PROCEDURE — 3075F SYST BP GE 130 - 139MM HG: CPT | Mod: CPTII,S$GLB,, | Performed by: INTERNAL MEDICINE

## 2019-10-28 PROCEDURE — 1101F PT FALLS ASSESS-DOCD LE1/YR: CPT | Mod: CPTII,S$GLB,, | Performed by: THORACIC SURGERY (CARDIOTHORACIC VASCULAR SURGERY)

## 2019-10-28 PROCEDURE — 1101F PR PT FALLS ASSESS DOC 0-1 FALLS W/OUT INJ PAST YR: ICD-10-PCS | Mod: CPTII,S$GLB,, | Performed by: THORACIC SURGERY (CARDIOTHORACIC VASCULAR SURGERY)

## 2019-10-28 PROCEDURE — 3074F SYST BP LT 130 MM HG: CPT | Mod: CPTII,S$GLB,, | Performed by: THORACIC SURGERY (CARDIOTHORACIC VASCULAR SURGERY)

## 2019-10-28 PROCEDURE — 93306 TTE W/DOPPLER COMPLETE: CPT | Mod: S$GLB,,, | Performed by: INTERNAL MEDICINE

## 2019-10-28 PROCEDURE — 99205 PR OFFICE/OUTPT VISIT, NEW, LEVL V, 60-74 MIN: ICD-10-PCS | Mod: S$GLB,,, | Performed by: THORACIC SURGERY (CARDIOTHORACIC VASCULAR SURGERY)

## 2019-10-28 PROCEDURE — 3078F DIAST BP <80 MM HG: CPT | Mod: CPTII,S$GLB,, | Performed by: THORACIC SURGERY (CARDIOTHORACIC VASCULAR SURGERY)

## 2019-10-28 PROCEDURE — 1101F PR PT FALLS ASSESS DOC 0-1 FALLS W/OUT INJ PAST YR: ICD-10-PCS | Mod: CPTII,S$GLB,, | Performed by: INTERNAL MEDICINE

## 2019-10-28 PROCEDURE — 3079F DIAST BP 80-89 MM HG: CPT | Mod: CPTII,S$GLB,, | Performed by: INTERNAL MEDICINE

## 2019-10-28 PROCEDURE — 93306 ECHO (CUPID ONLY): ICD-10-PCS | Mod: S$GLB,,, | Performed by: INTERNAL MEDICINE

## 2019-10-28 NOTE — H&P (VIEW-ONLY)
PCP - Jez Sheets MD  Referring Physician: Dr. Oscar Velazco     Subjective:   Patient ID:  Mary Prince is a 78 y.o. y.o. female who presents for evaluation and treatment of symptomatic severe AS, evaluation for TAVR.    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 States having progressively worsening SOB / CYR for the past 8 months. Currently having CYR with 1 block, developed symptoms while walking from the waiting room to clinic room. Associated with left side chest pain which resolved with SL NTG spray. Additionally gets occasional LE edema and + orthopnea. Denies PND. Patient is independent, able to complete all of her ADLS. She walks with a cane due to complicated laminectomy 09/2016 at [facility] with spinal infection, extended hospital stay, now needing a cane due to gait imbalance. Otherwise no recent HF admission.     Severe symptomatic AS (NYHA class II, CCS class III)  The patient has undergone the following TAVR work-up:    ECHO (Date10/28/19): VANDANA 0.64cm2, peak aortic jet velocity 4.02m/s, MG 37 mmHg, LVEF: 65%, mild AI   LHC (Date 08/13/2019): Normal coronaries s/p PCI.    STS: 3.1%    Frailty: 1/4 (Hand )   Iliacs are pending   LVOT: Pending   Incidental findings on CT: Pending    CT Surgery risk assessment: Moderate risk per Dr. Vaughn (age, cane)   Rhythm issues: Pending   PFTs: FEV1  predicted, DLCO  predicted.   Comorbidities: Severe neuropathy, Cane to ambulate, laminectomy       History:     Social History     Tobacco Use    Smoking status: Never Smoker    Smokeless tobacco: Never Used   Substance Use Topics    Alcohol use: Yes     Comment: social     History reviewed. No pertinent family history.    Meds:   Review of patient's allergies  indicates:  No Known Allergies    Current Outpatient Medications:     ALPRAZolam (XANAX) 0.5 MG tablet, Take 0.5 mg by mouth nightly as needed. , Disp: , Rfl:     aspirin (ECOTRIN LOW STRENGTH) 81 MG EC tablet, Take 81 mg by mouth once daily. , Disp: , Rfl:     atorvastatin (LIPITOR) 80 MG tablet, Take 80 mg by mouth every evening. , Disp: , Rfl:     biotin 10,000 mcg Cap, Take 1 capsule by mouth once daily. , Disp: , Rfl:     clopidogrel (PLAVIX) 75 mg tablet, Take 75 mg by mouth once daily. , Disp: , Rfl:     furosemide (LASIX) 20 MG tablet, Take 20 mg by mouth daily as needed., Disp: , Rfl:     gabapentin (NEURONTIN) 600 MG tablet, Take 3 tablets (1,800 mg total) by mouth 3 (three) times daily., Disp: 270 tablet, Rfl: 11    insulin aspart U-100 (NOVOLOG U-100 INSULIN ASPART) 100 unit/mL injection, Novolog U-100 Insulin aspart 100 unit/mL subcutaneous solution, Disp: , Rfl:     letrozole (FEMARA) 2.5 mg Tab, Take 2.5 mg by mouth once daily ., Disp: , Rfl:     levothyroxine (SYNTHROID) 112 MCG tablet, Take 112 mcg by mouth before breakfast. , Disp: , Rfl:     multivitamin capsule, Take 1 capsule by mouth once daily., Disp: , Rfl:     ramipril (ALTACE) 10 MG capsule, Take 10 mg by mouth. , Disp: , Rfl:     cyanocobalamin, vitamin B-12, 2,500 mcg Chew, Take by mouth once daily., Disp: , Rfl:     potassium aminobenzoate 500 mg Cap, Take by mouth once daily., Disp: , Rfl:     vitamin D 1000 units Tab, Take 1,000 Units by mouth once daily., Disp: , Rfl:     Review of Systems   Constitutional: Positive for malaise/fatigue. Negative for chills, fever and weight loss.   Respiratory: Positive for shortness of breath. Negative for cough and sputum production.    Cardiovascular: Positive for chest pain. Negative for palpitations, orthopnea, claudication, leg swelling and PND.   Gastrointestinal: Negative for abdominal pain, diarrhea, nausea and vomiting.   Genitourinary: Negative for dysuria and urgency.  "      Objective:   /81 (BP Location: Left arm, Patient Position: Sitting, BP Method: Large (Automatic))   Pulse 84   Ht 5' 1.81" (1.57 m)   Wt 78.7 kg (173 lb 8 oz)   SpO2 95%   BMI 31.93 kg/m²   Physical Exam   Constitutional: She is oriented to person, place, and time. No distress.   HENT:   Head: Normocephalic and atraumatic.   Neck: Normal range of motion. Neck supple. No JVD present.   Cardiovascular: Normal rate, regular rhythm and intact distal pulses. Exam reveals no gallop and no friction rub.   Murmur heard.  Pulmonary/Chest: Effort normal and breath sounds normal. No respiratory distress. She has no wheezes. She has no rales.   Abdominal: Soft. Bowel sounds are normal. She exhibits no distension. There is no tenderness.   Musculoskeletal: Normal range of motion. She exhibits edema.   Neurological: She is alert and oriented to person, place, and time.   Skin: Skin is warm and dry. She is not diaphoretic.       Labs:     Lab Results   Component Value Date     10/28/2019    K 4.3 10/28/2019     10/28/2019    CO2 30 (H) 10/28/2019    BUN 28 (H) 10/28/2019    CREATININE 1.0 10/28/2019    ANIONGAP 8 10/28/2019     No results found for: HGBA1C  No results found for: BNP, BNPTRIAGEBLO    Lab Results   Component Value Date    WBC 6.22 10/28/2019    HGB 13.2 10/28/2019    HCT 40.0 10/28/2019     (L) 10/28/2019    GRAN 3.7 10/28/2019    GRAN 59.5 10/28/2019     No results found for: CHOL, HDL, LDLCALC, TRIG    Lab Results   Component Value Date     10/28/2019    K 4.3 10/28/2019     10/28/2019    CO2 30 (H) 10/28/2019    BUN 28 (H) 10/28/2019    CREATININE 1.0 10/28/2019    ANIONGAP 8 10/28/2019     No results found for: HGBA1C  No results found for: BNP, BNPTRIAGEBLO Lab Results   Component Value Date    WBC 6.22 10/28/2019    HGB 13.2 10/28/2019    HCT 40.0 10/28/2019     (L) 10/28/2019    GRAN 3.7 10/28/2019    GRAN 59.5 10/28/2019     No results found for: CHOL, " HDL, LDLCALC, TRIG         Assessment & Plan:     1. Severe symptomatic AS (NYHA class II, CCS class III)  The patient has undergone the following TAVR work-up:    ECHO (Date10/28/19): VANDANA 0.64cm2, peak aortic jet velocity 4.02m/s, MG 37 mmHg, LVEF: 65%   [facility] (Date 08/13/2019)- Normal coronaries s/p PCI.   STS: 3.1%    Frailty: 1/4 (Hand )   Iliacs are pending   LVOT: Pending   Incidental findings on CT: Pending    CT Surgery risk assessment: Moderate risk per Dr. Vaughn (age, cane)   Rhythm issues: Pending   PFTs: FEV1  predicted, DLCO  predicted.   Comorbidities: Severe neuropathy, Cane to ambulate, laminectomy     2. Severe asphyxia    3. Essential tremor:    4. Nonrheumatic aortic valve stenosis    5. Coronary artery disease of native artery of native heart with stable angina pectoris    6. Hyperlipidemia LDL goal <70    7. Essential hypertension   - Continue Lasix 20mg qday   - Continue ACEi   8. Type 2 diabetes mellitus with diabetic polyneuropathy, with long-term current use of insulin   - Continue oral hypoglycemics    9. Impaired functional mobility, balance, gait, and endurance   - Continue activity as tolerated      Plan:   - CTA TAVR ordered pending, based on findings will consider TF vs. TA/TAx TAVR, moderate risk per Dr. Vaughn   - TAVR consents signed in clinic, will discuss with LV regarding scheduling   - Needs PFTs, EKG, and TAVR CTA then will review for access and schedule.        Signed:  Viv Romero M.D.  Page # [phone]  Cardiovascular Fellow PGY-V  [facility]     Staff:  I have personally taken the history and examined this patient and agree with the fellow's note as stated above and amended it accordingly :-)

## 2019-10-28 NOTE — PROGRESS NOTES
PCP - Jez Sheets MD  Referring Physician: Dr. Oscar Velazco     Subjective:   Patient ID:  Mary Prince is a 78 y.o. y.o. female who presents for evaluation and treatment of symptomatic severe AS, evaluation for TAVR.    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 States having progressively worsening SOB / CYR for the past 8 months. Currently having CYR with 1 block, developed symptoms while walking from the waiting room to clinic room. Associated with left side chest pain which resolved with SL NTG spray. Additionally gets occasional LE edema and + orthopnea. Denies PND. Patient is independent, able to complete all of her ADLS. She walks with a cane due to complicated laminectomy 09/2016 at [facility] with spinal infection, extended hospital stay, now needing a cane due to gait imbalance. Otherwise no recent HF admission.     Severe symptomatic AS (NYHA class II, CCS class III)  The patient has undergone the following TAVR work-up:    ECHO (Date10/28/19): VANDANA 0.64cm2, peak aortic jet velocity 4.02m/s, MG 37 mmHg, LVEF: 65%, mild AI   LHC (Date 08/13/2019): Normal coronaries s/p PCI.    STS: 3.1%    Frailty: 1/4 (Hand )   Iliacs are pending   LVOT: Pending   Incidental findings on CT: Pending    CT Surgery risk assessment: Moderate risk per Dr. Vaughn (age, cane)   Rhythm issues: Pending   PFTs: FEV1  predicted, DLCO  predicted.   Comorbidities: Severe neuropathy, Cane to ambulate, laminectomy       History:     Social History     Tobacco Use    Smoking status: Never Smoker    Smokeless tobacco: Never Used   Substance Use Topics    Alcohol use: Yes     Comment: social     History reviewed. No pertinent family history.    Meds:   Review of patient's allergies  indicates:  No Known Allergies    Current Outpatient Medications:     ALPRAZolam (XANAX) 0.5 MG tablet, Take 0.5 mg by mouth nightly as needed. , Disp: , Rfl:     aspirin (ECOTRIN LOW STRENGTH) 81 MG EC tablet, Take 81 mg by mouth once daily. , Disp: , Rfl:     atorvastatin (LIPITOR) 80 MG tablet, Take 80 mg by mouth every evening. , Disp: , Rfl:     biotin 10,000 mcg Cap, Take 1 capsule by mouth once daily. , Disp: , Rfl:     clopidogrel (PLAVIX) 75 mg tablet, Take 75 mg by mouth once daily. , Disp: , Rfl:     furosemide (LASIX) 20 MG tablet, Take 20 mg by mouth daily as needed., Disp: , Rfl:     gabapentin (NEURONTIN) 600 MG tablet, Take 3 tablets (1,800 mg total) by mouth 3 (three) times daily., Disp: 270 tablet, Rfl: 11    insulin aspart U-100 (NOVOLOG U-100 INSULIN ASPART) 100 unit/mL injection, Novolog U-100 Insulin aspart 100 unit/mL subcutaneous solution, Disp: , Rfl:     letrozole (FEMARA) 2.5 mg Tab, Take 2.5 mg by mouth once daily ., Disp: , Rfl:     levothyroxine (SYNTHROID) 112 MCG tablet, Take 112 mcg by mouth before breakfast. , Disp: , Rfl:     multivitamin capsule, Take 1 capsule by mouth once daily., Disp: , Rfl:     ramipril (ALTACE) 10 MG capsule, Take 10 mg by mouth. , Disp: , Rfl:     cyanocobalamin, vitamin B-12, 2,500 mcg Chew, Take by mouth once daily., Disp: , Rfl:     potassium aminobenzoate 500 mg Cap, Take by mouth once daily., Disp: , Rfl:     vitamin D 1000 units Tab, Take 1,000 Units by mouth once daily., Disp: , Rfl:     Review of Systems   Constitutional: Positive for malaise/fatigue. Negative for chills, fever and weight loss.   Respiratory: Positive for shortness of breath. Negative for cough and sputum production.    Cardiovascular: Positive for chest pain. Negative for palpitations, orthopnea, claudication, leg swelling and PND.   Gastrointestinal: Negative for abdominal pain, diarrhea, nausea and vomiting.   Genitourinary: Negative for dysuria and urgency.  "      Objective:   /81 (BP Location: Left arm, Patient Position: Sitting, BP Method: Large (Automatic))   Pulse 84   Ht 5' 1.81" (1.57 m)   Wt 78.7 kg (173 lb 8 oz)   SpO2 95%   BMI 31.93 kg/m²   Physical Exam   Constitutional: She is oriented to person, place, and time. No distress.   HENT:   Head: Normocephalic and atraumatic.   Neck: Normal range of motion. Neck supple. No JVD present.   Cardiovascular: Normal rate, regular rhythm and intact distal pulses. Exam reveals no gallop and no friction rub.   Murmur heard.  Pulmonary/Chest: Effort normal and breath sounds normal. No respiratory distress. She has no wheezes. She has no rales.   Abdominal: Soft. Bowel sounds are normal. She exhibits no distension. There is no tenderness.   Musculoskeletal: Normal range of motion. She exhibits edema.   Neurological: She is alert and oriented to person, place, and time.   Skin: Skin is warm and dry. She is not diaphoretic.       Labs:     Lab Results   Component Value Date     10/28/2019    K 4.3 10/28/2019     10/28/2019    CO2 30 (H) 10/28/2019    BUN 28 (H) 10/28/2019    CREATININE 1.0 10/28/2019    ANIONGAP 8 10/28/2019     No results found for: HGBA1C  No results found for: BNP, BNPTRIAGEBLO    Lab Results   Component Value Date    WBC 6.22 10/28/2019    HGB 13.2 10/28/2019    HCT 40.0 10/28/2019     (L) 10/28/2019    GRAN 3.7 10/28/2019    GRAN 59.5 10/28/2019     No results found for: CHOL, HDL, LDLCALC, TRIG    Lab Results   Component Value Date     10/28/2019    K 4.3 10/28/2019     10/28/2019    CO2 30 (H) 10/28/2019    BUN 28 (H) 10/28/2019    CREATININE 1.0 10/28/2019    ANIONGAP 8 10/28/2019     No results found for: HGBA1C  No results found for: BNP, BNPTRIAGEBLO Lab Results   Component Value Date    WBC 6.22 10/28/2019    HGB 13.2 10/28/2019    HCT 40.0 10/28/2019     (L) 10/28/2019    GRAN 3.7 10/28/2019    GRAN 59.5 10/28/2019     No results found for: CHOL, " HDL, LDLCALC, TRIG         Assessment & Plan:     1. Severe symptomatic AS (NYHA class II, CCS class III)  The patient has undergone the following TAVR work-up:    ECHO (Date10/28/19): VANDANA 0.64cm2, peak aortic jet velocity 4.02m/s, MG 37 mmHg, LVEF: 65%   [facility] (Date 08/13/2019)- Normal coronaries s/p PCI.   STS: 3.1%    Frailty: 1/4 (Hand )   Iliacs are pending   LVOT: Pending   Incidental findings on CT: Pending    CT Surgery risk assessment: Moderate risk per Dr. Vaughn (age, cane)   Rhythm issues: Pending   PFTs: FEV1  predicted, DLCO  predicted.   Comorbidities: Severe neuropathy, Cane to ambulate, laminectomy     2. Severe asphyxia    3. Essential tremor:    4. Nonrheumatic aortic valve stenosis    5. Coronary artery disease of native artery of native heart with stable angina pectoris    6. Hyperlipidemia LDL goal <70    7. Essential hypertension   - Continue Lasix 20mg qday   - Continue ACEi   8. Type 2 diabetes mellitus with diabetic polyneuropathy, with long-term current use of insulin   - Continue oral hypoglycemics    9. Impaired functional mobility, balance, gait, and endurance   - Continue activity as tolerated      Plan:   - CTA TAVR ordered pending, based on findings will consider TF vs. TA/TAx TAVR, moderate risk per Dr. Vaughn   - TAVR consents signed in clinic, will discuss with LV regarding scheduling   - Needs PFTs, EKG, and TAVR CTA then will review for access and schedule.        Signed:  Viv Romero M.D.  Page # [phone]  Cardiovascular Fellow PGY-V  [facility]     Staff:  I have personally taken the history and examined this patient and agree with the fellow's note as stated above and amended it accordingly :-)

## 2019-10-29 ENCOUNTER — CLINICAL SUPPORT (OUTPATIENT)
Dept: REHABILITATION | Facility: HOSPITAL | Age: 79
End: 2019-10-29
Payer: MEDICARE

## 2019-10-29 DIAGNOSIS — G89.29 CHRONIC PAIN OF BOTH SHOULDERS: ICD-10-CM

## 2019-10-29 DIAGNOSIS — M25.612 SHOULDER JOINT STIFFNESS, BILATERAL: ICD-10-CM

## 2019-10-29 DIAGNOSIS — M25.611 SHOULDER JOINT STIFFNESS, BILATERAL: ICD-10-CM

## 2019-10-29 DIAGNOSIS — M25.512 CHRONIC PAIN OF BOTH SHOULDERS: ICD-10-CM

## 2019-10-29 DIAGNOSIS — M25.511 CHRONIC PAIN OF BOTH SHOULDERS: ICD-10-CM

## 2019-10-29 PROCEDURE — 97110 THERAPEUTIC EXERCISES: CPT | Mod: PO

## 2019-10-29 NOTE — PROGRESS NOTES
Occupational Therapy Daily Treatment Note     Date: 10/29/2019  Name: Mary Prince  Clinic Number: [id number]    Therapy Diagnosis:   Encounter Diagnoses   Name Primary?    Shoulder joint stiffness, bilateral     Chronic pain of both shoulders      Physician: Ahmet Maurer MD    Physician Orders: Eval and treat  Medical Diagnosis: Brachial Plexus injury  Evaluation Date: 9/19/2019  Plan of Care Expiration Period: 12/02/2019  Insurance Authorization period Expiration: 6/20/20  Date of Return to MD: MICHAEL        Visit # / Visits Authorized: 14/ TBD  Time In: 3:15pm  Time Out: 4:00pm  Total Billable (one on one) Time: 45 minutes     Precautions: Standard and Fall      Subjective     Pt reports: Pt. Verbalized a lot of concern about an upcoming surgery throughout her visit this date.  she was somewhat compliant with home exercise program given last session.   Response to previous treatment: positive  Functional change: cooking more    Pain: 2/10  Location: L shoulder     Objective       Mary received therapeutic exercises for 45 minutes including:  - Supine FF with 8# dowel with 3 sec hold at end range 2x25  - Supine chest press 8# dowel 2x25  - 2x25 GTB B UEs (rest breaks and verbal cues needed for proper technique)               - Seated Rows              - Seated shoulder ext              - IR    Home Exercises and Education Provided     Education provided:   - Shoulder HEP  -  HEP  - Progress towards goals     Written Home Exercises Provided: yes.  Exercises were reviewed and Mary was able to demonstrate them prior to the end of the session.  Mary demonstrated good  understanding of the HEP provided.        Assessment       Mary had good tolerance to treatment this date. She was very fixated on her upcoming surgery. She expressed a lot of concern which required verbal cues to redirect to exercises. She continues with UE weakness affecting her function in daily life.       Mary is progressing well  towards her goals and there are no updates to goals at this time. Pt prognosis is Fair.     Pt will continue to benefit from skilled outpatient occupational therapy to address the deficits listed in the problem list on initial evaluation provide pt/family education and to maximize pt's level of independence in the home and community environment.     Anticipated barriers to occupational therapy: none at this time    Pt's spiritual, cultural and educational needs considered and pt agreeable to plan of care and goals.    Goals:  Short Term Goals: 5 weeks   - Pt. Will be educated in HEP and be able to return demonstrate with visual and verbal cues MET 10/24/2019  - Pt. Will have WNL PROM of B shoulders in FF/Ext/Abd/IR/ER with no greater than 2/10 pain for increased ability to perform functional tasks MET 10/24/2019  - Pt. Will improve  strength in both hands by at least 10# for increased functional performance during carrying tasks in progress  - Pt. Will educated in adaptive strategies to help improve her independence around the home in progress     Long Term Goals: 10 weeks   - Pt. Will be I with HEP in progress  - Pt. Will have an increase of at least 140 degrees AROM in both shoulders in FF/Abd for increased ability to perform functional tasks  in progress  - Pt. Will improve L IR AROM by 40 degrees and R IR to WFL for improved ability to perform dressing tasks in progress  - Pt. Will have improved Flex/Ext AROM of both wrists by at least 20 degrees for increased functional use in progress  - Pt, will improve  strength in both hands by at least 15# in progress  - Pt. Will improve 9hpt by 10 seconds in each hand for increased hand manipulation in progress  - Pt. Will improve R pinch strength to WNL compared to L in progress  - Pt. Will be able to successfully complete all areas of toileting Mod I in progress    Plan   Continue with established POC  Updates/Grading for next session: as tolerated, plan to  include dynamic seated balance with lateral truck movement for increased ability to toilet        JUAN Freeman

## 2019-10-29 NOTE — PROGRESS NOTES
Cardiothoracic Surgery  Transcatheter Aortic Valve Replacement Evaluation      SUBJECTIVE:     History of Present Illness:  Mary Prince is a 78 y.o. y.o. female who presents for evaluation and treatment of symptomatic severe AS, evaluation for TAVR.               - PMHx of HTN, CAD s/p Proximal to Mid LAD PCI (patent PCI 08/13/19), severe AS, HLD, central tremors, laminectomy               - Social Hx: resides in old [facility], retired nurse , lifelong nonsmoker,      Patient here with spouse for ongoing CYR / SOB and typical angina. States having progressively worsening SOB / CYR for the past 8 months. Currently having CYR with 1 block, developed symptoms while walking from the waiting room to clinic room. Associated with left side chest pain which resolved with SL NTG spray. Additionally gets occasional LE edema and + orthopnea. Denies PND. Patient is independent, able to complete all of her ADLS. She walks with a cane due to complicated laminectomy 09/2016 at [facility] with spinal infection, extended hospital stay, now needing a cane due to gait imbalance. Otherwise no recent HF admission.      Severe symptomatic AS (NYHA class II, CCS class III)  The patient has undergone the following TAVR work-up:               ECHO (Date10/28/19): VANDANA 0.64cm2, peak aortic jet velocity 4.02m/s, MG 37 mmHg, LVEF: 65%, mild AI              LHC (Date 08/13/2019): Normal coronaries s/p PCI.               STS: 3.1%               Frailty: 1/4 (Hand )              Iliacs are pending              LVOT: Pending              Incidental findings on CT: Pending               CT Surgery risk assessment: Moderate risk              Rhythm issues: Pending              PFTs: FEV1  predicted, DLCO  predicted.              Comorbidities: Severe neuropathy, Cane to ambulate, laminectomy     Past Medical History:   Diagnosis Date    Cancer 2010    left breast XRT + lumpectomy; right lunpectomy + Chemotherapy (Dr. Santana) 8 of 12  treatments, stopped for side effects    Cataract     right eye    Coronary artery disease     total 4 stents over several years, last 2015    Diabetes mellitus, type 2     Hyperlipidemia     Hypertension     Thyroid disease      Past Surgical History:   Procedure Laterality Date    APPENDECTOMY      BREAST SURGERY Bilateral     lumpectomy    EYE SURGERY Bilateral     cataract    HYSTERECTOMY      JOINT REPLACEMENT Right     hip    SPINE SURGERY      laminectomy     No family history on file.  Social History     Tobacco Use    Smoking status: Never Smoker    Smokeless tobacco: Never Used   Substance Use Topics    Alcohol use: Yes     Comment: social    Drug use: No      Review of patient's allergies indicates:  No Known Allergies  Current medications reviewed    Review of Systems:  Constitutional: Negative for fever, chills, distress  Skin: no acute disorders.  Negative for rash, itching  HEENT: unremarkable.  Negative for sore throat, congestion  Cardiovascular: Positive for chest pain,lower extremity edema.  Negative for orthopnea  Respiratory: Positive for shortness of breath.  Negative for cough.  GI:  unremarkable.  Negative for abdominal pain, vomiting  :  Negative for hematuria, flank pain  Musculoskeletal: unremarkable.  Negative for falls, myalgias  Neuro:  Negative for dizziness, LOC  Psych:  Negative for substance abuse, memory loss      OBJECTIVE:     Vital Signs (Most Recent)     Vital signs reviewed    Physical Exam:  General Appearance: no acute distress.  Normal for age  Skin: no acute lesions, minor bruises from phlebotomy  HEENT: no masses/hematoma, Jugular veins: not distended  Resp:  excursion/effort normal; clear to auscultation  CV:  Rate:  regular  Rhythm: regular  Murmur:  systolic ejection murmur at right upper sternal border  GI: Bowel sounds: present; abdo soft, nondistended, nontender, no masses palpable  Extrem: Edema: minimal  Pulses: normal  Groin: no hematoma  Neuro:  Alert and oriented; no focal deficit  Psych: no acute delirium noted  : voiding well  MSK: no acute findings, ranges of motion unchanged      I have personally reviewed the imaging, electrocardiogram, and pertinent lab findings    ASSESSMENT/PLAN:       78 y.o. female with multiple comorbidities and frailty (walks with a cane) presented with symptomatic severe aortic stenosis. Deemed intermediate risk for surgical aortic valve replacement (SAVR).  Appropriate candidate for TAVR evaluation.  We explained the patient's condition, pathology and prognosis of severe aortic stenosis, treatment options including medical therapy, SAVR and TAVR, details of SAVR and the TAVR procedure, risks and benefits of SAVR and TAVR including myocardial infarction, stroke, pacemaker, bleeding, infection, acute renal injury, conversion to open surgery, need for emergency mechanical circulatory support, and potential complications and recovery course with patient and family, and also durability of surgical vs transcatheter valve and options for reintervention in the future.  Patient and family understood and agreed to proceed. All questions answered.    I spent >70 minutes reviewing, examining and evaluating this patient, including reviewing relevant diagnostic studies, and 50% of which were spent on patient counseling including the pt's condition, diagnosis, prognosis, expected recovery after SAVR vs TAVR, follow-up plan and next steps.

## 2019-10-31 ENCOUNTER — CLINICAL SUPPORT (OUTPATIENT)
Dept: REHABILITATION | Facility: HOSPITAL | Age: 79
End: 2019-10-31
Payer: MEDICARE

## 2019-10-31 DIAGNOSIS — G89.29 CHRONIC PAIN OF BOTH SHOULDERS: ICD-10-CM

## 2019-10-31 DIAGNOSIS — R29.898 BILATERAL ARM WEAKNESS: ICD-10-CM

## 2019-10-31 DIAGNOSIS — M25.611 SHOULDER JOINT STIFFNESS, BILATERAL: ICD-10-CM

## 2019-10-31 DIAGNOSIS — M65.811: Primary | ICD-10-CM

## 2019-10-31 DIAGNOSIS — M25.512 CHRONIC PAIN OF BOTH SHOULDERS: ICD-10-CM

## 2019-10-31 DIAGNOSIS — M65.812 INFRASPINATUS TENOSYNOVITIS, LEFT: ICD-10-CM

## 2019-10-31 DIAGNOSIS — Z74.09 IMPAIRED FUNCTIONAL MOBILITY, BALANCE, GAIT, AND ENDURANCE: ICD-10-CM

## 2019-10-31 DIAGNOSIS — M25.612 SHOULDER JOINT STIFFNESS, BILATERAL: ICD-10-CM

## 2019-10-31 DIAGNOSIS — M25.511 CHRONIC PAIN OF BOTH SHOULDERS: ICD-10-CM

## 2019-10-31 PROCEDURE — 97530 THERAPEUTIC ACTIVITIES: CPT | Mod: PO

## 2019-10-31 PROCEDURE — 97112 NEUROMUSCULAR REEDUCATION: CPT | Mod: KX,PO

## 2019-10-31 PROCEDURE — 97110 THERAPEUTIC EXERCISES: CPT | Mod: KX,PO

## 2019-10-31 PROCEDURE — 97110 THERAPEUTIC EXERCISES: CPT | Mod: PO

## 2019-10-31 NOTE — PROGRESS NOTES
"  Physical Therapy Daily Treatment Note     Name: Mary Prince  Clinic Number: [id number]    Therapy Diagnosis:   Encounter Diagnoses   Name Primary?    Impaired functional mobility, balance, gait, and endurance     Bilateral arm weakness      Physician: Poncho Saunders MD    Visit Date: 10/31/2019    Medical Diagnosis: B arm weakness  Evaluation Date: 5-15-19  Authorization Period Expiration: 4-25-20  NEW Plan of Care Certification Period: 9/17/2019-11/17/2019  Visit #/Visits authorized: 18/18 new referral  -- KX modifier (29 total visits 2019)    Time In: 0945  Time Out: 1030  Total Billable Time: 45 minutes    Precautions: Standard and Fall    Subjective     Pt reports: "I'm doing pretty good, how about yourself."     She was compliant with home exercise program.  Response to previous treatment: little soreness  Functional change: her balance is improved    Pain: 0/10  Location: R leg    Objective   Mary participated in therapeutic exercises to improve functional mobility and safety for 10 minutes, including:  X 10 min on recumbent bike, level 7.0    Mary participated in neuromuscular re-education activities to improve: Balance, Coordination, Kinesthetic, Proprioception and Posture for 35 minutes. The following activities were included:  Ballet bar:   2 point wooden fitter board:             2  X 60  Sec of medial/lateral weight shifting with occasional 1 UE support             2 X 60 sec of trying to balance in the middle and avoiding medial/lateral weight shifting and occasional 1 UE support             2 X 60 sec of trying to balance in the middle and avoiding anterior/posterior weight shifting and occasional 1 UE support             2  X 60 sec of anterior/posterior weight shifting with occasional 1 UE support    Ladder:   Practicing stepping over ladder rungs forward.   Side stepping over ladder rungs    Coordination drills stepping over side and forward rungs    X 4 laps of backward ambulation with no " UE support and SBA.          Mary participated in gait training to improve functional mobility and safety for 0 minutes, including:         Home Exercises Provided and Patient Education Provided     Education provided:   - increased joint tension due to swelling, use of ice to decrease swelling    Written Home Exercises Provided: yes.  Exercises were reviewed and Mary was able to demonstrate them prior to the end of the session.  Mary demonstrated good  understanding of the education provided.     See EMR under Patient Instructions for exercises provided 6/28/2019.    Assessment     Mary tolerated session well and did not have any problems noted.  Pt with better balance today on the 2 point wooden fitter board and required less assistance for balance with activities.   Pt continues to be motivated and remains appropriate for skilled PT services.       Mary is progressing well towards her goals.   Pt prognosis is Good.      Pt will continue to benefit from skilled outpatient physical therapy to address the deficits listed in the problem list box on initial evaluation, provide pt/family education and to maximize pt's level of independence in the home and community environment.      Pt's spiritual, cultural and educational needs considered and pt agreeable to plan of care and goals.     Anticipated barriers to physical therapy: none     Goals:   Short Term GOALS: (5 weeks)  1. Pt to increase 30 second sit to stand score to 9 without use of UE. MET (7/23/2019)  2. Pt to increase Davidson Balance Score to 43 in order to decrease her risk for fall. MET (8/23/2019)  3. Pt to increase TUG score with use of AD to </= 20 seconds.MET (7/23/2019)  4. Pt will increase Nathanael condition 2 to 20 seconds with SBA. MET (8/23/2019)  Long Term GOALS: (10 weeks)  1. Pt will increase 30 second sit to stand score to 11 without use of UEs. MET (7/23/2019)  2. Pt will increase DGI score to 16 in order to decrease her risk for fall. MET  (10/15/2019)  2a. New goal 10/15/2019: Pt will increase DGI score to at least 19 in order to improve overall mobility and decrease risk for fall.  3. Pt will pass Nathanael condition 2 with minimal sway. MET  4. Pt will increase self selected walking speed to .7 in order to better be able to participate in pilgrimage. MET (7/23/2019)  4a (new goal 7/23/2019) Pt will increase self selected walking speed without AD to .9 in order to better be able to participate in pilgrimage. MET (10/15/2019)  5. Pt will increase TUG score with use of AD to </= 15 seconds in order to better participate in pilgrimage. MET (7/23/2019)  6. Pt will increase Davidson Balance score to 51 in order to decrease her risk for fall. MET (9/17/2019)  7. New goal 10/15/2019: Pt will pass condition 3 in [place]ath[place] sensory testing in order to improve her overall balance.  8. New goal 10/15/2019: Pt will improve [place] sensory testing condition 6 to at least 20 seconds in order to improve balance.      Plan      Focus on ankle strategy, vision eliminated balance, and SLS.     Kate Taveras, PTA  10/31/2019

## 2019-10-31 NOTE — PROGRESS NOTES
"  Occupational Therapy Daily Treatment Note     Date: 10/31/2019  Name: Mary Prince  Clinic Number: [id number]    Therapy Diagnosis:   Encounter Diagnoses   Name Primary?    Shoulder joint stiffness, bilateral     Chronic pain of both shoulders      Physician: Ahmet Maurer MD    Physician Orders: Eval and treat  Medical Diagnosis: Brachial Plexus injury  Evaluation Date: 9/19/2019  Plan of Care Expiration Period: 12/02/2019  Insurance Authorization period Expiration: 6/20/20  Date of Return to MD: MICHAEL        Visit # / Visits Authorized: 15/ TBD  Time In: 9:00am  Time Out: 9:45am  Total Billable (one on one) Time: 45 minutes     Precautions: Standard and Fall      Subjective     Pt reports: "These exercises are a good challenge"   she was somewhat compliant with home exercise program given last session.   Response to previous treatment: positive  Functional change: cooking more    Pain: 2/10  Location: L shoulder     Objective       Mary participated in therapeutic exercises for 30 minutes including:  - Supine FF with 8# dowel 2x30  - Supine chest press with 5# DB 2x30  - Prone T's and Y's with 2# 2x20  - Seated at table top: green theraweb 2x25 gross grasp    Mary participated in therapeutic activities for 15 minutes  - Fine motor activities to promote improved in hand manipulation and palm to finger translation        Home Exercises and Education Provided     Education provided:   - Shoulder HEP  -  HEP  - Progress towards goals     Written Home Exercises Provided: yes.  Exercises were reviewed and Mary was able to demonstrate them prior to the end of the session.  Mary demonstrated good  understanding of the HEP provided.        Assessment       Mary had good tolerance to treatment this date. She is still very worried about her surgery which requires distraction from often. Her movement with exercises is good and resembles functional ROM, however, when asked to simulate a functional activity, her " movement worsens. This may be some self-limiting behavior. She continues with UE weakness affecting her function in daily life.       Mary is progressing well towards her goals and there are no updates to goals at this time. Pt prognosis is Fair.     Pt will continue to benefit from skilled outpatient occupational therapy to address the deficits listed in the problem list on initial evaluation provide pt/family education and to maximize pt's level of independence in the home and community environment.     Anticipated barriers to occupational therapy: none at this time    Pt's spiritual, cultural and educational needs considered and pt agreeable to plan of care and goals.    Goals:  Short Term Goals: 5 weeks   - Pt. Will be educated in HEP and be able to return demonstrate with visual and verbal cues MET 10/24/2019  - Pt. Will have WNL PROM of B shoulders in FF/Ext/Abd/IR/ER with no greater than 2/10 pain for increased ability to perform functional tasks MET 10/24/2019  - Pt. Will improve  strength in both hands by at least 10# for increased functional performance during carrying tasks in progress  - Pt. Will educated in adaptive strategies to help improve her independence around the home in progress     Long Term Goals: 10 weeks   - Pt. Will be I with HEP in progress  - Pt. Will have an increase of at least 140 degrees AROM in both shoulders in FF/Abd for increased ability to perform functional tasks  in progress  - Pt. Will improve L IR AROM by 40 degrees and R IR to WFL for improved ability to perform dressing tasks in progress  - Pt. Will have improved Flex/Ext AROM of both wrists by at least 20 degrees for increased functional use in progress  - Pt, will improve  strength in both hands by at least 15# in progress  - Pt. Will improve 9hpt by 10 seconds in each hand for increased hand manipulation in progress  - Pt. Will improve R pinch strength to WNL compared to L in progress  - Pt. Will be able to  successfully complete all areas of toileting Mod I in progress    Plan   Continue with established POC  Updates/Grading for next session: as tolerated, plan to include dynamic seated balance with lateral truck movement for increased ability to toilet        JUAN Freeman

## 2019-11-01 ENCOUNTER — HOSPITAL ENCOUNTER (OUTPATIENT)
Dept: PULMONOLOGY | Facility: CLINIC | Age: 79
Discharge: HOME | End: 2019-11-01
Payer: MEDICARE

## 2019-11-01 ENCOUNTER — HOSPITAL ENCOUNTER (OUTPATIENT)
Dept: RADIOLOGY | Facility: HOSPITAL | Age: 79
Discharge: HOME | End: 2019-11-01
Attending: INTERNAL MEDICINE
Payer: MEDICARE

## 2019-11-01 ENCOUNTER — HOSPITAL ENCOUNTER (OUTPATIENT)
Dept: CARDIOLOGY | Facility: CLINIC | Age: 79
Discharge: HOME | End: 2019-11-01
Payer: MEDICARE

## 2019-11-01 VITALS — WEIGHT: 174.81 LBS | BODY MASS INDEX: 34.32 KG/M2 | HEIGHT: 60 IN

## 2019-11-01 DIAGNOSIS — I35.0 NODULAR CALCIFIC AORTIC VALVE STENOSIS: ICD-10-CM

## 2019-11-01 LAB
ALLENS TEST: NORMAL
DELSYS: NORMAL
DLCO ADJ PRE: 13.79 ML/(MIN*MMHG) (ref 11.8–23.27)
DLCO SINGLE BREATH LLN: 11.8
DLCO SINGLE BREATH PRE REF: 78.2 %
DLCO SINGLE BREATH REF: 17.53
DLCOC SBVA LLN: 2.47
DLCOC SBVA PRE REF: 111.9 %
DLCOC SBVA REF: 4.13
DLCOC SINGLE BREATH LLN: 11.8
DLCOC SINGLE BREATH PRE REF: 78.6 %
DLCOC SINGLE BREATH REF: 17.53
DLCOCSBVAULN: 5.79
DLCOCSINGLEBREATHULN: 23.27
DLCOSINGLEBREATHULN: 23.27
DLCOVA LLN: 2.47
DLCOVA PRE REF: 111.2 %
DLCOVA PRE: 4.6 ML/(MIN*MMHG*L) (ref 2.47–5.79)
DLCOVA REF: 4.13
DLCOVAULN: 5.79
DLVAADJ PRE: 4.62 ML/(MIN*MMHG*L) (ref 2.47–5.79)
FEF 25 75 LLN: 0.61
FEF 25 75 PRE REF: 84.6 %
FEF 25 75 REF: 1.44
FEV05 LLN: 0.53
FEV05 REF: 1.39
FEV1 FVC LLN: 63
FEV1 FVC PRE REF: 100.2 %
FEV1 FVC REF: 78
FEV1 LLN: 1.19
FEV1 PRE REF: 77.9 %
FEV1 REF: 1.7
FVC LLN: 1.56
FVC PRE REF: 76.9 %
FVC REF: 2.21
HCO3 UR-SCNC: 25.2 MMOL/L (ref 24–28)
IVC PRE: 1.53 L (ref 1.56–2.86)
IVC SINGLE BREATH LLN: 1.56
IVC SINGLE BREATH PRE REF: 69.1 %
IVC SINGLE BREATH REF: 2.21
IVCSINGLEBREATHULN: 2.86
MVV LLN: 49
MVV PRE REF: 101.4 %
MVV REF: 58
PCO2 BLDA: 41.8 MMHG (ref 35–45)
PEF LLN: 2.87
PEF PRE REF: 109.3 %
PEF REF: 4.36
PH SMN: 7.39 [PH] (ref 7.35–7.45)
PO2 BLDA: 84 MMHG (ref 80–100)
POC BE: 0 MMOL/L
POC SATURATED O2: 96 % (ref 95–100)
POC TCO2: 27 MMOL/L (ref 23–27)
PRE DLCO: 13.7 ML/(MIN*MMHG) (ref 11.8–23.27)
PRE FEF 25 75: 1.22 L/S (ref 0.61–2.27)
PRE FET 100: 5.69 SEC
PRE FEV05 REF: 81.7 %
PRE FEV1 FVC: 77.82 % (ref 63.06–92.28)
PRE FEV1: 1.32 L (ref 1.19–2.2)
PRE FEV5: 1.14 L (ref 0.53–2.25)
PRE FVC: 1.7 L (ref 1.56–2.86)
PRE MVV: 59 L/MIN (ref 49.44–66.88)
PRE PEF: 4.76 L/S (ref 2.87–5.86)
SAMPLE: NORMAL
SITE: NORMAL
VA PRE: 2.99 L (ref 4.09–4.09)
VA SINGLE BREATH LLN: 4.09
VA SINGLE BREATH PRE REF: 73.2 %
VA SINGLE BREATH REF: 4.09
VASINGLEBREATHULN: 4.09

## 2019-11-01 PROCEDURE — 82803 PR  BLOOD GASES: PH, PO2 & PCO2: ICD-10-PCS | Mod: S$GLB,,, | Performed by: INTERNAL MEDICINE

## 2019-11-01 PROCEDURE — 82803 BLOOD GASES ANY COMBINATION: CPT | Mod: S$GLB,,, | Performed by: INTERNAL MEDICINE

## 2019-11-01 PROCEDURE — 93005 ELECTROCARDIOGRAM TRACING: CPT | Mod: S$GLB,,, | Performed by: INTERNAL MEDICINE

## 2019-11-01 PROCEDURE — 94729 DIFFUSING CAPACITY: CPT | Mod: S$GLB,,, | Performed by: INTERNAL MEDICINE

## 2019-11-01 PROCEDURE — 94618 PULMONARY STRESS TESTING: ICD-10-PCS | Mod: S$GLB,,, | Performed by: INTERNAL MEDICINE

## 2019-11-01 PROCEDURE — 94010 BREATHING CAPACITY TEST: ICD-10-PCS | Mod: S$GLB,,, | Performed by: INTERNAL MEDICINE

## 2019-11-01 PROCEDURE — 93010 ELECTROCARDIOGRAM REPORT: CPT | Mod: S$GLB,,, | Performed by: INTERNAL MEDICINE

## 2019-11-01 PROCEDURE — 36600 WITHDRAWAL OF ARTERIAL BLOOD: CPT | Mod: S$GLB,,, | Performed by: INTERNAL MEDICINE

## 2019-11-01 PROCEDURE — 36600 PR WITHDRAWAL OF ARTERIAL BLOOD: ICD-10-PCS | Mod: S$GLB,,, | Performed by: INTERNAL MEDICINE

## 2019-11-01 PROCEDURE — 94010 BREATHING CAPACITY TEST: CPT | Mod: S$GLB,,, | Performed by: INTERNAL MEDICINE

## 2019-11-01 PROCEDURE — 93010 EKG 12-LEAD: ICD-10-PCS | Mod: S$GLB,,, | Performed by: INTERNAL MEDICINE

## 2019-11-01 PROCEDURE — 93005 EKG 12-LEAD: ICD-10-PCS | Mod: S$GLB,,, | Performed by: INTERNAL MEDICINE

## 2019-11-01 PROCEDURE — 94729 PR C02/MEMBANE DIFFUSE CAPACITY: ICD-10-PCS | Mod: S$GLB,,, | Performed by: INTERNAL MEDICINE

## 2019-11-01 PROCEDURE — 94618 PULMONARY STRESS TESTING: CPT | Mod: S$GLB,,, | Performed by: INTERNAL MEDICINE

## 2019-11-02 NOTE — PROCEDURES
Mary Prince is a 78 y.o.  female patient, who presents for a 6 minute walk test ordered by Balaji Shah MD.  The diagnosis is Aortic Valve Disorder.  The patient's BMI is 34.2 kg/m2.  Predicted distance (lower limit of normal) is 209.85 meters.      Test Results:    The test was completed with stops.  The patient stopped 1 time for a total of 21 seconds.  The total time walked was 339 seconds.  During walking, the patient reported:  Dyspnea.  The patient used a cane for assistance during testing.     11/01/2019---------Distance: 243.84 meters (800 feet)     O2 Sat % Supplemental Oxygen Heart Rate Blood Pressure Monica Scale   Pre-exercise  (Resting) 96 % Room Air 82 bpm 137/76 mmHg 1   During Exercise 93 % Room Air 125 bpm 185/80 mmHg 4   Post-exercise  (Recovery) 96 % Room Air  90 bpm 155/65 mmHg      Recovery Time:  316 seconds    Performing nurse/tech:  Dee BEAR      PREVIOUS STUDY:   The patient has not had a previous study.      CLINICAL INTERPRETATION:  Six minute walk distance is 243.84 meters (800 feet) with somewhat heavy dyspnea.  During exercise, there was desaturation while breathing room air.  Both blood pressure and heart rate increased significantly with walking.  The patient did not report non-pulmonary symptoms during exercise.  Significant exercise impairment is likely due to cardiovascular causes and subjective symptoms.  The patient did complete the study, walking 339 seconds of the 360 second test.  No previous study performed.  Based upon age and body mass index, exercise capacity may be normal.

## 2019-11-05 ENCOUNTER — DOCUMENTATION ONLY (OUTPATIENT)
Dept: REHABILITATION | Facility: HOSPITAL | Age: 79
End: 2019-11-05

## 2019-11-05 ENCOUNTER — CLINICAL SUPPORT (OUTPATIENT)
Dept: REHABILITATION | Facility: HOSPITAL | Age: 79
End: 2019-11-05
Payer: MEDICARE

## 2019-11-05 DIAGNOSIS — R29.898 BILATERAL ARM WEAKNESS: ICD-10-CM

## 2019-11-05 DIAGNOSIS — M25.612 SHOULDER JOINT STIFFNESS, BILATERAL: ICD-10-CM

## 2019-11-05 DIAGNOSIS — Z74.09 IMPAIRED FUNCTIONAL MOBILITY, BALANCE, GAIT, AND ENDURANCE: Primary | ICD-10-CM

## 2019-11-05 DIAGNOSIS — G89.29 CHRONIC PAIN OF BOTH SHOULDERS: ICD-10-CM

## 2019-11-05 DIAGNOSIS — M25.512 CHRONIC PAIN OF BOTH SHOULDERS: ICD-10-CM

## 2019-11-05 DIAGNOSIS — M25.511 CHRONIC PAIN OF BOTH SHOULDERS: ICD-10-CM

## 2019-11-05 DIAGNOSIS — M25.611 SHOULDER JOINT STIFFNESS, BILATERAL: ICD-10-CM

## 2019-11-05 PROCEDURE — 97110 THERAPEUTIC EXERCISES: CPT | Mod: PO

## 2019-11-05 PROCEDURE — 97112 NEUROMUSCULAR REEDUCATION: CPT | Mod: KX,PO

## 2019-11-05 PROCEDURE — 97530 THERAPEUTIC ACTIVITIES: CPT | Mod: PO

## 2019-11-05 PROCEDURE — 97110 THERAPEUTIC EXERCISES: CPT | Mod: KX,PO

## 2019-11-05 NOTE — PROGRESS NOTES
PT/PTA met face to face to discuss pt's treatment plan and progress towards established goals.  Continue with current PT POC with focus on balance, eccentric control and endurance.  Patient will be seen by physical therapist at least every sixth treatment or 30 days, whichever occurs first.    Kate Taveras, PTA  11/05/2019    Face to face meeting on 11/5/2019  Efrain David, PT  11/5/2019

## 2019-11-05 NOTE — PROGRESS NOTES
Physical Therapy Daily Treatment Note     Name: Mary Prince  Clinic Number: [id number]    Therapy Diagnosis:   Encounter Diagnoses   Name Primary?    Impaired functional mobility, balance, gait, and endurance Yes    Bilateral arm weakness      Physician: Poncho Saunders MD    Visit Date: 11/5/2019    Medical Diagnosis: B arm weakness  Evaluation Date: 5-15-19  Authorization Period Expiration: 9/19/2020  NEW Plan of Care Certification Period: 9/17/2019-11/17/2019  Visit #/Visits authorized: 2/6 new referral  -- KX modifier (30 total visits 2019)    Time In:945  Time Out: 1030  Total Billable Time: 45 minutes    Precautions: Standard and Fall    Subjective     Pt reports: MD wants to proceed with testing for TAVR. She is scared to do it but doctor wants her to continue with therapy. She continues to struggle with pain in R knee.     She was compliant with home exercise program.  Response to previous treatment: little soreness  Functional change: her balance is improved    Pain: did not rate/10  Location: R knee    Objective   Mary participated in therapeutic exercises to improve functional mobility and safety for 15 minutes, including:  X 10 min on [place]y Stepper[employer]ro sprint setting level 3.5  3 x 30 seconds gastroc stretch between // bars    Mary participated in neuromuscular re-education activities to improve: Balance, Coordination, Kinesthetic, Proprioception and Posture for 30 minutes. The following activities were included:  Ballet bar:   2 point wooden fitter board:             X 60  Sec of medial/lateral weight shifting with occasional 1 UE support              60 sec of trying to balance in the middle and avoiding medial/lateral weight shifting and occasional 1 UE support             X 60 sec of trying to balance in the middle and avoiding anterior/posterior weight shifting and occasional 1 UE support             X 60 sec of anterior/posterior weight shifting with occasional 1 UE support    SLS with  "opposing foot on 6" step, 2 x 30 ea LE    4 pt fitter:    3 x 30 seconds, eyes closed, fair balance   x10 ea LE lead step fwd over fitter board, no UE support,           Mary participated in gait training to improve functional mobility and safety for 0 minutes, including:         Home Exercises Provided and Patient Education Provided     Education provided:   - increased joint tension due to swelling, use of ice to decrease swelling    Written Home Exercises Provided: yes.  Exercises were reviewed and Mary was able to demonstrate them prior to the end of the session.  Mary demonstrated good  understanding of the education provided.     See EMR under Patient Instructions for exercises provided 6/28/2019.    Assessment     Mary tolerated session well and did not report any adverse reactions. Pt continues to struggle with SLS, SOB on exertion, and vision eliminated balance. Pt struggles to keep 2-pt fitter board balanced in center. She reports continued strengthening outside of sessions.  Pt continues to be motivated and remains appropriate for skilled PT services.       Mary is progressing well towards her goals.   Pt prognosis is Good.      Pt will continue to benefit from skilled outpatient physical therapy to address the deficits listed in the problem list box on initial evaluation, provide pt/family education and to maximize pt's level of independence in the home and community environment.      Pt's spiritual, cultural and educational needs considered and pt agreeable to plan of care and goals.     Anticipated barriers to physical therapy: none     Goals:   Short Term GOALS: (5 weeks)  1. Pt to increase 30 second sit to stand score to 9 without use of UE. MET (7/23/2019)  2. Pt to increase Davidson Balance Score to 43 in order to decrease her risk for fall. MET (8/23/2019)  3. Pt to increase TUG score with use of AD to </= 20 seconds.MET (7/23/2019)  4. Pt will increase Nathanael condition 2 to 20 seconds with " SBA. MET (8/23/2019)  Long Term GOALS: (10 weeks)  1. Pt will increase 30 second sit to stand score to 11 without use of UEs. MET (7/23/2019)  2. Pt will increase DGI score to 16 in order to decrease her risk for fall. MET (10/15/2019)  2a. New goal 10/15/2019: Pt will increase DGI score to at least 19 in order to improve overall mobility and decrease risk for fall.  3. Pt will pass Nathanael condition 2 with minimal sway. MET  4. Pt will increase self selected walking speed to .7 in order to better be able to participate in pilgrimage. MET (7/23/2019)  4a (new goal 7/23/2019) Pt will increase self selected walking speed without AD to .9 in order to better be able to participate in pilgrimage. MET (10/15/2019)  5. Pt will increase TUG score with use of AD to </= 15 seconds in order to better participate in pilgrimage. MET (7/23/2019)  6. Pt will increase Davidson Balance score to 51 in order to decrease her risk for fall. MET (9/17/2019)  7. New goal 10/15/2019: Pt will pass condition 3 in [place] sensory testing in order to improve her overall balance.  8. New goal 10/15/2019: Pt will improve [place] sensory testing condition 6 to at least 20 seconds in order to improve balance.      Plan      Focus on ankle strategy, vision eliminated balance, and SLS.     Efrain David, PT  11/05/2019

## 2019-11-05 NOTE — PROGRESS NOTES
"  Occupational Therapy Daily Treatment Note     Date: 11/5/2019  Name: Mary Prince  Clinic Number: [id number]    Therapy Diagnosis:   Encounter Diagnoses   Name Primary?    Shoulder joint stiffness, bilateral     Chronic pain of both shoulders      Physician: Ahmet Maurer MD    Physician Orders: Eval and treat  Medical Diagnosis: Brachial Plexus injury  Evaluation Date: 9/19/2019  Plan of Care Expiration Period: 12/02/2019  Insurance Authorization period Expiration: 6/20/20  Date of Return to MD: MICHAEL        Visit # / Visits Authorized: 16/ TBD  Time In: 11:15am  Time Out: 12:00pm  Total Billable (one on one) Time: 45 minutes     Precautions: Standard and Fall      Subjective     Pt reports: "I am really worried about this surgery coming up"   she was somewhat compliant with home exercise program given last session.   Response to previous treatment: positive  Functional change: cooking more    Pain: 2/10  Location: L shoulder     Objective       Mary participated in therapeutic exercises for 30 minutes including:  - Supine FF with 8# dowel 2x30  - Supine chest press with 10# DB 2x30  - Prone T's and Y's with 2# 2x25      Mary participated in therapeutic activities for 15 minutes  - Self-care simulation activity   - seated EOM with verbals cues to contract R obliques, lower R shoulder, and lift R hip while reaching for stuffed animals below her in order to promote increased functional independence for toileting      Home Exercises and Education Provided     Education provided:   - Shoulder HEP  -  HEP  - Progress towards goals     Written Home Exercises Provided: yes.  Exercises were reviewed and Mary was able to demonstrate them prior to the end of the session.  Mary demonstrated good  understanding of the HEP provided.        Assessment       Mary had good tolerance to treatment this date. She continues to have reservation about an upcoming surgery which can be a distraction for her in therapy. " However, upcoming surgery may serve as good motivation to increase her HEP compliance and promote increased independence at home because she will likely miss therapy during her recovery. She continues with UE weakness affecting her function in daily life.       Mary is progressing well towards her goals and there are no updates to goals at this time. Pt prognosis is Fair.     Pt will continue to benefit from skilled outpatient occupational therapy to address the deficits listed in the problem list on initial evaluation provide pt/family education and to maximize pt's level of independence in the home and community environment.     Anticipated barriers to occupational therapy: none at this time    Pt's spiritual, cultural and educational needs considered and pt agreeable to plan of care and goals.    Goals:  Short Term Goals: 5 weeks   - Pt. Will be educated in HEP and be able to return demonstrate with visual and verbal cues MET 10/24/2019  - Pt. Will have WNL PROM of B shoulders in FF/Ext/Abd/IR/ER with no greater than 2/10 pain for increased ability to perform functional tasks MET 10/24/2019  - Pt. Will improve  strength in both hands by at least 10# for increased functional performance during carrying tasks in progress  - Pt. Will educated in adaptive strategies to help improve her independence around the home in progress     Long Term Goals: 10 weeks   - Pt. Will be I with HEP in progress  - Pt. Will have an increase of at least 140 degrees AROM in both shoulders in FF/Abd for increased ability to perform functional tasks  in progress  - Pt. Will improve L IR AROM by 40 degrees and R IR to WFL for improved ability to perform dressing tasks in progress  - Pt. Will have improved Flex/Ext AROM of both wrists by at least 20 degrees for increased functional use in progress  - Pt, will improve  strength in both hands by at least 15# in progress  - Pt. Will improve 9hpt by 10 seconds in each hand for  increased hand manipulation in progress  - Pt. Will improve R pinch strength to WNL compared to L in progress  - Pt. Will be able to successfully complete all areas of toileting Mod I in progress    Plan   Continue with established POC  Updates/Grading for next session: as tolerated, plan to include dynamic seated balance with lateral truck movement for increased ability to toilet        JUAN Freeman

## 2019-11-06 ENCOUNTER — HOSPITAL ENCOUNTER (OUTPATIENT)
Dept: RADIOLOGY | Facility: HOSPITAL | Age: 79
Discharge: HOME | End: 2019-11-06
Attending: INTERNAL MEDICINE
Payer: MEDICARE

## 2019-11-06 PROCEDURE — 74174 CTA ABD&PLVS W/CONTRAST: CPT | Mod: 26,,, | Performed by: RADIOLOGY

## 2019-11-06 PROCEDURE — 74174 CTA CARDIAC TAVR_PARTNERS (XPD): ICD-10-PCS | Mod: 26,,, | Performed by: RADIOLOGY

## 2019-11-06 PROCEDURE — 71275 CT ANGIOGRAPHY CHEST: CPT | Mod: 26,,, | Performed by: RADIOLOGY

## 2019-11-06 PROCEDURE — 71275 CT ANGIOGRAPHY CHEST: CPT | Mod: TC

## 2019-11-06 PROCEDURE — 25500020 PHARM REV CODE 255: Performed by: INTERNAL MEDICINE

## 2019-11-06 PROCEDURE — 71275 CTA CARDIAC TAVR_PARTNERS (XPD): ICD-10-PCS | Mod: 26,,, | Performed by: RADIOLOGY

## 2019-11-06 RX ADMIN — IOHEXOL 100 ML: 350 INJECTION, SOLUTION INTRAVENOUS at 03:11

## 2019-11-06 NOTE — PROGRESS NOTES
"  Occupational Therapy Daily Treatment Note     Date: 11/7/2019  Name: Mary Prince  Clinic Number: [id number]    Therapy Diagnosis:   Encounter Diagnoses   Name Primary?    Shoulder joint stiffness, bilateral     Chronic pain of both shoulders      Physician: Ahmet Maurer MD    Physician Orders: Eval and treat  Medical Diagnosis: Brachial Plexus injury  Evaluation Date: 9/19/2019  Plan of Care Expiration Period: 12/02/2019  Insurance Authorization period Expiration: 6/20/20  Date of Return to MD: MICHAEL        Visit # / Visits Authorized: 17/ TBD  Time In: 11:15pm  Time Out: 12:00  Total Billable (one on one) Time: 45 minutes     Precautions: Standard and Fall      Subjective     Pt reports: "I have not been doing my exercises. I was so frustrated with my appointments Friday"  she was not compliant with home exercise program given last session.   Response to previous treatment: positive  Functional change: cooking more    Pain: 2/10  Location: L shoulder     Objective       Mary participated in therapeutic exercises for 30 minutes including:  - Supine FF with 10# dowel 2x30  - Supine chest press with 12# DB 2x30  - Prone T's and Y's with 2# 2x30  - Chest flys 2# db 2x30      Mary participated in therapeutic activities for 15 minutes  - Fine motor activity   - small peg find in yellow theraputty with placement into peg board 1x20 pegs each hand      Home Exercises and Education Provided     Education provided:   - Shoulder HEP  -  HEP  - Progress towards goals     Written Home Exercises Provided: yes.  Exercises were reviewed and Mary was able to demonstrate them prior to the end of the session.  Mary demonstrated good  understanding of the HEP provided.        Assessment     Mary had good tolerance to treatment this date. R hand fine motor control/speed was grossly improved this date while L remained fair. She has not been compliant with either HEP over several visits. She may be demonstrating some " "self-limiting behavior. She will often "challenge" herself at restaurants but then become discouraged when she feels people are looking at her. Pt. Was told that restaurants might not be the most appropriate place to challenge her fine motor deficits and Pt. agreed. She continues with UE weakness affecting her function in daily life.       Mary is progressing slowly towards her goals and there are no updates to goals at this time. Pt prognosis is Fair.     Pt will continue to benefit from skilled outpatient occupational therapy to address the deficits listed in the problem list on initial evaluation provide pt/family education and to maximize pt's level of independence in the home and community environment.     Anticipated barriers to occupational therapy: none at this time    Pt's spiritual, cultural and educational needs considered and pt agreeable to plan of care and goals.    Goals:  Short Term Goals: 5 weeks   - Pt. Will be educated in HEP and be able to return demonstrate with visual and verbal cues MET 10/24/2019  - Pt. Will have WNL PROM of B shoulders in FF/Ext/Abd/IR/ER with no greater than 2/10 pain for increased ability to perform functional tasks MET 10/24/2019  - Pt. Will improve  strength in both hands by at least 10# for increased functional performance during carrying tasks in progress  - Pt. Will educated in adaptive strategies to help improve her independence around the home in progress     Long Term Goals: 10 weeks   - Pt. Will be I with HEP in progress  - Pt. Will have an increase of at least 140 degrees AROM in both shoulders in FF/Abd for increased ability to perform functional tasks  in progress  - Pt. Will improve L IR AROM by 40 degrees and R IR to WFL for improved ability to perform dressing tasks in progress  - Pt. Will have improved Flex/Ext AROM of both wrists by at least 20 degrees for increased functional use in progress  - Pt, will improve  strength in both hands by at " least 15# in progress  - Pt. Will improve 9hpt by 10 seconds in each hand for increased hand manipulation in progress  - Pt. Will improve R pinch strength to WNL compared to L in progress  - Pt. Will be able to successfully complete all areas of toileting Mod I in progress    Plan   Continue with established POC  Updates/Grading for next session: as tolerated, plan to include dynamic seated balance with lateral truck movement for increased ability to toilet        JUAN Freeman

## 2019-11-07 ENCOUNTER — CLINICAL SUPPORT (OUTPATIENT)
Dept: REHABILITATION | Facility: HOSPITAL | Age: 79
End: 2019-11-07
Payer: MEDICARE

## 2019-11-07 DIAGNOSIS — I35.0 NODULAR CALCIFIC AORTIC VALVE STENOSIS: Primary | ICD-10-CM

## 2019-11-07 DIAGNOSIS — M25.512 CHRONIC PAIN OF BOTH SHOULDERS: ICD-10-CM

## 2019-11-07 DIAGNOSIS — N18.9 CHRONIC KIDNEY DISEASE, UNSPECIFIED CKD STAGE: ICD-10-CM

## 2019-11-07 DIAGNOSIS — R29.898 BILATERAL ARM WEAKNESS: ICD-10-CM

## 2019-11-07 DIAGNOSIS — M25.612 SHOULDER JOINT STIFFNESS, BILATERAL: ICD-10-CM

## 2019-11-07 DIAGNOSIS — Z74.09 IMPAIRED FUNCTIONAL MOBILITY, BALANCE, GAIT, AND ENDURANCE: ICD-10-CM

## 2019-11-07 DIAGNOSIS — M25.611 SHOULDER JOINT STIFFNESS, BILATERAL: ICD-10-CM

## 2019-11-07 DIAGNOSIS — G89.29 CHRONIC PAIN OF BOTH SHOULDERS: ICD-10-CM

## 2019-11-07 DIAGNOSIS — M25.511 CHRONIC PAIN OF BOTH SHOULDERS: ICD-10-CM

## 2019-11-07 PROCEDURE — 97112 NEUROMUSCULAR REEDUCATION: CPT | Mod: KX,PO

## 2019-11-07 PROCEDURE — 97110 THERAPEUTIC EXERCISES: CPT | Mod: KX,PO

## 2019-11-07 PROCEDURE — 97110 THERAPEUTIC EXERCISES: CPT | Mod: PO

## 2019-11-07 PROCEDURE — 97530 THERAPEUTIC ACTIVITIES: CPT | Mod: PO

## 2019-11-07 RX ORDER — DEXTROSE MONOHYDRATE AND SODIUM CHLORIDE 5; .45 G/100ML; G/100ML
INJECTION, SOLUTION INTRAVENOUS CONTINUOUS
Status: CANCELLED | OUTPATIENT
Start: 2019-11-07

## 2019-11-07 RX ORDER — DIPHENHYDRAMINE HCL 25 MG
50 CAPSULE ORAL ONCE
Status: CANCELLED | OUTPATIENT
Start: 2019-11-07 | End: 2019-11-07

## 2019-11-07 NOTE — PROGRESS NOTES
"  Physical Therapy Daily Treatment Note     Name: Mary Prince  Clinic Number: [id number]    Therapy Diagnosis:   Encounter Diagnoses   Name Primary?    Impaired functional mobility, balance, gait, and endurance     Bilateral arm weakness      Physician: Poncho Saunders MD    Visit Date: 11/7/2019    Medical Diagnosis: B arm weakness  Evaluation Date: 5-15-19  Authorization Period Expiration: 9/19/2020  NEW Plan of Care Certification Period: 9/17/2019-11/17/2019  Visit #/Visits authorized: 3/6 new referral  -- KX modifier (31 total visits 2019)    Time In: 1030   Time Out: 1115  Total Billable Time: 45 minutes    Precautions: Standard and Fall    Subjective     Pt reports: " I'm going pretty good today."     She was compliant with home exercise program.  Response to previous treatment: little soreness  Functional change: her balance is improved    Pain: did not rate/10  Location: R knee    Objective   Mary participated in therapeutic exercises to improve functional mobility and safety for 15 minutes, including:  X 10 min on recumbent bike, B LE's on level 5.0  1 x 30 seconds B gastroc stretch between // bars  2 x 30 sec of B HC stretch on incline with CGA    Mary participated in neuromuscular re-education activities to improve: Balance, Coordination, Kinesthetic, Proprioception and Posture for 30 minutes. The following activities were included:  // bars:   2 point wooden fitter board:             X 60  Sec of medial/lateral weight shifting with occasional 1 UE support              60 sec of trying to balance in the middle and avoiding medial/lateral weight shifting and occasional 1 UE support             X 60 sec of trying to balance in the middle and avoiding anterior/posterior weight shifting and occasional 1 UE support             X 60 sec of anterior/posterior weight shifting with occasional 1 UE support    Wooden rocker board:   2 x 60 sec of anterior/posterior rocking with no UE support and " CGA      BOSU:   Round side up    1 x 15 reps of B LE single leg step ups with 2 finger supports and CGA     Flat side up:    2 x 60 sec of static standing with no UE support and CGA    Mary participated in gait training to improve functional mobility and safety for 0 minutes, including:         Home Exercises Provided and Patient Education Provided     Education provided:   - increased joint tension due to swelling, use of ice to decrease swelling    Written Home Exercises Provided: yes.  Exercises were reviewed and Mary was able to demonstrate them prior to the end of the session.  Mary demonstrated good  understanding of the education provided.     See EMR under Patient Instructions for exercises provided 6/28/2019.    Assessment     Mary tolerated session well and did not report any adverse reactions. Pt cont to focus on balance and ankle strategy exercises and was able to perform most activities unsupported with CGA for safety.  Pt would require occasional 1 UE support for balance while on the 2 point wooden fitter board.   Pt progressed to balance on the BOSU on both sides and did not have any loss of balance.  Pt continues to be motivated and remains appropriate for skilled PT services.       Mary is progressing well towards her goals.   Pt prognosis is Good.      Pt will continue to benefit from skilled outpatient physical therapy to address the deficits listed in the problem list box on initial evaluation, provide pt/family education and to maximize pt's level of independence in the home and community environment.      Pt's spiritual, cultural and educational needs considered and pt agreeable to plan of care and goals.     Anticipated barriers to physical therapy: none     Goals:   Short Term GOALS: (5 weeks)  1. Pt to increase 30 second sit to stand score to 9 without use of UE. MET (7/23/2019)  2. Pt to increase Davidson Balance Score to 43 in order to decrease her risk for fall. MET  (8/23/2019)  3. Pt to increase TUG score with use of AD to </= 20 seconds.MET (7/23/2019)  4. Pt will increase [place] condition 2 to 20 seconds with SBA. MET (8/23/2019)  Long Term GOALS: (10 weeks)  1. Pt will increase 30 second sit to stand score to 11 without use of UEs. MET (7/23/2019)  2. Pt will increase DGI score to 16 in order to decrease her risk for fall. MET (10/15/2019)  2a. New goal 10/15/2019: Pt will increase DGI score to at least 19 in order to improve overall mobility and decrease risk for fall.  3. Pt will pass [place] condition 2 with minimal sway. MET  4. Pt will increase self selected walking speed to .7 in order to better be able to participate in pilgrimage. MET (7/23/2019)  4a (new goal 7/23/2019) Pt will increase self selected walking speed without AD to .9 in order to better be able to participate in pilgrimage. MET (10/15/2019)  5. Pt will increase TUG score with use of AD to </= 15 seconds in order to better participate in pilgrimage. MET (7/23/2019)  6. Pt will increase Davidson Balance score to 51 in order to decrease her risk for fall. MET (9/17/2019)  7. New goal 10/15/2019: Pt will pass condition 3 in [place] sensory testing in order to improve her overall balance.  8. New goal 10/15/2019: Pt will improve [place] sensory testing condition 6 to at least 20 seconds in order to improve balance.      Plan      Focus on ankle strategy, vision eliminated balance, and SLS.     Kate Taveras, PTA  11/07/2019

## 2019-11-08 ENCOUNTER — DOCUMENTATION ONLY (OUTPATIENT)
Dept: CARDIOLOGY | Facility: CLINIC | Age: 79
End: 2019-11-08

## 2019-11-08 NOTE — PROGRESS NOTES
Mary Prince is a 78 y.o. female referred by Dr Oscar Velazco  for evaluation of severe AS (NYHA Class II, CCS III sx).    The patient has undergone the following TAVR work-up:   ECHO (Date 10/28/19): VANDANA= 06.4 cm2, MG= 37mmHg, Peak César= 4.1 m/s, EF= 65%.   LHC (Date 8/13/19): Nonobstructive CAD. Patent LAD stent   STS: 3.1%   Frailty: 1/4(Hand )   Iliacs are >6.1 on R and > 7.74 on L   LVOT area by CTA is 2.84 cm2 (22.7 mm X 16.9 mm) and Avg Diameter is 19mm  Incidental findings on CT: None  CT Surgery risk assessment: Moderate risk, per Dr Vaughn due to age and comorbidities  Rhythm issues: None  PFTs: FEV1 77% predicted, DLCO 78% predicted.  Comorbidities: HTN, CAD s/p Proximal to Mid LAD PCI, HLD, central tremors, laminectomy       Mary Prince is a 26 mm Evolut valve candidate via LTF access.    Transcatheter Aortic valve replacement   1. Valve: 26 mm  Evolut  2. TAVR access: LTF  3. Valvuloplasty balloon:  16mm True  4. Viabahn size if needed: 8x5  5. Antithrombotic therapy:  ASA/Plavix  6. Pacemaker risk factors: The patient was explained the the procedure carries around a 12.5% risk of permanent pacemaker requirement and that risk depends on the patients underlying conduction system.  7. Patient was educated abut the pathophysiology and natural history of severe aortic stenosis and was educated about the treatment options including surgical and transcatheter valve replacement. She agrees to be full code for the forseable future and to undergo workup for treatment of severe AS.   8. The risks, benefits, and alternatives of transcatheter aortic valve replacement were discussed with the patient. All questions were answered and informed consent was obtained. I had a detailed discussion with the patient regarding risk of stroke, MI, bleeding access site complications including limb loss, allergy, kidney failure including dialysis and death.  9. The patient understands the risks and benefits and wishes to  go ahead with the procedure.  10. All patient's questions were answered

## 2019-11-12 ENCOUNTER — CLINICAL SUPPORT (OUTPATIENT)
Dept: REHABILITATION | Facility: HOSPITAL | Age: 79
End: 2019-11-12
Payer: MEDICARE

## 2019-11-12 DIAGNOSIS — R29.898 BILATERAL ARM WEAKNESS: ICD-10-CM

## 2019-11-12 DIAGNOSIS — Z74.09 IMPAIRED FUNCTIONAL MOBILITY, BALANCE, GAIT, AND ENDURANCE: Primary | ICD-10-CM

## 2019-11-12 PROCEDURE — 97110 THERAPEUTIC EXERCISES: CPT | Mod: KX,PO

## 2019-11-12 PROCEDURE — 97112 NEUROMUSCULAR REEDUCATION: CPT | Mod: KX,PO

## 2019-11-12 NOTE — PLAN OF CARE
"  Physical Therapy Daily Treatment Note     Name: Mary Prince  Clinic Number: [id number]    Therapy Diagnosis:   Encounter Diagnoses   Name Primary?    Impaired functional mobility, balance, gait, and endurance Yes    Bilateral arm weakness      Physician: Ponhco Saunders MD    Visit Date: 11/12/2019    Medical Diagnosis: B arm weakness  Evaluation Date: 5-15-19  Authorization Period Expiration: 9/19/2020  NEW Plan of Care Certification Period: 9/17/2019-11/17/2019  POC expiration: 11/30/2019  Visit #/Visits authorized: 4/6 new referral  -- KX modifier (32 total visits 2019)    Time In: 945   Time Out: 1030  Total Billable Time: 45 minutes    Precautions: Standard and Fall    Subjective     Pt reports: "This knee of mine is killing me. I feel like it's getting worse not better. How long does it take for a bone bruise to heal?" PT encourages pt to return to orthopedist for more information. She reports she is seeing him tomorrow for an appointment.    She was compliant with home exercise program.  Response to previous treatment: little soreness  Functional change: her balance is improved    Pain: 5/10  Location: R knee    Objective   Mary participated in therapeutic exercises to improve functional mobility and safety for 10 minutes, including:  X 10 min on recumbent bike, B LE's on level 5.0      Mary participated in neuromuscular re-education activities to improve: Balance, Coordination, Kinesthetic, Proprioception and Posture for 35 minutes. The following activities were included:    6/25/2019 7/23/2019 8/23/2019 9/17/2019 10/15/2019 11/12/19   TUG 26.37 seconds with cane  26.08 seconds without AD 13.7 seconds with cane  10.7 seconds without AD 9.4 seconds with cane  9.6 seconds without AD 8.6 seconds without AD 10.9 + 8.9 + 9.0 = 9.6 seconds without AD 14.6 + 11.8 + 11.4 = 12.6 sec without AD   30 second sit to stand 7 without UE use 14 without UE use 18 without UE use 19 without UE use (cues to come fully to " standing) 22 without UE use (cues to fully sit and stand) 18 without UE use   Self selected walking speed .54 m/sec .75 m/s (6m/8.0 seconds)  G-code = CK .83 m/s (6m/7.2sec)  G-code = CJ .83 m/s (6m/7.2sec)  G-code = CJ .91 m/s (6m/6.6sec)  G-code = CJ .85 m/s (6m/7.1sec)  G-code=CJ   Fast walking speed .71 m/sec .89 m/s (6m/6.7 seconds) .94 m/s (6m/6.4sec) 1.01 m/s (6m/5.9sec) 1.01 m/s (6m/5.9sec) .90 m/s (6m/6.7sec)   RUIZ 38/56 41/56 49/56 51/56 51/56 47/56   DGI 10/24 14/24 15/24 15/24 17/24 15/24      RUIZ Assessment  1. Sitting to Standin - able to stand without using hands and stabilize independently  2. Standing Unsupported: 4 - able to stand safely 2 minutes without hold  3. Sitting Unsupported: 4 - able to sit safely and securely 2 minutes  4. Standing to Sittin - sits safely with minimal use of hands  5. Pivot Transfer: 4 - able to trnasfer safely with minor use of hands  6. Standing with Eyes Closed: 4 - albe to stand 10 seconds safely  7. Standing with Feet Together: 4 - able to place feet together independently and stand 1 minute safely  8. Reaching Forward with Outstretched Arm: 4 - can reach forward confidently 25 cm/10 inches  9. Retrieving Object from Floor: 3 - able to pick slipper but needs supervision  10. Turning to Look Behind: 3 - looks behind one side only, other side less weight shift  11. Turning 360 Degrees: 4 - able to turn 360 in seconds or less  12. Placing Alternate Foot on Step: 2 - able to complete 4 steps without aid with supervision  13. Standing with One Foot in Front: 2 - able to take small step indenpendently and hold 30 seconds  14. Standing on One Foot: 1 - tries to lift leg and unable to hold 3 seconds but remains standing independently  Total: 47  Maximum: 56      DYNAMIC GAIT INDEX:    1. Gait level surface= 2   3) Normal: walks 20', no AD, good speed, no evidence for imbalance,   normal gait pattern   2) Mild impairment: walks 20', uses AD, slower speed, mild gait  "deviations   1)  moderate impairment: walks 20', slow speed, abnormal gait pattern,   evidence for imbalance.   0)  severe impairment: cannot walk 20' without assistance, severe gait   deviations or imbalance  2. Change in gait speed: 2   3) Normal: able to smoothly change walking speed without loss of balance   or gait deviation. Shows a significant difference in walking speeds    between fast, slow, and normal speeds.    2) Mild impairment: is able to change speeds but demonstrates mild gait   deviations, or not gait deviations but unable to achieve a significant   change in velocity, or uses AD   1) Moderate impairment: makes only minor adjustments in walking speed,   or accomplishes a change in speed with significant gait deviation, or   changes speed but loses balance and is able to recover and keep    walking    0) Severe impairment: cannot change speeds, or loses balance and has to   reach for wall or be caught.  3. Gait with horizontal head turns: 1   3) Normal: performs head turns smoothly with no change in gait.   2) Mild impairment: performs head turns smoothly with slight velocity, I.e.   Minor disruption to smooth gait path or uses AD   1) Moderate impairment: performs head turns with moderate change in gait   velocity, slows down, staggers but recovers, can continue to walk   0) Severe impairment: performs task with severe disruption of gait, i.e.   Staggers outside of 15" path, loses balance , stops, reaches for wall  4. Gait with vertical head turns: 2   3) Normal: performs head turns smoothly with no change in gait   2) Mild impairment: performs head turns smoothly with slight velocity, I.e.   Minor disruption to smooth gait path or uses AD   1) Moderate impairment: performs head turns with moderate change in gait   velocity, slows down, staggers but recovers, can continue to walk   0) Severe impairment: performs task with severe disruption of gait, i.e.   Staggers outside of 15" path, loses " "balance , stops, reaches for wall  5. Gait and pivot turn: 2   3)  Normal: pivot turns safely within 3 sec and stops quickly with no LOB   2)  Mild impairment: pivot turns safely in > 3 sec and stops with no LOB   1) Moderate impairment: turns slowly, requires verbal cueing, requires   several small steps to catch balance following turn and stop   0)  Severe impairment: cannot turn safely, requires assistance to stop and   Turn  6. Step over obstacle:  2   3) normal: is able to step over box without changing speed, no LOB   2)  Mild impairment: is able to step over box, but must slow down and adjust   steps to clear box safely   1)  Moderate impairment: is able to step over box but must stop, then step   over. May require verbal cueing   0) Severe impairment: cannot perform without assistance  7. Step around obstacle: 2   3) Normal: able to walk around cones safely without changing gait speed;   no LOB   2)  Mild impairment: able to step around cones, but must slow down to adjust   steps to clear cones   1) moderate impairment: able to clear cones but must significantly slow   speed to accomplish or requires verbal cueing   0)  Severe impairment: unable to clear cones, walks into 1 or both cones, or   requires physical assistance  8. Steps: 2   3) Normal: alternating feet, no rail   2) Mild impairment: alternating feet, must use rail   1) Moderate impairment: 2 feet a stair, must use rail   0)  Severe impairment: cannot perform safely  Total: 15/24    KIZZY SENSORY TESTING:  (P= Pass, F= Fail; note any sway; hold each position for 30")  Condition 1: (firm surface/feet together/eyes open) P  Condition 2: (firm surface/feet together/eyes closed) F; 19 seconds  Condition 3: (firm surface/feet in tandem/eyes open) F; 9 seconds  Condition 4: (firm surface/feet in tandem/eyes closed) F; 2 seconds  Condition 5: (soft surface/feet together/eyes open) P  Condition 6: (soft surface/feet together/eyes closed) F; 6 " seconds  Condition 7: (Fakuda step test), measure distance varied from center starting position NT      Mary participated in gait training to improve functional mobility and safety for 0 minutes, including:         Home Exercises Provided and Patient Education Provided     Education provided:   - increased joint tension due to swelling, use of ice to decrease swelling    Written Home Exercises Provided: yes.  Exercises were reviewed and Mary was able to demonstrate them prior to the end of the session.  Mary demonstrated good  understanding of the education provided.     See EMR under Patient Instructions for exercises provided 6/28/2019.    Assessment     Assessment period 10/15/2019-11/12/2019: Mary tolerated reassessment fairly. She continues to struggle with R knee pain. PT and pt discuss this in detail how this pain is affecting her progress in PT. Pt is also scheduled for heart surgery on 11/20/19. PT and pt discuss prioritizing her healthcare at this time and that PT may not be in the forefront at this time as she is no longer showing significant progress. She continues to be at risk for fall due to decreased coordination, balance, B drop foot, and impaired strength. PT to reassess pt on 11/26 following surgery and orthopedic apt and make further decision on skilled PT services are currently warranted or not. PT extending POC through 11/30/2019 at this time.      Mary is progressing well towards her goals.   Pt prognosis is Good.      Pt will continue to benefit from skilled outpatient physical therapy to address the deficits listed in the problem list box on initial evaluation, provide pt/family education and to maximize pt's level of independence in the home and community environment.      Pt's spiritual, cultural and educational needs considered and pt agreeable to plan of care and goals.     Anticipated barriers to physical therapy: none     Goals:   Short Term GOALS: (5 weeks)  1. Pt to  increase 30 second sit to stand score to 9 without use of UE. MET (7/23/2019)  2. Pt to increase Davidson Balance Score to 43 in order to decrease her risk for fall. MET (8/23/2019)  3. Pt to increase TUG score with use of AD to </= 20 seconds.MET (7/23/2019)  4. Pt will increase [place] condition 2 to 20 seconds with SBA. MET (8/23/2019)  Long Term GOALS: (10 weeks)  1. Pt will increase 30 second sit to stand score to 11 without use of UEs. MET (7/23/2019)  2. Pt will increase DGI score to 16 in order to decrease her risk for fall. MET (10/15/2019) previously, inconsistent  2a. New goal 10/15/2019: Pt will increase DGI score to at least 19 in order to improve overall mobility and decrease risk for fall.  3. Pt will pass [place] condition 2 with minimal sway. MET preiously, today F at 19 seconds  4. Pt will increase self selected walking speed to .7 in order to better be able to participate in pilgrimage. MET (7/23/2019)  4a (new goal 7/23/2019) Pt will increase self selected walking speed without AD to .9 in order to better be able to participate in pilgrimage. MET (10/15/2019) previously, inconsistent  5. Pt will increase TUG score with use of AD to </= 15 seconds in order to better participate in pilgrimage. MET (7/23/2019)  6. Pt will increase Davidson Balance score to 51 in order to decrease her risk for fall. MET (9/17/2019) previously, inconsistent  7. New goal 10/15/2019: Pt will pass condition 3 in [place] sensory testing in order to improve her overall balance. ongoing  8. New goal 10/15/2019: Pt will improve [place] sensory testing condition 6 to at least 20 seconds in order to improve balance. ongoing     Plan      PT to extend POC through 11/30/2019. PT to reassess following surgery and visit with orthopedic doctor to determine if pt remains appropriate for skilled PT Services. She continues to struggle with balance, endurance, and ambulation.     Efrain David, PT  11/12/2019

## 2019-11-14 ENCOUNTER — CLINICAL SUPPORT (OUTPATIENT)
Dept: REHABILITATION | Facility: HOSPITAL | Age: 79
End: 2019-11-14
Payer: MEDICARE

## 2019-11-14 DIAGNOSIS — Z74.09 IMPAIRED FUNCTIONAL MOBILITY, BALANCE, GAIT, AND ENDURANCE: ICD-10-CM

## 2019-11-14 DIAGNOSIS — R29.898 BILATERAL ARM WEAKNESS: ICD-10-CM

## 2019-11-14 PROCEDURE — 97112 NEUROMUSCULAR REEDUCATION: CPT | Mod: KX,PO

## 2019-11-14 PROCEDURE — 97110 THERAPEUTIC EXERCISES: CPT | Mod: KX,PO

## 2019-11-14 NOTE — PROGRESS NOTES
"  Physical Therapy Daily Treatment Note     Name: Mary Prince  Clinic Number: [id number]    Therapy Diagnosis:   Encounter Diagnoses   Name Primary?    Impaired functional mobility, balance, gait, and endurance     Bilateral arm weakness      Physician: Poncho Saunders MD    Visit Date: 11/14/2019    Medical Diagnosis: B arm weakness  Evaluation Date: 5-15-19  Authorization Period Expiration: 9/19/2020  NEW Plan of Care Certification Period: 9/17/2019-11/17/2019  POC expiration: 11/30/2019  Visit #/Visits authorized: 5/6 new referral  -- KX modifier (33 total visits 2019)     Time In: 1030  Time Out: 1115  Total Billable Time: 45 minutes    Precautions: Standard and Fall    Subjective     Pt reports: "I'm discouraged since the doctor wants me to start using a walker again because of my knee pain, but I don't want to use a walker."     She was compliant with home exercise program.  Response to previous treatment: little soreness  Functional change: her balance is improved    Pain: 3-4/10  Location: R knee    Objective   Mary participated in therapeutic exercises to improve functional mobility and safety for 10 minutes, including:  X 10 min on recumbent Nu Step stepper, B UE/B LE  level 5.0    Mary participated in neuromuscular re-education activities to improve: Balance, Coordination, Kinesthetic, Proprioception and Posture for 35 minutes. The following activities were included:    Parallel bars:  Balance on Airex foam pad, eyes open & no UE support 1 x 30" SBA  Balance on Airex foam pad, eyes closed & no UE support 2 x 30" CGA  Tandem balance B LE 1 x 30" intermittent UE support,  SBA  Tandem ambulation forward & backward 2 laps, single UE support, SBA  Balance on 4-point foam fitter with soccer ball reaches @ chest height, 4 x 10  B LE Step-ups on 4-point foam fitter with 3-second hip flexion hold @ top, single UE support, SBA  Single leg stance, toe tap on 3 low cones, intermittent UE support, SBA x 10 trials " "on each leg  Lateral steps over low cones, 3 laps, B UE support, SBA, VCs for hip flexion over cones      Mary participated in gait training to improve functional mobility and safety for 0 minutes, including:       Home Exercises Provided and Patient Education Provided     Education provided:   - increased joint tension due to swelling, use of ice to decrease swelling    Written Home Exercises Provided: yes.  Exercises were reviewed and Mary was able to demonstrate them prior to the end of the session.  Mary demonstrated good  understanding of the education provided.     See EMR under Patient Instructions for exercises provided 6/28/2019.    Assessment     Patient reports with knee pain of 3-4 but experienced no increase of knee pain with activity during session today. During balance activities with eyes closed, patient again expressed nervousness about being "in the dark" but maintained balance with intermittent UE support and CGA. Mary tolerated session well and expressed some fatigue with activity but did not display any increased loss of balance or adverse affects after activities. Continue with plan of care.       Mary is progressing well towards her goals.   Pt prognosis is Good.      Pt will continue to benefit from skilled outpatient physical therapy to address the deficits listed in the problem list box on initial evaluation, provide pt/family education and to maximize pt's level of independence in the home and community environment.      Pt's spiritual, cultural and educational needs considered and pt agreeable to plan of care and goals.     Anticipated barriers to physical therapy: none     Goals:   Short Term GOALS: (5 weeks)  1. Pt to increase 30 second sit to stand score to 9 without use of UE. MET (7/23/2019)  2. Pt to increase Davidson Balance Score to 43 in order to decrease her risk for fall. MET (8/23/2019)  3. Pt to increase TUG score with use of AD to </= 20 seconds.MET (7/23/2019)  4. Pt " will increase [place] condition 2 to 20 seconds with SBA. MET (8/23/2019)  Long Term GOALS: (10 weeks)  1. Pt will increase 30 second sit to stand score to 11 without use of UEs. MET (7/23/2019)  2. Pt will increase DGI score to 16 in order to decrease her risk for fall. MET (10/15/2019) previously, inconsistent  2a. New goal 10/15/2019: Pt will increase DGI score to at least 19 in order to improve overall mobility and decrease risk for fall.  3. Pt will pass [place] condition 2 with minimal sway. MET preiously, today F at 19 seconds  4. Pt will increase self selected walking speed to .7 in order to better be able to participate in pilgrimage. MET (7/23/2019)  4a (new goal 7/23/2019) Pt will increase self selected walking speed without AD to .9 in order to better be able to participate in pilgrimage. MET (10/15/2019) previously, inconsistent  5. Pt will increase TUG score with use of AD to </= 15 seconds in order to better participate in pilgrimage. MET (7/23/2019)  6. Pt will increase Davidson Balance score to 51 in order to decrease her risk for fall. MET (9/17/2019) previously, inconsistent  7. New goal 10/15/2019: Pt will pass condition 3 in Nathanael sensory testing in order to improve her overall balance. ongoing  8. New goal 10/15/2019: Pt will improve [place] sensory testing condition 6 to at least 20 seconds in order to improve balance. ongoing  Plan      PT to reassess following surgery and visit with orthopedic doctor to determine if pt remains appropriate for skilled PT Services. She continues to struggle with balance, endurance, and ambulation.       Juliet HUSSEIN, participating in treatment session, Supervised by PTA    I certify that I was present in the room directing the student in service delivery and guiding them using my skilled judgment.  As the co-signing therapist I have reviewed the students documentation and am responsible for the treatment, assessment, and plan.        Kate Taveras,  PTA  11/14/2019

## 2019-11-18 NOTE — PROGRESS NOTES
"  Physical Therapy Daily Treatment Note     Name: Mary Prince  Clinic Number: [id number]    Therapy Diagnosis:   Encounter Diagnoses   Name Primary?    Impaired functional mobility, balance, gait, and endurance Yes    Bilateral arm weakness      Physician: Poncho Saunders MD    Visit Date: 11/19/2019    Medical Diagnosis: B arm weakness  Evaluation Date: 5-15-19  Authorization Period Expiration: 9/19/2020  NEW Plan of Care Certification Period: 9/17/2019-11/17/2019  POC expiration: 11/30/2019  Visit #/Visits authorized: 5/6 new referral  -- KX modifier (33 total visits 2019)     Time In: 1030  Time Out: 1115  Total Billable Time: 45 minutes    Precautions: Standard and Fall    Subjective     Pt reports: The doctor wants her to use a walker and she does not want to use one. She greatly expresses her frustration about her R knee. Pt and PT discuss how her knee is limiting her ability to fully participate in PT as well as decreasing the previous gains in balance for PT.     She was compliant with home exercise program.  Response to previous treatment: little soreness  Functional change: her balance is improved    Pain: 3/10  Location: R knee    Objective   Mary participated in therapeutic exercises to improve functional mobility and safety for 25 minutes, including:  X 10 min on recumbent Nu Step stepper, B UE/B LE  level 5.0    x10 heel slides R only with pt supine on mat for improved R knee ROM  PT passively stretches quadriceps (R) off of mat, and hamstring 3 x 30 seconds ea    Mary participated in neuromuscular re-education activities to improve: Balance, Coordination, Kinesthetic, Proprioception and Posture for 20 minutes. The following activities were included:    Parallel bars:  Balance on Airex foam pad, eyes closed & no UE support 2 x 30" min-modA pt strongly leans posteriorly and L today    Tandem balance B LE 1 x 30" intermittent UE support,  SBA  Balance on 4-point foam fitter with soccer ball " reaches @ chest height, 2 x 10    Mary participated in gait training to improve functional mobility and safety for 0 minutes, including:       Home Exercises Provided and Patient Education Provided     Education provided:   - increased joint tension due to swelling, use of ice to decrease swelling    Written Home Exercises Provided: yes.  Exercises were reviewed and Mary was able to demonstrate them prior to the end of the session.  Mary demonstrated good  understanding of the education provided.     See EMR under Patient Instructions for exercises provided 6/28/2019.    Assessment     Mary tolerates PT session fairly today. She is greatly distracted by the pain in her knee today as well as upcoming cardiac surgery. PT and pt discuss following MD orders for use of RW, and pt is opposed due to she feels as though this will not fix the issue simply decrease her pain. PT attempts to decrease some of pt's R knee pain with ROM and stretching exercises today as when attempting balance activities pt struggles greatly and required min-modA for balance. Pt inadvertently does not want to put weight onto R LE. Pt is scheduled for cardiac procedure tomorrow which may further complicate her case. PT to reassess upon return for appropriateness of PT services at next apt.       Mary is progressing well towards her goals.   Pt prognosis is Good.      Pt will continue to benefit from skilled outpatient physical therapy to address the deficits listed in the problem list box on initial evaluation, provide pt/family education and to maximize pt's level of independence in the home and community environment.      Pt's spiritual, cultural and educational needs considered and pt agreeable to plan of care and goals.     Anticipated barriers to physical therapy: none     Goals:   Short Term GOALS: (5 weeks)  1. Pt to increase 30 second sit to stand score to 9 without use of UE. MET (7/23/2019)  2. Pt to increase Davidson Balance Score  to 43 in order to decrease her risk for fall. MET (8/23/2019)  3. Pt to increase TUG score with use of AD to </= 20 seconds.MET (7/23/2019)  4. Pt will increase [place] condition 2 to 20 seconds with SBA. MET (8/23/2019)  Long Term GOALS: (10 weeks)  1. Pt will increase 30 second sit to stand score to 11 without use of UEs. MET (7/23/2019)  2. Pt will increase DGI score to 16 in order to decrease her risk for fall. MET (10/15/2019) previously, inconsistent  2a. New goal 10/15/2019: Pt will increase DGI score to at least 19 in order to improve overall mobility and decrease risk for fall.  3. Pt will pass [place] condition 2 with minimal sway. MET preiously, today F at 19 seconds  4. Pt will increase self selected walking speed to .7 in order to better be able to participate in pilgrimage. MET (7/23/2019)  4a (new goal 7/23/2019) Pt will increase self selected walking speed without AD to .9 in order to better be able to participate in pilgrimage. MET (10/15/2019) previously, inconsistent  5. Pt will increase TUG score with use of AD to </= 15 seconds in order to better participate in pilgrimage. MET (7/23/2019)  6. Pt will increase [place] Balance score to 51 in order to decrease her risk for fall. MET (9/17/2019) previously, inconsistent  7. New goal 10/15/2019: Pt will pass condition 3 in [place]ath[place] sensory testing in order to improve her overall balance. ongoing  8. New goal 10/15/2019: Pt will improve [place] sensory testing condition 6 to at least 20 seconds in order to improve balance. ongoing  Plan      PT to reassess following surgery and visit with orthopedic doctor to determine if pt remains appropriate for skilled PT Services. She continues to struggle with balance, endurance, and ambulation.    Efrain David, PT  11/19/2019

## 2019-11-19 ENCOUNTER — ANESTHESIA EVENT (OUTPATIENT)
Dept: MEDSURG UNIT | Facility: HOSPITAL | Age: 79
DRG: 266 | End: 2019-11-19
Payer: MEDICARE

## 2019-11-19 ENCOUNTER — CLINICAL SUPPORT (OUTPATIENT)
Dept: REHABILITATION | Facility: HOSPITAL | Age: 79
End: 2019-11-19
Payer: MEDICARE

## 2019-11-19 DIAGNOSIS — R29.898 BILATERAL ARM WEAKNESS: ICD-10-CM

## 2019-11-19 DIAGNOSIS — Z74.09 IMPAIRED FUNCTIONAL MOBILITY, BALANCE, GAIT, AND ENDURANCE: Primary | ICD-10-CM

## 2019-11-19 PROCEDURE — 97110 THERAPEUTIC EXERCISES: CPT | Mod: KX,PO

## 2019-11-19 PROCEDURE — 97112 NEUROMUSCULAR REEDUCATION: CPT | Mod: KX,PO

## 2019-11-19 NOTE — ANESTHESIA PREPROCEDURE EVALUATION
[facility]  Anesthesia Pre-Operative Evaluation         Patient Name: Mary Prince  YOB: 1940  MRN: [id number]    SUBJECTIVE:     Pre-operative evaluation for Procedure(s) (LRB):  REPLACEMENT, AORTIC VALVE, TRANSCATHETER (TAVR) (N/A)     11/19/2019    Mary Prince is a 79 y.o. female w/ a significant PMHx of CAD (s/p proximal to mid LAD PCI), HLD, HTN, DM2, tremor, and severe AS who now presents for the above procedure(s).    Tvar Work up:  Severe symptomatic AS (NYHA class II, CCS class III)  The patient has undergone the following TAVR work-up:               ECHO (Date10/28/19): VANDANA 0.64cm2, peak aortic jet velocity 4.02m/s, MG 37 mmHg, LVEF: 65%, mild AI              LHC (Date 08/13/2019): Normal coronaries s/p PCI.               STS: 3.1%               Frailty: 1/4 (Hand )              Iliacs are pending              LVOT: Pending              Incidental findings on CT: Pending               CT Surgery risk assessment: Moderate risk              Rhythm issues: Pending              PFTs: FEV1  predicted, DLCO  predicted.              Comorbidities: Severe neuropathy, Cane to ambulate, laminectomy     LDA: Normal Saline.      Prev airway: None documented.    Drips: Normal Saline.       Patient Active Problem List   Diagnosis    Nonrheumatic aortic valve stenosis    Coronary artery disease of native artery of native heart with stable angina pectoris    Hyperlipidemia LDL goal <70    Essential hypertension    Type 2 diabetes mellitus with diabetic polyneuropathy, with long-term current use of insulin    Acquired hypothyroidism    Epidural intraspinal abscess    Bilateral arm weakness    Impaired functional mobility, balance, gait, and endurance    Shoulder joint stiffness, bilateral    Bilateral shoulder pain    Essential tremor    Nodular calcific aortic valve stenosis       Review of patient's allergies indicates:  No Known Allergies    Current  Inpatient Medications:      No current facility-administered medications on file prior to encounter.      Current Outpatient Medications on File Prior to Encounter   Medication Sig Dispense Refill    ALPRAZolam (XANAX) 0.5 MG tablet Take 0.5 mg by mouth nightly as needed.       aspirin (ECOTRIN LOW STRENGTH) 81 MG EC tablet Take 81 mg by mouth once daily.       atorvastatin (LIPITOR) 80 MG tablet Take 80 mg by mouth every evening.       biotin 10,000 mcg Cap Take 1 capsule by mouth once daily.       clopidogrel (PLAVIX) 75 mg tablet Take 75 mg by mouth once daily.       cyanocobalamin, vitamin B-12, 2,500 mcg Chew Take by mouth once daily.      furosemide (LASIX) 20 MG tablet Take 20 mg by mouth daily as needed.      gabapentin (NEURONTIN) 600 MG tablet Take 3 tablets (1,800 mg total) by mouth 3 (three) times daily. 270 tablet 11    insulin aspart U-100 (NOVOLOG U-100 INSULIN ASPART) 100 unit/mL injection Novolog U-100 Insulin aspart 100 unit/mL subcutaneous solution      letrozole (FEMARA) 2.5 mg Tab Take 2.5 mg by mouth once daily .      levothyroxine (SYNTHROID) 112 MCG tablet Take 112 mcg by mouth before breakfast.       multivitamin capsule Take 1 capsule by mouth once daily.      potassium aminobenzoate 500 mg Cap Take by mouth once daily.      ramipril (ALTACE) 10 MG capsule Take 10 mg by mouth.       vitamin D 1000 units Tab Take 1,000 Units by mouth once daily.         Past Surgical History:   Procedure Laterality Date    APPENDECTOMY      BREAST SURGERY Bilateral     lumpectomy    EYE SURGERY Bilateral     cataract    HYSTERECTOMY      JOINT REPLACEMENT Right     hip    SPINE SURGERY      laminectomy       Social History     Socioeconomic History    Marital status:      Spouse name: Not on file    Number of children: Not on file    Years of education: Not on file    Highest education level: Not on file   Occupational History    Not on file   Social Needs    Financial  resource strain: Not on file    Food insecurity:     Worry: Not on file     Inability: Not on file    Transportation needs:     Medical: Not on file     Non-medical: Not on file   Tobacco Use    Smoking status: Never Smoker    Smokeless tobacco: Never Used   Substance and Sexual Activity    Alcohol use: Yes     Comment: social    Drug use: No    Sexual activity: Not on file   Lifestyle    Physical activity:     Days per week: Not on file     Minutes per session: Not on file    Stress: Not on file   Relationships    Social connections:     Talks on phone: Not on file     Gets together: Not on file     Attends Adventism service: Not on file     Active member of club or organization: Not on file     Attends meetings of clubs or organizations: Not on file     Relationship status: Not on file   Other Topics Concern    Not on file   Social History Narrative    Registered nurse now retired; 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    EKG:   Results for orders placed or performed during the hospital encounter of 11/01/19   EKG 12-lead    Collection Time: 11/01/19 12:47 PM    Narrative    Test Reason : I35.0,    Vent. Rate : 089 BPM     Atrial Rate : 089 BPM     P-R Int : 192 ms          QRS Dur : 078 ms      QT Int : 380 ms       P-R-T Axes : 059 003 066 degrees     QTc Int : 462 ms    Normal sinus rhythm  Normal ECG  No previous ECGs available  Confirmed by Liz Harden MD ([id number]) on 11/1/2019 1:44:35  PM    Referred By: RUSTY NORRIS           Confirmed By:Liz Harden MD       2D ECHO:  TTE:  Results for orders placed or performed during the hospital encounter of 10/28/19   Echo   Result Value Ref Range    Ascending aorta 2.95 cm    STJ 2.60 cm    AV mean gradient 37 mmHg    Ao peak césar 4.02 m/s    Ao VTI 91.00 cm    IVRT 0.05 msec    IVS 0.95 0.6 - 1.1 cm    LA size 4.00 cm    Left Atrium Major Axis 5.77 cm    Left Atrium Minor Axis 5.76 cm    LVIDD 4.41 3.5 - 6.0 cm    LVIDS 3.06 2.1 - 4.0 cm    LVOT diameter 1.90 cm    LVOT peak VTI 20.70 cm    PW 0.88 0.6 - 1.1 cm    MV Peak A César 1.24 m/s    E wave decelartion time 238.55 msec    MV Peak E César 1.25 m/s    PV Peak D César 0.38 m/s    PV Peak S César 0.41 m/s    RA Major Axis 4.91 cm    RA Width 3.06 cm    RVDD 3.41 cm    Sinus 3.14 cm    TAPSE 1.78 cm    TR Max César 2.97 m/s    TDI LATERAL 0.06 m/s    TDI SEPTAL 0.06 m/s    LA WIDTH 4.64 cm    LV Diastolic Volume 88.16 mL    LV Systolic Volume 36.59 mL    LVOT peak césar 0.85 m/s    LV LATERAL E/E' RATIO 20.83 m/s    LV SEPTAL E/E' RATIO 20.83 m/s    FS 31 %    LA volume 90.95 cm3    LV mass 131.39 g    Left Ventricle Relative Wall Thickness 0.40 cm    AV valve area 0.64 cm2    AV Velocity Ratio 0.21     AV index (prosthetic) 0.23     E/A ratio 1.01     Mean e' 0.06 m/s    Pulm vein S/D ratio 1.08     LVOT area 2.8 cm2    LVOT stroke volume 58.66 cm3    AV peak gradient 65 mmHg    E/E' ratio 20.83 m/s    Triscuspid Valve Regurgitation Peak Gradient 35 mmHg    BSA 1.86 m2    LV Systolic Volume Index 20.2 mL/m2    LV Diastolic Volume Index 48.73 mL/m2    LA Volume Index 50.3 mL/m2    LV Mass Index 73 g/m2    Right Atrial Pressure (from IVC) 3 mmHg    TV rest pulmonary artery pressure 38 mmHg    Narrative    · Normal left ventricular systolic function. The estimated ejection   fraction is 65%  · Severe left atrial enlargement.  · Indeterminate left ventricular diastolic function with increased LAP  · Normal right  ventricular systolic function.  · Mild mitral regurgitation.  · Mild tricuspid regurgitation.  · Moderate-to-severe aortic valve stenosis. Aortic valve area is 0.64 cm2;   peak velocity is 4.02 m/s; mean gradient is 37 mmHg. Mild aortic   regurgitation.  · Suggestion of Patent foramen ovale present.  · Normal central venous pressure (3 mm Hg).  · The estimated PA systolic pressure is 38 mm Hg          CHRISTAL:  No results found for this or any previous visit.    ASSESSMENT/PLAN:         Anesthesia Evaluation    I have reviewed the Patient Summary Reports.    I have reviewed the Nursing Notes.   I have reviewed the Medications.     Review of Systems  Anesthesia Hx:  History of prior surgery of interest to airway management or planning: Denies Family Hx of Anesthesia complications.   Denies Personal Hx of Anesthesia complications.   Hematology/Oncology:  Hematology Normal   Oncology Normal     EENT/Dental:EENT/Dental Normal   Cardiovascular:   Hypertension CAD   Angina    Pulmonary:  Pulmonary Normal    Renal/:  Renal/ Normal     Hepatic/GI:  Hepatic/GI Normal    Musculoskeletal:  Musculoskeletal Normal    Neurological:   Neuromuscular Disease,    Endocrine:   Diabetes, well controlled Hypothyroidism    Dermatological:  Skin Normal    Psych:  Psychiatric Normal           Physical Exam  General:  Well nourished    Airway/Jaw/Neck:  Airway Findings: Mouth Opening: Normal Tongue: Normal  General Airway Assessment: Adult  Mallampati: II  TM Distance: Normal, at least 6 cm  Jaw/Neck Findings:  Neck ROM: Normal ROM  Neck Findings: Normal    Eyes/Ears/Nose:  EYES/EARS/NOSE FINDINGS: Normal   Dental:  Dental Findings: In tact, Periodontal disease, Mild   Chest/Lungs:  Chest/Lungs Findings: Clear to auscultation, Normal Respiratory Rate     Heart/Vascular:  Heart Findings: Rate: Normal  Rhythm: Regular Rhythm  Sounds: Normal  Heart murmur: negative Vascular Findings: Normal    Abdomen:  Abdomen Findings: Normal     Musculoskeletal:  Musculoskeletal Findings: Normal   Skin:  Skin Findings: Normal    Mental Status:  Mental Status Findings:  Cooperative, Alert and Oriented         Anesthesia Plan  Type of Anesthesia, risks & benefits discussed:  Anesthesia Type:  general, MAC  Patient's Preference:   Intra-op Monitoring Plan: standard ASA monitors, central line and arterial line  Intra-op Monitoring Plan Comments:   Post Op Pain Control Plan: multimodal analgesia and per primary service following discharge from PACU  Post Op Pain Control Plan Comments:   Induction:   IV  Beta Blocker:  Patient is not currently on a Beta-Blocker (No further documentation required).       Informed Consent: Patient understands risks and agrees with Anesthesia plan.  Questions answered. Anesthesia consent signed with patient.  ASA Score: 4     Day of Surgery Review of History & Physical:  There are no significant changes.  H&P update referred to the provider.         Ready For Surgery From Anesthesia Perspective.

## 2019-11-20 ENCOUNTER — HOSPITAL ENCOUNTER (INPATIENT)
Facility: HOSPITAL | Age: 79
LOS: 1 days | Discharge: HOME | DRG: 266 | End: 2019-11-21
Attending: INTERNAL MEDICINE | Admitting: INTERNAL MEDICINE
Payer: MEDICARE

## 2019-11-20 ENCOUNTER — ANESTHESIA (OUTPATIENT)
Dept: MEDSURG UNIT | Facility: HOSPITAL | Age: 79
DRG: 266 | End: 2019-11-20
Payer: MEDICARE

## 2019-11-20 DIAGNOSIS — E03.9 ACQUIRED HYPOTHYROIDISM: ICD-10-CM

## 2019-11-20 DIAGNOSIS — N18.9 CHRONIC KIDNEY DISEASE, UNSPECIFIED CKD STAGE: ICD-10-CM

## 2019-11-20 DIAGNOSIS — E11.42 TYPE 2 DIABETES MELLITUS WITH DIABETIC POLYNEUROPATHY, WITH LONG-TERM CURRENT USE OF INSULIN: ICD-10-CM

## 2019-11-20 DIAGNOSIS — T14.90XA TRAUMA: Primary | ICD-10-CM

## 2019-11-20 DIAGNOSIS — Z95.2 S/P TAVR (TRANSCATHETER AORTIC VALVE REPLACEMENT): ICD-10-CM

## 2019-11-20 DIAGNOSIS — I35.0 NODULAR CALCIFIC AORTIC VALVE STENOSIS: Primary | ICD-10-CM

## 2019-11-20 DIAGNOSIS — I35.0 NODULAR CALCIFIC AORTIC VALVE STENOSIS: ICD-10-CM

## 2019-11-20 DIAGNOSIS — I25.118 CORONARY ARTERY DISEASE OF NATIVE ARTERY OF NATIVE HEART WITH STABLE ANGINA PECTORIS: ICD-10-CM

## 2019-11-20 DIAGNOSIS — Z79.4 TYPE 2 DIABETES MELLITUS WITH DIABETIC POLYNEUROPATHY, WITH LONG-TERM CURRENT USE OF INSULIN: ICD-10-CM

## 2019-11-20 DIAGNOSIS — Z96.41 INSULIN PUMP STATUS: ICD-10-CM

## 2019-11-20 PROBLEM — Z95.3 S/P TAVR (TRANSCATHETER AORTIC VALVE REPLACEMENT): Status: ACTIVE | Noted: 2019-11-20

## 2019-11-20 LAB
ABO + RH BLD: NORMAL
ALLENS TEST: ABNORMAL
ANION GAP SERPL CALC-SCNC: 10 MMOL/L (ref 8–16)
ANION GAP SERPL CALC-SCNC: 14 MMOL/L (ref 8–16)
APTT BLDCRRT: <21 SEC (ref 21–32)
BLD GP AB SCN CELLS X3 SERPL QL: NORMAL
BUN SERPL-MCNC: 34 MG/DL (ref 8–23)
BUN SERPL-MCNC: 42 MG/DL (ref 8–23)
CALCIUM SERPL-MCNC: 8.5 MG/DL (ref 8.7–10.5)
CALCIUM SERPL-MCNC: 9.7 MG/DL (ref 8.7–10.5)
CHLORIDE SERPL-SCNC: 100 MMOL/L (ref 95–110)
CHLORIDE SERPL-SCNC: 108 MMOL/L (ref 95–110)
CO2 SERPL-SCNC: 21 MMOL/L (ref 23–29)
CO2 SERPL-SCNC: 23 MMOL/L (ref 23–29)
CREAT SERPL-MCNC: 1.3 MG/DL (ref 0.5–1.4)
CREAT SERPL-MCNC: 2 MG/DL (ref 0.5–1.4)
ERYTHROCYTE [DISTWIDTH] IN BLOOD BY AUTOMATED COUNT: 13.5 % (ref 11.5–14.5)
EST. GFR  (AFRICAN AMERICAN): 26.8 ML/MIN/1.73 M^2
EST. GFR  (AFRICAN AMERICAN): 45.1 ML/MIN/1.73 M^2
EST. GFR  (NON AFRICAN AMERICAN): 23.2 ML/MIN/1.73 M^2
EST. GFR  (NON AFRICAN AMERICAN): 39.1 ML/MIN/1.73 M^2
GLUCOSE SERPL-MCNC: 199 MG/DL (ref 70–110)
GLUCOSE SERPL-MCNC: 83 MG/DL (ref 70–110)
HCO3 UR-SCNC: 27.8 MMOL/L (ref 24–28)
HCT VFR BLD AUTO: 38.6 % (ref 37–48.5)
HCT VFR BLD CALC: 32 %PCV (ref 36–54)
HGB BLD-MCNC: 13.1 G/DL (ref 12–16)
INR PPP: 1 (ref 0.8–1.2)
MCH RBC QN AUTO: 34.2 PG (ref 27–31)
MCHC RBC AUTO-ENTMCNC: 33.9 G/DL (ref 32–36)
MCV RBC AUTO: 101 FL (ref 82–98)
PCO2 BLDA: 52.5 MMHG (ref 35–45)
PH SMN: 7.33 [PH] (ref 7.35–7.45)
PLATELET # BLD AUTO: 118 K/UL (ref 150–350)
PMV BLD AUTO: 12.2 FL (ref 9.2–12.9)
PO2 BLDA: 230 MMHG (ref 80–100)
POC BE: 2 MMOL/L
POC IONIZED CALCIUM: 1.26 MMOL/L (ref 1.06–1.42)
POC SATURATED O2: 100 % (ref 95–100)
POC TCO2: 29 MMOL/L (ref 23–27)
POCT GLUCOSE: 109 MG/DL (ref 70–110)
POCT GLUCOSE: 137 MG/DL (ref 70–110)
POCT GLUCOSE: 184 MG/DL (ref 70–110)
POCT GLUCOSE: 78 MG/DL (ref 70–110)
POTASSIUM BLD-SCNC: 3.7 MMOL/L (ref 3.5–5.1)
POTASSIUM SERPL-SCNC: 3.8 MMOL/L (ref 3.5–5.1)
POTASSIUM SERPL-SCNC: 5.1 MMOL/L (ref 3.5–5.1)
PROTHROMBIN TIME: 10.5 SEC (ref 9–12.5)
RBC # BLD AUTO: 3.83 M/UL (ref 4–5.4)
SAMPLE: ABNORMAL
SITE: ABNORMAL
SODIUM BLD-SCNC: 137 MMOL/L (ref 136–145)
SODIUM SERPL-SCNC: 135 MMOL/L (ref 136–145)
SODIUM SERPL-SCNC: 141 MMOL/L (ref 136–145)
WBC # BLD AUTO: 9.38 K/UL (ref 3.9–12.7)

## 2019-11-20 PROCEDURE — 37000008 HC ANESTHESIA 1ST 15 MINUTES: Performed by: INTERNAL MEDICINE

## 2019-11-20 PROCEDURE — 85027 COMPLETE CBC AUTOMATED: CPT

## 2019-11-20 PROCEDURE — 93005 ELECTROCARDIOGRAM TRACING: CPT

## 2019-11-20 PROCEDURE — 33361 PR TAVR, PERCUTANEOUS FEMORAL: ICD-10-PCS | Mod: 62,Q0,, | Performed by: THORACIC SURGERY (CARDIOTHORACIC VASCULAR SURGERY)

## 2019-11-20 PROCEDURE — 27000239 HC STAND-BY BYPASS PUMP

## 2019-11-20 PROCEDURE — 93010 ELECTROCARDIOGRAM REPORT: CPT | Mod: 76,,, | Performed by: INTERNAL MEDICINE

## 2019-11-20 PROCEDURE — C1769 GUIDE WIRE: HCPCS | Performed by: INTERNAL MEDICINE

## 2019-11-20 PROCEDURE — 33210 PR INSER HEART TEMP PACER ONE CHMBR: ICD-10-PCS | Mod: Q0,,, | Performed by: ANESTHESIOLOGY

## 2019-11-20 PROCEDURE — 76937 PR  US GUIDE, VASCULAR ACCESS: ICD-10-PCS | Mod: 26,Q0,, | Performed by: ANESTHESIOLOGY

## 2019-11-20 PROCEDURE — C1760 CLOSURE DEV, VASC: HCPCS | Performed by: INTERNAL MEDICINE

## 2019-11-20 PROCEDURE — A4216 STERILE WATER/SALINE, 10 ML: HCPCS | Performed by: STUDENT IN AN ORGANIZED HEALTH CARE EDUCATION/TRAINING PROGRAM

## 2019-11-20 PROCEDURE — 82962 GLUCOSE BLOOD TEST: CPT

## 2019-11-20 PROCEDURE — 27201423 OPTIME MED/SURG SUP & DEVICES STERILE SUPPLY: Performed by: INTERNAL MEDICINE

## 2019-11-20 PROCEDURE — 36620 PR INSERT CATH,ART,PERCUT,SHORTTERM: ICD-10-PCS | Mod: 59,Q0,, | Performed by: ANESTHESIOLOGY

## 2019-11-20 PROCEDURE — 85610 PROTHROMBIN TIME: CPT

## 2019-11-20 PROCEDURE — 85730 THROMBOPLASTIN TIME PARTIAL: CPT

## 2019-11-20 PROCEDURE — 27800903 OPTIME MED/SURG SUP & DEVICES OTHER IMPLANTS: Performed by: INTERNAL MEDICINE

## 2019-11-20 PROCEDURE — C1726 CATH, BAL DIL, NON-VASCULAR: HCPCS | Performed by: INTERNAL MEDICINE

## 2019-11-20 PROCEDURE — 76937 US GUIDE VASCULAR ACCESS: CPT | Mod: 26,Q0,, | Performed by: ANESTHESIOLOGY

## 2019-11-20 PROCEDURE — 80048 BASIC METABOLIC PNL TOTAL CA: CPT | Mod: 91

## 2019-11-20 PROCEDURE — 33361 PR TAVR, PERCUTANEOUS FEMORAL: ICD-10-PCS | Mod: 62,Q0,, | Performed by: INTERNAL MEDICINE

## 2019-11-20 PROCEDURE — 63600175 PHARM REV CODE 636 W HCPCS: Performed by: STUDENT IN AN ORGANIZED HEALTH CARE EDUCATION/TRAINING PROGRAM

## 2019-11-20 PROCEDURE — 33210 INSERT ELECTRD/PM CATH SNGL: CPT | Mod: Q0,,, | Performed by: ANESTHESIOLOGY

## 2019-11-20 PROCEDURE — 25000003 PHARM REV CODE 250: Performed by: STUDENT IN AN ORGANIZED HEALTH CARE EDUCATION/TRAINING PROGRAM

## 2019-11-20 PROCEDURE — 93010 ELECTROCARDIOGRAM REPORT: CPT | Mod: ,,, | Performed by: INTERNAL MEDICINE

## 2019-11-20 PROCEDURE — C1894 INTRO/SHEATH, NON-LASER: HCPCS | Performed by: INTERNAL MEDICINE

## 2019-11-20 PROCEDURE — 36620 INSERTION CATHETER ARTERY: CPT | Mod: 59,Q0,, | Performed by: ANESTHESIOLOGY

## 2019-11-20 PROCEDURE — 99223 1ST HOSP IP/OBS HIGH 75: CPT | Mod: ,,, | Performed by: INTERNAL MEDICINE

## 2019-11-20 PROCEDURE — 93010 EKG 12-LEAD: ICD-10-PCS | Mod: 76,,, | Performed by: INTERNAL MEDICINE

## 2019-11-20 PROCEDURE — D9220A PRA ANESTHESIA: ICD-10-PCS | Mod: Q0,,, | Performed by: ANESTHESIOLOGY

## 2019-11-20 PROCEDURE — 94761 N-INVAS EAR/PLS OXIMETRY MLT: CPT

## 2019-11-20 PROCEDURE — 37000009 HC ANESTHESIA EA ADD 15 MINS: Performed by: INTERNAL MEDICINE

## 2019-11-20 PROCEDURE — 80048 BASIC METABOLIC PNL TOTAL CA: CPT

## 2019-11-20 PROCEDURE — 33361 REPLACE AORTIC VALVE PERQ: CPT | Mod: 62,Q0,, | Performed by: INTERNAL MEDICINE

## 2019-11-20 PROCEDURE — 86901 BLOOD TYPING SEROLOGIC RH(D): CPT

## 2019-11-20 PROCEDURE — 20000000 HC ICU ROOM

## 2019-11-20 PROCEDURE — 25000003 PHARM REV CODE 250: Performed by: INTERNAL MEDICINE

## 2019-11-20 PROCEDURE — 27000191 HC C-V MONITORING

## 2019-11-20 PROCEDURE — D9220A PRA ANESTHESIA: Mod: Q0,,, | Performed by: ANESTHESIOLOGY

## 2019-11-20 PROCEDURE — 99223 PR INITIAL HOSPITAL CARE,LEVL III: ICD-10-PCS | Mod: ,,, | Performed by: INTERNAL MEDICINE

## 2019-11-20 PROCEDURE — 33361 REPLACE AORTIC VALVE PERQ: CPT | Mod: Q0 | Performed by: INTERNAL MEDICINE

## 2019-11-20 PROCEDURE — 33361 REPLACE AORTIC VALVE PERQ: CPT | Mod: 62,Q0,, | Performed by: THORACIC SURGERY (CARDIOTHORACIC VASCULAR SURGERY)

## 2019-11-20 DEVICE — VALVE COREVALVE TAV 26MM: Type: IMPLANTABLE DEVICE | Site: HEART | Status: FUNCTIONAL

## 2019-11-20 RX ORDER — SODIUM,POTASSIUM PHOSPHATES 280-250MG
2 POWDER IN PACKET (EA) ORAL
Status: DISCONTINUED | OUTPATIENT
Start: 2019-11-20 | End: 2019-11-21 | Stop reason: HOSPADM

## 2019-11-20 RX ORDER — LIDOCAINE HYDROCHLORIDE 10 MG/ML
1 INJECTION, SOLUTION EPIDURAL; INFILTRATION; INTRACAUDAL; PERINEURAL ONCE
Status: ACTIVE | OUTPATIENT
Start: 2019-11-20

## 2019-11-20 RX ORDER — SODIUM CHLORIDE 9 MG/ML
INJECTION, SOLUTION INTRAVENOUS CONTINUOUS PRN
Status: DISCONTINUED | OUTPATIENT
Start: 2019-11-20 | End: 2019-11-20

## 2019-11-20 RX ORDER — ATORVASTATIN CALCIUM 20 MG/1
80 TABLET, FILM COATED ORAL NIGHTLY
Status: DISCONTINUED | OUTPATIENT
Start: 2019-11-21 | End: 2019-11-21 | Stop reason: HOSPADM

## 2019-11-20 RX ORDER — GABAPENTIN 300 MG/1
300 CAPSULE ORAL 3 TIMES DAILY
Status: DISCONTINUED | OUTPATIENT
Start: 2019-11-20 | End: 2019-11-21

## 2019-11-20 RX ORDER — CLOPIDOGREL BISULFATE 75 MG/1
75 TABLET ORAL DAILY
Status: DISCONTINUED | OUTPATIENT
Start: 2019-11-21 | End: 2019-11-21 | Stop reason: HOSPADM

## 2019-11-20 RX ORDER — LANOLIN ALCOHOL/MO/W.PET/CERES
800 CREAM (GRAM) TOPICAL
Status: DISCONTINUED | OUTPATIENT
Start: 2019-11-20 | End: 2019-11-21 | Stop reason: HOSPADM

## 2019-11-20 RX ORDER — LEVOTHYROXINE SODIUM 112 UG/1
112 TABLET ORAL
Status: DISCONTINUED | OUTPATIENT
Start: 2019-11-21 | End: 2019-11-21 | Stop reason: HOSPADM

## 2019-11-20 RX ORDER — NOREPINEPHRINE BITARTRATE/D5W 4MG/250ML
0.02 PLASTIC BAG, INJECTION (ML) INTRAVENOUS CONTINUOUS
Status: DISCONTINUED | OUTPATIENT
Start: 2019-11-20 | End: 2019-11-21 | Stop reason: HOSPADM

## 2019-11-20 RX ORDER — DIPHENHYDRAMINE HCL 50 MG
50 CAPSULE ORAL ONCE
Status: COMPLETED | OUTPATIENT
Start: 2019-11-20 | End: 2019-11-20

## 2019-11-20 RX ORDER — PROTAMINE SULFATE 10 MG/ML
INJECTION, SOLUTION INTRAVENOUS
Status: DISCONTINUED | OUTPATIENT
Start: 2019-11-20 | End: 2019-11-20

## 2019-11-20 RX ORDER — SODIUM CHLORIDE 9 MG/ML
INJECTION, SOLUTION INTRAVENOUS CONTINUOUS
Status: ACTIVE | OUTPATIENT
Start: 2019-11-20 | End: 2019-11-20

## 2019-11-20 RX ORDER — ONDANSETRON 2 MG/ML
4 INJECTION INTRAMUSCULAR; INTRAVENOUS EVERY 12 HOURS PRN
Status: DISCONTINUED | OUTPATIENT
Start: 2019-11-20 | End: 2019-11-21 | Stop reason: HOSPADM

## 2019-11-20 RX ORDER — LABETALOL HCL 20 MG/4 ML
10 SYRINGE (ML) INTRAVENOUS EVERY 4 HOURS PRN
Status: DISCONTINUED | OUTPATIENT
Start: 2019-11-20 | End: 2019-11-21 | Stop reason: HOSPADM

## 2019-11-20 RX ORDER — HEPARIN SODIUM 1000 [USP'U]/ML
INJECTION, SOLUTION INTRAVENOUS; SUBCUTANEOUS
Status: DISCONTINUED | OUTPATIENT
Start: 2019-11-20 | End: 2019-11-20

## 2019-11-20 RX ORDER — DEXTROSE MONOHYDRATE AND SODIUM CHLORIDE 5; .45 G/100ML; G/100ML
INJECTION, SOLUTION INTRAVENOUS CONTINUOUS
Status: DISCONTINUED | OUTPATIENT
Start: 2019-11-20 | End: 2019-11-21 | Stop reason: HOSPADM

## 2019-11-20 RX ORDER — CEFAZOLIN SODIUM 1 G/3ML
INJECTION, POWDER, FOR SOLUTION INTRAMUSCULAR; INTRAVENOUS
Status: DISCONTINUED | OUTPATIENT
Start: 2019-11-20 | End: 2019-11-20

## 2019-11-20 RX ORDER — NAPROXEN SODIUM 220 MG/1
81 TABLET, FILM COATED ORAL DAILY
Status: DISCONTINUED | OUTPATIENT
Start: 2019-11-21 | End: 2019-11-21 | Stop reason: HOSPADM

## 2019-11-20 RX ORDER — POTASSIUM CHLORIDE 20 MEQ/15ML
40 SOLUTION ORAL
Status: DISCONTINUED | OUTPATIENT
Start: 2019-11-20 | End: 2019-11-21 | Stop reason: HOSPADM

## 2019-11-20 RX ORDER — ACETAMINOPHEN 325 MG/1
650 TABLET ORAL EVERY 4 HOURS PRN
Status: DISCONTINUED | OUTPATIENT
Start: 2019-11-20 | End: 2019-11-21 | Stop reason: HOSPADM

## 2019-11-20 RX ADMIN — SODIUM CHLORIDE: 0.9 INJECTION, SOLUTION INTRAVENOUS at 10:11

## 2019-11-20 RX ADMIN — CEFAZOLIN 2 G: 330 INJECTION, POWDER, FOR SOLUTION INTRAMUSCULAR; INTRAVENOUS at 10:11

## 2019-11-20 RX ADMIN — SODIUM CHLORIDE: 0.9 INJECTION, SOLUTION INTRAVENOUS at 12:11

## 2019-11-20 RX ADMIN — GABAPENTIN 300 MG: 300 CAPSULE ORAL at 08:11

## 2019-11-20 RX ADMIN — PROTAMINE SULFATE 50 MG: 10 INJECTION, SOLUTION INTRAVENOUS at 11:11

## 2019-11-20 RX ADMIN — DIPHENHYDRAMINE HYDROCHLORIDE 50 MG: 50 CAPSULE ORAL at 09:11

## 2019-11-20 RX ADMIN — GABAPENTIN 300 MG: 300 CAPSULE ORAL at 04:11

## 2019-11-20 RX ADMIN — LABETALOL HCL IV SOLN PREFILLED SYRINGE 20 MG/4ML (5 MG/ML) 10 MG: 20/4 SOLUTION PREFILLED SYRINGE at 10:11

## 2019-11-20 RX ADMIN — HEPARIN SODIUM 10000 UNITS: 1000 INJECTION INTRAVENOUS; SUBCUTANEOUS at 11:11

## 2019-11-20 RX ADMIN — DEXMEDETOMIDINE HYDROCHLORIDE 1 MCG/KG/HR: 100 INJECTION, SOLUTION, CONCENTRATE INTRAVENOUS at 10:11

## 2019-11-20 NOTE — ANESTHESIA PROCEDURE NOTES
Arterial    Diagnosis: AS    Patient location during procedure: done in OR  Procedure start time: 11/20/2019 10:10 AM  Timeout: 11/20/2019 10:10 AM  Procedure end time: 11/20/2019 10:15 AM    Staffing  Authorizing Provider: Nelson Del Castillo MD  Performing Provider: Azeem Randhawa MD    Anesthesiologist was present at the time of the procedure.    Preanesthetic Checklist  Completed: patient identified, site marked, surgical consent, pre-op evaluation, timeout performed, IV checked, risks and benefits discussed, monitors and equipment checked and anesthesia consent givenArterial  Skin Prep: chlorhexidine gluconate  Local Infiltration: lidocaine  Orientation: right  Location: radial  Catheter Size: 18 G  Catheter placement by Ultrasound guidance. Heme positive aspiration all ports.  Vessel Caliber: small, patent, compressibility normal  Vascular Doppler:  not done  Needle advanced into vessel with real time Ultrasound guidance.  Guidewire confirmed in vessel.  Sterile sheath used.  Image recorded and saved.Insertion Attempts: 1  Assessment  Dressing: secured with tape and tegaderm  Patient: Tolerated well

## 2019-11-20 NOTE — PROGRESS NOTES
Pt admitted to SICU [id number] from cath lab, attached to bedside monitor and oxygen. VSS at this time. Team notified of patient arrival. Full assessment completed of pt at this time. Abdomen soft/nontender. Insulin pump noted to left upper abdomen. Sugar on arrival 137. No other significant events this admit. Orders received and carried out. Will continue to monitor.    Skin Note: no breakdown noted to heels or sacrum. Mild bruising noted to back and arms from fall.

## 2019-11-20 NOTE — OP NOTE
[facility]  Cardiothoracic Surgery Operative Report    Patient Name:  Mary Prince; [id number]    DATE OF PROCEDURE: 11/20/2019   ATTENDING SURGEONS: Balaji Shah M.D., Jaylon Doty M.D., and Balaji Vaughn M.D.  PREOPERATIVE DIAGNOSIS:  Severe aortic stenosis.  POSTOPERATIVE DIAGNOSIS:  Severe aortic stenosis  ?  OPERATION PERFORMED: Transcatheter aortic valve insertion via transfemoral approach using a 26mm Medtronic Evolut bioprosthesis  ANESTHESIA: General.  ESTIMATED BLOOD LOSS: 10 mL.  BRIEF HISTORY: This patient is a 79 year old female with symptomatic severe aortic stenosis.  The patient has had progressive dyspnea on exertion. This prompted a thoughtful and thorough evaluation, which demonstrated severe aortic stenosis. Given the comorbid conditions, a transcatheter valve insertion was recommended. The patient now presents for transcatheter aortic valve insertion.  PROCEDURE: After obtaining informed and written consent, the patient was brought to the cath lab and placed on the cath table in supine position. After induction of adequate anesthesia, a transvenous pacing wire was placed.  Bilateral femoral arterial and femoral venous access was obtained. A wire was advanced across the aortic valve. It was exchanged for stiff wire, and an aortic balloon was placed. Under rapid ventricular pacing, balloon valvuloplasty was performed. The balloon was then withdrawn, and a valve was advanced to the level of the aortic annulus. Once the team was satisfied that the valve was in proper position, it was deployed. Excellent positioning was obtained. Post-procedure echo demonstrated no aortic insufficiency. The femoral access sites were controlled using percutaneous techniques. The patient tolerated the procedure well, there were no complications. At the conclusion of the case, sponge and instrument counts were correct.

## 2019-11-20 NOTE — ANESTHESIA PROCEDURE NOTES
[facility]    Diagnosis: AS  Patient location during procedure: done in OR  Procedure start time: 11/20/2019 10:30 AM  Timeout: 11/20/2019 10:30 AM  Procedure end time: 11/20/2019 10:45 AM    Staffing  Authorizing Provider: Nelson Del Castillo MD  Performing Provider: Azeem Randhawa MD    Anesthesiologist was present at the time of the procedure.  Preanesthetic Checklist  Completed: patient identified, site marked, surgical consent, pre-op evaluation, timeout performed, IV checked, risks and benefits discussed, monitors and equipment checked and anesthesia consent given  [facility] [place] [facility]  Skin Prep: chlorhexidine gluconate  Local Infiltration: lidocaine  Location: right,  internal jugular vein  Vessel Caliber: small, patent, compressibility normal  Vascular Doppler:  not done  Coaxial introducer size: 6 fr. manometry not used.  Device: transvenous pacing catheter, fluoroscopy used.   Catheter Size: 5 Fr  Catheter placement by yes. Heme positive aspiration all ports. PAC floated with balloon up not wedgedSterile sheath usedInsertion Attempts: 3  Indication: transvenous pacing  Ultrasound Guidance  Needle advanced into vessel with real time Ultrasound guidance.  Image recorded and saved.  Guidewire confirmed in vessel.  Sterile sheath used.  Assessment  Central Line Bundle Protocol followed. Hand hygiene before procedure, surgical cap worn, surgical mask worn, sterile surgical gloves worn, large sterile drape used.  Dressing: sutured in place and taped and secured with tape and tegaderm  Patient: Tolerated Well

## 2019-11-20 NOTE — SUBJECTIVE & OBJECTIVE
Past Medical History:   Diagnosis Date    Cancer 2010    left breast XRT + lumpectomy; right lunpectomy + Chemotherapy (Dr. Santana) 8 of 12 treatments, stopped for side effects    Cataract     right eye    Coronary artery disease     total 4 stents over several years, last 2015    Diabetes mellitus, type 2     Hyperlipidemia     Hypertension     Thyroid disease        Past Surgical History:   Procedure Laterality Date    APPENDECTOMY      BREAST SURGERY Bilateral     lumpectomy    EYE SURGERY Bilateral     cataract    HYSTERECTOMY      JOINT REPLACEMENT Right     hip    SPINE SURGERY      laminectomy       Review of patient's allergies indicates:  No Known Allergies    No current facility-administered medications on file prior to encounter.      Current Outpatient Medications on File Prior to Encounter   Medication Sig    ALPRAZolam (XANAX) 0.5 MG tablet Take 0.5 mg by mouth nightly as needed.     aspirin (ECOTRIN LOW STRENGTH) 81 MG EC tablet Take 81 mg by mouth once daily.     atorvastatin (LIPITOR) 80 MG tablet Take 80 mg by mouth every evening.     clopidogrel (PLAVIX) 75 mg tablet Take 75 mg by mouth once daily.     furosemide (LASIX) 20 MG tablet Take 20 mg by mouth daily as needed.    gabapentin (NEURONTIN) 600 MG tablet Take 3 tablets (1,800 mg total) by mouth 3 (three) times daily.    insulin aspart U-100 (NOVOLOG U-100 INSULIN ASPART) 100 unit/mL injection Novolog U-100 Insulin aspart 100 unit/mL subcutaneous solution    letrozole (FEMARA) 2.5 mg Tab Take 2.5 mg by mouth once daily .    levothyroxine (SYNTHROID) 112 MCG tablet Take 112 mcg by mouth before breakfast.     multivitamin capsule Take 1 capsule by mouth once daily.    potassium aminobenzoate 500 mg Cap Take by mouth once daily.    ramipril (ALTACE) 10 MG capsule Take 10 mg by mouth.     vitamin D 1000 units Tab Take 1,000 Units by mouth once daily.    biotin 10,000 mcg Cap Take 1 capsule by mouth once daily.      cyanocobalamin, vitamin B-12, 2,500 mcg Chew Take by mouth once daily.     Family History     None        Tobacco Use    Smoking status: Never Smoker    Smokeless tobacco: Never Used   Substance and Sexual Activity    Alcohol use: Yes     Comment: social    Drug use: No    Sexual activity: Not on file     Review of Systems   Unable to perform ROS: other (Patient very drowsy)     Objective:     Vital Signs (Most Recent):  Temp: 97.9 °F (36.6 °C) (11/20/19 0906)  Pulse: (!) 48 (11/20/19 1315)  Resp: 13 (11/20/19 1315)  BP: (!) 101/51 (11/20/19 1300)  SpO2: 99 % (11/20/19 1315) Vital Signs (24h Range):  Temp:  [97.9 °F (36.6 °C)] 97.9 °F (36.6 °C)  Pulse:  [48-69] 48  Resp:  [13-22] 13  SpO2:  [97 %-99 %] 99 %  BP: (101-103)/(49-55) 101/51  Arterial Line BP: ()/(42-60) 95/42       Weight: 76.7 kg (169 lb)  Body mass index is 31.42 kg/m².    SpO2: 99 %  O2 Device (Oxygen Therapy): Simple Face Mask    Physical Exam   Constitutional: She appears well-developed and well-nourished.   HENT:   Head: Normocephalic and atraumatic.   Neck: Normal range of motion. Neck supple.   Cardiovascular: Regular rhythm. Exam reveals no friction rub.   No murmur heard.  Bradycardic in 40-50s and regular  TVP RIJ   Pulmonary/Chest: Effort normal and breath sounds normal.   venti mask on   Abdominal: Soft. She exhibits no distension. There is no tenderness.   Musculoskeletal: She exhibits no edema.   Skin: Skin is warm and dry. No rash noted. No erythema.   Nursing note and vitals reviewed.      Significant Labs: All pertinent lab results from the last 24 hours have been reviewed.    Significant Imaging: Reviewed

## 2019-11-20 NOTE — ASSESSMENT & PLAN NOTE
Patient with severe AS who presents for elective TAVR. Post procedure, patient has a TVP in place and EP is being consulted for PPM evaluation.     - LVEF: 65%  - Valve implanted: 26mm Evolute  - PRE EC2019 QRS HR 66, , QRS 74  - POST ECG 2019 HR 59, , QRS 90  - POD#1 ECG : to be evaluated in AM  - TVP in place. Threshold testing performed at bedside. Currently rate 40 with threshold 0.2 mA  - Continue tele monitoring  - NPO at MN

## 2019-11-20 NOTE — CONSULTS
[facility]  Cardiac Electrophysiology  Consult Note    Admission Date: 11/20/2019  Code Status: No Order   Attending Provider: Balaji Shah MD  Consulting Provider: Arnaud Albright MD  Principal Problem:<principal problem not specified>    Inpatient consult to Electrophysiology  Consult performed by: Arnaud Albright MD  Consult ordered by: Daja Platt MD        Subjective:     Chief Complaint:  S/p TAVR     HPI:   PMHx of HTN, CAD s/p Proximal to Mid LAD PCI (patent PCI 08/13/19), severe AS, HLD, central tremors, laminectomy  who underwent TAVR today. EP consulted to evaluate TVP and need for PPM. EF 65% at baseline. QRS HR 66, , QRS 74.    Past Medical History:   Diagnosis Date    Cancer 2010    left breast XRT + lumpectomy; right lunpectomy + Chemotherapy (Dr. Santana) 8 of 12 treatments, stopped for side effects    Cataract     right eye    Coronary artery disease     total 4 stents over several years, last 2015    Diabetes mellitus, type 2     Hyperlipidemia     Hypertension     Thyroid disease        Past Surgical History:   Procedure Laterality Date    APPENDECTOMY      BREAST SURGERY Bilateral     lumpectomy    EYE SURGERY Bilateral     cataract    HYSTERECTOMY      JOINT REPLACEMENT Right     hip    SPINE SURGERY      laminectomy       Review of patient's allergies indicates:  No Known Allergies    No current facility-administered medications on file prior to encounter.      Current Outpatient Medications on File Prior to Encounter   Medication Sig    ALPRAZolam (XANAX) 0.5 MG tablet Take 0.5 mg by mouth nightly as needed.     aspirin (ECOTRIN LOW STRENGTH) 81 MG EC tablet Take 81 mg by mouth once daily.     atorvastatin (LIPITOR) 80 MG tablet Take 80 mg by mouth every evening.     clopidogrel (PLAVIX) 75 mg tablet Take 75 mg by mouth once daily.     furosemide (LASIX) 20 MG tablet Take 20 mg by mouth daily as needed.    gabapentin (NEURONTIN)  600 MG tablet Take 3 tablets (1,800 mg total) by mouth 3 (three) times daily.    insulin aspart U-100 (NOVOLOG U-100 INSULIN ASPART) 100 unit/mL injection Novolog U-100 Insulin aspart 100 unit/mL subcutaneous solution    letrozole (FEMARA) 2.5 mg Tab Take 2.5 mg by mouth once daily .    levothyroxine (SYNTHROID) 112 MCG tablet Take 112 mcg by mouth before breakfast.     multivitamin capsule Take 1 capsule by mouth once daily.    potassium aminobenzoate 500 mg Cap Take by mouth once daily.    ramipril (ALTACE) 10 MG capsule Take 10 mg by mouth.     vitamin D 1000 units Tab Take 1,000 Units by mouth once daily.    biotin 10,000 mcg Cap Take 1 capsule by mouth once daily.     cyanocobalamin, vitamin B-12, 2,500 mcg Chew Take by mouth once daily.     Family History     None        Tobacco Use    Smoking status: Never Smoker    Smokeless tobacco: Never Used   Substance and Sexual Activity    Alcohol use: Yes     Comment: social    Drug use: No    Sexual activity: Not on file     Review of Systems   Unable to perform ROS: other (Patient very drowsy)     Objective:     Vital Signs (Most Recent):  Temp: 97.9 °F (36.6 °C) (11/20/19 0906)  Pulse: (!) 48 (11/20/19 1315)  Resp: 13 (11/20/19 1315)  BP: (!) 101/51 (11/20/19 1300)  SpO2: 99 % (11/20/19 1315) Vital Signs (24h Range):  Temp:  [97.9 °F (36.6 °C)] 97.9 °F (36.6 °C)  Pulse:  [48-69] 48  Resp:  [13-22] 13  SpO2:  [97 %-99 %] 99 %  BP: (101-103)/(49-55) 101/51  Arterial Line BP: ()/(42-60) 95/42       Weight: 76.7 kg (169 lb)  Body mass index is 31.42 kg/m².    SpO2: 99 %  O2 Device (Oxygen Therapy): Simple Face Mask    Physical Exam   Constitutional: She appears well-developed and well-nourished.   HENT:   Head: Normocephalic and atraumatic.   Neck: Normal range of motion. Neck supple.   Cardiovascular: Regular rhythm. Exam reveals no friction rub.   No murmur heard.  Bradycardic in 40-50s and regular  TVP RIJ   Pulmonary/Chest: Effort normal and  breath sounds normal.   venti mask on   Abdominal: Soft. She exhibits no distension. There is no tenderness.   Musculoskeletal: She exhibits no edema.   Skin: Skin is warm and dry. No rash noted. No erythema.   Nursing note and vitals reviewed.      Significant Labs: All pertinent lab results from the last 24 hours have been reviewed.    Significant Imaging: Reviewed              Assessment and Plan:     S/P TAVR (transcatheter aortic valve replacement)  Patient with severe AS who presents for elective TAVR. Post procedure, patient has a TVP in place and EP is being consulted for PPM evaluation.     - LVEF: 65%  - Valve implanted: 26mm Evolute  - PRE EC2019 QRS HR 66, , QRS 74  - POST ECG 2019 HR 59, , QRS 90  - POD#1 ECG : to be evaluated in AM  - TVP in place. Threshold testing performed at bedside. Currently rate 40 with threshold 0.2 mA  - Continue tele monitoring  - NPO at MN          Thank you for your consult. I will follow-up with patient. Please contact us if you have any additional questions.    Arnaud Albright MD  Cardiac Electrophysiology  [facility]

## 2019-11-20 NOTE — INTERVAL H&P NOTE
The patient has been examined and the H&P has been reviewed:    I concur with the findings and changes have been noted since the H&P was written: see below.    Anesthesia/Surgery risks, benefits and alternative options discussed and understood by patient/family.    Mary Prince is a 78 y.o. female referred by Dr Oscar Velazco  for evaluation of severe AS (NYHA Class II, CCS III sx).     The patient has undergone the following TAVR work-up:   · ECHO (Date 10/28/19): VANDANA= 06.4 cm2, MG= 37mmHg, Peak César= 4.1 m/s, EF= 65%.   · LHC (Date 8/13/19): Nonobstructive CAD. Patent LAD stent   · STS: 3.1%   · Frailty: 1/4(Hand )   · Iliacs are >6.1 on R and > 7.74 on L   · LVOT area by CTA is 2.84 cm2 (22.7 mm X 16.9 mm) and Avg Diameter is 19mm  · Incidental findings on CT: None  · CT Surgery risk assessment: Moderate risk, per Dr Vaughn due to age and comorbidities  · Rhythm issues: None  · PFTs: FEV1 77% predicted, DLCO 78% predicted.  · Comorbidities: HTN, CAD s/p Proximal to Mid LAD PCI, HLD, central tremors, laminectomy         Mary Prince is a 26 mm Evolut valve candidate via LTF access.     Transcatheter Aortic valve replacement   1. Valve: 26 mm  Evolut  2. TAVR access: LTF  3. Valvuloplasty balloon:  16mm True  4. Viabahn size if needed: 8x5  5. Antithrombotic therapy:  ASA/Plavix  6. Pacemaker risk factors: The patient was explained the the procedure carries around a 12.5% risk of permanent pacemaker requirement and that risk depends on the patients underlying conduction system.  7. Patient was educated abut the pathophysiology and natural history of severe aortic stenosis and was educated about the treatment options including surgical and transcatheter valve replacement. She agrees to be full code for the forseable future and to undergo workup for treatment of severe AS.   8. The risks, benefits, and alternatives of transcatheter aortic valve replacement were discussed with the patient. All questions were  answered and informed consent was obtained. I had a detailed discussion with the patient regarding risk of stroke, MI, bleeding access site complications including limb loss, allergy, kidney failure including dialysis and death.  9. The patient understands the risks and benefits and wishes to go ahead with the procedure.  10. All patient's questions were answered          Active Hospital Problems    Diagnosis  POA    Nodular calcific aortic valve stenosis [I35.0]  Yes    Essential tremor [G25.0]  Yes    Type 2 diabetes mellitus with diabetic polyneuropathy, with long-term current use of insulin [E11.42, Z79.4]  Not Applicable    Hyperlipidemia LDL goal <70 [E78.5]  Yes    Essential hypertension [I10]  Yes    Coronary artery disease of native artery of native heart with stable angina pectoris [I25.118]  Yes      Resolved Hospital Problems   No resolved problems to display.

## 2019-11-20 NOTE — ANESTHESIA POSTPROCEDURE EVALUATION
Anesthesia Post Evaluation    Patient: Mary Prince    Procedure(s) Performed: Procedure(s) (LRB):  REPLACEMENT, AORTIC VALVE, TRANSCATHETER (TAVR) (N/A)    Final Anesthesia Type: general    Patient location during evaluation: ICU  Patient participation: Yes- Able to Participate  Level of consciousness: sedated and responds to stimulation  Post-procedure vital signs: reviewed and stable  Pain management: adequate  Airway patency: patent    PONV status at discharge: No PONV  Anesthetic complications: no      Cardiovascular status: hemodynamically stable  Respiratory status: unassisted  Follow-up not needed.          Vitals Value Taken Time   /51 11/20/2019  1:02 PM   Temp 36.6 °C (97.9 °F) 11/20/2019  9:06 AM   Pulse 49 11/20/2019  1:04 PM   Resp 13 11/20/2019  1:04 PM   SpO2 98 % 11/20/2019  1:04 PM   Vitals shown include unvalidated device data.      No case tracking events are documented in the log.      Pain/Luzmaria Score: Luzmaria Score: 10 (11/20/2019 10:19 AM)

## 2019-11-20 NOTE — HPI
PMHx of HTN, CAD s/p Proximal to Mid LAD PCI (patent PCI 08/13/19), severe AS, HLD, central tremors, laminectomy who underwent TAVR today. EP consulted to evaluate TVP and need for PPM. EF 65% at baseline. QRS HR 66, , QRS 74.

## 2019-11-20 NOTE — TRANSFER OF CARE
"Anesthesia Transfer of Care Note    Patient: Mary Prince    Procedure(s) Performed: Procedure(s) (LRB):  REPLACEMENT, AORTIC VALVE, TRANSCATHETER (TAVR) (N/A)    Patient location: ICU    Anesthesia Type: general    Transport from OR: Transported from OR on 6-10 L/min O2 by face mask with adequate spontaneous ventilation. Continuous ECG monitoring in transport. Continuous SpO2 monitoring in transport. Continuos invasive BP monitoring in transport    Post pain: adequate analgesia    Post assessment: no apparent anesthetic complications    Post vital signs: stable    Level of consciousness: sedated    Nausea/Vomiting: no nausea/vomiting    Complications: none    Transfer of care protocol was followed      Last vitals:   Visit Vitals  BP (!) 103/49 (BP Location: Left arm, Patient Position: Lying)   Pulse 69   Temp 36.6 °C (97.9 °F) (Oral)   Resp 18   Ht 5' 1.5" (1.562 m)   Wt 76.7 kg (169 lb)   SpO2 97%   Breastfeeding? No   BMI 31.42 kg/m²     "

## 2019-11-21 VITALS
SYSTOLIC BLOOD PRESSURE: 136 MMHG | HEIGHT: 62 IN | RESPIRATION RATE: 20 BRPM | TEMPERATURE: 99 F | OXYGEN SATURATION: 95 % | DIASTOLIC BLOOD PRESSURE: 6 MMHG | WEIGHT: 168.88 LBS | BODY MASS INDEX: 31.08 KG/M2 | HEART RATE: 86 BPM

## 2019-11-21 PROBLEM — I50.33 ACUTE ON CHRONIC DIASTOLIC HEART FAILURE: Status: ACTIVE | Noted: 2019-11-21

## 2019-11-21 PROBLEM — Z96.41 INSULIN PUMP STATUS: Status: ACTIVE | Noted: 2019-11-21

## 2019-11-21 PROBLEM — I50.32 CHRONIC DIASTOLIC HEART FAILURE: Status: ACTIVE | Noted: 2019-11-21

## 2019-11-21 LAB
ANION GAP SERPL CALC-SCNC: 8 MMOL/L (ref 8–16)
BASOPHILS # BLD AUTO: 0.05 K/UL (ref 0–0.2)
BASOPHILS NFR BLD: 0.6 % (ref 0–1.9)
BUN SERPL-MCNC: 33 MG/DL (ref 8–23)
CALCIUM SERPL-MCNC: 8.8 MG/DL (ref 8.7–10.5)
CHLORIDE SERPL-SCNC: 105 MMOL/L (ref 95–110)
CO2 SERPL-SCNC: 23 MMOL/L (ref 23–29)
CREAT SERPL-MCNC: 1 MG/DL (ref 0.5–1.4)
DIFFERENTIAL METHOD: ABNORMAL
EOSINOPHIL # BLD AUTO: 0.2 K/UL (ref 0–0.5)
EOSINOPHIL NFR BLD: 1.9 % (ref 0–8)
ERYTHROCYTE [DISTWIDTH] IN BLOOD BY AUTOMATED COUNT: 13.6 % (ref 11.5–14.5)
EST. GFR  (AFRICAN AMERICAN): >60 ML/MIN/1.73 M^2
EST. GFR  (NON AFRICAN AMERICAN): 53.7 ML/MIN/1.73 M^2
GLUCOSE SERPL-MCNC: 148 MG/DL (ref 70–110)
GLUCOSE SERPL-MCNC: 165 MG/DL (ref 70–110)
HCO3 UR-SCNC: 29.3 MMOL/L (ref 24–28)
HCT VFR BLD AUTO: 35.3 % (ref 37–48.5)
HCT VFR BLD CALC: 34 %PCV (ref 36–54)
HGB BLD-MCNC: 11.6 G/DL (ref 12–16)
IMM GRANULOCYTES # BLD AUTO: 0.03 K/UL (ref 0–0.04)
IMM GRANULOCYTES NFR BLD AUTO: 0.3 % (ref 0–0.5)
LYMPHOCYTES # BLD AUTO: 0.4 K/UL (ref 1–4.8)
LYMPHOCYTES NFR BLD: 4.2 % (ref 18–48)
MCH RBC QN AUTO: 32.6 PG (ref 27–31)
MCHC RBC AUTO-ENTMCNC: 32.9 G/DL (ref 32–36)
MCV RBC AUTO: 99 FL (ref 82–98)
MONOCYTES # BLD AUTO: 0.9 K/UL (ref 0.3–1)
MONOCYTES NFR BLD: 10.8 % (ref 4–15)
NEUTROPHILS # BLD AUTO: 7.1 K/UL (ref 1.8–7.7)
NEUTROPHILS NFR BLD: 82.2 % (ref 38–73)
NRBC BLD-RTO: 0 /100 WBC
PCO2 BLDA: 55.7 MMHG (ref 35–45)
PH SMN: 7.33 [PH] (ref 7.35–7.45)
PLATELET # BLD AUTO: 96 K/UL (ref 150–350)
PMV BLD AUTO: 12 FL (ref 9.2–12.9)
PO2 BLDA: 332 MMHG (ref 80–100)
POC ACTIVATED CLOTTING TIME K: 312 SEC (ref 74–137)
POC BE: 3 MMOL/L
POC IONIZED CALCIUM: 1.26 MMOL/L (ref 1.06–1.42)
POC SATURATED O2: 100 % (ref 95–100)
POC TCO2: 31 MMOL/L (ref 23–27)
POCT GLUCOSE: 142 MG/DL (ref 70–110)
POTASSIUM BLD-SCNC: 4.1 MMOL/L (ref 3.5–5.1)
POTASSIUM SERPL-SCNC: 4.2 MMOL/L (ref 3.5–5.1)
RBC # BLD AUTO: 3.56 M/UL (ref 4–5.4)
SAMPLE: ABNORMAL
SAMPLE: ABNORMAL
SODIUM BLD-SCNC: 135 MMOL/L (ref 136–145)
SODIUM SERPL-SCNC: 136 MMOL/L (ref 136–145)
WBC # BLD AUTO: 8.62 K/UL (ref 3.9–12.7)

## 2019-11-21 PROCEDURE — 93010 EKG 12-LEAD: ICD-10-PCS | Mod: ,,, | Performed by: INTERNAL MEDICINE

## 2019-11-21 PROCEDURE — 80048 BASIC METABOLIC PNL TOTAL CA: CPT

## 2019-11-21 PROCEDURE — 94761 N-INVAS EAR/PLS OXIMETRY MLT: CPT

## 2019-11-21 PROCEDURE — 99233 SBSQ HOSP IP/OBS HIGH 50: CPT | Mod: ,,, | Performed by: INTERNAL MEDICINE

## 2019-11-21 PROCEDURE — 63600175 PHARM REV CODE 636 W HCPCS: Performed by: STUDENT IN AN ORGANIZED HEALTH CARE EDUCATION/TRAINING PROGRAM

## 2019-11-21 PROCEDURE — 99223 1ST HOSP IP/OBS HIGH 75: CPT | Mod: ,,, | Performed by: NURSE PRACTITIONER

## 2019-11-21 PROCEDURE — 93005 ELECTROCARDIOGRAM TRACING: CPT

## 2019-11-21 PROCEDURE — 25000003 PHARM REV CODE 250: Performed by: STUDENT IN AN ORGANIZED HEALTH CARE EDUCATION/TRAINING PROGRAM

## 2019-11-21 PROCEDURE — 99223 PR INITIAL HOSPITAL CARE,LEVL III: ICD-10-PCS | Mod: ,,, | Performed by: NURSE PRACTITIONER

## 2019-11-21 PROCEDURE — 99233 PR SUBSEQUENT HOSPITAL CARE,LEVL III: ICD-10-PCS | Mod: ,,, | Performed by: INTERNAL MEDICINE

## 2019-11-21 PROCEDURE — 85025 COMPLETE CBC W/AUTO DIFF WBC: CPT

## 2019-11-21 PROCEDURE — 93010 ELECTROCARDIOGRAM REPORT: CPT | Mod: ,,, | Performed by: INTERNAL MEDICINE

## 2019-11-21 RX ORDER — IBUPROFEN 200 MG
24 TABLET ORAL
Status: DISCONTINUED | OUTPATIENT
Start: 2019-11-21 | End: 2019-11-21 | Stop reason: HOSPADM

## 2019-11-21 RX ORDER — IBUPROFEN 200 MG
16 TABLET ORAL
Status: DISCONTINUED | OUTPATIENT
Start: 2019-11-21 | End: 2019-11-21 | Stop reason: HOSPADM

## 2019-11-21 RX ORDER — GABAPENTIN 300 MG/1
600 CAPSULE ORAL 3 TIMES DAILY
Status: DISCONTINUED | OUTPATIENT
Start: 2019-11-21 | End: 2019-11-21 | Stop reason: HOSPADM

## 2019-11-21 RX ORDER — GABAPENTIN 300 MG/1
600 CAPSULE ORAL 3 TIMES DAILY
Status: DISCONTINUED | OUTPATIENT
Start: 2019-11-21 | End: 2019-11-21

## 2019-11-21 RX ORDER — IBUPROFEN 400 MG/1
400 TABLET ORAL ONCE
Status: COMPLETED | OUTPATIENT
Start: 2019-11-21 | End: 2019-11-21

## 2019-11-21 RX ORDER — GLUCAGON 1 MG
1 KIT INJECTION
Status: DISCONTINUED | OUTPATIENT
Start: 2019-11-21 | End: 2019-11-21 | Stop reason: HOSPADM

## 2019-11-21 RX ORDER — FUROSEMIDE 10 MG/ML
20 INJECTION INTRAMUSCULAR; INTRAVENOUS
Status: COMPLETED | OUTPATIENT
Start: 2019-11-21 | End: 2019-11-21

## 2019-11-21 RX ADMIN — CLOPIDOGREL BISULFATE 75 MG: 75 TABLET ORAL at 08:11

## 2019-11-21 RX ADMIN — FUROSEMIDE 20 MG: 10 INJECTION, SOLUTION INTRAMUSCULAR; INTRAVENOUS at 08:11

## 2019-11-21 RX ADMIN — LEVOTHYROXINE SODIUM 112 MCG: 112 TABLET ORAL at 05:11

## 2019-11-21 RX ADMIN — ASPIRIN 81 MG CHEWABLE TABLET 81 MG: 81 TABLET CHEWABLE at 08:11

## 2019-11-21 RX ADMIN — GABAPENTIN 600 MG: 300 CAPSULE ORAL at 08:11

## 2019-11-21 RX ADMIN — IBUPROFEN 400 MG: 400 TABLET ORAL at 08:11

## 2019-11-21 NOTE — ASSESSMENT & PLAN NOTE
BG goal 140-180    Continue home insulin pump with Novolog  Patient is alert and oriented and can manage her pump  She is comfortable and confident in managing insulin pump  She is willing and able to manage pump and count carbs  She has all his own insulin pump/ supplies at bedside  Signed consent form and placed in chart  Demonstrated use of bedside pump log  Infusion set and insertion site to be changed every 48-72hrs or prn  Date of last infusion set and insertion site change was Tuesday 11/19/19 2000  The insertion is located to the LLQ abdomen and is clear, without redness  She verbalized understanding

## 2019-11-21 NOTE — DISCHARGE SUMMARY
[facility]  Discharge Summary      Admit Date: 11/20/2019    Discharge Date and Time:  11/21/2019 11:56 AM    Attending Physician: Balaji Shah MD     Reason for Admission: TAVR    Procedures Performed: Procedure(s) (LRB):  REPLACEMENT, AORTIC VALVE, TRANSCATHETER (TAVR) (N/A)    Hospital Course (synopsis of major diagnoses, care, treatment, and services provided during the course of the hospital stay)        Subjective:   Patient did well last night. Denies any bleeding, dizziness or SOB. No acute events on telemetry. EKG this am shows narrow QRS.         Physical Exam:  General appearance: well developed, well nourished  Head: normocephalic, atraumatic  Eyes:  conjunctivae/corneas clear. PERRL.  Nose: Nares normal. Septum midline.  Throat: lips, mucosa, and tongue normal; teeth and gums normal and no throat erythema  Neck: supple, symmetrical, trachea midline,+ JVD   Lungs:  clear to auscultation bilaterally and normal respiratory effort  Heart: regular rate and rhythm, S1, S2 normal, no murmur, click, rub or gallop  Abdomen: soft, non-tender non-distented;BS posl; no masses,  no organomegaly  Extremities: mild BL LE edema, or clubbing  Pulses: 2+ DP/PT, BL groins soft w/o hematoma or thrill  Skin: Skin color, texture, turgor normal. No rashes or lesions  Neurologic: Normal strength and tone. No focal numbness or weakness     A/P:  Severe Aortic Stenosis  - Successful L transfemoral 26 mm Evolut R TAVR   - Post valve transthoracic echo showed no paravalvular leak  - Cont ASA and plavix     Acute On Chronic Diastolic Heart Failure Exacerbation  - PT with volume overload requiring IV diuretics post procedure  - Improved afterwards  - Should continue lasix PRN at home for fluid retention    CKD:   - Cr was initially worse on admission but significantly improved after hydration  - Continue medical therapy and avoid nephrotoxic agents    Normocytic anemia  -H/H stable  -continue clinical  monitoring         Hospital Course:   Pt admitted and underwent successful placement of a 26 mm Evolut R TAVR via TF access under MAC sedation. TVP was left in place and EP was consulted. Pt was taken to the CCU in stable condition. Pt remained stable overnight. QRS remained stable and TVP removed. Pt  required IV diuresis for acute on chronic diastolic heart failure with IV Lasix. Diuresed well. That afternoon, pt ambulated with PT without difficulty. Pt was eager to go home. It was felt pt was stable for discharge. Cont ASA and Plavix and f/u in one month in valve clinic    Consults: none    Significant Diagnostic Studies: Labs:   BMP:   Recent Labs   Lab 11/20/19  0829 11/20/19  1550 11/21/19  0300   * 83 148*   * 141 136   K 5.1 3.8 4.2    108 105   CO2 21* 23 23   BUN 42* 34* 33*   CREATININE 2.0* 1.3 1.0   CALCIUM 9.7 8.5* 8.8    and CBC   Recent Labs   Lab 11/20/19  0829  11/21/19  0300   WBC 9.38  --  8.62   HGB 13.1  --  11.6*   HCT 38.6   < > 35.3*   *  --  96*    < > = values in this interval not displayed.       Final Diagnoses:    Principal Problem: <principal problem not specified>   Secondary Diagnoses:   Active Hospital Problems    Diagnosis  POA    *S/P TAVR (transcatheter aortic valve replacement) [Z95.2]  Not Applicable    Insulin pump status [Z96.41]  Not Applicable    Acute on chronic diastolic heart failure [I50.33]  Unknown    Nodular calcific aortic valve stenosis [I35.0]  Yes    Essential tremor [G25.0]  Yes    Type 2 diabetes mellitus with diabetic polyneuropathy, with long-term current use of insulin [E11.42, Z79.4]  Not Applicable    Hyperlipidemia LDL goal <70 [E78.5]  Yes    Essential hypertension [I10]  Yes    Coronary artery disease of native artery of native heart with stable angina pectoris [I25.118]  Yes    Acquired hypothyroidism [E03.9]  Yes      Resolved Hospital Problems   No resolved problems to display.       Discharged Condition:  good    Disposition: Home or Self Care    Follow Up/Patient Instructions:     Medications:  Reconciled Home Medications:      Medication List      CONTINUE taking these medications    ALPRAZolam 0.5 MG tablet  Commonly known as:  XANAX  Take 0.5 mg by mouth nightly as needed.     Altace 10 MG capsule  Generic drug:  ramipril  Take 10 mg by mouth.     atorvastatin 80 MG tablet  Commonly known as:  LIPITOR  Take 80 mg by mouth every evening.     clopidogrel 75 mg tablet  Commonly known as:  PLAVIX  Take 75 mg by mouth once daily.     Ecotrin Low Strength 81 MG EC tablet  Generic drug:  aspirin  Take 81 mg by mouth once daily.     Femara 2.5 mg Tab  Generic drug:  letrozole  Take 2.5 mg by mouth once daily .     furosemide 20 MG tablet  Commonly known as:  LASIX  Take 20 mg by mouth daily as needed.     gabapentin 600 MG tablet  Commonly known as:  NEURONTIN  Take 3 tablets (1,800 mg total) by mouth 3 (three) times daily.     multivitamin capsule  Take 1 capsule by mouth once daily.     NovoLOG U-100 Insulin aspart 100 unit/mL injection  Generic drug:  insulin aspart U-100  Novolog U-100 Insulin aspart 100 unit/mL subcutaneous solution     potassium aminobenzoate 500 mg Cap  Take by mouth once daily.     Synthroid 112 MCG tablet  Generic drug:  levothyroxine  Take 112 mcg by mouth before breakfast.     vitamin D 1000 units Tab  Commonly known as:  VITAMIN D3  Take 1,000 Units by mouth once daily.        STOP taking these medications    biotin 10,000 mcg Cap     cyanocobalamin (vitamin B-12) 2,500 mcg Chew          Discharge Procedure Orders   Type & Screen   Standing Status: Future Standing Exp. Date: 01/05/21

## 2019-11-21 NOTE — PLAN OF CARE
Extended Emergency Contact Information  Primary Emergency Contact: Raymond Sparks  Address: [address] [address] [place]  Home Phone: [phone]  Mobile Phone: [phone]  Relation: Significant other    Jez Sheets MD  [address] / [place] [address]    Future Appointments   Date Time Provider Department Center   11/26/2019  9:45 AM Daren Draper, [facility]   11/26/2019 10:30 AM Efrain David, PT [facility]   12/3/2019  9:45 AM Efrain David, PT [facility]   12/3/2019 10:30 AM Daren Draper, [facility]   12/5/2019 10:30 AM Daren Draper, [facility]   12/5/2019 11:15 AM Efrain David, PT [facility]   12/10/2019 10:30 AM Efrain David, PT [facility]   12/10/2019 11:15 AM Daren Draper, [facility]   12/12/2019 10:30 AM Efrain David, PT [facility]   12/12/2019 11:15 AM Daren Draper, [facility]   12/17/2019 10:30 AM Efrain David, PT [facility]   12/17/2019 11:15 AM Daren Draper, [facility]   12/18/2019 10:15 AM ECHO, [facility] ECHOLAB [facility]   12/18/2019 12:30 PM LAB, APPOINTMENT NEW [facility]   12/18/2019  4:00 PM INTERVENTIONAL, VALVE CLINIC [facility] CARDVAL [facility]   12/19/2019 10:30 AM Efrain David, PT [facility]   12/19/2019 11:15 AM Daren Draper, [facility]     Payor: [employer]ICARE / Plan: [employer] CHOICES 65 / Product Type: Medicare Advantage /       [facility] - [place] - [address]  [address] [place] [place] [address]  Phone: [phone] Fax: [phone]       11/21/19 1559   Discharge Assessment   Assessment Type Discharge Planning Assessment   Assessment information obtained from? Medical  Record   Expected Length of Stay (days) 1   Prior to hospitilization cognitive status: Unable to Assess   Prior to hospitalization functional status: Assistive Equipment   Current cognitive status: Alert/Oriented   Current Functional Status: Assistive Equipment   Lives With significant other   Able to Return to Prior Arrangements yes   Is patient able to care for self after discharge? Yes   Readmission Within the Last 30 Days no previous admission in last 30 days   Patient currently being followed by outpatient case management? No   Does the patient have transportation home? Yes   Transportation Anticipated family or friend will provide   Discharge Plan A Home   Discharge Plan B Home   Patient/Family in Agreement with Plan unable to assess

## 2019-11-21 NOTE — ASSESSMENT & PLAN NOTE
S/p TAVR on 11/21/19  Managed per primary team  Avoid hypoglycemia  Optimize BG control for surgical wound healing

## 2019-11-21 NOTE — SUBJECTIVE & OBJECTIVE
Interval HPI:   Overnight events: Remains in ICU, NAEON.  Insulin pump in place.  BG well controlled overnight.    Eating:   <25%  Nausea: No  Hypoglycemia and intervention: No  Fever: No  TPN and/or TF: No    PMH, PSH, FH, SH reviewed     Review of Systems    Constitutional: Positive for weight changes. 40 lb weight gain over last 3 years.  Eyes: Negative for visual disturbance.  Respiratory: Negative for cough.   Cardiovascular: Negative for chest pain.  Gastrointestinal: Negative for nausea.  Endocrine: Negative for polyuria, polydipsia.  Musculoskeletal: Positive for chronic back pain.  Skin: Negative for rash.  Neurological: Negative for syncope.  Fall prior to admission.  Psychiatric/Behavioral: Positive for mild depression, denies any suicidal or homicidal ideations.    Current Medications and/or Treatments Impacting Glycemic Control  Immunotherapy:    Immunosuppressants     None        Steroids:   Hormones (From admission, onward)    None        Pressors:    Autonomic Drugs (From admission, onward)    Start     Stop Route Frequency Ordered    11/20/19 1245  norepinephrine 4 mg in dextrose 5% 250 mL infusion (premix) (titrating)     Question Answer Comment   Titrate by: (in mcg/kg/min) 0.01    Titrate interval: (in minutes) 1    Titrate to maintain: (MAP or SBP) SBP    Greater than: (in mmHg) 90    Maximum dose: (in mcg/kg/min) 3        -- IV Continuous 11/20/19 1144    11/20/19 1245  EPINEPHrine (ADRENALIN) 5 mg in sodium chloride 0.9% 250 mL infusion     Question Answer Comment   Titrate by: (in mcg/kg/min) 0.01    Titrate interval: (in minutes) 1    Titrate to maintain: (SBP or MAP or Cardiac Index) SBP    Greater than: (in mmHg) 90    Maximum dose: (in mcg/kg/min) 1        -- IV Continuous 11/20/19 1144        Hyperglycemia/Diabetes Medications:   Antihyperglycemics (From admission, onward)    None             PHYSICAL EXAMINATION:  Vitals:    11/21/19 1015   BP:    Pulse: 79   Resp: 18   Temp:      Body  mass index is 31.42 kg/m².    Physical Exam     Constitutional: Well developed, well nourished, NAD.  ENT: External ears no masses with nose patent  Neck: Supple; trachea midline  Cardiovascular: Normal heart sounds, no LE edema. DP +2 bilaterally.  Lungs: Normal effort; lungs anterior bilaterally clear to auscultation.  Abdomen: Soft, no masses, no hernias.  Hypoactive BS noted.  MS: No clubbing or cyanosis of nails noted; unable to assess gait.  Skin: No rashes, lesions, or ulcers; no nodules.  R chest incision with dressing.  Psychiatric: Good judgement and insight; normal mood and affect.  Neurological: Cranial nerves are grossly intact.   Foot: Nails in good condition, no amputations noted.

## 2019-11-21 NOTE — ASSESSMENT & PLAN NOTE
BG goal 140 - 180     See insulin pump status for details    Discharge planning: Resume home insulin pump and follow up with established endocrine provider

## 2019-11-21 NOTE — PROGRESS NOTES
Pharmacist Renal Dose Adjustment Note    Mary Prince is a 79 y.o. female being treated with the medication gabapentin.    Patient Data:    Vital Signs (Most Recent):  Temp: 99.4 °F (37.4 °C) (11/21/19 0700)  Pulse: 83 (11/21/19 0730)  Resp: 14 (11/21/19 0730)  BP: 127/76 (11/21/19 0700)  SpO2: 95 % (11/21/19 0730) Vital Signs (72h Range):  Temp:  [97.6 °F (36.4 °C)-99.4 °F (37.4 °C)]   Pulse:  [48-88]   Resp:  [10-33]   BP: (101-172)/(49-76)   SpO2:  [93 %-100 %]   Arterial Line BP: ()/(41-68)      Recent Labs   Lab 11/20/19  0829 11/20/19  1550 11/21/19  0300   CREATININE 2.0* 1.3 1.0     Serum creatinine: 1 mg/dL 11/21/19 0300  Estimated creatinine clearance: 43.3 mL/min    Medication:Gabapentin 1200 mg PO TID will be changed to gabapentin 600 mg PO TID.    Pharmacist's Name: Chepe Olvera  Pharmacist's Extension: [phone]

## 2019-11-21 NOTE — ASSESSMENT & PLAN NOTE
Patient with severe AS who presents for elective TAVR. Post procedure, patient has a TVP in place and EP is being consulted for PPM evaluation.     - LVEF: 65%  - Valve implanted: 26mm Evolute  - PRE EC2019 QRS HR 66, , QRS 74  - POST ECG 2019 HR 59, , QRS 90  - POD#1 ECG : QRS 72  - TVP in place. Threshold testing performed at bedside    Recommendations:  1) D/C TVP   2) EP will sign off. Please call if we can be of any assistance

## 2019-11-21 NOTE — NURSING
Upon assessment, noted patient to still having home insulin pump connected, glucose check 109.  Notified MD Gilmer of pump still in place. No endocrine consult ordered.

## 2019-11-21 NOTE — HPI
"Reason for Consult: Management of T2DM, Hyperglycemia     Surgical Procedure and Date: TAVR 11/20/19    Diabetes diagnosis year: "in my late 50s"    No results found for: LABA1C, HGBA1C    Home Diabetes Medications:  Medtronic minimed paradigm insulin pump with Novolog (on insulin pump since 2011)    Basal:     1.5 u/hr  ICR:        1:6  ISF:         36  Target:    100  Time:       4 hrs    How often checking glucose at home? twice daily   BG readings on regimen: low 100s  Hypoglycemia on the regimen?  Yes, about once per week  Missed doses on regimen?  No    Diabetes Complications include:     Hypoglycemia , Diabetic cataract  and Diabetic peripheral neuropathy     Complicating diabetes co morbidities:   none    HPI:   Patient is a 79 y.o. female with a diagnosis of HTN, CAD, DM2, and AS, who is s/p TAVR on 11/20/19.  Patient with well controlled DM2 on insulin pump since 2011,  Previously on Metformin but stopped due to constate GI upset.  Patient is followed by Dr. Barreto, endocrinologist at [facility].  Endocrinology consulted for DM/BG management.          "

## 2019-11-21 NOTE — CONSULTS
"[facility]  Endocrinology  Diabetes Consult Note    Consult Requested by: Balaji Shah MD   Reason for admit: S/P TAVR (transcatheter aortic valve replacement)    HISTORY OF PRESENT ILLNESS:  Reason for Consult: Management of T2DM, Hyperglycemia     Surgical Procedure and Date: TAVR 11/20/19    Diabetes diagnosis year: "in my late 50s"    No results found for: LABA1C, HGBA1C    Home Diabetes Medications:  Medtronic minimed paradigm insulin pump with Novolog (on insulin pump since 2011)    Basal:     1.5 u/hr  ICR:        1:6  ISF:         36  Target:    100  Time:       4 hrs    How often checking glucose at home? twice daily   BG readings on regimen: low 100s  Hypoglycemia on the regimen?  Yes, about once per week  Missed doses on regimen?  No    Diabetes Complications include:     Hypoglycemia , Diabetic cataract  and Diabetic peripheral neuropathy     Complicating diabetes co morbidities:   none    HPI:   Patient is a 79 y.o. female with a diagnosis of HTN, CAD, DM2, and AS, who is s/p TAVR on 11/20/19.  Patient with well controlled DM2 on insulin pump since 2011,  Previously on Metformin but stopped due to constate GI upset.  Patient is followed by Dr. Barreto, endocrinologist at [facility].  Endocrinology consulted for DM/BG management.            Interval HPI:   Overnight events: Remains in ICU, NAEON.  Insulin pump in place.  BG well controlled overnight.    Eating:   <25%  Nausea: No  Hypoglycemia and intervention: No  Fever: No  TPN and/or TF: No    PMH, PSH, FH, SH reviewed     Review of Systems    Constitutional: Positive for weight changes. 40 lb weight gain over last 3 years.  Eyes: Negative for visual disturbance.  Respiratory: Negative for cough.   Cardiovascular: Negative for chest pain.  Gastrointestinal: Negative for nausea.  Endocrine: Negative for polyuria, polydipsia.  Musculoskeletal: Positive for chronic back pain.  Skin: Negative for rash.  Neurological: Negative for " syncope.  Fall prior to admission.  Psychiatric/Behavioral: Positive for mild depression, denies any suicidal or homicidal ideations.    Current Medications and/or Treatments Impacting Glycemic Control  Immunotherapy:    Immunosuppressants     None        Steroids:   Hormones (From admission, onward)    None        Pressors:    Autonomic Drugs (From admission, onward)    Start     Stop Route Frequency Ordered    11/20/19 1245  norepinephrine 4 mg in dextrose 5% 250 mL infusion (premix) (titrating)     Question Answer Comment   Titrate by: (in mcg/kg/min) 0.01    Titrate interval: (in minutes) 1    Titrate to maintain: (MAP or SBP) SBP    Greater than: (in mmHg) 90    Maximum dose: (in mcg/kg/min) 3        -- IV Continuous 11/20/19 1144    11/20/19 1245  EPINEPHrine (ADRENALIN) 5 mg in sodium chloride 0.9% 250 mL infusion     Question Answer Comment   Titrate by: (in mcg/kg/min) 0.01    Titrate interval: (in minutes) 1    Titrate to maintain: (SBP or MAP or Cardiac Index) SBP    Greater than: (in mmHg) 90    Maximum dose: (in mcg/kg/min) 1        -- IV Continuous 11/20/19 1144        Hyperglycemia/Diabetes Medications:   Antihyperglycemics (From admission, onward)    None             PHYSICAL EXAMINATION:  Vitals:    11/21/19 1015   BP:    Pulse: 79   Resp: 18   Temp:      Body mass index is 31.42 kg/m².    Physical Exam     Constitutional: Well developed, well nourished, NAD.  ENT: External ears no masses with nose patent  Neck: Supple; trachea midline  Cardiovascular: Normal heart sounds, no LE edema. DP +2 bilaterally.  Lungs: Normal effort; lungs anterior bilaterally clear to auscultation.  Abdomen: Soft, no masses, no hernias.  Hypoactive BS noted.  MS: No clubbing or cyanosis of nails noted; unable to assess gait.  Skin: No rashes, lesions, or ulcers; no nodules.  R chest incision with dressing.  Psychiatric: Good judgement and insight; normal mood and affect.  Neurological: Cranial nerves are grossly intact.    Foot: Nails in good condition, no amputations noted.        Labs Reviewed and Include   Recent Labs   Lab 11/21/19  0300   *   CALCIUM 8.8      K 4.2   CO2 23      BUN 33*   CREATININE 1.0     Lab Results   Component Value Date    WBC 8.62 11/21/2019    HGB 11.6 (L) 11/21/2019    HCT 35.3 (L) 11/21/2019    MCV 99 (H) 11/21/2019    PLT 96 (L) 11/21/2019     No results for input(s): TSH, FREET4 in the last 168 hours.  No results found for: HGBA1C    Nutritional status:   Body mass index is 31.42 kg/m².  Lab Results   Component Value Date    ALBUMIN 3.9 10/28/2019     No results found for: PREALBUMIN    Estimated Creatinine Clearance: 43.3 mL/min (based on SCr of 1 mg/dL).    Accu-Checks  Recent Labs     11/20/19  0920 11/20/19  1204 11/20/19  1555 11/20/19  2123 11/21/19  0323   POCTGLUCOSE 184* 137* 78 109 142*        ASSESSMENT and PLAN    * S/P TAVR (transcatheter aortic valve replacement)  S/p TAVR on 11/21/19  Managed per primary team  Avoid hypoglycemia  Optimize BG control for surgical wound healing        Type 2 diabetes mellitus with diabetic polyneuropathy, with long-term current use of insulin  BG goal 140 - 180     See insulin pump status for details    Discharge planning: Resume home insulin pump and follow up with established endocrine provider        Insulin pump status  BG goal 140-180    Continue home insulin pump with Novolog  Patient is alert and oriented and can manage her pump  She is comfortable and confident in managing insulin pump  She is willing and able to manage pump and count carbs  She has all his own insulin pump/ supplies at bedside  Signed consent form and placed in chart  Demonstrated use of bedside pump log  Infusion set and insertion site to be changed every 48-72hrs or prn  Date of last infusion set and insertion site change was Tuesday 11/19/19 2000  The insertion is located to the LLQ abdomen and is clear, without redness  She verbalized  understanding        Acquired hypothyroidism  Managed per primary team  On levothyroxine 1112 mcg PO daily          Plan discussed with patient, family, and RN at bedside.     Austin Cerna NP  Endocrinology  [facility]

## 2019-11-21 NOTE — PROGRESS NOTES
[facility]  Cardiac Electrophysiology  Progress Note    Admission Date: 2019  Code Status: No Order   Attending Physician: Balaji Shah MD   Expected Discharge Date:   Principal Problem:<principal problem not specified>    Subjective:     Interval History: no events overnight. Telemetry without any episodes of AVB, new BBB, or pacing.     ROS  Objective:     Vital Signs (Most Recent):  Temp: 99.4 °F (37.4 °C) (19 0700)  Pulse: 83 (19 0730)  Resp: 14 (19)  BP: 127/76 (19 0700)  SpO2: 95 % (19) Vital Signs (24h Range):  Temp:  [97.6 °F (36.4 °C)-99.4 °F (37.4 °C)] 99.4 °F (37.4 °C)  Pulse:  [48-88] 83  Resp:  [10-33] 14  SpO2:  [93 %-100 %] 95 %  BP: (101-172)/(49-76) 127/76  Arterial Line BP: ()/(41-68) 147/54     Weight: 76.7 kg (169 lb)  Body mass index is 31.42 kg/m².     SpO2: 95 %  O2 Device (Oxygen Therapy): room air    Physical Exam   Constitutional: She appears well-developed and well-nourished.   HENT:   Head: Normocephalic and atraumatic.   Neck: Normal range of motion. Neck supple.   Cardiovascular: Normal rate and regular rhythm. Exam reveals no friction rub.   No murmur heard.  TVP RIJ   Pulmonary/Chest: Effort normal and breath sounds normal.   Abdominal: Soft. She exhibits no distension. There is no tenderness.   Musculoskeletal: She exhibits no edema.   Skin: Skin is warm and dry. No rash noted. No erythema.   Nursing note and vitals reviewed.      Significant Labs: All pertinent lab results from the last 24 hours have been reviewed.    Significant Imaging: Reviewed    Assessment and Plan:     S/P TAVR (transcatheter aortic valve replacement)  Patient with severe AS who presents for elective TAVR. Post procedure, patient has a TVP in place and EP is being consulted for PPM evaluation.     - LVEF: 65%  - Valve implanted: 26mm Evolute  - PRE EC2019 QRS HR 66, , QRS 74  - POST ECG 2019 HR 59, , QRS  90  - POD#1 ECG : QRS 72  - TVP in place. Threshold testing performed at bedside    Recommendations:  1) D/C TVP   2) EP will sign off. Please call if we can be of any assistance        Arnaud Albright MD  Cardiac Electrophysiology  [facility]

## 2019-11-21 NOTE — SUBJECTIVE & OBJECTIVE
Interval History: no events overnight. Telemetry without any episodes of AVB, new BBB, or pacing.     ROS  Objective:     Vital Signs (Most Recent):  Temp: 99.4 °F (37.4 °C) (11/21/19 0700)  Pulse: 83 (11/21/19 0730)  Resp: 14 (11/21/19 0730)  BP: 127/76 (11/21/19 0700)  SpO2: 95 % (11/21/19 0730) Vital Signs (24h Range):  Temp:  [97.6 °F (36.4 °C)-99.4 °F (37.4 °C)] 99.4 °F (37.4 °C)  Pulse:  [48-88] 83  Resp:  [10-33] 14  SpO2:  [93 %-100 %] 95 %  BP: (101-172)/(49-76) 127/76  Arterial Line BP: ()/(41-68) 147/54     Weight: 76.7 kg (169 lb)  Body mass index is 31.42 kg/m².     SpO2: 95 %  O2 Device (Oxygen Therapy): room air    Physical Exam   Constitutional: She appears well-developed and well-nourished.   HENT:   Head: Normocephalic and atraumatic.   Neck: Normal range of motion. Neck supple.   Cardiovascular: Normal rate and regular rhythm. Exam reveals no friction rub.   No murmur heard.  TVP RIJ   Pulmonary/Chest: Effort normal and breath sounds normal.   Abdominal: Soft. She exhibits no distension. There is no tenderness.   Musculoskeletal: She exhibits no edema.   Skin: Skin is warm and dry. No rash noted. No erythema.   Nursing note and vitals reviewed.      Significant Labs: All pertinent lab results from the last 24 hours have been reviewed.    Significant Imaging: Reviewed

## 2019-11-25 ENCOUNTER — PATIENT OUTREACH (OUTPATIENT)
Dept: ADMINISTRATIVE | Facility: CLINIC | Age: 79
End: 2019-11-25

## 2019-11-25 NOTE — PROGRESS NOTES
C3 nurse attempted to contact patient. No answer. The following message was left for the patient to return the call:  Good afternoon  I am a nurse calling on behalf of [facility] from the [facility].  This is a Transitional Care Call for Mary Prince. When you have a moment please contact us at [phone] or [phone] Monday through Friday, between the hours of 8 am to 4 pm. We look forward to speaking with you. On behalf of [facility] have a nice day.    The patient has a scheduled HOSFU appointment with Interventional Cardiology on 12/18/19 @ 1600. Patient has non [facility] PCP.

## 2019-11-25 NOTE — PROGRESS NOTES
"  Physical Therapy Daily Treatment Note     Name: Mary Prince  Clinic Number: [id number]    Therapy Diagnosis:   Encounter Diagnoses   Name Primary?    Impaired functional mobility, balance, gait, and endurance Yes    Bilateral arm weakness      Physician: Poncho Saunders MD    Visit Date: 11/26/2019    Medical Diagnosis: B arm weakness  Evaluation Date: 5-15-19  Authorization Period Expiration: 9/19/2020  NEW Plan of Care Certification Period: 9/17/2019-11/17/2019  POC expiration: 11/30/2019  Visit #/Visits authorized: 6/12 new referral  -- KX modifier (33 total visits 2019)     Time In: 1030  Time Out: 1115  Total Billable Time: 45 minutes    Precautions: Standard and Fall    Subjective     Pt reports: She fell twice last Tuesday following PT. Both times in the bathroom, once due to wet floor and second time she is unsure what happened. She is following orthopedist recommendations to use RW now. She states the cardiologist reports she can return to normal activity.     She was compliant with home exercise program.  Response to previous treatment: little soreness  Functional change: her balance is improved    Pain: 0/10  Location: n/a    Objective   Mary participated in therapeutic exercises to improve functional mobility and safety for 0 minutes, including:      Mayr participated in neuromuscular re-education activities to improve: Balance, Coordination, Kinesthetic, Proprioception and Posture for 35 minutes. The following activities were included:  x10 sit to stand from EOM to RW with no UE support    Ballet bar:  Ladder drills 3 laps with 1 LE per rung, no UE support, CGA   3 laps side stepping with two feet per rung, no UE support, CGA  Tandem balance B LE 2 x 30" intermittent UE support, CGA  Foam: x10 step ups onto foam   2 x 10 reps of mini squats on foam   2 x10 reps of hip abduction on foam with 1 UE support    Mary participated in gait training to improve functional mobility and safety for " 10 minutes, includinMWT with RW: 804' (approximately due to odometer broken)     Home Exercises Provided and Patient Education Provided     Education provided:   - increased joint tension due to swelling, use of ice to decrease swelling    Written Home Exercises Provided: yes.  Exercises were reviewed and Mary was able to demonstrate them prior to the end of the session.  Mary demonstrated good  understanding of the education provided.     See EMR under Patient Instructions for exercises provided 2019.    Assessment     Mary tolerates PT session well today. She tolerates 6MWT well and appears to be much more sturdy with use of RW. PT encourages continued use of RW. PT to fully reassess next visit but extending POC through the end of the year (2019) in order to achieve remaining goals and work toward management of conditions at home. 6MWT goal added as pt had cardiac surgery last week. Pt is appropriate for skilled PT services due to decreased balance (pt fell twice last week), impaired endurance, and overall poor strength in B LE.        Mary is progressing well towards her goals.   Pt prognosis is Good.      Pt will continue to benefit from skilled outpatient physical therapy to address the deficits listed in the problem list box on initial evaluation, provide pt/family education and to maximize pt's level of independence in the home and community environment.      Pt's spiritual, cultural and educational needs considered and pt agreeable to plan of care and goals.     Anticipated barriers to physical therapy: none     Goals:   Short Term GOALS: (5 weeks)  1. Pt to increase 30 second sit to stand score to 9 without use of UE. MET (2019)  2. Pt to increase Davidson Balance Score to 43 in order to decrease her risk for fall. MET (2019)  3. Pt to increase TUG score with use of AD to </= 20 seconds.MET (2019)  4. Pt will increase [place] condition 2 to 20 seconds with SBA. MET  (8/23/2019)  Long Term GOALS: (10 weeks)  1. Pt will increase 30 second sit to stand score to 11 without use of UEs. MET (7/23/2019)  2. Pt will increase DGI score to 16 in order to decrease her risk for fall. MET (10/15/2019) previously, inconsistent  2a. New goal 10/15/2019: Pt will increase DGI score to at least 19 in order to improve overall mobility and decrease risk for fall.  3. Pt will pass [place] condition 2 with minimal sway. MET preiously, today F at 19 seconds  4. Pt will increase self selected walking speed to .7 in order to better be able to participate in pilgrimage. MET (7/23/2019)  4a (new goal 7/23/2019) Pt will increase self selected walking speed without AD to .9 in order to better be able to participate in pilgrimage. MET (10/15/2019) previously, inconsistent  5. Pt will increase TUG score with use of AD to </= 15 seconds in order to better participate in pilgrimage. MET (7/23/2019)  6. Pt will increase Davidson Balance score to 51 in order to decrease her risk for fall. MET (9/17/2019) previously, inconsistent  7. New goal 10/15/2019: Pt will pass condition 3 in [place] sensory testing in order to improve her overall balance. ongoing  8. New goal 10/15/2019: Pt will improve [place]athanael sensory testing condition 6 to at least 20 seconds in order to improve balance. Ongoing  9. New goal 11/26/2019: Pt will increase score on 6MWT to at least 950' with or without AD.  Plan     PT to continue through the end of the year, POC extended through 12/31/2019 to achieve remaining goals and self management at home.    Efrain David, PT  11/26/2019

## 2019-11-25 NOTE — PATIENT INSTRUCTIONS
After Your Transcatheter Aortic Valve Replacement (TAVR)  You have just had surgery to replace your aortic valve with a new biological tissue valve. When you go home, follow all instructions for medicines, pain control, diet, activity, and wound care. Make sure to keep all your follow-up appointments. You should feel better after your surgery. Complete recovery may take several weeks. How long depends on whether the surgery was done through your groin, underneath the collarbone, or between your ribs. All of these methods are considered less invasive than open heart surgery. This means fewer complications and a shorter recovery time. Below are some general guidelines to follow as you heal.     A pill organizer can help you keep track of the medicines you take each day.   Recovering at home  Follow your healthcare providers directions for recovery at home. It may take several weeks to get back to your normal routine. Using the groin artery is the least invasive method and heals the fastest. TAVR done between your ribs requires a larger incision takes longer to recover from.  During your recovery:  · Take medicine as directed. Take pain medicine, blood thinners, and any other medicine exactly as your healthcare provider advises. You will likely need to take aspirin and another blood thinner (antiplatelets) for a period. Youll need to take the aspirin for the rest of your life. Be sure your doctor knows about all other medicines you take, including over-the-counter medicines and dietary supplements.  · Care for your incision. Its normal for your incision to be bruised, itchy, or sore while its healing. Your incision may take a week or more to heal. A surgery site between your ribs will take longer to heal than one in your groin. Care for the bandage and incision as advised. Wash it every day with warm water and soap. Gently pat it dry. Dont put powder, lotion, or ointment on the incision until its  healed.  · Shower carefully. Unless youre told otherwise, you can shower once you get home. Use warm water and a mild soap. Avoid hot water because it can make you feel lightheaded. Dont take a bath until your healthcare provider says its OK. Also dont sit in a swimming pool or hot tub until your doctor says its OK. If your surgery was between your ribs, you may need to keep the incision site dry for a certain period.  · Avoid activities as directed. Your doctor may tell you to avoid strenuous activities for a week or more. This includes no heavy lifting. Instructions on what to do are different depending on how your surgery was done. Your doctor will give you specific instructions that are appropriate for you.  · Dont drive if you are taking opioid pain medicine. These medicines can make you feel sleepy and it is unsafe to drive or operate heavy machinery while taking them.  · Walk regularly. One of the best ways to get stronger is to walk. If your doctor agrees, start with short walks at home. Walk a little more each day. Take someone with you until you feel OK to walk alone. Your healthcare provider may recommend a cardiac rehab program. This will allow you to be closely monitored  by trained personnel during exercise. Most insurance companies will cover these programs.  Call 911  Call 911 for immediate care if you have:  · Chest pain  · Severe difficulty breathing  · Signs of stroke such as sudden numbness or weakness in the face, arms, or legs  When should I call my healthcare provider?  Seek immediate medical help if you have any of the following symptoms:  · Bowel movement that is bright red  · Bleeding that is frequent or persistent, such as nosebleeds  · Chills or fever of 100.4°F (38°C) or higher  · Dizziness, lightheadedness, or fainting  · Redness, swelling, bleeding, warmth, or fluid draining at the incision site  · Weight gain of more than 2 to 3 pounds in 24 hours or more than 5 pounds in 1  week  · Swelling in your hands, feet, or ankles  · Shortness of breath that doesnt get better when you rest  · Pain that gets worse or doesnt go away  · Fast, slow, or irregular pulse  · Worsening or severe fatigue  · Other signs or symptoms as indicated by your healthcare provider   Follow-up care  Follow-up visits with your healthcare provider help make sure youre recovering well. In fact, to keep feeling your best, youll need regular checkups for the rest of your life. During these visits, you may have:  · Blood tests to monitor the function of your heart and kidneys, and check for anemia  · Echocardiograms to check how well your heart and new heart valve are working  · Electrocardiograms (ECGs) to show if your heart rhythm has changed after your valve replacement  Staying healthy after a heart valve replacement  · Learn to take your own blood pressure and pulse. Keep a record of your results. Ask your healthcare provider which readings mean that you need medical care.  · Weigh yourself everyday and keep a record of this. It is normal for your weight to fluctuate 2 to 3 pounds in a day, anything more than this could mean you are retaining fluid and developing heart failure  · Tell all your healthcare providers and dentists youve had a valve replacement. Before any dental procedure, you will need to take an antibiotic to protect your new heart valve.  · Take any prescribed blood thinners exactly as directed.  · Make lifestyle changes as advised by your healthcare provider. Keep in mind that healthy habits such as exercise, and a healthy diet can strengthen your heart.  · Alert your healthcare provider to any new symptoms.    © 3546-8710 [employer]. [address] [place] [address], [place] [address]. All rights reserved. This information is not intended as a substitute for professional medical care. Always follow your healthcare professional's instructions.

## 2019-11-26 ENCOUNTER — CLINICAL SUPPORT (OUTPATIENT)
Dept: REHABILITATION | Facility: HOSPITAL | Age: 79
End: 2019-11-26
Payer: MEDICARE

## 2019-11-26 DIAGNOSIS — Z74.09 IMPAIRED FUNCTIONAL MOBILITY, BALANCE, GAIT, AND ENDURANCE: Primary | ICD-10-CM

## 2019-11-26 DIAGNOSIS — R29.898 BILATERAL ARM WEAKNESS: ICD-10-CM

## 2019-11-26 DIAGNOSIS — M25.512 CHRONIC PAIN OF BOTH SHOULDERS: ICD-10-CM

## 2019-11-26 DIAGNOSIS — M25.611 SHOULDER JOINT STIFFNESS, BILATERAL: ICD-10-CM

## 2019-11-26 DIAGNOSIS — M25.612 SHOULDER JOINT STIFFNESS, BILATERAL: ICD-10-CM

## 2019-11-26 DIAGNOSIS — M25.511 CHRONIC PAIN OF BOTH SHOULDERS: ICD-10-CM

## 2019-11-26 DIAGNOSIS — G89.29 CHRONIC PAIN OF BOTH SHOULDERS: ICD-10-CM

## 2019-11-26 PROCEDURE — 97116 GAIT TRAINING THERAPY: CPT | Mod: KX,PO,59

## 2019-11-26 PROCEDURE — 97110 THERAPEUTIC EXERCISES: CPT | Mod: PO

## 2019-11-26 PROCEDURE — 97112 NEUROMUSCULAR REEDUCATION: CPT | Mod: KX,PO

## 2019-11-26 PROCEDURE — 97530 THERAPEUTIC ACTIVITIES: CPT | Mod: PO

## 2019-11-26 NOTE — PROGRESS NOTES
"  Occupational Therapy Daily Treatment Note and updated POC     Date: 11/26/2019  Name: Mary Prince  Clinic Number: [id number]    Therapy Diagnosis:   Encounter Diagnoses   Name Primary?    Shoulder joint stiffness, bilateral     Chronic pain of both shoulders      Physician: Ahmet Maurer MD    Physician Orders: Eval and treat  Medical Diagnosis: Brachial Plexus injury  Evaluation Date: 9/19/2019  Plan of Care Expiration Period: 12/20/2019  Insurance Authorization period Expiration: 6/20/20  Date of Return to MD: MICHAEL        Visit # / Visits Authorized: 18/ TBD  Time In: 9:45am  Time Out: 10:30am  Total Billable (one on one) Time: 45 minutes     Precautions: Standard and Fall      Subjective     Pt reports: "Feeling ok, getting stronger everyday"  she was not compliant with home exercise program given last session.   Response to previous treatment: positive  Functional change: cooking more    Pain: 2/10  Location: L shoulder     Objective     Mary participated in therapeutic exercises for 30 minutes including:  - Seated Rows blue theraband 2x30  - Seated extension orange theraband 2x30  - seated IR green theraband 2x30  - seated ER orange theraband 2x30      -R FF: 140  - L FF:140  - R Abd: 155  - L Abd: 155  - R : 35#  - L :31#  - L wrist ext/Flex: 60/70  - R wrist ext/flex: 70/60    Mary participated in therapeutic activities for 15 minutes  - Therapeutic listening: discussed her recent procedure and her current medical issues      Home Exercises and Education Provided     Education provided:   - Shoulder HEP  -  HEP  - Progress towards goals     Written Home Exercises Provided: yes.  Exercises were reviewed and Mary was able to demonstrate them prior to the end of the session.  Mary demonstrated good  understanding of the HEP provided.        Assessment     Mary had good tolerance to treatment this date.ROM goals are being met but strength continues to be a barrier. Unfortunately, " Mary has not been consistent with her HEP which is likely why her strength is not improving.She continues with UE weakness affecting her function in daily life.       Mary is progressing slowly towards her goals and there are no updates to goals at this time. Pt prognosis is Fair.     Pt will continue to benefit from skilled outpatient occupational therapy to address the deficits listed in the problem list on initial evaluation provide pt/family education and to maximize pt's level of independence in the home and community environment.     Anticipated barriers to occupational therapy: none at this time    Pt's spiritual, cultural and educational needs considered and pt agreeable to plan of care and goals.    Goals:  Short Term Goals: 5 weeks   - Pt. Will be educated in HEP and be able to return demonstrate with visual and verbal cues MET 10/24/2019  - Pt. Will have WNL PROM of B shoulders in FF/Ext/Abd/IR/ER with no greater than 2/10 pain for increased ability to perform functional tasks MET 10/24/2019  - Pt. Will improve  strength in both hands by at least 10# for increased functional performance during carrying tasks in progress  - Pt. Will educated in adaptive strategies to help improve her independence around the home in progress     Long Term Goals: 10 weeks   - Pt. Will be I with HEP in progress  - Pt. Will have an increase of at least 140 degrees AROM in both shoulders in FF/Abd for increased ability to perform functional tasks  MET  - Pt. Will improve L IR AROM by 40 degrees and R IR to WFL for improved ability to perform dressing tasks in progress  - Pt. Will have improved Flex/Ext AROM of both wrists by at least 20 degrees for increased functional use MET  - Pt, will improve  strength in both hands by at least 15# in progress  - Pt. Will improve 9hpt by 10 seconds in each hand for increased hand manipulation in progress  - Pt. Will improve R pinch strength to WNL compared to L in progress  -  Pt. Will be able to successfully complete all areas of toileting Mod I in progress    Plan   Plan to see for 6 more visits to work on HEP compliance and then discharge. (12/19/2020)      JUAN Freeman

## 2019-12-03 ENCOUNTER — CLINICAL SUPPORT (OUTPATIENT)
Dept: REHABILITATION | Facility: HOSPITAL | Age: 79
End: 2019-12-03
Payer: MEDICARE

## 2019-12-03 DIAGNOSIS — M25.512 CHRONIC PAIN OF BOTH SHOULDERS: ICD-10-CM

## 2019-12-03 DIAGNOSIS — M25.511 CHRONIC PAIN OF BOTH SHOULDERS: ICD-10-CM

## 2019-12-03 DIAGNOSIS — Z74.09 IMPAIRED FUNCTIONAL MOBILITY, BALANCE, GAIT, AND ENDURANCE: Primary | ICD-10-CM

## 2019-12-03 DIAGNOSIS — M25.611 SHOULDER JOINT STIFFNESS, BILATERAL: ICD-10-CM

## 2019-12-03 DIAGNOSIS — M25.612 SHOULDER JOINT STIFFNESS, BILATERAL: ICD-10-CM

## 2019-12-03 DIAGNOSIS — G89.29 CHRONIC PAIN OF BOTH SHOULDERS: ICD-10-CM

## 2019-12-03 DIAGNOSIS — R29.898 BILATERAL ARM WEAKNESS: ICD-10-CM

## 2019-12-03 PROCEDURE — 97110 THERAPEUTIC EXERCISES: CPT | Mod: PO

## 2019-12-03 PROCEDURE — 97530 THERAPEUTIC ACTIVITIES: CPT | Mod: PO

## 2019-12-03 NOTE — PLAN OF CARE
Physical Therapy Daily Treatment Note     Name: Mary Prince  Clinic Number: [id number]    Therapy Diagnosis:   Encounter Diagnoses   Name Primary?    Impaired functional mobility, balance, gait, and endurance Yes    Bilateral arm weakness      Physician: Ahmet Maurer MD    Visit Date: 12/3/2019    Medical Diagnosis: B arm weakness  Evaluation Date: 5-15-19  Authorization Period Expiration: 9/19/2020  Plan of Care Certification Period: 9/17/2019-11/17/2019  NEW POC expiration: 12/31/2019  Visit #/Visits authorized: 7/12 new referral  -- KX modifier (33 total visits 2019)     Time In: 949  Time Out: 1030  Total Billable Time: 41 minutes    Precautions: Standard and Fall    Subjective     Pt reports: She is doing well today. Had a great Thanksgiving, no falls and was able to travel to her cousins house. She continues to use RW and knee brace which helps to reduce her pain.     She was compliant with home exercise program.  Response to previous treatment: little soreness  Functional change: her balance is improved    Pain: 0/10  Location: n/a    Objective   Mary participated in therapeutic exercises to improve functional mobility and safety for 0 minutes, including:      Mary participated in neuromuscular re-education activities to improve: Balance, Coordination, Kinesthetic, Proprioception and Posture for 41 minutes. The following activities were included:      6/25/2019 7/23/2019 8/23/2019 9/17/2019 10/15/2019 11/12/19 12/3/19   TUG 26.37 seconds with cane  26.08 seconds without AD 13.7 seconds with cane  10.7 seconds without AD 9.4 seconds with cane  9.6 seconds without AD 8.6 seconds without AD 10.9 + 8.9 + 9.0 = 9.6 seconds without AD 14.6 + 11.8 + 11.4 = 12.6 sec without AD 9.1 + 9.8 =  9.5 sec with RW  9.2 + 9.1 = 9.2 sec without AD   30 second sit to stand 7 without UE use 14 without UE use 18 without UE use 19 without UE use (cues to come fully to standing) 22 without UE use (cues to fully sit  "and stand) 18 without UE use 20 without UE use   Self selected walking speed .54 m/sec .75 m/s (6m/8.0 seconds)  G-code = CK .83 m/s (6m/7.2sec)  G-code = CJ .83 m/s (6m/7.2sec)  G-code = CJ .91 m/s (6m/6.6sec)  G-code = CJ .85 m/s (6m/7.1sec)  G-code=CJ 1.0 m/s (6m/6.0sec) with RW  .95 m/s  (6m/6.3sec)  Without AD    G-code= CJ   Fast walking speed .71 m/sec .89 m/s (6m/6.7 seconds) .94 m/s (6m/6.4sec) 1.01 m/s (6m/5.9sec) 1.01 m/s (6m/5.9sec) .90 m/s (6m/6.7sec) 1.1 m/s (6m/5.4sec)  With RW   RUIZ 38/56 41/56 49/56 51/56 51/56 47/56 50/56   DGI 10/24 14/24 15/24 15/24 17/24 15/24 16      KIZZY SENSORY TESTING:  (P= Pass, F= Fail; note any sway; hold each position for 30")  Condition 1: (firm surface/feet together/eyes open) P  Condition 2: (firm surface/feet together/eyes closed) P  Condition 3: (firm surface/feet in tandem/eyes open) F; 9 seconds  Condition 4: (firm surface/feet in tandem/eyes closed) NT  Condition 5: (soft surface/feet together/eyes open) P  Condition 6: (soft surface/feet together/eyes closed) F; 10 seconds  Condition 7: (Fakuda step test), measure distance varied from center starting position NT      RUIZ Assessment  1. Sitting to Standin - able to stand without using hands and stabilize independently  2. Standing Unsupported: 4 - able to stand safely 2 minutes without hold  3. Sitting Unsupported: 4 - able to sit safely and securely 2 minutes  4. Standing to Sittin - sits safely with minimal use of hands  5. Pivot Transfer: 4 - able to trnasfer safely with minor use of hands  6. Standing with Eyes Closed: 4 - albe to stand 10 seconds safely  7. Standing with Feet Together: 4 - able to place feet together independently and stand 1 minute safely  8. Reaching Forward with Outstretched Arm: 4 - can reach forward confidently 25 cm/10 inches  9. Retrieving Object from Floor: 4 - able to  slipper safely and easily  10. Turning to Look Behind: 3 - looks behind one side only, other " side less weight shift  11. Turning 360 Degrees: 3 - able to trun 360 safely one side only in 4 seconds or less  12. Placing Alternate Foot on Step: 4 - able to stand independently safely and complete 8 steps in 20 seconds  13. Standing with One Foot in Front: 3 - able to place foot ahead of other independently and hold 30 seconds  14. Standing on One Foot: 1 - tries to lift leg and unable to hold 3 seconds but remains standing independently  Total: 50  Maximum: 56  (low fall risk)    0-20= high fall risk  21-40= moderate fall risk  41-56= low fall risk    Fall risk cut-off scores:   Elderly and History of falls: <51/56   Elderly and No history of falls: <42/56   CVA: <45/56       DYNAMIC GAIT INDEX:    1. Gait level surface= 2   3) Normal: walks 20', no AD, good speed, no evidence for imbalance,   normal gait pattern   2) Mild impairment: walks 20', uses AD, slower speed, mild gait deviations   1)  moderate impairment: walks 20', slow speed, abnormal gait pattern,   evidence for imbalance.   0)  severe impairment: cannot walk 20' without assistance, severe gait   deviations or imbalance  2. Change in gait speed: 2   3) Normal: able to smoothly change walking speed without loss of balance   or gait deviation. Shows a significant difference in walking speeds    between fast, slow, and normal speeds.    2) Mild impairment: is able to change speeds but demonstrates mild gait   deviations, or not gait deviations but unable to achieve a significant   change in velocity, or uses AD   1) Moderate impairment: makes only minor adjustments in walking speed,   or accomplishes a change in speed with significant gait deviation, or   changes speed but loses balance and is able to recover and keep    walking    0) Severe impairment: cannot change speeds, or loses balance and has to   reach for wall or be caught.  3. Gait with horizontal head turns: 1   3) Normal: performs head turns smoothly with no change in gait.   2) Mild  "impairment: performs head turns smoothly with slight velocity, I.e.   Minor disruption to smooth gait path or uses AD   1) Moderate impairment: performs head turns with moderate change in gait   velocity, slows down, staggers but recovers, can continue to walk   0) Severe impairment: performs task with severe disruption of gait, i.e.   Staggers outside of 15" path, loses balance , stops, reaches for wall  4. Gait with vertical head turns: 2   3) Normal: performs head turns smoothly with no change in gait   2) Mild impairment: performs head turns smoothly with slight velocity, I.e.   Minor disruption to smooth gait path or uses AD   1) Moderate impairment: performs head turns with moderate change in gait   velocity, slows down, staggers but recovers, can continue to walk   0) Severe impairment: performs task with severe disruption of gait, i.e.   Staggers outside of 15" path, loses balance , stops, reaches for wall  5. Gait and pivot turn: 2   3)  Normal: pivot turns safely within 3 sec and stops quickly with no LOB   2)  Mild impairment: pivot turns safely in > 3 sec and stops with no LOB   1) Moderate impairment: turns slowly, requires verbal cueing, requires   several small steps to catch balance following turn and stop   0)  Severe impairment: cannot turn safely, requires assistance to stop and   Turn  6. Step over obstacle:  2   3) normal: is able to step over box without changing speed, no LOB   2)  Mild impairment: is able to step over box, but must slow down and adjust   steps to clear box safely   1)  Moderate impairment: is able to step over box but must stop, then step   over. May require verbal cueing   0) Severe impairment: cannot perform without assistance  7. Step around obstacle: 3   3) Normal: able to walk around cones safely without changing gait speed;   no LOB   2)  Mild impairment: able to step around cones, but must slow down to adjust   steps to clear cones   1) moderate impairment: able to " clear cones but must significantly slow   speed to accomplish or requires verbal cueing   0)  Severe impairment: unable to clear cones, walks into 1 or both cones, or   requires physical assistance  8. Steps: 2   3) Normal: alternating feet, no rail   2) Mild impairment: alternating feet, must use rail   1) Moderate impairment: 2 feet a stair, must use rail   0)  Severe impairment: cannot perform safely  Total: 16/24    Mary participated in gait training to improve functional mobility and safety for 10 minutes, including:       Home Exercises Provided and Patient Education Provided     Education provided:   - continued use of RW to ensure healing of L LE    Written Home Exercises Provided: yes.  Exercises were reviewed and Mary was able to demonstrate them prior to the end of the session.  Mary demonstrated good  understanding of the education provided.     See EMR under Patient Instructions for exercises provided 6/28/2019.    Assessment     Mary tolerates PT reassessment well today. She was able to score at or better than prior to her fall causing the L bone bruise. Pt reports that with use of the walker, advil, and knee brace her pain continually reduces. Pt continues to be at risk for fall based on DGI score. Although pt did not use RW for DGI testing she continues to use it outside of therapy. PT to continue through the end of the year in order to decrease likelihood of fall and in attempts to have pt not use RW again if knee pain continues to subside. Pt remains appropriate for skilled PT services, her goals have been updated and remain appropriate for the next month of skilled services.       Mary is progressing well towards her goals.   Pt prognosis is Good.      Pt will continue to benefit from skilled outpatient physical therapy to address the deficits listed in the problem list box on initial evaluation, provide pt/family education and to maximize pt's level of independence in the home and  community environment.      Pt's spiritual, cultural and educational needs considered and pt agreeable to plan of care and goals.     Anticipated barriers to physical therapy: none     Goals:   Short Term GOALS: (5 weeks)  1. Pt to increase 30 second sit to stand score to 9 without use of UE. MET (7/23/2019)  2. Pt to increase Davidson Balance Score to 43 in order to decrease her risk for fall. MET (8/23/2019)  3. Pt to increase TUG score with use of AD to </= 20 seconds.MET (7/23/2019)  4. Pt will increase Nathanael condition 2 to 20 seconds with SBA. MET (8/23/2019)  Long Term GOALS: (10 weeks)  1. Pt will increase 30 second sit to stand score to 11 without use of UEs. MET (7/23/2019)  2. Pt will increase DGI score to 16 in order to decrease her risk for fall. MET (10/15/2019)   2a. New goal 10/15/2019: Pt will increase DGI score to at least 19 in order to improve overall mobility and decrease risk for fall. ongoing  3. Pt will pass [place] condition 2 with minimal sway. MET previously  4. Pt will increase self selected walking speed to .7 in order to better be able to participate in pilgrimage. MET (7/23/2019)  4a (new goal 7/23/2019) Pt will increase self selected walking speed without AD to .9 in order to better be able to participate in pilgrimage. MET (10/15/2019)   5. Pt will increase TUG score with use of AD to </= 15 seconds in order to better participate in pilgrimage. MET (7/23/2019)  6. Pt will increase Davidson Balance score to 51 in order to decrease her risk for fall. MET (9/17/2019) previously, inconsistent  7. New goal 10/15/2019: Pt will pass condition 3 in Nathanael sensory testing in order to improve her overall balance. ongoing  8. New goal 10/15/2019: Pt will improve Nathanael sensory testing condition 6 to at least 20 seconds in order to improve balance. Ongoing  9. New goal 11/26/2019: Pt will increase score on 6MWT to at least 950' with or without AD. ongoing  Plan   PT to continue through the end of the year, POC  extended through 12/31/2019 to achieve remaining goals and self management at home.    Efrain David, PT  12/03/2019

## 2019-12-03 NOTE — PROGRESS NOTES
"  Occupational Therapy Daily Treatment Note     Date: 12/3/2019  Name: Mary Prince  Clinic Number: [id number]    Therapy Diagnosis:   Encounter Diagnoses   Name Primary?    Shoulder joint stiffness, bilateral     Chronic pain of both shoulders      Physician: Ahmet Maurer MD    Physician Orders: Eval and treat  Medical Diagnosis: Brachial Plexus injury  Evaluation Date: 9/19/2019  Plan of Care Expiration Period: 12/20/2019  Insurance Authorization period Expiration: 6/20/20  Date of Return to MD: MICHAEL        Visit # / Visits Authorized: 19/ TBD  Time In: 10:30am  Time Out: 11:15am  Total Billable (one on one) Time: 45 minutes     Precautions: Standard and Fall      Subjective     Pt reports: "I feel like I am getting stronger everyday"  she was not compliant with home exercise program given last session.   Response to previous treatment: positive  Functional change: cooking more    Pain: 2/10  Location: L shoulder     Objective     Mary participated in therapeutic exercises for 30 minutes including:  - Seated Rows blue theraband 2x30  - Seated extension green theraband 2x30  - seated IR green theraband 2x30  - seated ER orange theraband 2x30    Mary participated in therapeutic activities for 15 minutes  - Seated EOM toileting body mechanics activity, focus on oblique contraction with reach for small balls then IR and trunk rotation and place balls into small basket  -Discussed continued practice at home and discussed practice with milagros hinton Pt. Already owns for an upcoming trip      Home Exercises and Education Provided     Education provided:   - Shoulder HEP  -  HEP  - Progress towards goals     Written Home Exercises Provided: yes.  Exercises were reviewed and Mary was able to demonstrate them prior to the end of the session.  Mary demonstrated good  understanding of the HEP provided.        Assessment     Mary had good tolerance to treatment this date. Her ability to toilet " independently has not been met. She is uninterested in adaptive tools to assist but was open to the idea to use them while on her trip out of the country. ROM has improved overall but poor compliance with HEP has limited her progression toward her goals at this time. She was successful at eating soup in a restaurant recently. We discussed again challenging herself in public with soup. Overall, she has made good progress and would continue to benefit from skilled therapy to ensure better HEP compliance before d/c.       Mary is progressing slowly towards her goals and there are no updates to goals at this time. Pt prognosis is Fair.     Pt will continue to benefit from skilled outpatient occupational therapy to address the deficits listed in the problem list on initial evaluation provide pt/family education and to maximize pt's level of independence in the home and community environment.     Anticipated barriers to occupational therapy: none at this time    Pt's spiritual, cultural and educational needs considered and pt agreeable to plan of care and goals.    Goals:  Short Term Goals: 5 weeks   - Pt. Will be educated in HEP and be able to return demonstrate with visual and verbal cues MET 10/24/2019  - Pt. Will have WNL PROM of B shoulders in FF/Ext/Abd/IR/ER with no greater than 2/10 pain for increased ability to perform functional tasks MET 10/24/2019  - Pt. Will improve  strength in both hands by at least 10# for increased functional performance during carrying tasks in progress  - Pt. Will educated in adaptive strategies to help improve her independence around the home in progress     Long Term Goals: 10 weeks   - Pt. Will be I with HEP in progress  - Pt. Will have an increase of at least 140 degrees AROM in both shoulders in FF/Abd for increased ability to perform functional tasks  MET  - Pt. Will improve L IR AROM by 40 degrees and R IR to WFL for improved ability to perform dressing tasks in progress  -  Pt. Will have improved Flex/Ext AROM of both wrists by at least 20 degrees for increased functional use MET  - Pt, will improve  strength in both hands by at least 15# in progress  - Pt. Will improve 9hpt by 10 seconds in each hand for increased hand manipulation in progress  - Pt. Will improve R pinch strength to WNL compared to L in progress  - Pt. Will be able to successfully complete all areas of toileting Mod I in progress    Plan   Plan to see for 6 more visits to work on HEP compliance and then discharge. (12/20/2019)      JUAN Freeman

## 2019-12-04 NOTE — PROGRESS NOTES
"  Physical Therapy Daily Treatment Note     Name: Mary Prince  Clinic Number: [id number]    Therapy Diagnosis:   No diagnosis found.  Physician: No ref. provider found    Visit Date: 12/5/2019    Medical Diagnosis: B arm weakness  Evaluation Date: 5-15-19  Authorization Period Expiration: 9/19/2020  Plan of Care Certification Period: 9/17/2019-11/17/2019  NEW POC expiration: 12/31/2019  Visit #/Visits authorized: 8/12 new referral  -- KX modifier (35 total visits 2019)     Time In: 1115  Time Out: 1200  Total Billable Time: 45 minutes    Precautions: Standard and Fall    Subjective     Pt reports: She is doing well today. No new complaints knee is feeling okay. She took Advil and is wearing her knee sleeve. MD told her not to go to the gym until he clears her but she is able to attend PT.     She was compliant with home exercise program.  Response to previous treatment: little soreness and tired  Functional change: her balance is improved    Pain: 0/10  Location: n/a    Objective   Mary participated in therapeutic exercises to improve functional mobility and safety for 10 minutes, including:  10 minutes recumbent bike, lv 5    Mary participated in neuromuscular re-education activities to improve: Balance, Coordination, Kinesthetic, Proprioception and Posture for 35 minutes. The following activities were included:  x10 sit to stand from EOM to RW with no UE support, x3 additional standing on 4pt fitter with min-modA    // bars:  Tandem balance B LE 2 x 30" intermittent UE support, CGA  Foam: 2 x 30 seconds head turns L/R, CGA   2 x 30 seconds head turns up/down, CGA   2 x 30 seconds, eyes closed, Hemal    x10 step ups onto foam   2 x 10 reps of mini squats on foam  4 pt fitter:    2 x 30 seconds head turns L/R, CGA   2 x 30 seconds head turns up/down, CGA   2 x 30 seconds eyes closed, Hemal   x5 ea LE lead step ups    x8 ea LE lead step over    Mary participated in gait training to improve functional " mobility and safety for 0 minutes, including:       Home Exercises Provided and Patient Education Provided     Education provided:   - increased joint tension due to swelling, use of ice to decrease swelling    Written Home Exercises Provided: yes.  Exercises were reviewed and Mary was able to demonstrate them prior to the end of the session.  Mary demonstrated good  understanding of the education provided.     See EMR under Patient Instructions for exercises provided 6/28/2019.    Assessment     Mary tolerates PT session well today. She continues to demonstrate balance deficits and often avoids weight bearing onto R LE due to pain in this knee. She does well with sit<>stands today and would benefit from continued practice on foam and 4pt fitter.         Mary is progressing well towards her goals.   Pt prognosis is Good.      Pt will continue to benefit from skilled outpatient physical therapy to address the deficits listed in the problem list box on initial evaluation, provide pt/family education and to maximize pt's level of independence in the home and community environment.      Pt's spiritual, cultural and educational needs considered and pt agreeable to plan of care and goals.     Anticipated barriers to physical therapy: none     Goals:   Short Term GOALS: (5 weeks)  1. Pt to increase 30 second sit to stand score to 9 without use of UE. MET (7/23/2019)  2. Pt to increase Davidson Balance Score to 43 in order to decrease her risk for fall. MET (8/23/2019)  3. Pt to increase TUG score with use of AD to </= 20 seconds.MET (7/23/2019)  4. Pt will increase [place] condition 2 to 20 seconds with SBA. MET (8/23/2019)  Long Term GOALS: (10 weeks)  1. Pt will increase 30 second sit to stand score to 11 without use of UEs. MET (7/23/2019)  2. Pt will increase DGI score to 16 in order to decrease her risk for fall. MET (10/15/2019)   2a. New goal 10/15/2019: Pt will increase DGI score to at least 19 in order to improve  overall mobility and decrease risk for fall. ongoing  3. Pt will pass [place] condition 2 with minimal sway. MET previously  4. Pt will increase self selected walking speed to .7 in order to better be able to participate in pilgrimage. MET (7/23/2019)  4a (new goal 7/23/2019) Pt will increase self selected walking speed without AD to .9 in order to better be able to participate in pilgrimage. MET (10/15/2019)   5. Pt will increase TUG score with use of AD to </= 15 seconds in order to better participate in pilgrimage. MET (7/23/2019)  6. Pt will increase Davidson Balance score to 51 in order to decrease her risk for fall. MET (9/17/2019) previously, inconsistent  7. New goal 10/15/2019: Pt will pass condition 3 in Nathanael sensory testing in order to improve her overall balance. ongoing  8. New goal 10/15/2019: Pt will improve [place] sensory testing condition 6 to at least 20 seconds in order to improve balance. Ongoing  9. New goal 11/26/2019: Pt will increase score on 6MWT to at least 950' with or without AD. ongoing  Plan   PT to continue through the end of the year, POC extended through 12/31/2019 to achieve remaining goals and self management at home.      Efrain David, PT  12/04/2019

## 2019-12-05 ENCOUNTER — CLINICAL SUPPORT (OUTPATIENT)
Dept: REHABILITATION | Facility: HOSPITAL | Age: 79
End: 2019-12-05
Payer: MEDICARE

## 2019-12-05 DIAGNOSIS — G89.29 CHRONIC PAIN OF BOTH SHOULDERS: ICD-10-CM

## 2019-12-05 DIAGNOSIS — M25.512 CHRONIC PAIN OF BOTH SHOULDERS: ICD-10-CM

## 2019-12-05 DIAGNOSIS — Z74.09 IMPAIRED FUNCTIONAL MOBILITY, BALANCE, GAIT, AND ENDURANCE: Primary | ICD-10-CM

## 2019-12-05 DIAGNOSIS — M25.511 CHRONIC PAIN OF BOTH SHOULDERS: ICD-10-CM

## 2019-12-05 DIAGNOSIS — M25.611 SHOULDER JOINT STIFFNESS, BILATERAL: ICD-10-CM

## 2019-12-05 DIAGNOSIS — R29.898 BILATERAL ARM WEAKNESS: ICD-10-CM

## 2019-12-05 DIAGNOSIS — M25.612 SHOULDER JOINT STIFFNESS, BILATERAL: ICD-10-CM

## 2019-12-05 PROCEDURE — G8987 SELF CARE CURRENT STATUS: HCPCS | Mod: CJ,PO

## 2019-12-05 PROCEDURE — G8988 SELF CARE GOAL STATUS: HCPCS | Mod: CI,PO

## 2019-12-05 PROCEDURE — 97112 NEUROMUSCULAR REEDUCATION: CPT | Mod: KX,PO

## 2019-12-05 PROCEDURE — 97530 THERAPEUTIC ACTIVITIES: CPT | Mod: PO

## 2019-12-05 PROCEDURE — 97110 THERAPEUTIC EXERCISES: CPT | Mod: PO

## 2019-12-05 PROCEDURE — 97110 THERAPEUTIC EXERCISES: CPT | Mod: KX,PO

## 2019-12-05 NOTE — PROGRESS NOTES
"  Occupational Therapy Daily Treatment Note     Date: 12/5/2019  Name: Mary Prince  Clinic Number: [id number]    Therapy Diagnosis:   Encounter Diagnoses   Name Primary?    Shoulder joint stiffness, bilateral     Chronic pain of both shoulders      Physician: Ahmet Maurer MD    Physician Orders: Eval and treat  Medical Diagnosis: Brachial Plexus injury  Evaluation Date: 9/19/2019  Plan of Care Expiration Period: 12/20/2019  Insurance Authorization period Expiration: 6/20/20  Date of Return to MD: MICHAEL        Visit # / Visits Authorized: 20/ TBD  Time In: 10:30am  Time Out: 11:15am  Total Billable (one on one) Time: 45 minutes     Precautions: Standard and Fall      Subjective     Pt reports: "These improvements make me feel really encouraged. I am going to keep up with my exercises for sure"   she was somewhat compliant with home exercise program given last session.   Response to previous treatment: positive  Functional change: cooking more    Pain: 0/10  Location: L shoulder     Objective       Mary participated in therapeutic exercises for 30 minutes including:  - Seated Rows purple theraband 2x30  R : 40#  L : 38#  L IR: WFL  R IR: WFL  R 9HPT: 26 sec  L 9HPT: 28 sec  R tip: 8#  R lat: 9#  R 3 jaw: 8#    Mary participated in therapeutic activities for 15 minutes  - Seated EOM toileting body mechanics activity, focus on oblique contraction with reach for small balls then IR and trunk rotation and place balls into small basket      Home Exercises and Education Provided     Education provided:   - Shoulder HEP  -  HEP  - Progress towards goals     Written Home Exercises Provided: yes.  Exercises were reviewed and Mary was able to demonstrate them prior to the end of the session.  Mary demonstrated good  understanding of the HEP provided.        Assessment     Mary had good tolerance to treatment this date. She has met 8/12 goals.  strength goals likely not met due to poor HEP " compliance. Overall, she has made good progress and would continue to benefit from skilled therapy to ensure better HEP compliance before d/c.       Mary is progressing slowly towards her goals and there are no updates to goals at this time. Pt prognosis is Fair.     Pt will continue to benefit from skilled outpatient occupational therapy to address the deficits listed in the problem list on initial evaluation provide pt/family education and to maximize pt's level of independence in the home and community environment.     Anticipated barriers to occupational therapy: none at this time    Pt's spiritual, cultural and educational needs considered and pt agreeable to plan of care and goals.    Goals:  Short Term Goals: 5 weeks   - Pt. Will be educated in HEP and be able to return demonstrate with visual and verbal cues MET 10/24/2019  - Pt. Will have WNL PROM of B shoulders in FF/Ext/Abd/IR/ER with no greater than 2/10 pain for increased ability to perform functional tasks MET 10/24/2019  - Pt. Will improve  strength in both hands by at least 10# for increased functional performance during carrying tasks NOT MET  - Pt. Will educated in adaptive strategies to help improve her independence around the home MET     Long Term Goals: 10 weeks   - Pt. Will be I with HEP in progress  - Pt. Will have an increase of at least 140 degrees AROM in both shoulders in FF/Abd for increased ability to perform functional tasks  MET  - Pt. Will improve L IR AROM by 40 degrees and R IR to WFL for improved ability to perform dressing tasks MET  - Pt. Will have improved Flex/Ext AROM of both wrists by at least 20 degrees for increased functional use MET  - Pt, will improve  strength in both hands by at least 15# NOT MET  - Pt. Will improve 9hpt by 10 seconds in each hand for increased hand manipulation MET  - Pt. Will improve R pinch strength to WNL compared to L MET  - Pt. Will be able to successfully complete all areas of  toileting Mod I in progress    Plan   Plan to see for 6 more visits to work on HEP compliance and then discharge. (12/20/2019)      JUAN Freeman

## 2019-12-10 ENCOUNTER — CLINICAL SUPPORT (OUTPATIENT)
Dept: REHABILITATION | Facility: HOSPITAL | Age: 79
End: 2019-12-10
Payer: MEDICARE

## 2019-12-10 DIAGNOSIS — Z74.09 IMPAIRED FUNCTIONAL MOBILITY, BALANCE, GAIT, AND ENDURANCE: Primary | ICD-10-CM

## 2019-12-10 DIAGNOSIS — M25.511 BILATERAL SHOULDER PAIN, UNSPECIFIED CHRONICITY: ICD-10-CM

## 2019-12-10 DIAGNOSIS — M25.512 BILATERAL SHOULDER PAIN, UNSPECIFIED CHRONICITY: ICD-10-CM

## 2019-12-10 DIAGNOSIS — M25.612 SHOULDER JOINT STIFFNESS, BILATERAL: ICD-10-CM

## 2019-12-10 DIAGNOSIS — R29.898 BILATERAL ARM WEAKNESS: ICD-10-CM

## 2019-12-10 DIAGNOSIS — M25.611 SHOULDER JOINT STIFFNESS, BILATERAL: ICD-10-CM

## 2019-12-10 PROCEDURE — 97530 THERAPEUTIC ACTIVITIES: CPT | Mod: PO

## 2019-12-10 PROCEDURE — 97110 THERAPEUTIC EXERCISES: CPT | Mod: KX,PO

## 2019-12-10 PROCEDURE — 97110 THERAPEUTIC EXERCISES: CPT | Mod: PO

## 2019-12-10 PROCEDURE — 97112 NEUROMUSCULAR REEDUCATION: CPT | Mod: KX,PO

## 2019-12-10 NOTE — PROGRESS NOTES
Physical Therapy Daily Treatment Note     Name: Mary Purdy Prince  Clinic Number: [id number]    Therapy Diagnosis:   Encounter Diagnoses   Name Primary?    Impaired functional mobility, balance, gait, and endurance Yes    Bilateral arm weakness      Physician: Ahmet Maurer MD    Visit Date: 12/12/2019    Medical Diagnosis: B arm weakness  Evaluation Date: 5-15-19  Authorization Period Expiration: 12/2/2020  Plan of Care Certification Period: 9/17/2019-11/17/2019  NEW POC expiration: 12/31/2019  Visit #/Visits authorized: 4/12 new referral  -- KX modifier (36 total visits 2019)    Time In: 1030  Time Out: 1114  Total Billable Time: 44 minutes    Precautions: Standard and Fall    Subjective     Pt reports: She is doing well today. No new complaints, no falls.      She was compliant with home exercise program.  Response to previous treatment: L knee was painful  Functional change: ongoing    Pain: 0/10  Location: n/a    Objective   Mary participated in therapeutic exercises to improve functional mobility and safety for 10 minutes, including:  10 minutes recumbent stepper on hills setting, lv 4    Mary participated in neuromuscular re-education activities to improve: Balance, Coordination, Kinesthetic, Proprioception and Posture for 35 minutes. The following activities were included:  2x10 sit to stand from EOM standing on 4 pt fiter, seated on [employer]     // bars:  4 pt fitter:    2 x 30 seconds head turns L/R, CGA   2 x 30 seconds head turns up/down, CGA   2 x 30 seconds eyes closed, CGA   x10 ea LE lead step ups    x8 ea LE lead step over  Bosu:    2 x 30 seconds static standing, Hemal-CGA   2 x30 seconds  L/R tapping, CGA   2 x30 seconds ant/posterior tapping, CGA-Hemal  x10 ea LE tap onto med cone with occ UE support    Mary participated in gait training to improve functional mobility and safety for 0 minutes, including:       Home Exercises Provided and Patient Education Provided     Education provided:    - increased joint tension due to swelling, use of ice to decrease swelling    Written Home Exercises Provided: yes.  Exercises were reviewed and Mary was able to demonstrate them prior to the end of the session.  Mary demonstrated good  understanding of the education provided.     See EMR under Patient Instructions for exercises provided 6/28/2019.    Assessment     Mary tolerates PT session well today. She continues to report her L knee bothers her following PT sessions and she is currently using knee brace to prevent this pain. Pt continues to demonstrate poor ankle control due to drop foot and pt continues to report that she feels she will wake up and it will work one day. She remains appropriate for skilled PT services.          Mary is progressing well towards her goals.   Pt prognosis is Good.      Pt will continue to benefit from skilled outpatient physical therapy to address the deficits listed in the problem list box on initial evaluation, provide pt/family education and to maximize pt's level of independence in the home and community environment.      Pt's spiritual, cultural and educational needs considered and pt agreeable to plan of care and goals.     Anticipated barriers to physical therapy: none     Goals:   Short Term GOALS: (5 weeks)  1. Pt to increase 30 second sit to stand score to 9 without use of UE. MET (7/23/2019)  2. Pt to increase Davidson Balance Score to 43 in order to decrease her risk for fall. MET (8/23/2019)  3. Pt to increase TUG score with use of AD to </= 20 seconds.MET (7/23/2019)  4. Pt will increase [place] condition 2 to 20 seconds with SBA. MET (8/23/2019)  Long Term GOALS: (10 weeks)  1. Pt will increase 30 second sit to stand score to 11 without use of UEs. MET (7/23/2019)  2. Pt will increase DGI score to 16 in order to decrease her risk for fall. MET (10/15/2019)   2a. New goal 10/15/2019: Pt will increase DGI score to at least 19 in order to improve overall mobility  and decrease risk for fall. ongoing  3. Pt will pass [place] condition 2 with minimal sway. MET previously  4. Pt will increase self selected walking speed to .7 in order to better be able to participate in pilgrimage. MET (7/23/2019)  4a (new goal 7/23/2019) Pt will increase self selected walking speed without AD to .9 in order to better be able to participate in pilgrimage. MET (10/15/2019)   5. Pt will increase TUG score with use of AD to </= 15 seconds in order to better participate in pilgrimage. MET (7/23/2019)  6. Pt will increase Davidson Balance score to 51 in order to decrease her risk for fall. MET (9/17/2019) previously, inconsistent  7. New goal 10/15/2019: Pt will pass condition 3 in Nathanael sensory testing in order to improve her overall balance. ongoing  8. New goal 10/15/2019: Pt will improve Nathanael sensory testing condition 6 to at least 20 seconds in order to improve balance. Ongoing  9. New goal 11/26/2019: Pt will increase score on 6MWT to at least 950' with or without AD. ongoing  Plan   PT to continue through the end of the year, POC extended through 12/31/2019 to achieve remaining goals and self management at home.      Efrain David, PT  12/12/2019

## 2019-12-10 NOTE — PROGRESS NOTES
"  Physical Therapy Daily Treatment Note     Name: Mary Prince  Clinic Number: [id number]    Therapy Diagnosis:   Encounter Diagnoses   Name Primary?    Impaired functional mobility, balance, gait, and endurance Yes    Bilateral arm weakness      Physician: Ahmet Maurer MD    Visit Date: 12/10/2019    Medical Diagnosis: B arm weakness  Evaluation Date: 5-15-19  Authorization Period Expiration: 9/19/2020  Plan of Care Certification Period: 9/17/2019-11/17/2019  NEW POC expiration: 12/31/2019  Visit #/Visits authorized: 9/12 new referral  -- KX modifier (36 total visits 2019)     Time In: 1030  Time Out: 1115  Total Billable Time: 45 minutes    Precautions: Standard and Fall    Subjective     Pt reports: She is doing well today. No falls recently and continues to do well.      She was compliant with home exercise program.  Response to previous treatment: L knee was painful  Functional change: ongoing    Pain: 0/10  Location: n/a    Objective   Mary participated in therapeutic exercises to improve functional mobility and safety for 10 minutes, including:  10 minutes recumbent stepper on hills setting, lv 4    Mary participated in neuromuscular re-education activities to improve: Balance, Coordination, Kinesthetic, Proprioception and Posture for 35 minutes. The following activities were included:  x10 sit to stand from EOM to RW with no UE support, x3 additional standing on 4pt fitter with min-modA     // bars:  Tandem balance B LE 2 x 30" intermittent UE support, CGA  Foam:   x10 step ups onto foam   2 x 10 reps of mini squats on foam  4 pt fitter:    2 x 30 seconds head turns L/R, CGA   2 x 30 seconds head turns up/down, CGA   2 x 30 seconds eyes closed, [place]-CGA   x5 ea LE lead step ups    x5 ea LE lead step over  Bosu:    2 x 30 seconds static standing, [place]-CGA   x30 seconds static stance with L/R head turns, CGA   x30 seconds static stanec with up/down head turns, CGA    Mary participated in gait " training to improve functional mobility and safety for 0 minutes, including:       Home Exercises Provided and Patient Education Provided     Education provided:   - increased joint tension due to swelling, use of ice to decrease swelling    Written Home Exercises Provided: yes.  Exercises were reviewed and Mary was able to demonstrate them prior to the end of the session.  Mary demonstrated good  understanding of the education provided.     See EMR under Patient Instructions for exercises provided 6/28/2019.    Assessment     Mary tolerates PT session well today. She demonstrates improvement in vision eliminated balance on 4pt fitter and tolerates use of Bosu for balance well today. Pt continues to struggle with pain in R knee though she does not compensate as much onto the L side today. PT believes over compensation onto L side was causing L knee pain following last session. This pain was resolved prior to start of session. She remains appropriate for skilled PT services.          Mary is progressing well towards her goals.   Pt prognosis is Good.      Pt will continue to benefit from skilled outpatient physical therapy to address the deficits listed in the problem list box on initial evaluation, provide pt/family education and to maximize pt's level of independence in the home and community environment.      Pt's spiritual, cultural and educational needs considered and pt agreeable to plan of care and goals.     Anticipated barriers to physical therapy: none     Goals:   Short Term GOALS: (5 weeks)  1. Pt to increase 30 second sit to stand score to 9 without use of UE. MET (7/23/2019)  2. Pt to increase Davidson Balance Score to 43 in order to decrease her risk for fall. MET (8/23/2019)  3. Pt to increase TUG score with use of AD to </= 20 seconds.MET (7/23/2019)  4. Pt will increase [place] condition 2 to 20 seconds with SBA. MET (8/23/2019)  Long Term GOALS: (10 weeks)  1. Pt will increase 30 second sit to  stand score to 11 without use of UEs. MET (7/23/2019)  2. Pt will increase DGI score to 16 in order to decrease her risk for fall. MET (10/15/2019)   2a. New goal 10/15/2019: Pt will increase DGI score to at least 19 in order to improve overall mobility and decrease risk for fall. ongoing  3. Pt will pass [place] condition 2 with minimal sway. MET previously  4. Pt will increase self selected walking speed to .7 in order to better be able to participate in pilgrimage. MET (7/23/2019)  4a (new goal 7/23/2019) Pt will increase self selected walking speed without AD to .9 in order to better be able to participate in pilgrimage. MET (10/15/2019)   5. Pt will increase TUG score with use of AD to </= 15 seconds in order to better participate in pilgrimage. MET (7/23/2019)  6. Pt will increase Davidson Balance score to 51 in order to decrease her risk for fall. MET (9/17/2019) previously, inconsistent  7. New goal 10/15/2019: Pt will pass condition 3 in [place] sensory testing in order to improve her overall balance. ongoing  8. New goal 10/15/2019: Pt will improve [place]athana[place] sensory testing condition 6 to at least 20 seconds in order to improve balance. Ongoing  9. New goal 11/26/2019: Pt will increase score on 6MWT to at least 950' with or without AD. ongoing  Plan   PT to continue through the end of the year, POC extended through 12/31/2019 to achieve remaining goals and self management at home.      Efrain David, PT  12/10/2019

## 2019-12-10 NOTE — PROGRESS NOTES
"  Occupational Therapy Daily Treatment Note     Date: 12/10/2019  Name: Mary Prince  Clinic Number: [id number]    Therapy Diagnosis:   Encounter Diagnoses   Name Primary?    Shoulder joint stiffness, bilateral     Bilateral shoulder pain, unspecified chronicity      Physician: Ahmet Maurer MD    Physician Orders: Eval and treat  Medical Diagnosis: Brachial Plexus injury  Evaluation Date: 9/19/2019  Plan of Care Expiration Period: 12/20/2019  Insurance Authorization period Expiration: 6/20/20  Date of Return to MD: MICHAEL        Visit # / Visits Authorized: 21/ TBD  Time In: 11:15am  Time Out: 12:00pm  Total Billable (one on one) Time: 45 minutes     Precautions: Standard and Fall      Subjective     Pt reports: "I ate soup on my own and didn't try to use a spoon like you had suggested. I ordered it in a large coffee cup and just drank it. It worked very well for me"   she was somewhat compliant with home exercise program given last session.   Response to previous treatment: positive  Functional change: cooking more    Pain: 0/10  Location: L shoulder     Objective       Mary participated in therapeutic exercises for 30 minutes including:  - Seated Rows purple theraband 2x30  - Seated extension green theraband 2x30  - seated IR green theraband 1x30   - seated ER orange theraband 1x30  - Supine FF with 10# dowel 2x30  - Supine chest press with 10# dowel 2x30    Mary participated in therapeutic activities for 15 minutes:  - Discussed functional activities in which she has modified for increased independence  - discussing increasing HEP compliance     Home Exercises and Education Provided     Education provided:   - Shoulder HEP  -  HEP  - Progress towards goals     Written Home Exercises Provided: yes.  Exercises were reviewed and Mary was able to demonstrate them prior to the end of the session.  Mary demonstrated good  understanding of the HEP provided.        Assessment       Mary had good " tolerance to treatment this date. HEP complaince is improving some. She has made good progress and would continue to benefit from skilled therapy to ensure better HEP compliance before d/c.       Mary is progressing slowly towards her goals and there are no updates to goals at this time. Pt prognosis is Fair.     Pt will continue to benefit from skilled outpatient occupational therapy to address the deficits listed in the problem list on initial evaluation provide pt/family education and to maximize pt's level of independence in the home and community environment.     Anticipated barriers to occupational therapy: none at this time    Pt's spiritual, cultural and educational needs considered and pt agreeable to plan of care and goals.    Goals:  Short Term Goals: 5 weeks   - Pt. Will be educated in HEP and be able to return demonstrate with visual and verbal cues MET 10/24/2019  - Pt. Will have WNL PROM of B shoulders in FF/Ext/Abd/IR/ER with no greater than 2/10 pain for increased ability to perform functional tasks MET 10/24/2019  - Pt. Will improve  strength in both hands by at least 10# for increased functional performance during carrying tasks NOT MET  - Pt. Will educated in adaptive strategies to help improve her independence around the home MET     Long Term Goals: 10 weeks   - Pt. Will be I with HEP in progress  - Pt. Will have an increase of at least 140 degrees AROM in both shoulders in FF/Abd for increased ability to perform functional tasks  MET  - Pt. Will improve L IR AROM by 40 degrees and R IR to WFL for improved ability to perform dressing tasks MET  - Pt. Will have improved Flex/Ext AROM of both wrists by at least 20 degrees for increased functional use MET  - Pt, will improve  strength in both hands by at least 15# NOT MET  - Pt. Will improve 9hpt by 10 seconds in each hand for increased hand manipulation MET  - Pt. Will improve R pinch strength to WNL compared to L MET  - Pt. Will be  able to successfully complete all areas of toileting Mod I in progress    Plan   Plan to see for 6 more visits to work on HEP compliance and then discharge. (12/20/2019)      JUAN Freeman

## 2019-12-12 ENCOUNTER — CLINICAL SUPPORT (OUTPATIENT)
Dept: REHABILITATION | Facility: HOSPITAL | Age: 79
End: 2019-12-12
Payer: MEDICARE

## 2019-12-12 DIAGNOSIS — Z74.09 IMPAIRED FUNCTIONAL MOBILITY, BALANCE, GAIT, AND ENDURANCE: Primary | ICD-10-CM

## 2019-12-12 DIAGNOSIS — M25.512 CHRONIC PAIN OF BOTH SHOULDERS: ICD-10-CM

## 2019-12-12 DIAGNOSIS — M25.511 CHRONIC PAIN OF BOTH SHOULDERS: ICD-10-CM

## 2019-12-12 DIAGNOSIS — R29.898 BILATERAL ARM WEAKNESS: ICD-10-CM

## 2019-12-12 DIAGNOSIS — M25.612 SHOULDER JOINT STIFFNESS, BILATERAL: ICD-10-CM

## 2019-12-12 DIAGNOSIS — G89.29 CHRONIC PAIN OF BOTH SHOULDERS: ICD-10-CM

## 2019-12-12 DIAGNOSIS — M25.611 SHOULDER JOINT STIFFNESS, BILATERAL: ICD-10-CM

## 2019-12-12 PROCEDURE — 97530 THERAPEUTIC ACTIVITIES: CPT | Mod: PO

## 2019-12-12 PROCEDURE — 97112 NEUROMUSCULAR REEDUCATION: CPT | Mod: KX,PO

## 2019-12-12 PROCEDURE — 97110 THERAPEUTIC EXERCISES: CPT | Mod: PO

## 2019-12-12 PROCEDURE — 97110 THERAPEUTIC EXERCISES: CPT | Mod: KX,PO

## 2019-12-12 NOTE — PROGRESS NOTES
Occupational Therapy Daily Treatment Note     Date: 12/12/2019  Name: Mary Prince  Clinic Number: [id number]    Therapy Diagnosis:   Encounter Diagnoses   Name Primary?    Shoulder joint stiffness, bilateral     Chronic pain of both shoulders      Physician: Poncho Saunders MD    Physician Orders: Eval and treat  Medical Diagnosis: Brachial Plexus injury  Evaluation Date: 9/19/2019  Plan of Care Expiration Period: 12/20/2019  Insurance Authorization period Expiration: 6/20/20  Date of Return to MD: MICHAEL        Visit # / Visits Authorized: 22/ TBD  Time In: 11:15am  Time Out: 12:00pm  Total Billable (one on one) Time: 45 minutes     Precautions: Standard and Fall      Subjective     Pt reports: Pt. Reported that the upgraded bands were a good challenge for her  she was somewhat compliant with home exercise program given last session.   Response to previous treatment: positive  Functional change: cooking more    Pain: 0/10  Location: L shoulder     Objective     Mary participated in therapeutic exercises for 30 minutes including:  - Standing at table top   - ergo gripper with 2 yellow bands 3x30 each hand   - digi extension 3x30 yellow band each hand   - Tip pinch green clothes pin pom pom  each hand   - 3 jaw pinch green clothe spin pom pom  each hand    Mary participated in therapeutic activities for 15 minutes:  - Functional reaching task to shelf at eye level x2 trials each hand  - Discussed d/c plan and ways to stay active on her own    Home Exercises and Education Provided     Education provided:   - Shoulder HEP  -  HEP  - Progress towards goals     Written Home Exercises Provided: yes.  Exercises were reviewed and Mary was able to demonstrate them prior to the end of the session.  Mary demonstrated good  understanding of the HEP provided.        Assessment     Mary had good tolerance to treatment this date. Increased focus this date on hand strength. She is less interested  in her  HEP than her UE HEP. She seemed a little upset once we discussed discharging after last scheduled visit. She was ensured that although we haven't met all goals, she has made great gains in her UEs which she agreed with. She has made good progress and would continue to benefit from skilled therapy to ensure better HEP compliance before d/c.       Mary is progressing slowly towards her goals and there are no updates to goals at this time. Pt prognosis is Fair.     Pt will continue to benefit from skilled outpatient occupational therapy to address the deficits listed in the problem list on initial evaluation provide pt/family education and to maximize pt's level of independence in the home and community environment.     Anticipated barriers to occupational therapy: none at this time    Pt's spiritual, cultural and educational needs considered and pt agreeable to plan of care and goals.    Goals:  Short Term Goals: 5 weeks   - Pt. Will be educated in HEP and be able to return demonstrate with visual and verbal cues MET 10/24/2019  - Pt. Will have WNL PROM of B shoulders in FF/Ext/Abd/IR/ER with no greater than 2/10 pain for increased ability to perform functional tasks MET 10/24/2019  - Pt. Will improve  strength in both hands by at least 10# for increased functional performance during carrying tasks NOT MET  - Pt. Will educated in adaptive strategies to help improve her independence around the home MET     Long Term Goals: 10 weeks   - Pt. Will be I with HEP in progress  - Pt. Will have an increase of at least 140 degrees AROM in both shoulders in FF/Abd for increased ability to perform functional tasks  MET  - Pt. Will improve L IR AROM by 40 degrees and R IR to WFL for improved ability to perform dressing tasks MET  - Pt. Will have improved Flex/Ext AROM of both wrists by at least 20 degrees for increased functional use MET  - Pt, will improve  strength in both hands by at least 15# NOT MET  -  Pt. Will improve 9hpt by 10 seconds in each hand for increased hand manipulation MET  - Pt. Will improve R pinch strength to WNL compared to L MET  - Pt. Will be able to successfully complete all areas of toileting Mod I in progress    Plan   Plan to see for 6 more visits to work on HEP compliance and then discharge. (12/20/2019)      JUAN Freeman

## 2019-12-14 ENCOUNTER — NURSE TRIAGE (OUTPATIENT)
Dept: ADMINISTRATIVE | Facility: CLINIC | Age: 79
End: 2019-12-14

## 2019-12-14 NOTE — TELEPHONE ENCOUNTER
Reason for Disposition   SEVERE leg swelling (e.g., swelling extends above knee, entire leg is swollen, weeping fluid)    Additional Information   Negative: Severe difficulty breathing (e.g., struggling for each breath, speaks in single words)   Negative: Looks like a broken bone or dislocated joint (e.g., crooked or deformed)   Negative: Sounds like a life-threatening emergency to the triager   Negative: Chest pain   Negative: Followed a leg injury   Negative: [1] Small area of swelling AND [2] followed an insect bite to the area   Negative: Swelling of one ankle joint   Negative: Swelling of knee is main symptom   Negative: Pregnant   Negative: Postpartum (< 1 month since delivery)   Negative: Difficulty breathing at rest   Negative: Entire foot is cool or blue in comparison to other side   Negative: [1] Can't walk or can barely walk AND [2] new onset   Negative: [1] Difficulty breathing with exertion (e.g., walking) AND [2] new onset or worsening   Negative: [1] Red area or streak AND [2] fever   Negative: [1] Swelling is painful to touch AND [2] fever   Negative: [1] Cast on leg or ankle AND [2] now increased pain   Negative: Patient sounds very sick or weak to the triager    Protocols used: LEG SWELLING AND EDEMA-A-AH  Pt called re TAVR 11/20. L leg swollen, tenderness in calf and back of knee. Tried elevation, resting. Sl warm calf. Has cards appt wed, swelling form knee to ankle. Better after keeping elevated. no SOB, no CP, afeb. rec ED/UC. Pt declines as she doesn't want the wait of the ED. Pt states she is in bed with leg elevated. Call back with questions

## 2019-12-16 NOTE — TELEPHONE ENCOUNTER
"Spoke to the patient, she states that her leg is still very swollen and is now worse,  The back of her leg is "turning black" and she has been in bed.  She stated that she wants to wait until she hears from her PCP and get an ultrasound.  Strongly advised patient to go the the ED for evaluation and rule out possible DVT.  She agreed and will go to the ED.    "

## 2019-12-17 ENCOUNTER — CLINICAL SUPPORT (OUTPATIENT)
Dept: REHABILITATION | Facility: HOSPITAL | Age: 79
End: 2019-12-17
Payer: MEDICARE

## 2019-12-17 DIAGNOSIS — G89.29 CHRONIC PAIN OF BOTH SHOULDERS: ICD-10-CM

## 2019-12-17 DIAGNOSIS — M25.512 CHRONIC PAIN OF BOTH SHOULDERS: ICD-10-CM

## 2019-12-17 DIAGNOSIS — M25.612 SHOULDER JOINT STIFFNESS, BILATERAL: ICD-10-CM

## 2019-12-17 DIAGNOSIS — R29.898 BILATERAL ARM WEAKNESS: ICD-10-CM

## 2019-12-17 DIAGNOSIS — M25.511 CHRONIC PAIN OF BOTH SHOULDERS: ICD-10-CM

## 2019-12-17 DIAGNOSIS — M25.611 SHOULDER JOINT STIFFNESS, BILATERAL: ICD-10-CM

## 2019-12-17 DIAGNOSIS — Z74.09 IMPAIRED FUNCTIONAL MOBILITY, BALANCE, GAIT, AND ENDURANCE: Primary | ICD-10-CM

## 2019-12-17 PROCEDURE — 97530 THERAPEUTIC ACTIVITIES: CPT | Mod: PO

## 2019-12-17 PROCEDURE — 97110 THERAPEUTIC EXERCISES: CPT | Mod: KX,PO

## 2019-12-17 PROCEDURE — 97110 THERAPEUTIC EXERCISES: CPT | Mod: PO

## 2019-12-17 PROCEDURE — 97112 NEUROMUSCULAR REEDUCATION: CPT | Mod: KX,PO

## 2019-12-17 NOTE — PROGRESS NOTES
"  Physical Therapy Daily Treatment Note     Name: Mary Prince  Clinic Number: [id number]    Therapy Diagnosis:   Encounter Diagnoses   Name Primary?    Impaired functional mobility, balance, gait, and endurance Yes    Bilateral arm weakness      Physician: Ahmet Maurer MD    Visit Date: 12/17/2019    Medical Diagnosis: B arm weakness  Evaluation Date: 5-15-19  Authorization Period Expiration: 12/2/2020  Plan of Care Certification Period: 9/17/2019-11/17/2019  NEW POC expiration: 12/31/2019  Visit #/Visits authorized: 5/12 new referral  -- KX modifier (37 total visits 2019)    Time In: 1030  Time Out: 1115  Total Billable Time: 45 minutes    Precautions: Standard and Fall    Subjective     Pt reports: "I had a little trip to the ER yesterday."  Pt reports that she called her heart surgeon to report new onset of swelling and bruising in L LE, to which the nurse encouraged pt to go to the ER immediately for concerns of a blood clot. ER told patient it was not a blood clot and to resume normal activity.    She was compliant with home exercise program.  Response to previous treatment: L knee was painful  Functional change: ongoing    Pain: 0/10  Location: n/a    Objective   Mary participated in therapeutic exercises to improve functional mobility and safety for 15 minutes, including:  10 minutes recumbent stepper on hills setting, lv 4    Mary participated in neuromuscular re-education activities to improve: Balance, Coordination, Kinesthetic, Proprioception and Posture for 30 minutes. The following activities were included:  2x15 sit to stand from EOM standing on 4 pt fiter, seated on Airex     // bars:  4 pt fitter:    x10 ea LE lead step ups    x8 ea LE lead step over  Bosu:    2 x 30 seconds static standing CGA   2 x 30 seconds  L/R tapping, CGA   2 x 30 seconds ant/posterior tapping, CGA-Hemal    x10 ea LE tap onto med cone with occ UE support    Mary participated in gait training to improve " functional mobility and safety for 0 minutes, including:       Home Exercises Provided and Patient Education Provided     Education provided:   - increased joint tension due to swelling, use of ice to decrease swelling    Written Home Exercises Provided: yes.  Exercises were reviewed and Mary was able to demonstrate them prior to the end of the session.  Mary demonstrated good  understanding of the education provided.     See EMR under Patient Instructions for exercises provided 6/28/2019.    Assessment     Mary tolerates PT session well today. She and PT spend a considerable amount of time discussing L LE swelling and bruising, PT notes minimal swelling in the leg and non-pitting. It is of note that bruising is posterior from popliteal fossa to nearly the ankle and pt is sensitive to moderate touch on posterior gastroc area. Pt demonstrates slight increase in difficulty moving this limb today. Pt continues to demonstrate balance deficits, though continuing to improve with vision eliminated balance. She remains appropriate for skilled PT services.          Mary is progressing well towards her goals.   Pt prognosis is Good.      Pt will continue to benefit from skilled outpatient physical therapy to address the deficits listed in the problem list box on initial evaluation, provide pt/family education and to maximize pt's level of independence in the home and community environment.      Pt's spiritual, cultural and educational needs considered and pt agreeable to plan of care and goals.     Anticipated barriers to physical therapy: none     Goals:   Short Term GOALS: (5 weeks)  1. Pt to increase 30 second sit to stand score to 9 without use of UE. MET (7/23/2019)  2. Pt to increase Davidson Balance Score to 43 in order to decrease her risk for fall. MET (8/23/2019)  3. Pt to increase TUG score with use of AD to </= 20 seconds.MET (7/23/2019)  4. Pt will increase [place] condition 2 to 20 seconds with SBA. MET  (8/23/2019)  Long Term GOALS: (10 weeks)  1. Pt will increase 30 second sit to stand score to 11 without use of UEs. MET (7/23/2019)  2. Pt will increase DGI score to 16 in order to decrease her risk for fall. MET (10/15/2019)   2a. New goal 10/15/2019: Pt will increase DGI score to at least 19 in order to improve overall mobility and decrease risk for fall. ongoing  3. Pt will pass Nathanael condition 2 with minimal sway. MET previously  4. Pt will increase self selected walking speed to .7 in order to better be able to participate in pilgrimage. MET (7/23/2019)  4a (new goal 7/23/2019) Pt will increase self selected walking speed without AD to .9 in order to better be able to participate in pilgrimage. MET (10/15/2019)   5. Pt will increase TUG score with use of AD to </= 15 seconds in order to better participate in pilgrimage. MET (7/23/2019)  6. Pt will increase Davidson Balance score to 51 in order to decrease her risk for fall. MET (9/17/2019) previously, inconsistent  7. New goal 10/15/2019: Pt will pass condition 3 in Nathanael sensory testing in order to improve her overall balance. ongoing  8. New goal 10/15/2019: Pt will improve Nathanael sensory testing condition 6 to at least 20 seconds in order to improve balance. Ongoing  9. New goal 11/26/2019: Pt will increase score on 6MWT to at least 950' with or without AD. ongoing  Plan   PT to continue through the end of the year, POC extended through 12/31/2019 to achieve remaining goals and self management at home.      Efrain David, PT  12/17/2019

## 2019-12-17 NOTE — PROGRESS NOTES
"  Occupational Therapy Daily Treatment Note     Date: 12/17/2019  Name: Mary Prince  Clinic Number: [id number]    Therapy Diagnosis:   Encounter Diagnoses   Name Primary?    Shoulder joint stiffness, bilateral     Chronic pain of both shoulders      Physician: Poncho Saunders MD    Physician Orders: Eval and treat  Medical Diagnosis: Brachial Plexus injury  Evaluation Date: 9/19/2019  Plan of Care Expiration Period: 12/20/2019  Insurance Authorization period Expiration: 6/20/20  Date of Return to MD: MICHAEL        Visit # / Visits Authorized: 23/ TBD  Time In: 11:15am  Time Out: 12:00pm  Total Billable (one on one) Time: 45 minutes     Precautions: Standard and Fall      Subjective     Pt reports: "I am determined to go on my pilgrimage. I decided to bring my walker because I can walk better with it"  she was somewhat compliant with home exercise program given last session.   Response to previous treatment: positive  Functional change: cooking more    Pain: 0/10  Location: L shoulder     Objective     Mary participated in therapeutic exercises for 30 minutes including:  - Standing at table top   - ergo gripper with 2 yellow bands 3x30 each hand   - digi extension 3x30 yellow band each hand   - Tip pinch green clothes pin pom pom  each hand   - 3 jaw pinch green clothe spin pom pom  each hand    Mary participated in therapeutic activities for 15 minutes:  - Functional reaching task to items below mat while seated EOM  - Discussed d/c plan and ways to stay active on her own    Home Exercises and Education Provided     Education provided:   - Shoulder HEP  -  HEP  - Progress towards goals     Written Home Exercises Provided: yes.  Exercises were reviewed and Mary was able to demonstrate them prior to the end of the session.  Mary demonstrated good  understanding of the HEP provided.        Assessment     Mary had good tolerance to treatment this date. She was in better spirits when " discussing D/C. She is tolerating more hand exercises and is working more on her hand HEP. Plan to see for one more visit then d/c.    Mary is progressing slowly towards her goals and there are no updates to goals at this time. Pt prognosis is Fair.     Pt will continue to benefit from skilled outpatient occupational therapy to address the deficits listed in the problem list on initial evaluation provide pt/family education and to maximize pt's level of independence in the home and community environment.     Anticipated barriers to occupational therapy: none at this time    Pt's spiritual, cultural and educational needs considered and pt agreeable to plan of care and goals.    Goals:  Short Term Goals: 5 weeks   - Pt. Will be educated in HEP and be able to return demonstrate with visual and verbal cues MET 10/24/2019  - Pt. Will have WNL PROM of B shoulders in FF/Ext/Abd/IR/ER with no greater than 2/10 pain for increased ability to perform functional tasks MET 10/24/2019  - Pt. Will improve  strength in both hands by at least 10# for increased functional performance during carrying tasks NOT MET  - Pt. Will educated in adaptive strategies to help improve her independence around the home MET     Long Term Goals: 10 weeks   - Pt. Will be I with HEP in progress  - Pt. Will have an increase of at least 140 degrees AROM in both shoulders in FF/Abd for increased ability to perform functional tasks  MET  - Pt. Will improve L IR AROM by 40 degrees and R IR to WFL for improved ability to perform dressing tasks MET  - Pt. Will have improved Flex/Ext AROM of both wrists by at least 20 degrees for increased functional use MET  - Pt, will improve  strength in both hands by at least 15# NOT MET  - Pt. Will improve 9hpt by 10 seconds in each hand for increased hand manipulation MET  - Pt. Will improve R pinch strength to WNL compared to L MET  - Pt. Will be able to successfully complete all areas of toileting Mod I in  progress    Plan   Plan to see for 6 more visits to work on HEP compliance and then discharge. (12/20/2019)      JUAN Freeman

## 2019-12-18 ENCOUNTER — OFFICE VISIT (OUTPATIENT)
Dept: CARDIOLOGY | Facility: CLINIC | Age: 79
End: 2019-12-18
Payer: MEDICARE

## 2019-12-18 ENCOUNTER — HOSPITAL ENCOUNTER (OUTPATIENT)
Dept: CARDIOLOGY | Facility: CLINIC | Age: 79
Discharge: HOME | End: 2019-12-18
Payer: MEDICARE

## 2019-12-18 ENCOUNTER — LAB VISIT (OUTPATIENT)
Dept: LAB | Facility: HOSPITAL | Age: 79
End: 2019-12-18
Payer: MEDICARE

## 2019-12-18 VITALS
DIASTOLIC BLOOD PRESSURE: 62 MMHG | SYSTOLIC BLOOD PRESSURE: 142 MMHG | HEART RATE: 64 BPM | HEIGHT: 62 IN | BODY MASS INDEX: 30.91 KG/M2 | WEIGHT: 168 LBS

## 2019-12-18 VITALS
BODY MASS INDEX: 32.22 KG/M2 | DIASTOLIC BLOOD PRESSURE: 67 MMHG | WEIGHT: 175.06 LBS | HEIGHT: 62 IN | SYSTOLIC BLOOD PRESSURE: 149 MMHG | HEART RATE: 78 BPM

## 2019-12-18 DIAGNOSIS — Z95.2 S/P TAVR (TRANSCATHETER AORTIC VALVE REPLACEMENT): ICD-10-CM

## 2019-12-18 DIAGNOSIS — I50.33 ACUTE ON CHRONIC DIASTOLIC HEART FAILURE: ICD-10-CM

## 2019-12-18 DIAGNOSIS — I25.118 CORONARY ARTERY DISEASE OF NATIVE ARTERY OF NATIVE HEART WITH STABLE ANGINA PECTORIS: ICD-10-CM

## 2019-12-18 DIAGNOSIS — I35.0 NODULAR CALCIFIC AORTIC VALVE STENOSIS: ICD-10-CM

## 2019-12-18 LAB
ANION GAP SERPL CALC-SCNC: 6 MMOL/L (ref 8–16)
ASCENDING AORTA: 2.38 CM
AV INDEX (PROSTH): 0.6
AV MEAN GRADIENT: 8 MMHG
AV PEAK GRADIENT: 14 MMHG
AV VALVE AREA: 1.79 CM2
AV VELOCITY RATIO: 0.57
BASOPHILS # BLD AUTO: 0.04 K/UL (ref 0–0.2)
BASOPHILS NFR BLD: 0.6 % (ref 0–1.9)
BSA FOR ECHO PROCEDURE: 1.83 M2
BUN SERPL-MCNC: 34 MG/DL (ref 8–23)
CALCIUM SERPL-MCNC: 9.5 MG/DL (ref 8.7–10.5)
CHLORIDE SERPL-SCNC: 100 MMOL/L (ref 95–110)
CO2 SERPL-SCNC: 30 MMOL/L (ref 23–29)
CREAT SERPL-MCNC: 1.1 MG/DL (ref 0.5–1.4)
CV ECHO LV RWT: 0.43 CM
DIFFERENTIAL METHOD: ABNORMAL
DOP CALC AO PEAK VEL: 1.9 M/S
DOP CALC AO VTI: 40.18 CM
DOP CALC LVOT AREA: 3 CM2
DOP CALC LVOT DIAMETER: 1.95 CM
DOP CALC LVOT PEAK VEL: 1.08 M/S
DOP CALC LVOT STROKE VOLUME: 71.85 CM3
DOP CALCLVOT PEAK VEL VTI: 24.07 CM
E WAVE DECELERATION TIME: 373.12 MSEC
E/A RATIO: 1.24
ECHO LV POSTERIOR WALL: 0.88 CM (ref 0.6–1.1)
EOSINOPHIL # BLD AUTO: 0.4 K/UL (ref 0–0.5)
EOSINOPHIL NFR BLD: 5.9 % (ref 0–8)
ERYTHROCYTE [DISTWIDTH] IN BLOOD BY AUTOMATED COUNT: 13.7 % (ref 11.5–14.5)
EST. GFR  (AFRICAN AMERICAN): 55.2 ML/MIN/1.73 M^2
EST. GFR  (NON AFRICAN AMERICAN): 47.9 ML/MIN/1.73 M^2
FRACTIONAL SHORTENING: 41 % (ref 28–44)
GLUCOSE SERPL-MCNC: 61 MG/DL (ref 70–110)
HCT VFR BLD AUTO: 36.2 % (ref 37–48.5)
HGB BLD-MCNC: 11.8 G/DL (ref 12–16)
IMM GRANULOCYTES # BLD AUTO: 0.02 K/UL (ref 0–0.04)
IMM GRANULOCYTES NFR BLD AUTO: 0.3 % (ref 0–0.5)
INTERVENTRICULAR SEPTUM: 0.94 CM (ref 0.6–1.1)
IVRT: 0.1 MSEC
LA MAJOR: 4.71 CM
LA MINOR: 4.72 CM
LA WIDTH: 3.82 CM
LEFT ATRIUM SIZE: 3.99 CM
LEFT ATRIUM VOLUME INDEX: 34.4 ML/M2
LEFT ATRIUM VOLUME: 61.09 CM3
LEFT INTERNAL DIMENSION IN SYSTOLE: 2.43 CM (ref 2.1–4)
LEFT VENTRICLE DIASTOLIC VOLUME INDEX: 42.56 ML/M2
LEFT VENTRICLE DIASTOLIC VOLUME: 75.55 ML
LEFT VENTRICLE MASS INDEX: 66 G/M2
LEFT VENTRICLE SYSTOLIC VOLUME INDEX: 11.7 ML/M2
LEFT VENTRICLE SYSTOLIC VOLUME: 20.73 ML
LEFT VENTRICULAR INTERNAL DIMENSION IN DIASTOLE: 4.13 CM (ref 3.5–6)
LEFT VENTRICULAR MASS: 117.25 G
LV SEPTAL E/E' RATIO: 24.8 M/S
LYMPHOCYTES # BLD AUTO: 1 K/UL (ref 1–4.8)
LYMPHOCYTES NFR BLD: 15.5 % (ref 18–48)
MCH RBC QN AUTO: 33.2 PG (ref 27–31)
MCHC RBC AUTO-ENTMCNC: 32.6 G/DL (ref 32–36)
MCV RBC AUTO: 102 FL (ref 82–98)
MONOCYTES # BLD AUTO: 0.8 K/UL (ref 0.3–1)
MONOCYTES NFR BLD: 12.8 % (ref 4–15)
MV PEAK A VEL: 1 M/S
MV PEAK E VEL: 1.24 M/S
NEUTROPHILS # BLD AUTO: 4.1 K/UL (ref 1.8–7.7)
NEUTROPHILS NFR BLD: 64.9 % (ref 38–73)
NRBC BLD-RTO: 0 /100 WBC
PISA TR MAX VEL: 2.44 M/S
PLATELET # BLD AUTO: 109 K/UL (ref 150–350)
PMV BLD AUTO: 12 FL (ref 9.2–12.9)
POTASSIUM SERPL-SCNC: 5.2 MMOL/L (ref 3.5–5.1)
RA MAJOR: 4.88 CM
RA PRESSURE: 3 MMHG
RBC # BLD AUTO: 3.55 M/UL (ref 4–5.4)
RIGHT VENTRICULAR END-DIASTOLIC DIMENSION: 3.6 CM
RV TISSUE DOPPLER FREE WALL SYSTOLIC VELOCITY 1 (APICAL 4 CHAMBER VIEW): 8.22 CM/S
SINUS: 2.09 CM
SODIUM SERPL-SCNC: 136 MMOL/L (ref 136–145)
STJ: 2.04 CM
TDI SEPTAL: 0.05 M/S
TR MAX PG: 24 MMHG
TRICUSPID ANNULAR PLANE SYSTOLIC EXCURSION: 1.99 CM
TV REST PULMONARY ARTERY PRESSURE: 27 MMHG
WBC # BLD AUTO: 6.31 K/UL (ref 3.9–12.7)

## 2019-12-18 PROCEDURE — 36415 COLL VENOUS BLD VENIPUNCTURE: CPT

## 2019-12-18 PROCEDURE — 3078F PR MOST RECENT DIASTOLIC BLOOD PRESSURE < 80 MM HG: ICD-10-PCS | Mod: CPTII,S$GLB,, | Performed by: INTERNAL MEDICINE

## 2019-12-18 PROCEDURE — 3077F PR MOST RECENT SYSTOLIC BLOOD PRESSURE >= 140 MM HG: ICD-10-PCS | Mod: CPTII,S$GLB,, | Performed by: INTERNAL MEDICINE

## 2019-12-18 PROCEDURE — 99214 OFFICE O/P EST MOD 30 MIN: CPT | Mod: S$GLB,,, | Performed by: INTERNAL MEDICINE

## 2019-12-18 PROCEDURE — 80048 BASIC METABOLIC PNL TOTAL CA: CPT

## 2019-12-18 PROCEDURE — 3077F SYST BP >= 140 MM HG: CPT | Mod: CPTII,S$GLB,, | Performed by: INTERNAL MEDICINE

## 2019-12-18 PROCEDURE — 93306 ECHO (CUPID ONLY): ICD-10-PCS | Mod: S$GLB,,, | Performed by: INTERNAL MEDICINE

## 2019-12-18 PROCEDURE — 85025 COMPLETE CBC W/AUTO DIFF WBC: CPT

## 2019-12-18 PROCEDURE — 3078F DIAST BP <80 MM HG: CPT | Mod: CPTII,S$GLB,, | Performed by: INTERNAL MEDICINE

## 2019-12-18 PROCEDURE — 1126F PR PAIN SEVERITY QUANTIFIED, NO PAIN PRESENT: ICD-10-PCS | Mod: S$GLB,,, | Performed by: INTERNAL MEDICINE

## 2019-12-18 PROCEDURE — 1101F PR PT FALLS ASSESS DOC 0-1 FALLS W/OUT INJ PAST YR: ICD-10-PCS | Mod: CPTII,S$GLB,, | Performed by: INTERNAL MEDICINE

## 2019-12-18 PROCEDURE — 99999 PR PBB SHADOW E&M-EST. PATIENT-LVL III: CPT | Mod: PBBFAC,,,

## 2019-12-18 PROCEDURE — 99214 PR OFFICE/OUTPT VISIT, EST, LEVL IV, 30-39 MIN: ICD-10-PCS | Mod: S$GLB,,, | Performed by: INTERNAL MEDICINE

## 2019-12-18 PROCEDURE — 93306 TTE W/DOPPLER COMPLETE: CPT | Mod: S$GLB,,, | Performed by: INTERNAL MEDICINE

## 2019-12-18 PROCEDURE — 1126F AMNT PAIN NOTED NONE PRSNT: CPT | Mod: S$GLB,,, | Performed by: INTERNAL MEDICINE

## 2019-12-18 PROCEDURE — 99999 PR PBB SHADOW E&M-EST. PATIENT-LVL III: ICD-10-PCS | Mod: PBBFAC,,,

## 2019-12-18 PROCEDURE — 1159F PR MEDICATION LIST DOCUMENTED IN MEDICAL RECORD: ICD-10-PCS | Mod: S$GLB,,, | Performed by: INTERNAL MEDICINE

## 2019-12-18 PROCEDURE — 1101F PT FALLS ASSESS-DOCD LE1/YR: CPT | Mod: CPTII,S$GLB,, | Performed by: INTERNAL MEDICINE

## 2019-12-18 PROCEDURE — 1159F MED LIST DOCD IN RCRD: CPT | Mod: S$GLB,,, | Performed by: INTERNAL MEDICINE

## 2019-12-18 NOTE — PROGRESS NOTES
Physical Therapy Daily Treatment Note     Name: Mary Prince  Clinic Number: [id number]    Therapy Diagnosis:   Encounter Diagnoses   Name Primary?    Impaired functional mobility, balance, gait, and endurance Yes    Bilateral arm weakness      Physician: Ahmet Maurer MD    Visit Date: 12/19/2019    Medical Diagnosis: B arm weakness  Evaluation Date: 5-15-19  Authorization Period Expiration: 12/2/2020  Plan of Care Certification Period: 9/17/2019-11/17/2019  NEW POC expiration: 12/31/2019  Visit #/Visits authorized: 6/12 new referral  -- KX modifier (37 total visits 2019)    Time In: 1030  Time Out: 1115  Total Billable Time: 45 minutes    Precautions: Standard and Fall    Subjective     Pt reports: Pt went in for a visit with her surgeon yesterday. He cleared her for full activity including return to gym. Per pt, the MD wants her to continue with therapy.      She was not compliant with home exercise program or going to the gym.  Response to previous treatment: felt fine  Functional change: ongoing    Pain: 0/10  Location: n/a    Objective   Mary participated in therapeutic exercises to improve functional mobility and safety for 12 minutes, including:  10 minutes recumbent bike on lv 7    Mary participated in neuromuscular re-education activities to improve: Balance, Coordination, Kinesthetic, Proprioception and Posture for 33 minutes. The following activities were included:  2x10 sit to stand from EOM standing on 4 pt fiter, seated on Airex    // bars:  4 pt fitter:    x10 ea LE lead step ups    x8 ea LE lead step over  Bosu(sm):    x 30 seconds static standing CGA   x 30 seconds  L/R tapping, CGA   x 30 seconds ant/posterior tapping, CGA-Hemal    Tandem B LE lead x 30 sec ea, occ UE support, CGA    2 pt fitter:   2 x 30 sec L/R tapping with occ touchdown support   2 x 30 sec A/P tapping with occ touchdown support     Side stepping over half foam roll x10 ea direction with 1 UE support and addition  x5 ea direction with no UE support, green resistance strap around knees  Fwd stepping over half foam roll, x10 ea LE with no UE support, CGA-Hemal    Mary participated in gait training to improve functional mobility and safety for 0 minutes, including:       Home Exercises Provided and Patient Education Provided     Education provided:   - increased joint tension due to swelling, use of ice to decrease swelling    Written Home Exercises Provided: yes.  Exercises were reviewed and Mary was able to demonstrate them prior to the end of the session.  Mary demonstrated good  understanding of the education provided.     See EMR under Patient Instructions for exercises provided 6/28/2019.    Assessment     Mary continues to tolerate PT session well today. Pt reports that B knee pain has resolved. Pt continues to struggle with balance tasks without UE support, especially new tasks. She continues to mention that MD wants her to continue with therapy, and PT reminds pt that she will reassess and determine how much longer she needs PT. She continues to get frustrated with inability to DF B feet and balance. She remains appropriate for skilled PT services.          Mary is progressing well towards her goals.   Pt prognosis is Good.      Pt will continue to benefit from skilled outpatient physical therapy to address the deficits listed in the problem list box on initial evaluation, provide pt/family education and to maximize pt's level of independence in the home and community environment.      Pt's spiritual, cultural and educational needs considered and pt agreeable to plan of care and goals.     Anticipated barriers to physical therapy: none     Goals:   Short Term GOALS: (5 weeks)  1. Pt to increase 30 second sit to stand score to 9 without use of UE. MET (7/23/2019)  2. Pt to increase Davidson Balance Score to 43 in order to decrease her risk for fall. MET (8/23/2019)  3. Pt to increase TUG score with use of AD to </=  20 seconds.MET (7/23/2019)  4. Pt will increase [place] condition 2 to 20 seconds with SBA. MET (8/23/2019)  Long Term GOALS: (10 weeks)  1. Pt will increase 30 second sit to stand score to 11 without use of UEs. MET (7/23/2019)  2. Pt will increase DGI score to 16 in order to decrease her risk for fall. MET (10/15/2019)   2a. New goal 10/15/2019: Pt will increase DGI score to at least 19 in order to improve overall mobility and decrease risk for fall. ongoing  3. Pt will pass Nathanael condition 2 with minimal sway. MET previously  4. Pt will increase self selected walking speed to .7 in order to better be able to participate in pilgrimage. MET (7/23/2019)  4a (new goal 7/23/2019) Pt will increase self selected walking speed without AD to .9 in order to better be able to participate in pilgrimage. MET (10/15/2019)   5. Pt will increase TUG score with use of AD to </= 15 seconds in order to better participate in pilgrimage. MET (7/23/2019)  6. Pt will increase Davidson Balance score to 51 in order to decrease her risk for fall. MET (9/17/2019) previously, inconsistent  7. New goal 10/15/2019: Pt will pass condition 3 in Nathanael sensory testing in order to improve her overall balance. ongoing  8. New goal 10/15/2019: Pt will improve Nathanael sensory testing condition 6 to at least 20 seconds in order to improve balance. Ongoing  9. New goal 11/26/2019: Pt will increase score on 6MWT to at least 950' with or without AD. ongoing  Plan   PT to continue through the end of the year, POC extended through 12/31/2019 to achieve remaining goals and self management at home.      Efrain David, PT  12/19/2019

## 2019-12-18 NOTE — ASSESSMENT & PLAN NOTE
- Successful left transfemoral 26 mm evolute valve for heavily calcified LV outflow tract and severe aortic stenosis.  - No PVL on echo today. MG 8 mmHg, PV 1.9 m/s.  - Continue ASA and Plavix for at least 6 months. Plavix d/c will be up to Dr Velazco.  - No non sterile procedures which could cause endocarditis for 6 months including dental work, endoscopy, colonoscopy, and  procedures.   - SBE prophylaxis for life   - Follow up with [facility] in 1 year with labs and echo

## 2019-12-18 NOTE — PROGRESS NOTES
Subjective:    Patient ID:  Mary Prince is a 79 y.o. female who presents for 1 month follow-up of TAVR      Referring Physician: Dr. Velazco    NYHA: I CCS: 0    HPI  Ms. Prince is a 79 year old female with a past medical history of HTN, CAD s/p Proximal to Mid LAD PCI, and HLD who presents for a one month TAVR follow up. She is s/p successful left transfemoral 26 mm evolute valve for heavily calcified LV outflow tract and severe aortic stenosis. No paravalvular leak, peak velocity 1.5, mean gradient 4 post TAVR by transthoracic echo. She reports a huge decrease in SOB with exertions. She was able to walk up the ramp in clinic today without symptoms. She reports recent LLE pain and swelling starting on Thursday. Pain was worsened with pressure and dorsiflexion. She was seen at the [facility] ER. LE doppler showed no clot. Pain is still present today but less severe. She denies CP, orthopnea, and PND.       Review of Systems   Constitution: Negative for chills and fever.   Eyes: Negative for blurred vision.   Cardiovascular: Negative for chest pain, claudication, dyspnea on exertion, irregular heartbeat, leg swelling, orthopnea, palpitations and syncope.   Respiratory: Negative for cough and sputum production.    Skin: Positive for color change (LLE). Negative for itching, rash and suspicious lesions.   Musculoskeletal: Positive for myalgias (LLE). Negative for falls.   Gastrointestinal: Negative for abdominal pain.   Neurological: Negative for disturbances in coordination, dizziness and loss of balance.   Psychiatric/Behavioral: Negative for altered mental status.        Past Medical History:   Diagnosis Date    Cancer 2010    left breast XRT + lumpectomy; right lunpectomy + Chemotherapy (Dr. Santana) 8 of 12 treatments, stopped for side effects    Cataract     right eye    Coronary artery disease     total 4 stents over several years, last 2015    Diabetes mellitus, type 2     Hyperlipidemia      "Hypertension     Thyroid disease      Current Outpatient Medications on File Prior to Visit   Medication Sig Dispense Refill    ALPRAZolam (XANAX) 0.5 MG tablet Take 0.5 mg by mouth nightly as needed.       aspirin (ECOTRIN LOW STRENGTH) 81 MG EC tablet Take 81 mg by mouth once daily.       atorvastatin (LIPITOR) 80 MG tablet Take 80 mg by mouth every evening.       clopidogrel (PLAVIX) 75 mg tablet Take 75 mg by mouth once daily.       furosemide (LASIX) 20 MG tablet Take 20 mg by mouth daily as needed.      gabapentin (NEURONTIN) 600 MG tablet Take 3 tablets (1,800 mg total) by mouth 3 (three) times daily. 270 tablet 11    insulin aspart U-100 (NOVOLOG U-100 INSULIN ASPART) 100 unit/mL injection Novolog U-100 Insulin aspart 100 unit/mL subcutaneous solution      letrozole (FEMARA) 2.5 mg Tab Take 2.5 mg by mouth once daily .      levothyroxine (SYNTHROID) 112 MCG tablet Take 112 mcg by mouth before breakfast.       multivitamin capsule Take 1 capsule by mouth once daily.      potassium aminobenzoate 500 mg Cap Take by mouth once daily.      ramipril (ALTACE) 10 MG capsule Take 10 mg by mouth.       vitamin D 1000 units Tab Take 1,000 Units by mouth once daily.       Current Facility-Administered Medications on File Prior to Visit   Medication Dose Route Frequency Provider Last Rate Last Dose    lidocaine (PF) 10 mg/ml (1%) injection 10 mg  1 mL Intradermal Once Azeem Randhawa MD         Vitals:    12/18/19 1506 12/18/19 1509   BP: (!) 144/62 (!) 149/67   BP Location: Right arm Left arm   Patient Position: Sitting Sitting   BP Method: Large (Automatic) Large (Automatic)   Pulse: 78 78   Weight: 79.4 kg (175 lb 0.7 oz)    Height: 5' 2" (1.575 m)      Body mass index is 32.02 kg/m².  Objective:    Physical Exam   Constitutional: She is oriented to person, place, and time. She appears well-developed and well-nourished. No distress.   HENT:   Head: Normocephalic and atraumatic.   Eyes: EOM " are normal.   Neck: Normal range of motion.   Cardiovascular: Regular rhythm and intact distal pulses.   Murmur heard.   Harsh midsystolic murmur is present with a grade of 1/6 at the upper right sternal border radiating to the neck.  Pulmonary/Chest: Effort normal and breath sounds normal. No respiratory distress.   Musculoskeletal:   Tenderness to mild palpation of left calf. Noted venous insufficiency. No visible swelling or LE asymmetry.    Neurological: She is alert and oriented to person, place, and time.   Skin: She is not diaphoretic.   Nursing note and vitals reviewed.        Assessment & Plan:       S/P TAVR (transcatheter aortic valve replacement)  - Successful left transfemoral 26 mm evolute valve for heavily calcified LV outflow tract and severe aortic stenosis.  - No PVL on echo today. MG 8 mmHg, PV 1.9 m/s.  - Continue ASA and Plavix for at least 6 months. Plavix d/c will be up to Dr Velazco.  - No non sterile procedures which could cause endocarditis for 6 months including dental work, endoscopy, colonoscopy, and  procedures.   - SBE prophylaxis for life   - Follow up with [facility]ve [facility] in 1 year with labs and echo    Coronary artery disease of native artery of native heart with stable angina pectoris  - [facility] (Date 8/13/19): Nonobstructive CAD. Patent LAD stent   - Asymptomatic    Acute on chronic diastolic heart failure  - Stable  - Continue Rashid Ruiz PA-C  Interventional Cardiology  [facility]

## 2019-12-19 ENCOUNTER — CLINICAL SUPPORT (OUTPATIENT)
Dept: REHABILITATION | Facility: HOSPITAL | Age: 79
End: 2019-12-19
Payer: MEDICARE

## 2019-12-19 DIAGNOSIS — M25.511 BILATERAL SHOULDER PAIN, UNSPECIFIED CHRONICITY: ICD-10-CM

## 2019-12-19 DIAGNOSIS — M25.612 SHOULDER JOINT STIFFNESS, BILATERAL: ICD-10-CM

## 2019-12-19 DIAGNOSIS — M25.512 BILATERAL SHOULDER PAIN, UNSPECIFIED CHRONICITY: ICD-10-CM

## 2019-12-19 DIAGNOSIS — R29.898 BILATERAL ARM WEAKNESS: ICD-10-CM

## 2019-12-19 DIAGNOSIS — M25.611 SHOULDER JOINT STIFFNESS, BILATERAL: ICD-10-CM

## 2019-12-19 DIAGNOSIS — Z74.09 IMPAIRED FUNCTIONAL MOBILITY, BALANCE, GAIT, AND ENDURANCE: Primary | ICD-10-CM

## 2019-12-19 PROCEDURE — 97530 THERAPEUTIC ACTIVITIES: CPT | Mod: PO

## 2019-12-19 PROCEDURE — 97110 THERAPEUTIC EXERCISES: CPT | Mod: PO

## 2019-12-19 PROCEDURE — 97112 NEUROMUSCULAR REEDUCATION: CPT | Mod: KX,PO

## 2019-12-19 PROCEDURE — 97110 THERAPEUTIC EXERCISES: CPT | Mod: KX,PO

## 2019-12-19 NOTE — PROGRESS NOTES
"  Occupational Therapy Daily Treatment Note and D/C Summary     Date: 12/19/2019  Name: Mary Prince  Clinic Number: [id number]    Therapy Diagnosis:   Encounter Diagnoses   Name Primary?    Shoulder joint stiffness, bilateral     Bilateral shoulder pain, unspecified chronicity      Physician: Poncho Saunders MD    Physician Orders: Eval and treat  Medical Diagnosis: Brachial Plexus injury  Evaluation Date: 9/19/2019  Plan of Care Expiration Period: 12/20/2019  Insurance Authorization period Expiration: 6/20/20  Date of Return to MD: TBD        Visit # / Visits Authorized: 24 total (7/12 current auth)  Time In: 11:15am  Time Out: 12:00pm  Total Billable (one on one) Time: 45 minutes     Precautions: Standard and Fall      Subjective     Pt reports: "I really feel like I have gotten better in my hands"   she was somewhat compliant with home exercise program given last session.   Response to previous treatment: positive  Functional change: cooking more    Pain: 0/10  Location: L shoulder     Objective     Mary participated in therapeutic exercises for 30 minutes including:  - Supine FF 10# dowel 2x30  - Standing at table top   - ergo gripper with 2 yellow bands 3x30 each hand   - digi extension 3x30 yellow band each hand   - Place and remove colored wooden blocks to hangers at different levels B hands    Mary participated in therapeutic activities for 15 minutes:  - Discussed d/c plan and ways to stay active on her own    Home Exercises and Education Provided     Education provided:   - Shoulder HEP  -  HEP  - Progress towards goals     Written Home Exercises Provided: yes.  Exercises were reviewed and Mary was able to demonstrate them prior to the end of the session.  Mary demonstrated good  understanding of the HEP provided.        Assessment     Mary had good tolerance to treatment this date. She has reached good functional ability with B UEs at this time and she is appropriate for d/c. "     Mary is progressing slowly towards her goals and there are no updates to goals at this time. Pt prognosis is Fair.     Pt will continue to benefit from skilled outpatient occupational therapy to address the deficits listed in the problem list on initial evaluation provide pt/family education and to maximize pt's level of independence in the home and community environment.     Anticipated barriers to occupational therapy: none at this time    Pt's spiritual, cultural and educational needs considered and pt agreeable to plan of care and goals.    Goals:  Short Term Goals: 5 weeks   - Pt. Will be educated in HEP and be able to return demonstrate with visual and verbal cues MET 10/24/2019  - Pt. Will have WNL PROM of B shoulders in FF/Ext/Abd/IR/ER with no greater than 2/10 pain for increased ability to perform functional tasks MET 10/24/2019  - Pt. Will improve  strength in both hands by at least 10# for increased functional performance during carrying tasks NOT MET  - Pt. Will educated in adaptive strategies to help improve her independence around the home MET     Long Term Goals: 10 weeks   - Pt. Will be I with HEP MET  - Pt. Will have an increase of at least 140 degrees AROM in both shoulders in FF/Abd for increased ability to perform functional tasks  MET  - Pt. Will improve L IR AROM by 40 degrees and R IR to WFL for improved ability to perform dressing tasks MET  - Pt. Will have improved Flex/Ext AROM of both wrists by at least 20 degrees for increased functional use MET  - Pt, will improve  strength in both hands by at least 15# NOT MET  - Pt. Will improve 9hpt by 10 seconds in each hand for increased hand manipulation MET  - Pt. Will improve R pinch strength to WNL compared to L MET  - Pt. Will be able to successfully complete all areas of toileting Mod I in progress    Plan   Plan to d/c this date secondary to goals met and plateau of progress.      JUAN Freeman

## 2019-12-30 NOTE — PROGRESS NOTES
Physical Therapy Daily Treatment Note     Name: Mary Prince  Clinic Number: [id number]    Therapy Diagnosis:   Encounter Diagnoses   Name Primary?    Impaired functional mobility, balance, gait, and endurance Yes    Bilateral arm weakness      Physician: Ahmet Maurer MD    Visit Date: 12/31/2019    Medical Diagnosis: B arm weakness  Evaluation Date: 5-15-19  Authorization Period Expiration: 12/2/2020  Plan of Care Certification Period: 9/17/2019-11/17/2019  NEW POC expiration: 12/31/2019  Extended POC expiration: 1/31/2020  Visit #/Visits authorized: 1/20 new referral  -- KX modifier (38 total visits 2019)    Time In: 1030  Time Out: 1115  Total Billable Time: 45 minutes    Precautions: Standard and Fall    Subjective     Pt reports: Pt reports she has been miserable the last few days. She was sick for [place] and overall had a quiet Kate. Pt reports new onset of tenderness in L anterior medial knee that she is unsure of when it started. Pt reports she has not slept well in a while and has had the sniffles. She reports last night when attempting to fall asleep she would have said it is an 8-9/10 pain in her knee. Today she wouldn't describe it as pain.     She was not compliant with home exercise program or going to the gym.  Response to previous treatment: felt fine  Functional change: ongoing    Pain: 0/10  Location: n/a    Objective   Mary participated in therapeutic exercises to improve functional mobility and safety for 12 minutes, including:  10 minutes recumbent bike on lv 5    Mary participated in neuromuscular re-education activities to improve: Balance, Coordination, Kinesthetic, Proprioception and Posture for 33 minutes. The following activities were included:      6/25/2019 7/23/2019 8/23/2019 9/17/2019 10/15/19 11/12/19 12/3/19 12/30/19   TUG 26.37 seconds with cane  26.08 seconds without AD 13.7 seconds with cane  10.7 seconds without AD 9.4 seconds with cane  9.6 seconds  "without AD 8.6 seconds without AD 10.9 + 8.9 + 9.0 = 9.6 seconds without AD 14.6 + 11.8 + 11.4 = 12.6 sec without AD 9.1 + 9.8 =  9.5 sec with RW  9.2 + 9.1 = 9.2 sec without AD 9.6 + 9.3 = 9.5 s with RW  9.6 + 9.7s = 9.7s  without AD   30 second sit to stand 7 without UE use 14 without UE use 18 without UE use 19 without UE use (cues to come fully to standing) 22 without UE use (cues to fully sit and stand) 18 without UE use 20 without UE use 16 without UE use   Self selected walking speed .54 m/sec .75 m/s (6m/8.0 seconds)  G-code = CK .83 m/s (6m/7.2sec)  G-code = CJ .83 m/s (6m/7.2sec)  G-code = CJ .91 m/s (6m/6.6sec)  G-code = CJ .85 m/s (6m/7.1sec)  G-code=CJ 1.0 m/s (6m/6.0sec) with RW  .95 m/s  (6m/6.3sec)  Without AD     G-code= CJ .95 m/s (6m/6.3s) with RW  .88 m/s (6m/6.8s) without AD    G-code= CJ   Fast walking speed .71 m/sec .89 m/s (6m/6.7 seconds) .94 m/s (6m/6.4sec) 1.01 m/s (6m/5.9sec) 1.01 m/s (6m/5.9sec) .90 m/s (6m/6.7sec) 1.1 m/s (6m/5.4sec)  With RW 1.0 m/s (6m/6.0s) with RW   RUIZ 38/56 41/56 49/56 51/56 51/56 47/56 50/56 NT   DGI 10/24 14/24 15/24 15/24 17/24 15/24 16/24 18/24      KIZZY SENSORY TESTING:  (P= Pass, F= Fail; note any sway; hold each position for 30")  Condition 1: (firm surface/feet together/eyes open) P  Condition 2: (firm surface/feet together/eyes closed) F; 9 sec  Condition 3: (firm surface/feet in tandem/eyes open) F; 11 sec  Condition 4: (firm surface/feet in tandem/eyes closed) F; 2-3 seconds  Condition 5: (soft surface/feet together/eyes open) P  Condition 6: (soft surface/feet together/eyes closed) F; 3 seconds  Condition 7: (Fakuda step test), measure distance varied from center starting position NT    DYNAMIC GAIT INDEX:    1. Gait level surface= 2   3) Normal: walks 20', no AD, good speed, no evidence for imbalance,   normal gait pattern   2) Mild impairment: walks 20', uses AD, slower speed, mild gait deviations   1)  moderate impairment: walks 20', slow speed, " "abnormal gait pattern,   evidence for imbalance.   0)  severe impairment: cannot walk 20' without assistance, severe gait   deviations or imbalance  2. Change in gait speed: 2   3) Normal: able to smoothly change walking speed without loss of balance   or gait deviation. Shows a significant difference in walking speeds    between fast, slow, and normal speeds.    2) Mild impairment: is able to change speeds but demonstrates mild gait   deviations, or not gait deviations but unable to achieve a significant   change in velocity, or uses AD   1) Moderate impairment: makes only minor adjustments in walking speed,   or accomplishes a change in speed with significant gait deviation, or   changes speed but loses balance and is able to recover and keep    walking    0) Severe impairment: cannot change speeds, or loses balance and has to   reach for wall or be caught.  3. Gait with horizontal head turns: 3   3) Normal: performs head turns smoothly with no change in gait.   2) Mild impairment: performs head turns smoothly with slight velocity, I.e.   Minor disruption to smooth gait path or uses AD   1) Moderate impairment: performs head turns with moderate change in gait   velocity, slows down, staggers but recovers, can continue to walk   0) Severe impairment: performs task with severe disruption of gait, i.e.   Staggers outside of 15" path, loses balance , stops, reaches for wall  4. Gait with vertical head turns: 2   3) Normal: performs head turns smoothly with no change in gait   2) Mild impairment: performs head turns smoothly with slight velocity, I.e.   Minor disruption to smooth gait path or uses AD   1) Moderate impairment: performs head turns with moderate change in gait   velocity, slows down, staggers but recovers, can continue to walk   0) Severe impairment: performs task with severe disruption of gait, i.e.   Staggers outside of 15" path, loses balance , stops, reaches for wall  5. Gait and pivot turn: 3   3) "  Normal: pivot turns safely within 3 sec and stops quickly with no LOB   2)  Mild impairment: pivot turns safely in > 3 sec and stops with no LOB   1) Moderate impairment: turns slowly, requires verbal cueing, requires   several small steps to catch balance following turn and stop   0)  Severe impairment: cannot turn safely, requires assistance to stop and   Turn  6. Step over obstacle:  2   3) normal: is able to step over box without changing speed, no LOB   2)  Mild impairment: is able to step over box, but must slow down and adjust   steps to clear box safely   1)  Moderate impairment: is able to step over box but must stop, then step   over. May require verbal cueing   0) Severe impairment: cannot perform without assistance  7. Step around obstacle: 2   3) Normal: able to walk around cones safely without changing gait speed;   no LOB   2)  Mild impairment: able to step around cones, but must slow down to adjust   steps to clear cones   1) moderate impairment: able to clear cones but must significantly slow   speed to accomplish or requires verbal cueing   0)  Severe impairment: unable to clear cones, walks into 1 or both cones, or   requires physical assistance  8. Steps: 2   3) Normal: alternating feet, no rail   2) Mild impairment: alternating feet, must use rail   1) Moderate impairment: 2 feet a stair, must use rail   0)  Severe impairment: cannot perform safely  Total: 18/24      Mary participated in gait training to improve functional mobility and safety for 0 minutes, including:       Home Exercises Provided and Patient Education Provided     Education provided:   - increased joint tension due to swelling, use of ice to decrease swelling    Written Home Exercises Provided: yes.  Exercises were reviewed and Mary was able to demonstrate them prior to the end of the session.  Mary demonstrated good  understanding of the education provided.     See EMR under Patient Instructions for exercises provided  "6/28/2019.    Assessment     Mary tolerates reassessment well today. She continues to demonstrate deficits in balance and gait as well as complains of pain in B knees. PT and pt discuss the possibilities of reaching a plateau and discharge from PT at conclusion of January. Pt sad throughout session and continues to say that she is sad that she isn't back to "normal". PT and pt discuss her improvements with therapy and how normal aging causes some balance deficits as well as comorbidities with her past surgeries, etc may never return fully to normal. Pt's goals have been updated and remain appropriate for the next month of skilled PT care. PT to extend POC through 1/31/2020.      Mary is progressing well towards her goals.   Pt prognosis is Good.      Pt will continue to benefit from skilled outpatient physical therapy to address the deficits listed in the problem list box on initial evaluation, provide pt/family education and to maximize pt's level of independence in the home and community environment.      Pt's spiritual, cultural and educational needs considered and pt agreeable to plan of care and goals.     Anticipated barriers to physical therapy: none     Goals:   Short Term GOALS: (5 weeks)  1. Pt to increase 30 second sit to stand score to 9 without use of UE. MET (7/23/2019)  2. Pt to increase Davidson Balance Score to 43 in order to decrease her risk for fall. MET (8/23/2019)  3. Pt to increase TUG score with use of AD to </= 20 seconds.MET (7/23/2019)  4. Pt will increase Nathanael condition 2 to 20 seconds with SBA. MET (8/23/2019)  Long Term GOALS: (10 weeks)  1. Pt will increase 30 second sit to stand score to 11 without use of UEs. MET (7/23/2019)  2. Pt will increase DGI score to 16 in order to decrease her risk for fall. MET (10/15/2019)   2a. New goal 10/15/2019: Pt will increase DGI score to at least 19 in order to improve overall mobility and decrease risk for fall. ongoing  3. Pt will pass [place] " condition 2 with minimal sway. MET previously  4. Pt will increase self selected walking speed to .7 in order to better be able to participate in pilgrimage. MET (7/23/2019)  4a (new goal 7/23/2019) Pt will increase self selected walking speed without AD to .9 in order to better be able to participate in pilgrimage. MET (10/15/2019)   5. Pt will increase TUG score with use of AD to </= 15 seconds in order to better participate in pilgrimage. MET (7/23/2019)  6. Pt will increase Davidson Balance score to 51 in order to decrease her risk for fall. MET (9/17/2019)   7. New goal 10/15/2019: Pt will pass condition 3 in Nathanael sensory testing in order to improve her overall balance. ongoing  8. New goal 10/15/2019: Pt will improve Nathanael sensory testing condition 6 to at least 20 seconds in order to improve balance. Ongoing  9. New goal 11/26/2019: Pt will increase score on 6MWT to at least 950' with or without AD. ongoing  Plan   PT to extend POC through 1/31/2020.    Continue to work on balance, HEP for home maintenance, and increasing endurance.    Efrain David, PT  12/31/2019

## 2019-12-31 ENCOUNTER — CLINICAL SUPPORT (OUTPATIENT)
Dept: REHABILITATION | Facility: HOSPITAL | Age: 79
End: 2019-12-31
Payer: MEDICARE

## 2019-12-31 DIAGNOSIS — R29.898 BILATERAL ARM WEAKNESS: ICD-10-CM

## 2019-12-31 DIAGNOSIS — Z74.09 IMPAIRED FUNCTIONAL MOBILITY, BALANCE, GAIT, AND ENDURANCE: Primary | ICD-10-CM

## 2019-12-31 PROCEDURE — 97110 THERAPEUTIC EXERCISES: CPT | Mod: KX,PO

## 2019-12-31 PROCEDURE — 97112 NEUROMUSCULAR REEDUCATION: CPT | Mod: KX,PO

## 2019-12-31 NOTE — PLAN OF CARE
Physical Therapy Daily Treatment Note     Name: Mary Prince  Clinic Number: [id number]    Therapy Diagnosis:   Encounter Diagnoses   Name Primary?    Impaired functional mobility, balance, gait, and endurance Yes    Bilateral arm weakness      Physician: Ahmet Maurer MD    Visit Date: 12/31/2019    Medical Diagnosis: B arm weakness  Evaluation Date: 5-15-19  Authorization Period Expiration: 12/2/2020  Plan of Care Certification Period: 9/17/2019-11/17/2019  NEW POC expiration: 12/31/2019  Extended POC expiration: 1/31/2020  Visit #/Visits authorized: 1/20 new referral  -- KX modifier (38 total visits 2019)    Time In: 1030  Time Out: 1115  Total Billable Time: 45 minutes    Precautions: Standard and Fall    Subjective     Pt reports: Pt reports she has been miserable the last few days. She was sick for [place] and overall had a quiet Kate. Pt reports new onset of tenderness in L anterior medial knee that she is unsure of when it started. Pt reports she has not slept well in a while and has had the sniffles. She reports last night when attempting to fall asleep she would have said it is an 8-9/10 pain in her knee. Today she wouldn't describe it as pain.     She was not compliant with home exercise program or going to the gym.  Response to previous treatment: felt fine  Functional change: ongoing    Pain: 0/10  Location: n/a    Objective   Mary participated in therapeutic exercises to improve functional mobility and safety for 12 minutes, including:  10 minutes recumbent bike on lv 5    Mary participated in neuromuscular re-education activities to improve: Balance, Coordination, Kinesthetic, Proprioception and Posture for 33 minutes. The following activities were included:      6/25/2019 7/23/2019 8/23/2019 9/17/2019 10/15/19 11/12/19 12/3/19 12/30/19   TUG 26.37 seconds with cane  26.08 seconds without AD 13.7 seconds with cane  10.7 seconds without AD 9.4 seconds with cane  9.6 seconds  "without AD 8.6 seconds without AD 10.9 + 8.9 + 9.0 = 9.6 seconds without AD 14.6 + 11.8 + 11.4 = 12.6 sec without AD 9.1 + 9.8 =  9.5 sec with RW  9.2 + 9.1 = 9.2 sec without AD 9.6 + 9.3 = 9.5 s with RW  9.6 + 9.7s = 9.7s  without AD   30 second sit to stand 7 without UE use 14 without UE use 18 without UE use 19 without UE use (cues to come fully to standing) 22 without UE use (cues to fully sit and stand) 18 without UE use 20 without UE use 16 without UE use   Self selected walking speed .54 m/sec .75 m/s (6m/8.0 seconds)  G-code = CK .83 m/s (6m/7.2sec)  G-code = CJ .83 m/s (6m/7.2sec)  G-code = CJ .91 m/s (6m/6.6sec)  G-code = CJ .85 m/s (6m/7.1sec)  G-code=CJ 1.0 m/s (6m/6.0sec) with RW  .95 m/s  (6m/6.3sec)  Without AD     G-code= CJ .95 m/s (6m/6.3s) with RW  .88 m/s (6m/6.8s) without AD    G-code= CJ   Fast walking speed .71 m/sec .89 m/s (6m/6.7 seconds) .94 m/s (6m/6.4sec) 1.01 m/s (6m/5.9sec) 1.01 m/s (6m/5.9sec) .90 m/s (6m/6.7sec) 1.1 m/s (6m/5.4sec)  With RW 1.0 m/s (6m/6.0s) with RW   RUIZ 38/56 41/56 49/56 51/56 51/56 47/56 50/56 NT   DGI 10/24 14/24 15/24 15/24 17/24 15/24 16/24 18/24      KIZZY SENSORY TESTING:  (P= Pass, F= Fail; note any sway; hold each position for 30")  Condition 1: (firm surface/feet together/eyes open) P  Condition 2: (firm surface/feet together/eyes closed) F; 9 sec  Condition 3: (firm surface/feet in tandem/eyes open) F; 11 sec  Condition 4: (firm surface/feet in tandem/eyes closed) F; 2-3 seconds  Condition 5: (soft surface/feet together/eyes open) P  Condition 6: (soft surface/feet together/eyes closed) F; 3 seconds  Condition 7: (Fakuda step test), measure distance varied from center starting position NT    DYNAMIC GAIT INDEX:    1. Gait level surface= 2   3) Normal: walks 20', no AD, good speed, no evidence for imbalance,   normal gait pattern   2) Mild impairment: walks 20', uses AD, slower speed, mild gait deviations   1)  moderate impairment: walks 20', slow speed, " "abnormal gait pattern,   evidence for imbalance.   0)  severe impairment: cannot walk 20' without assistance, severe gait   deviations or imbalance  2. Change in gait speed: 2   3) Normal: able to smoothly change walking speed without loss of balance   or gait deviation. Shows a significant difference in walking speeds    between fast, slow, and normal speeds.    2) Mild impairment: is able to change speeds but demonstrates mild gait   deviations, or not gait deviations but unable to achieve a significant   change in velocity, or uses AD   1) Moderate impairment: makes only minor adjustments in walking speed,   or accomplishes a change in speed with significant gait deviation, or   changes speed but loses balance and is able to recover and keep    walking    0) Severe impairment: cannot change speeds, or loses balance and has to   reach for wall or be caught.  3. Gait with horizontal head turns: 3   3) Normal: performs head turns smoothly with no change in gait.   2) Mild impairment: performs head turns smoothly with slight velocity, I.e.   Minor disruption to smooth gait path or uses AD   1) Moderate impairment: performs head turns with moderate change in gait   velocity, slows down, staggers but recovers, can continue to walk   0) Severe impairment: performs task with severe disruption of gait, i.e.   Staggers outside of 15" path, loses balance , stops, reaches for wall  4. Gait with vertical head turns: 2   3) Normal: performs head turns smoothly with no change in gait   2) Mild impairment: performs head turns smoothly with slight velocity, I.e.   Minor disruption to smooth gait path or uses AD   1) Moderate impairment: performs head turns with moderate change in gait   velocity, slows down, staggers but recovers, can continue to walk   0) Severe impairment: performs task with severe disruption of gait, i.e.   Staggers outside of 15" path, loses balance , stops, reaches for wall  5. Gait and pivot turn: 3   3) "  Normal: pivot turns safely within 3 sec and stops quickly with no LOB   2)  Mild impairment: pivot turns safely in > 3 sec and stops with no LOB   1) Moderate impairment: turns slowly, requires verbal cueing, requires   several small steps to catch balance following turn and stop   0)  Severe impairment: cannot turn safely, requires assistance to stop and   Turn  6. Step over obstacle:  2   3) normal: is able to step over box without changing speed, no LOB   2)  Mild impairment: is able to step over box, but must slow down and adjust   steps to clear box safely   1)  Moderate impairment: is able to step over box but must stop, then step   over. May require verbal cueing   0) Severe impairment: cannot perform without assistance  7. Step around obstacle: 2   3) Normal: able to walk around cones safely without changing gait speed;   no LOB   2)  Mild impairment: able to step around cones, but must slow down to adjust   steps to clear cones   1) moderate impairment: able to clear cones but must significantly slow   speed to accomplish or requires verbal cueing   0)  Severe impairment: unable to clear cones, walks into 1 or both cones, or   requires physical assistance  8. Steps: 2   3) Normal: alternating feet, no rail   2) Mild impairment: alternating feet, must use rail   1) Moderate impairment: 2 feet a stair, must use rail   0)  Severe impairment: cannot perform safely  Total: 18/24      Mary participated in gait training to improve functional mobility and safety for 0 minutes, including:       Home Exercises Provided and Patient Education Provided     Education provided:   - increased joint tension due to swelling, use of ice to decrease swelling    Written Home Exercises Provided: yes.  Exercises were reviewed and Mary was able to demonstrate them prior to the end of the session.  Mary demonstrated good  understanding of the education provided.     See EMR under Patient Instructions for exercises provided  "6/28/2019.    Assessment     Mary tolerates reassessment well today. She continues to demonstrate deficits in balance and gait as well as complains of pain in B knees. PT and pt discuss the possibilities of reaching a plateau and discharge from PT at conclusion of January. Pt sad throughout session and continues to say that she is sad that she isn't back to "normal". PT and pt discuss her improvements with therapy and how normal aging causes some balance deficits as well as comorbidities with her past surgeries, etc may never return fully to normal. Pt's goals have been updated and remain appropriate for the next month of skilled PT care. PT to extend POC through 1/31/2020.      Mary is progressing well towards her goals.   Pt prognosis is Good.      Pt will continue to benefit from skilled outpatient physical therapy to address the deficits listed in the problem list box on initial evaluation, provide pt/family education and to maximize pt's level of independence in the home and community environment.      Pt's spiritual, cultural and educational needs considered and pt agreeable to plan of care and goals.     Anticipated barriers to physical therapy: none     Goals:   Short Term GOALS: (5 weeks)  1. Pt to increase 30 second sit to stand score to 9 without use of UE. MET (7/23/2019)  2. Pt to increase Davidson Balance Score to 43 in order to decrease her risk for fall. MET (8/23/2019)  3. Pt to increase TUG score with use of AD to </= 20 seconds.MET (7/23/2019)  4. Pt will increase [place] condition 2 to 20 seconds with SBA. MET (8/23/2019)  Long Term GOALS: (10 weeks)  1. Pt will increase 30 second sit to stand score to 11 without use of UEs. MET (7/23/2019)  2. Pt will increase DGI score to 16 in order to decrease her risk for fall. MET (10/15/2019)   2a. New goal 10/15/2019: Pt will increase DGI score to at least 19 in order to improve overall mobility and decrease risk for fall. ongoing  3. Pt will pass [place] " condition 2 with minimal sway. MET previously  4. Pt will increase self selected walking speed to .7 in order to better be able to participate in pilgrimage. MET (7/23/2019)  4a (new goal 7/23/2019) Pt will increase self selected walking speed without AD to .9 in order to better be able to participate in pilgrimage. MET (10/15/2019)   5. Pt will increase TUG score with use of AD to </= 15 seconds in order to better participate in pilgrimage. MET (7/23/2019)  6. Pt will increase Davidson Balance score to 51 in order to decrease her risk for fall. MET (9/17/2019)   7. New goal 10/15/2019: Pt will pass condition 3 in Nathanael sensory testing in order to improve her overall balance. ongoing  8. New goal 10/15/2019: Pt will improve Nathanael sensory testing condition 6 to at least 20 seconds in order to improve balance. Ongoing  9. New goal 11/26/2019: Pt will increase score on 6MWT to at least 950' with or without AD. ongoing  Plan   PT to extend POC through 1/31/2020.    Continue to work on balance, HEP for home maintenance, and increasing endurance.    Efrain David, PT  12/31/2019

## 2020-01-06 ENCOUNTER — DOCUMENTATION ONLY (OUTPATIENT)
Dept: REHABILITATION | Facility: HOSPITAL | Age: 80
End: 2020-01-06

## 2020-01-06 NOTE — PROGRESS NOTES
PT/PTA met face to face to discuss pt's treatment plan and progress towards established goals.  Continue with current PT POC with focus on balance, ankle strengthening, stretching, and endurance.  Patient will be seen by physical therapist at least every sixth treatment or 30 days, whichever occurs first.    Kate Taveras, PTA  01/06/2020    Face to face meeting held on 1/6/2020.  Efrain David, PT, DPT  1/7/2020

## 2020-01-07 ENCOUNTER — CLINICAL SUPPORT (OUTPATIENT)
Dept: REHABILITATION | Facility: HOSPITAL | Age: 80
End: 2020-01-07
Payer: MEDICARE

## 2020-01-07 DIAGNOSIS — Z74.09 IMPAIRED FUNCTIONAL MOBILITY, BALANCE, GAIT, AND ENDURANCE: Primary | ICD-10-CM

## 2020-01-07 DIAGNOSIS — R29.898 BILATERAL ARM WEAKNESS: ICD-10-CM

## 2020-01-07 PROCEDURE — 97110 THERAPEUTIC EXERCISES: CPT | Mod: PO

## 2020-01-07 PROCEDURE — 97112 NEUROMUSCULAR REEDUCATION: CPT | Mod: PO

## 2020-01-07 NOTE — PROGRESS NOTES
Physical Therapy Daily Treatment Note     Name: Mary Prince  Clinic Number: [id number]    Therapy Diagnosis:   Encounter Diagnoses   Name Primary?    Impaired functional mobility, balance, gait, and endurance Yes    Bilateral arm weakness      Physician: Ahmet Maurer MD    Visit Date: 1/7/2020    Medical Diagnosis: B arm weakness  Evaluation Date: 5-15-19  Authorization Period Expiration: 12/2/2020  Plan of Care Certification Period: 9/17/2019-11/17/2019  NEW POC expiration: 12/31/2019  Extended POC expiration: 1/31/2020  Visit #/Visits authorized: 2/20 new referral  -- KX modifier (38 total visits 2019)    Time In: 1059  Time Out: 1144  Total Billable Time: 45 minutes    Precautions: Standard and Fall    Subjective     Pt reports: Initially, no new complaints, pt reports that she had a good weekend. Her R knee doesn't bother her anymore but the L knee bothers her. She is disappointed in her dentist and her orthopedist because neither of them have done anything to help her or change her situation. She is concerned about [facility] she thinks they have been saying she won't be able to keep up. Pt expresses that she wants a new OT referral as she did not meet her goals for OT.     She was not compliant with home exercise program or going to the gym.  Response to previous treatment: felt fine  Functional change: ongoing    Pain: 0/10  Location: n/a    Objective   Mary participated in therapeutic exercises to improve functional mobility and safety for 15 minutes, including:  10 minutes recumbent stepper; on lv 4.5;    2x10 sit to stand from EOM standing on airex, 2.2 lb ball in hands       Mary participated in neuromuscular re-education activities to improve: Balance, Coordination, Kinesthetic, Proprioception and Posture for 30 minutes. The following activities were included:  // bars:  4 pt fitter:               x10 ea LE lead step ups               x8 ea LE  "lead step over   3 x 30 sec eyes closed, static stance, WBOS, Hemal     Tandem B LE lead x 30 sec ea, occ UE support, CGA     2 pt fitter, seated:              2 x 30 sec A/P tapping with occ touchdown support     Mary participated in gait training to improve functional mobility and safety for 0 minutes, including:       Home Exercises Provided and Patient Education Provided     Education provided:   - increased joint tension due to swelling, use of ice to decrease swelling    Written Home Exercises Provided: yes.  Exercises were reviewed and Mary was able to demonstrate them prior to the end of the session.  Mary demonstrated good  understanding of the education provided.     See EMR under Patient Instructions for exercises provided 6/28/2019.    Assessment     Mary tolerates session fairly today. She does have a brief moment when she requests her sugar tablets as she feels as though her sugar was dropping, but she was unable to get a reading on her monitor. Pt spends much time today venting to PT about her inabilities and even becomes tearful once stating that she is just going to have to accept that she will be like "this" for the rest of her life. Pt seems anxious about her upcoming trip to [facility], though is agreeable with PT that if she is to attend trip use of RW will be safest. Pt also expresses that she is worried that she was discharged from OT. PT concerned that pt will not accept discharge from PT at end of the month, though this has already been discussed once. She remains appropriate for skilled PT services.       Mary is progressing well towards her goals.   Pt prognosis is Good.      Pt will continue to benefit from skilled outpatient physical therapy to address the deficits listed in the problem list box on initial evaluation, provide pt/family education and to maximize pt's level of independence in the home and community environment.      Pt's spiritual, cultural and educational needs " considered and pt agreeable to plan of care and goals.     Anticipated barriers to physical therapy: none     Goals:   Short Term GOALS: (5 weeks)  1. Pt to increase 30 second sit to stand score to 9 without use of UE. MET (7/23/2019)  2. Pt to increase Davidson Balance Score to 43 in order to decrease her risk for fall. MET (8/23/2019)  3. Pt to increase TUG score with use of AD to </= 20 seconds.MET (7/23/2019)  4. Pt will increase [place] condition 2 to 20 seconds with SBA. MET (8/23/2019)  Long Term GOALS: (10 weeks)  1. Pt will increase 30 second sit to stand score to 11 without use of UEs. MET (7/23/2019)  2. Pt will increase DGI score to 16 in order to decrease her risk for fall. MET (10/15/2019)   2a. New goal 10/15/2019: Pt will increase DGI score to at least 19 in order to improve overall mobility and decrease risk for fall. ongoing  3. Pt will pass Nathanael condition 2 with minimal sway. MET previously  4. Pt will increase self selected walking speed to .7 in order to better be able to participate in pilgrimage. MET (7/23/2019)  4a (new goal 7/23/2019) Pt will increase self selected walking speed without AD to .9 in order to better be able to participate in pilgrimage. MET (10/15/2019)   5. Pt will increase TUG score with use of AD to </= 15 seconds in order to better participate in pilgrimage. MET (7/23/2019)  6. Pt will increase Davidson Balance score to 51 in order to decrease her risk for fall. MET (9/17/2019)   7. New goal 10/15/2019: Pt will pass condition 3 in Nathanael sensory testing in order to improve her overall balance. ongoing  8. New goal 10/15/2019: Pt will improve [place] sensory testing condition 6 to at least 20 seconds in order to improve balance. Ongoing  9. New goal 11/26/2019: Pt will increase score on 6MWT to at least 950' with or without AD. ongoing  Plan   PT to extend POC through 1/31/2020.    Continue to work on balance, HEP for home maintenance, and increasing endurance.    Efrain David,  PT  01/07/2020

## 2020-01-09 ENCOUNTER — CLINICAL SUPPORT (OUTPATIENT)
Dept: REHABILITATION | Facility: HOSPITAL | Age: 80
End: 2020-01-09
Payer: MEDICARE

## 2020-01-09 DIAGNOSIS — Z74.09 IMPAIRED FUNCTIONAL MOBILITY, BALANCE, GAIT, AND ENDURANCE: Primary | ICD-10-CM

## 2020-01-09 DIAGNOSIS — R29.898 BILATERAL ARM WEAKNESS: ICD-10-CM

## 2020-01-09 PROCEDURE — 97112 NEUROMUSCULAR REEDUCATION: CPT | Mod: PO

## 2020-01-09 PROCEDURE — 97110 THERAPEUTIC EXERCISES: CPT | Mod: PO

## 2020-01-09 NOTE — PROGRESS NOTES
Physical Therapy Daily Treatment Note     Name: Mary Prince  Clinic Number: [id number]    Therapy Diagnosis:   Encounter Diagnoses   Name Primary?    Impaired functional mobility, balance, gait, and endurance Yes    Bilateral arm weakness      Physician: Ahmet Maurer MD    Visit Date: 1/9/2020    Medical Diagnosis: B arm weakness  Evaluation Date: 5-15-19  Authorization Period Expiration: 12/2/2020  Plan of Care Certification Period: 9/17/2019-11/17/2019  NEW POC expiration: 12/31/2019  Extended POC expiration: 1/31/2020  Visit #/Visits authorized: 3/20 new referral  -- KX modifier (38 total visits 2019)    Time In: 1100  Time Out: 1145  Total Billable Time: 45 minutes    Precautions: Standard and Fall    Subjective     Pt reports: Her knee pain continues to improve. She expresses concerns again about her trip to [facility] and that others are talking about it.      She was not compliant with home exercise program or going to the gym.  Response to previous treatment: felt fine  Functional change: ongoing    Pain: 0/10  Location: n/a    Objective   Mary participated in therapeutic exercises to improve functional mobility and safety for 15 minutes, including:  10 minutes recumbent stepper; on lv 5.0    Mary participated in neuromuscular re-education activities to improve: Balance, Coordination, Kinesthetic, Proprioception and Posture for 30 minutes. The following activities were included:  // bars:  1 pt fitter:               2 x 30 sec static stance, CGA-Hemal               x 30 sec attempting to touch all sides of the board, Hemal     3 laps of tandem ambulation, CGA, pt struggles to attain tandem  3 laps side stepping, no UE support, SBA    Using green x2, blue x2, black theraband pads, side stepping with 2 feet on ea board, 3 laps, then 2 laps fwd ambulation with 1 LE on each pad     2x30 sec tandem stance ea LE lead  2 x 30 sec NBOS, vision eliminated.     3 laps ambulation in // bars with occ UE  support, stepping over cones on their side as well as tapping cones when pt passes them, CGA to Hemal     Mary participated in gait training to improve functional mobility and safety for 0 minutes, including:       Home Exercises Provided and Patient Education Provided     Education provided:   - increased joint tension due to swelling, use of ice to decrease swelling    Written Home Exercises Provided: yes.  Exercises were reviewed and Mary was able to demonstrate them prior to the end of the session.  Mary demonstrated good  understanding of the education provided.     See EMR under Patient Instructions for exercises provided 6/28/2019.    Assessment     Mary tolerates session well today. She was more focused today and able to participate in all tasks with little redirection. She continues to demonstrate poor dynamic balance, though improved since start of care. Pt's pain continues to decrease. She remains appropriate for skilled PT services.       Mary is progressing well towards her goals.   Pt prognosis is Good.      Pt will continue to benefit from skilled outpatient physical therapy to address the deficits listed in the problem list box on initial evaluation, provide pt/family education and to maximize pt's level of independence in the home and community environment.      Pt's spiritual, cultural and educational needs considered and pt agreeable to plan of care and goals.     Anticipated barriers to physical therapy: none     Goals:   Short Term GOALS: (5 weeks)  1. Pt to increase 30 second sit to stand score to 9 without use of UE. MET (7/23/2019)  2. Pt to increase Davidson Balance Score to 43 in order to decrease her risk for fall. MET (8/23/2019)  3. Pt to increase TUG score with use of AD to </= 20 seconds.MET (7/23/2019)  4. Pt will increase [place] condition 2 to 20 seconds with SBA. MET (8/23/2019)  Long Term GOALS: (10 weeks)  1. Pt will increase 30 second sit to stand score to 11 without use of  UEs. MET (7/23/2019)  2. Pt will increase DGI score to 16 in order to decrease her risk for fall. MET (10/15/2019)   2a. New goal 10/15/2019: Pt will increase DGI score to at least 19 in order to improve overall mobility and decrease risk for fall. ongoing  3. Pt will pass [place] condition 2 with minimal sway. MET previously  4. Pt will increase self selected walking speed to .7 in order to better be able to participate in pilgrimage. MET (7/23/2019)  4a (new goal 7/23/2019) Pt will increase self selected walking speed without AD to .9 in order to better be able to participate in pilgrimage. MET (10/15/2019)   5. Pt will increase TUG score with use of AD to </= 15 seconds in order to better participate in pilgrimage. MET (7/23/2019)  6. Pt will increase Davidson Balance score to 51 in order to decrease her risk for fall. MET (9/17/2019)   7. New goal 10/15/2019: Pt will pass condition 3 in [place] sensory testing in order to improve her overall balance. ongoing  8. New goal 10/15/2019: Pt will improve Nathanael sensory testing condition 6 to at least 20 seconds in order to improve balance. Ongoing  9. New goal 11/26/2019: Pt will increase score on 6MWT to at least 950' with or without AD. ongoing  Plan   PT to extend POC through 1/31/2020.    Continue to work on balance, HEP for home maintenance, and increasing endurance.    Efrain David, PT  01/09/2020

## 2020-01-13 NOTE — PROGRESS NOTES
"  Physical Therapy Daily Treatment Note     Name: Mary Prince  Clinic Number: [id number]    Therapy Diagnosis:   Encounter Diagnoses   Name Primary?    Impaired functional mobility, balance, gait, and endurance Yes    Bilateral arm weakness      Physician: Ahmet Maurer MD    Visit Date: 1/14/2020    Medical Diagnosis: B arm weakness  Evaluation Date: 5-15-19  Authorization Period Expiration: 12/2/2020  Plan of Care Certification Period: 9/17/2019-11/17/2019  NEW POC expiration: 12/31/2019  Extended POC expiration: 1/31/2020  Visit #/Visits authorized: 4/12 new referral  -- KX modifier (38 total visits 2019)    Time In: 1448 (pt arrives late for apt)  Time Out: 1530  Total Billable Time: 42 minutes    Precautions: Standard and Fall    Subjective     Pt reports: "I went to the doctor and he wants me to continue with PT and OT. He put in new orders for me." Pt expresses her concerns for impending discharge and that she "isn't ready to give this up." She says she wants to start OT again soon.     She was not compliant with home exercise program or going to the gym.  Response to previous treatment: felt fine  Functional change: ongoing    Pain: 0/10  Location: n/a    Objective   Mary participated in therapeutic exercises to improve functional mobility and safety for 15 minutes, including:  10 minutes recumbent stepper; on lv 5.0    Mary participated in neuromuscular re-education activities to improve: Balance, Coordination, Kinesthetic, Proprioception and Posture for 30 minutes. The following activities were included:  // bars:   3 laps of tandem ambulation, CGA, pt struggles to attain tandem  3 laps of stepping over various objects, 1 UE support required for the majority of the time, Hemal from PT     2x30 sec tandem stance ea LE lead, CGA  2 x 30 sec NBOS, vision eliminated, Hemal-CGA  4pt fitter 2 x 5 reps step over with occasional touchdown support    Bosu:    2 x 30 seconds flat side up static stance, " CGA   2 x 30 seconds flat side up, static stance with L/R head turns, Makayla   2 x 30 seconds flat side up, static stance with up/down head turns, Makayla   2 x 30 seconds round side up, static stance, Makayla    Mary participated in gait training to improve functional mobility and safety for 0 minutes, including:       Home Exercises Provided and Patient Education Provided     Education provided:   - increased joint tension due to swelling, use of ice to decrease swelling    Written Home Exercises Provided: yes.  Exercises were reviewed and Mary was able to demonstrate them prior to the end of the session.  Mary demonstrated good  understanding of the education provided.     See EMR under Patient Instructions for exercises provided 6/28/2019.    Assessment     Mary tolerates session fairly today. She continues to express fear and concerns of discharging from PT. PT educates pt on the necessity of progress as well as skilled services in order to continue with care. PT also educates pt that she does not need to ask for a referral for PT while she is currently attending PT unless she wants to be seen for another condition. Pt requires Makayla for balance today and continues to demonstrate poor control at B ankles. Pt continues to ask pt if she will ever get better, PT explains that she may not return to how she remembers herself and that some deficits may remain at the conclusion of care, but that is not to say she can never improve in her functioning again. She remains appropriate for skilled PT services.       Mary is progressing well towards her goals.   Pt prognosis is Good.      Pt will continue to benefit from skilled outpatient physical therapy to address the deficits listed in the problem list box on initial evaluation, provide pt/family education and to maximize pt's level of independence in the home and community environment.      Pt's spiritual, cultural and educational needs considered and pt agreeable to  plan of care and goals.     Anticipated barriers to physical therapy: none     Goals:   Short Term GOALS: (5 weeks)  1. Pt to increase 30 second sit to stand score to 9 without use of UE. MET (7/23/2019)  2. Pt to increase Davidson Balance Score to 43 in order to decrease her risk for fall. MET (8/23/2019)  3. Pt to increase TUG score with use of AD to </= 20 seconds.MET (7/23/2019)  4. Pt will increase [place] condition 2 to 20 seconds with SBA. MET (8/23/2019)  Long Term GOALS: (10 weeks)  1. Pt will increase 30 second sit to stand score to 11 without use of UEs. MET (7/23/2019)  2. Pt will increase DGI score to 16 in order to decrease her risk for fall. MET (10/15/2019)   2a. New goal 10/15/2019: Pt will increase DGI score to at least 19 in order to improve overall mobility and decrease risk for fall. ongoing  3. Pt will pass [place] condition 2 with minimal sway. MET previously  4. Pt will increase self selected walking speed to .7 in order to better be able to participate in pilgrimage. MET (7/23/2019)  4a (new goal 7/23/2019) Pt will increase self selected walking speed without AD to .9 in order to better be able to participate in pilgrimage. MET (10/15/2019)   5. Pt will increase TUG score with use of AD to </= 15 seconds in order to better participate in pilgrimage. MET (7/23/2019)  6. Pt will increase Davidson Balance score to 51 in order to decrease her risk for fall. MET (9/17/2019)   7. New goal 10/15/2019: Pt will pass condition 3 in [place] sensory testing in order to improve her overall balance. ongoing  8. New goal 10/15/2019: Pt will improve [place] sensory testing condition 6 to at least 20 seconds in order to improve balance. Ongoing  9. New goal 11/26/2019: Pt will increase score on 6MWT to at least 950' with or without AD. ongoing  Plan   PT to extend POC through 1/31/2020.  Continue to work on balance, HEP for home maintenance, and increasing endurance.    Efrain David, PT  01/14/2020

## 2020-01-14 ENCOUNTER — CLINICAL SUPPORT (OUTPATIENT)
Dept: REHABILITATION | Facility: HOSPITAL | Age: 80
End: 2020-01-14
Payer: MEDICARE

## 2020-01-14 DIAGNOSIS — R29.898 BILATERAL ARM WEAKNESS: ICD-10-CM

## 2020-01-14 DIAGNOSIS — Z74.09 IMPAIRED FUNCTIONAL MOBILITY, BALANCE, GAIT, AND ENDURANCE: Primary | ICD-10-CM

## 2020-01-14 PROCEDURE — 97110 THERAPEUTIC EXERCISES: CPT | Mod: PO

## 2020-01-14 PROCEDURE — 97112 NEUROMUSCULAR REEDUCATION: CPT | Mod: PO

## 2020-01-16 ENCOUNTER — CLINICAL SUPPORT (OUTPATIENT)
Dept: REHABILITATION | Facility: HOSPITAL | Age: 80
End: 2020-01-16
Payer: MEDICARE

## 2020-01-16 DIAGNOSIS — R29.898 BILATERAL ARM WEAKNESS: ICD-10-CM

## 2020-01-16 DIAGNOSIS — Z74.09 IMPAIRED FUNCTIONAL MOBILITY, BALANCE, GAIT, AND ENDURANCE: ICD-10-CM

## 2020-01-16 PROCEDURE — 97112 NEUROMUSCULAR REEDUCATION: CPT | Mod: PO,CQ

## 2020-01-16 PROCEDURE — 97110 THERAPEUTIC EXERCISES: CPT | Mod: PO,CQ

## 2020-01-16 NOTE — PROGRESS NOTES
"  Physical Therapy Daily Treatment Note     Name: Mary Prince  Clinic Number: [id number]    Therapy Diagnosis:   Encounter Diagnoses   Name Primary?    Impaired functional mobility, balance, gait, and endurance     Bilateral arm weakness      Physician: Ahmet Maurer MD    Visit Date: 1/16/2020    Medical Diagnosis: B arm weakness  Evaluation Date: 5-15-19  Authorization Period Expiration: 12/2/2020  Plan of Care Certification Period: 9/17/2019-11/17/2019  NEW POC expiration: 12/31/2019  Extended POC expiration: 1/31/2020  Visit #/Visits authorized: 5/12 new referral  --  (38 total visits 2019)    Time In: 1100  Time Out: 1145  Total Billable Time: 45 minutes    Precautions: Standard and Fall    Subjective     Pt reports: "It's so gloomy."     She was not compliant with home exercise program or going to the gym.  Response to previous treatment: felt fine  Functional change: ongoing    Pain: 0/10  Location: n/a    Objective   Mary participated in therapeutic exercises to improve functional mobility and safety for 15 minutes, including:  10 minutes recumbent bike, on lv 5.0    Mary participated in neuromuscular re-education activities to improve: Balance, Coordination, Kinesthetic, Proprioception and Posture for 30 minutes. The following activities were included:  // bars:    1 point foam fitter board: CGA   3 x 30 sec of static standing with occasional 1UE support   2 x 30 sec of single leg stance with 1 UE support    2 x 10 reps of B LE single leg step up onto fitter board with 1 UE support    2 point wooden fitter board: CGA   2 x 60 sec of avoiding moving medial/lateral with no UE support   2 x 60 sec of avoiding moving anterior/posteriore with on UE support   X 60 sec of medial/lateral weight shifting with no UE support   X 60 sec of anterior/posterior weight shifting with no UE support    4 point foam fitter board:   2 x 30 sec of static standing with eyes closed and no UE support   X 60 sec of " medial/lateral weight shifting with no UE support   X 60 sec of anterior/posterior weight shifting with on UE support      Mary participated in gait training to improve functional mobility and safety for 0 minutes, including:       Home Exercises Provided and Patient Education Provided     Education provided:   - increased joint tension due to swelling, use of ice to decrease swelling    Written Home Exercises Provided: yes.  Exercises were reviewed and Mary was able to demonstrate them prior to the end of the session.  Mary demonstrated good  understanding of the education provided.     See EMR under Patient Instructions for exercises provided 6/28/2019.    Assessment     Mary tolerates session fairly well today and did not have any problems noted.  Pt cont with endurance and balance training.  Pt required less assistance for balance activities today.  Cont with plan of care.        Mary is progressing well towards her goals.   Pt prognosis is Good.      Pt will continue to benefit from skilled outpatient physical therapy to address the deficits listed in the problem list box on initial evaluation, provide pt/family education and to maximize pt's level of independence in the home and community environment.      Pt's spiritual, cultural and educational needs considered and pt agreeable to plan of care and goals.     Anticipated barriers to physical therapy: none     Goals:   Short Term GOALS: (5 weeks)  1. Pt to increase 30 second sit to stand score to 9 without use of UE. MET (7/23/2019)  2. Pt to increase Davidson Balance Score to 43 in order to decrease her risk for fall. MET (8/23/2019)  3. Pt to increase TUG score with use of AD to </= 20 seconds.MET (7/23/2019)  4. Pt will increase [place] condition 2 to 20 seconds with SBA. MET (8/23/2019)  Long Term GOALS: (10 weeks)  1. Pt will increase 30 second sit to stand score to 11 without use of UEs. MET (7/23/2019)  2. Pt will increase DGI score to 16 in order to  decrease her risk for fall. MET (10/15/2019)   2a. New goal 10/15/2019: Pt will increase DGI score to at least 19 in order to improve overall mobility and decrease risk for fall. ongoing  3. Pt will pass [place] condition 2 with minimal sway. MET previously  4. Pt will increase self selected walking speed to .7 in order to better be able to participate in pilgrimage. MET (7/23/2019)  4a (new goal 7/23/2019) Pt will increase self selected walking speed without AD to .9 in order to better be able to participate in pilgrimage. MET (10/15/2019)   5. Pt will increase TUG score with use of AD to </= 15 seconds in order to better participate in pilgrimage. MET (7/23/2019)  6. Pt will increase Davidson Balance score to 51 in order to decrease her risk for fall. MET (9/17/2019)   7. New goal 10/15/2019: Pt will pass condition 3 in [place] sensory testing in order to improve her overall balance. ongoing  8. New goal 10/15/2019: Pt will improve [place] sensory testing condition 6 to at least 20 seconds in order to improve balance. Ongoing  9. New goal 11/26/2019: Pt will increase score on 6MWT to at least 950' with or without AD. ongoing  Plan   PT to extend POC through 1/31/2020.  Continue to work on balance, HEP for home maintenance, and increasing endurance.    Kate Taveras, PTA  01/16/2020

## 2020-01-20 NOTE — PROGRESS NOTES
"  Physical Therapy Daily Treatment Note     Name: Mary Prince  Clinic Number: [id number]    Therapy Diagnosis:   No diagnosis found.  Physician: Ahmet Maurer MD    Visit Date: 1/21/2020    Medical Diagnosis: B arm weakness  Evaluation Date: 5-15-19  Authorization Period Expiration: 12/2/2020  Plan of Care Certification Period: 9/17/2019-11/17/2019  NEW POC expiration: 12/31/2019  Extended POC expiration: 1/31/2020  Visit #/Visits authorized: 6/12 new referral  --  (38 total visits 2019)    Time In: 1100  Time Out: 1145  Total Billable Time: 45 minutes    Precautions: Standard and Fall    Subjective     Pt reports: After I had PT last week my L leg was bothering me so much I could barely walk. Its not bothering me anymore but it hurt until Sunday. "I've decided I'm not going to [facility]."    She was not compliant with home exercise program or going to the gym.  Response to previous treatment: felt fine  Functional change: ongoing    Pain: 1/10  Location: n/a    Objective   Mary participated in therapeutic exercises to improve functional mobility and safety for 15 minutes, including:  10 minutes recumbent stepper, on lv 5.0    Ambulation with SPC, approximately 200' with mod I. She continues to ambulate with WBOS and increased time for completion, but appears to have stable balance.    Mary participated in neuromuscular re-education activities to improve: Balance, Coordination, Kinesthetic, Proprioception and Posture for 30 minutes. The following activities were included:  // bars:    4 point foam fitter board:   2 x 30 sec of static standing with eyes closed and no UE support    Bosu:               2 x 30 seconds flat side up static stance, CGA              2 x 30 seconds flat side up, static stance with L/R head turns, CGA              2 x 30 seconds flat side up, static stance with up/down head turns, CGA              2 x 30 seconds round side up, static stance, CGA   2 x 30 sec flat side up, L/R " tapping, CGA   2 x 30 seconds flat side up, A/P tapping, CGA    PT attempts to stretch L LE due to pain in L lower leg. PT attempts stretching to soleus, adductors, and hamstrings, none of which affects the tender point. PT palpates medial posterior lower leg slightly distal to knee which was very sensitive to touch. Pt describes this as a shooting pain but unable to reproduce with any movement.     Mary participated in gait training to improve functional mobility and safety for 0 minutes, including:       Home Exercises Provided and Patient Education Provided     Education provided:   - increased joint tension due to swelling, use of ice to decrease swelling    Written Home Exercises Provided: yes.  Exercises were reviewed and Mary was able to demonstrate them prior to the end of the session.  Mary demonstrated good  understanding of the education provided.     See EMR under Patient Instructions for exercises provided 6/28/2019.    Assessment     Mary tolerates session fairly well today and did not have any problems noted. She does note increased pain following last session and midway through balance activities c/o pain beginning in L  Knee area. PT attempts to locate the origin of this pain but only able to reproduce with palpation and pt reports that it does not follow a pain pattern. Pt continues to demonstrate improving balance.  Cont with plan of care.        Mary is progressing well towards her goals.   Pt prognosis is Good.      Pt will continue to benefit from skilled outpatient physical therapy to address the deficits listed in the problem list box on initial evaluation, provide pt/family education and to maximize pt's level of independence in the home and community environment.      Pt's spiritual, cultural and educational needs considered and pt agreeable to plan of care and goals.     Anticipated barriers to physical therapy: none     Goals:   Short Term GOALS: (5 weeks)  1. Pt to increase 30  second sit to stand score to 9 without use of UE. MET (7/23/2019)  2. Pt to increase Davidson Balance Score to 43 in order to decrease her risk for fall. MET (8/23/2019)  3. Pt to increase TUG score with use of AD to </= 20 seconds.MET (7/23/2019)  4. Pt will increase Nathanael condition 2 to 20 seconds with SBA. MET (8/23/2019)  Long Term GOALS: (10 weeks)  1. Pt will increase 30 second sit to stand score to 11 without use of UEs. MET (7/23/2019)  2. Pt will increase DGI score to 16 in order to decrease her risk for fall. MET (10/15/2019)   2a. New goal 10/15/2019: Pt will increase DGI score to at least 19 in order to improve overall mobility and decrease risk for fall. ongoing  3. Pt will pass [place] condition 2 with minimal sway. MET previously  4. Pt will increase self selected walking speed to .7 in order to better be able to participate in pilgrimage. MET (7/23/2019)  4a (new goal 7/23/2019) Pt will increase self selected walking speed without AD to .9 in order to better be able to participate in pilgrimage. MET (10/15/2019)   5. Pt will increase TUG score with use of AD to </= 15 seconds in order to better participate in pilgrimage. MET (7/23/2019)  6. Pt will increase Davidson Balance score to 51 in order to decrease her risk for fall. MET (9/17/2019)   7. New goal 10/15/2019: Pt will pass condition 3 in [place] sensory testing in order to improve her overall balance. ongoing  8. New goal 10/15/2019: Pt will improve [place]ath[place] sensory testing condition 6 to at least 20 seconds in order to improve balance. Ongoing  9. New goal 11/26/2019: Pt will increase score on 6MWT to at least 950' with or without AD. ongoing  Plan   PT to extend POC through 1/31/2020.  Continue to work on balance, HEP for home maintenance, and increasing endurance.    Efrain David, PT  01/20/2020

## 2020-01-21 ENCOUNTER — CLINICAL SUPPORT (OUTPATIENT)
Dept: REHABILITATION | Facility: HOSPITAL | Age: 80
End: 2020-01-21
Payer: MEDICARE

## 2020-01-21 DIAGNOSIS — Z74.09 IMPAIRED FUNCTIONAL MOBILITY, BALANCE, GAIT, AND ENDURANCE: Primary | ICD-10-CM

## 2020-01-21 DIAGNOSIS — R29.898 BILATERAL ARM WEAKNESS: ICD-10-CM

## 2020-01-21 PROCEDURE — 97112 NEUROMUSCULAR REEDUCATION: CPT | Mod: PO

## 2020-01-21 PROCEDURE — 97110 THERAPEUTIC EXERCISES: CPT | Mod: PO

## 2020-01-22 NOTE — PROGRESS NOTES
Physical Therapy Daily Treatment Note     Name: Mary Prince  Clinic Number: [id number]    Therapy Diagnosis:   Encounter Diagnoses   Name Primary?    Impaired functional mobility, balance, gait, and endurance Yes    Bilateral arm weakness      Physician: Ahmet Maurer MD    Visit Date: 1/23/2020    Medical Diagnosis: B arm weakness  Evaluation Date: 5-15-19  Authorization Period Expiration: 12/2/2020  Plan of Care Certification Period: 9/17/2019-11/17/2019  NEW POC expiration: 12/31/2019  Extended POC expiration: 1/31/2020  Visit #/Visits authorized: 7/12 new referral  --  (38 total visits 2019)    Time In: 900  Time Out: 945  Total Billable Time: 45 minutes    Precautions: Standard and Fall    Subjective     Pt reports: This leg here is really bothering me since we poked around on it Tuesday. Yesterday she did a lot of work, cleaned out her closet all day. She has several doctors apt in the coming days.    She was not compliant with home exercise program or going to the gym.  Response to previous treatment: felt fine  Functional change: ongoing    Pain: 4/10  Location: L leg    Objective   Mary participated in therapeutic exercises to improve functional mobility and safety for 15 minutes, including:  10 minutes recumbent stepper, on lv 5.0    Mary participated in neuromuscular re-education activities to improve: Balance, Coordination, Kinesthetic, Proprioception and Posture for 30 minutes. The following activities were included:      6/25/2019 7/23/2019 8/23/2019 9/17/2019 10/15/19 11/12/19 12/3/19 12/30/19 1/22/2020   TUG 26.37 seconds with cane  26.08 seconds without AD 13.7 seconds with cane  10.7 seconds without AD 9.4 seconds with cane  9.6 seconds without AD 8.6 seconds without AD 10.9 + 8.9 + 9.0 = 9.6 seconds without AD 14.6 + 11.8 + 11.4 = 12.6 sec without AD 9.1 + 9.8 =  9.5 sec with RW  9.2 + 9.1 = 9.2 sec without AD 9.6 + 9.3 = 9.5 s with RW  9.6 + 9.7s = 9.7s  without AD 10.5 + 9.9 +  "9.2 = 9.9 s without AD   30 second sit to stand 7 without UE use 14 without UE use 18 without UE use 19 without UE use (cues to come fully to standing) 22 without UE use (cues to fully sit and stand) 18 without UE use 20 without UE use 16 without UE use 15 without UE use   Self selected walking speed .54 m/sec .75 m/s (6m/8.0 seconds)  G-code = CK .83 m/s (6m/7.2sec)  G-code = CJ .83 m/s (6m/7.2sec)  G-code = CJ .91 m/s (6m/6.6sec)  G-code = CJ .85 m/s (6m/7.1sec)  G-code=CJ 1.0 m/s (6m/6.0sec) with RW  .95 m/s  (6m/6.3sec)  Without AD     G-code= CJ .95 m/s (6m/6.3s) with RW  .88 m/s (6m/6.8s) without AD     G-code= CJ .85 m/s (6m/7.1s) with SPC  .88 m/s (6m/6.8s) without AD   Fast walking speed .71 m/sec .89 m/s (6m/6.7 seconds) .94 m/s (6m/6.4sec) 1.01 m/s (6m/5.9sec) 1.01 m/s (6m/5.9sec) .90 m/s (6m/6.7sec) 1.1 m/s (6m/5.4sec)  With RW 1.0 m/s (6m/6.0s) with RW .94 m/s (6m/6.4s) with SPC   RUIZ 38/56 41/56 49/56 51/56 51/56 47/56 50/56 NT 49/56   DGI 10/24 14/24 15/24 15/24 17/24 15/24 16/24 18/24 18/24      [place] SENSORY TESTING:  (P= Pass, F= Fail; note any sway; hold each position for 30")  Condition 1: (firm surface/feet together/eyes open) P  Condition 2: (firm surface/feet together/eyes closed) P  Condition 3: (firm surface/feet in tandem/eyes open) P  Condition 4: (firm surface/feet in tandem/eyes closed) NT  Condition 5: (soft surface/feet together/eyes open) F; 28 sec  Condition 6: (soft surface/feet together/eyes closed) P  Condition 7: (Fakuda step test), measure distance varied from center starting position NT    DYNAMIC GAIT INDEX:    1. Gait level surface= 2   3) Normal: walks 20', no AD, good speed, no evidence for imbalance, normal gait pattern   2) Mild impairment: walks 20', uses AD, slower speed, mild gait deviations   1)  moderate impairment: walks 20', slow speed, abnormal gait pattern, evidence for imbalance.   0)  severe impairment: cannot walk 20' without assistance, severe gait deviations " "or imbalance  2. Change in gait speed: 2   3) Normal: able to smoothly change walking speed without loss of balance or gait deviation. Shows a significant difference in walking speeds between fast, slow, and normal speeds.    2) Mild impairment: is able to change speeds but demonstrates mild gait deviations, or not gait deviations but unable to achieve a significant change in velocity, or uses AD   1) Moderate impairment: makes only minor adjustments in walking speed, or accomplishes a change in speed with significant gait deviation, or changes speed but loses balance and is able to recover and keep walking    0) Severe impairment: cannot change speeds, or loses balance and has to reach for wall or be caught.  3. Gait with horizontal head turns: 2   3) Normal: performs head turns smoothly with no change in gait.   2) Mild impairment: performs head turns smoothly with slight velocity, I.e.   Minor disruption to smooth gait path or uses AD   1) Moderate impairment: performs head turns with moderate change in gait   velocity, slows down, staggers but recovers, can continue to walk   0) Severe impairment: performs task with severe disruption of gait, i.e.   Staggers outside of 15" path, loses balance , stops, reaches for wall  4. Gait with vertical head turns: 2   3) Normal: performs head turns smoothly with no change in gait   2) Mild impairment: performs head turns smoothly with slight velocity, I.e.   Minor disruption to smooth gait path or uses AD   1) Moderate impairment: performs head turns with moderate change in gait   velocity, slows down, staggers but recovers, can continue to walk   0) Severe impairment: performs task with severe disruption of gait, i.e.   Staggers outside of 15" path, loses balance , stops, reaches for wall  5. Gait and pivot turn: 3   3)  Normal: pivot turns safely within 3 sec and stops quickly with no LOB   2)  Mild impairment: pivot turns safely in > 3 sec and stops with no " LOB   1) Moderate impairment: turns slowly, requires verbal cueing, requires   several small steps to catch balance following turn and stop   0)  Severe impairment: cannot turn safely, requires assistance to stop and   Turn  6. Step over obstacle:  3   3) normal: is able to step over box without changing speed, no LOB   2)  Mild impairment: is able to step over box, but must slow down and adjust   steps to clear box safely   1)  Moderate impairment: is able to step over box but must stop, then step   over. May require verbal cueing   0) Severe impairment: cannot perform without assistance  7. Step around obstacle: 2   3) Normal: able to walk around cones safely without changing gait speed;   no LOB   2)  Mild impairment: able to step around cones, but must slow down to adjust   steps to clear cones   1) moderate impairment: able to clear cones but must significantly slow   speed to accomplish or requires verbal cueing   0)  Severe impairment: unable to clear cones, walks into 1 or both cones, or   requires physical assistance  8. Steps: 2   3) Normal: alternating feet, no rail   2) Mild impairment: alternating feet, must use rail   1) Moderate impairment: 2 feet a stair, must use rail   0)  Severe impairment: cannot perform safely  Total:     RUIZ Assessment  1. Sitting to Standin - able to stand without using hands and stabilize independently  2. Standing Unsupported: 4 - able to stand safely 2 minutes without hold  3. Sitting Unsupported: 4 - able to sit safely and securely 2 minutes  4. Standing to Sittin - sits safely with minimal use of hands  5. Pivot Transfer: 4 - able to trnasfer safely with minor use of hands  6. Standing with Eyes Closed: 4 - albe to stand 10 seconds safely  7. Standing with Feet Together: 4 - able to place feet together independently and stand 1 minute safely  8. Reaching Forward with Outstretched Arm: 4 - can reach forward confidently 25 cm/10 inches  9. Retrieving Object  from Floor: 3 - able to pick slipper but needs supervision  10. Turning to Look Behind: 4 - looks behind from both sides and weights shifts well  11. Turning 360 Degrees: 4 - able to turn 360 in seconds or less  12. Placing Alternate Foot on Step: 2 - able to complete 4 steps without aid with supervision  13. Standing with One Foot in Front: 3 - able to place foot ahead of other independently and hold 30 seconds  14. Standing on One Foot: 1 - tries to lift leg and unable to hold 3 seconds but remains standing independently  Total: 49  Maximum: 56  (low fall risk)  0-20= high fall risk  21-40= moderate fall risk  41-56= low fall risk    Fall risk cut-off scores:   Elderly and History of falls: <51/56   Elderly and No history of falls: <42/56   CVA: <45/56       Mary participated in gait training to improve functional mobility and safety for 0 minutes, including:       Home Exercises Provided and Patient Education Provided     Education provided:   - increased joint tension due to swelling, use of ice to decrease swelling    Written Home Exercises Provided: yes.  Exercises were reviewed and Mary was able to demonstrate them prior to the end of the session.  Mary demonstrated good  understanding of the education provided.     See EMR under Patient Instructions for exercises provided 6/28/2019.    Assessment     Mary tolerates reassessment well today. Pt continues to demonstrate stable scores on DGI, Davidson, walking speeds, and TUG. Pt and PT discuss discharge as she has reached a plateau in skilled PT services at this time. Pt is reluctant to discharge but PT explants to pt how much progress she has made since start of care. PT and pt discuss her continuation of exercise on her own at the gym. Final two PT sessions to focus on HEP and assisting in establishing use of machines at gym. Pt's goals have been updated and remain appropriate for next two sessions.       Mary is progressing well towards her goals.   Pt  prognosis is Good.      Pt will continue to benefit from skilled outpatient physical therapy to address the deficits listed in the problem list box on initial evaluation, provide pt/family education and to maximize pt's level of independence in the home and community environment.      Pt's spiritual, cultural and educational needs considered and pt agreeable to plan of care and goals.     Anticipated barriers to physical therapy: none     Goals:   Short Term GOALS: (5 weeks)  1. Pt to increase 30 second sit to stand score to 9 without use of UE. MET (7/23/2019)  2. Pt to increase Davidson Balance Score to 43 in order to decrease her risk for fall. MET (8/23/2019)  3. Pt to increase TUG score with use of AD to </= 20 seconds.MET (7/23/2019)  4. Pt will increase [place]ath[place] condition 2 to 20 seconds with SBA. MET (8/23/2019)  Long Term GOALS: (10 weeks)  1. Pt will increase 30 second sit to stand score to 11 without use of UEs. MET (7/23/2019)  2. Pt will increase DGI score to 16 in order to decrease her risk for fall. MET (10/15/2019)   2a. New goal 10/15/2019: Pt will increase DGI score to at least 19 in order to improve overall mobility and decrease risk for fall. ongoing  3. Pt will pass [place] condition 2 with minimal sway. MET 1/23/2020  4. Pt will increase self selected walking speed to .7 in order to better be able to participate in pilgrimage. MET (7/23/2019)  4a (new goal 7/23/2019) Pt will increase self selected walking speed without AD to .9 in order to better be able to participate in pilgrimage. MET (10/15/2019)   5. Pt will increase TUG score with use of AD to </= 15 seconds in order to better participate in pilgrimage. MET (7/23/2019)  6. Pt will increase Davidson Balance score to 51 in order to decrease her risk for fall. MET (9/17/2019)   7. New goal 10/15/2019: Pt will pass condition 3 in [place] sensory testing in order to improve her overall balance.Met 1/23/2020  8. New goal 10/15/2019: Pt will improve [place]athana[place] sensory  testing condition 6 to at least 20 seconds in order to improve balance. Met 1/23/2020  9. New goal 11/26/2019: Pt will increase score on 6MWT to at least 950' with or without AD. ongoing  Plan   PT to extend POC through 1/31/2020.  Continue to work on balance, HEP for home maintenance, and increasing endurance.    Efrain David, PT  01/23/2020

## 2020-01-23 ENCOUNTER — CLINICAL SUPPORT (OUTPATIENT)
Dept: REHABILITATION | Facility: HOSPITAL | Age: 80
End: 2020-01-23
Payer: MEDICARE

## 2020-01-23 DIAGNOSIS — Z74.09 IMPAIRED FUNCTIONAL MOBILITY, BALANCE, GAIT, AND ENDURANCE: Primary | ICD-10-CM

## 2020-01-23 DIAGNOSIS — R29.898 BILATERAL ARM WEAKNESS: ICD-10-CM

## 2020-01-23 PROCEDURE — 97110 THERAPEUTIC EXERCISES: CPT | Mod: PO

## 2020-01-23 PROCEDURE — 97112 NEUROMUSCULAR REEDUCATION: CPT | Mod: PO

## 2020-01-23 NOTE — PLAN OF CARE
Physical Therapy Daily Treatment Note     Name: Mary Prince  Clinic Number: [id number]    Therapy Diagnosis:   Encounter Diagnoses   Name Primary?    Impaired functional mobility, balance, gait, and endurance Yes    Bilateral arm weakness      Physician: Ahmet Maurer MD    Visit Date: 1/23/2020    Medical Diagnosis: B arm weakness  Evaluation Date: 5-15-19  Authorization Period Expiration: 12/2/2020  Plan of Care Certification Period: 9/17/2019-11/17/2019  NEW POC expiration: 12/31/2019  Extended POC expiration: 1/31/2020  Visit #/Visits authorized: 7/12 new referral  --  (38 total visits 2019)    Time In: 900  Time Out: 945  Total Billable Time: 45 minutes    Precautions: Standard and Fall    Subjective     Pt reports: This leg here is really bothering me since we poked around on it Tuesday. Yesterday she did a lot of work, cleaned out her closet all day. She has several doctors apt in the coming days.    She was not compliant with home exercise program or going to the gym.  Response to previous treatment: felt fine  Functional change: ongoing    Pain: 4/10  Location: L leg    Objective   Mary participated in therapeutic exercises to improve functional mobility and safety for 15 minutes, including:  10 minutes recumbent stepper, on lv 5.0    Mary participated in neuromuscular re-education activities to improve: Balance, Coordination, Kinesthetic, Proprioception and Posture for 30 minutes. The following activities were included:      6/25/2019 7/23/2019 8/23/2019 9/17/2019 10/15/19 11/12/19 12/3/19 12/30/19 1/22/2020   TUG 26.37 seconds with cane  26.08 seconds without AD 13.7 seconds with cane  10.7 seconds without AD 9.4 seconds with cane  9.6 seconds without AD 8.6 seconds without AD 10.9 + 8.9 + 9.0 = 9.6 seconds without AD 14.6 + 11.8 + 11.4 = 12.6 sec without AD 9.1 + 9.8 =  9.5 sec with RW  9.2 + 9.1 = 9.2 sec without AD 9.6 + 9.3 = 9.5 s with RW  9.6 + 9.7s = 9.7s  without AD 10.5 + 9.9 +  "9.2 = 9.9 s without AD   30 second sit to stand 7 without UE use 14 without UE use 18 without UE use 19 without UE use (cues to come fully to standing) 22 without UE use (cues to fully sit and stand) 18 without UE use 20 without UE use 16 without UE use 15 without UE use   Self selected walking speed .54 m/sec .75 m/s (6m/8.0 seconds)  G-code = CK .83 m/s (6m/7.2sec)  G-code = CJ .83 m/s (6m/7.2sec)  G-code = CJ .91 m/s (6m/6.6sec)  G-code = CJ .85 m/s (6m/7.1sec)  G-code=CJ 1.0 m/s (6m/6.0sec) with RW  .95 m/s  (6m/6.3sec)  Without AD     G-code= CJ .95 m/s (6m/6.3s) with RW  .88 m/s (6m/6.8s) without AD     G-code= CJ .85 m/s (6m/7.1s) with SPC  .88 m/s (6m/6.8s) without AD   Fast walking speed .71 m/sec .89 m/s (6m/6.7 seconds) .94 m/s (6m/6.4sec) 1.01 m/s (6m/5.9sec) 1.01 m/s (6m/5.9sec) .90 m/s (6m/6.7sec) 1.1 m/s (6m/5.4sec)  With RW 1.0 m/s (6m/6.0s) with RW .94 m/s (6m/6.4s) with SPC   RUIZ 38/56 41/56 49/56 51/56 51/56 47/56 50/56 NT 49/56   DGI 10/24 14/24 15/24 15/24 17/24 15/24 16/24 18/24 18/24      [place] SENSORY TESTING:  (P= Pass, F= Fail; note any sway; hold each position for 30")  Condition 1: (firm surface/feet together/eyes open) P  Condition 2: (firm surface/feet together/eyes closed) P  Condition 3: (firm surface/feet in tandem/eyes open) P  Condition 4: (firm surface/feet in tandem/eyes closed) NT  Condition 5: (soft surface/feet together/eyes open) F; 28 sec  Condition 6: (soft surface/feet together/eyes closed) P  Condition 7: (Fakuda step test), measure distance varied from center starting position NT    DYNAMIC GAIT INDEX:    1. Gait level surface= 2   3) Normal: walks 20', no AD, good speed, no evidence for imbalance, normal gait pattern   2) Mild impairment: walks 20', uses AD, slower speed, mild gait deviations   1)  moderate impairment: walks 20', slow speed, abnormal gait pattern, evidence for imbalance.   0)  severe impairment: cannot walk 20' without assistance, severe gait deviations " "or imbalance  2. Change in gait speed: 2   3) Normal: able to smoothly change walking speed without loss of balance or gait deviation. Shows a significant difference in walking speeds between fast, slow, and normal speeds.    2) Mild impairment: is able to change speeds but demonstrates mild gait deviations, or not gait deviations but unable to achieve a significant change in velocity, or uses AD   1) Moderate impairment: makes only minor adjustments in walking speed, or accomplishes a change in speed with significant gait deviation, or changes speed but loses balance and is able to recover and keep walking    0) Severe impairment: cannot change speeds, or loses balance and has to reach for wall or be caught.  3. Gait with horizontal head turns: 2   3) Normal: performs head turns smoothly with no change in gait.   2) Mild impairment: performs head turns smoothly with slight velocity, I.e.   Minor disruption to smooth gait path or uses AD   1) Moderate impairment: performs head turns with moderate change in gait   velocity, slows down, staggers but recovers, can continue to walk   0) Severe impairment: performs task with severe disruption of gait, i.e.   Staggers outside of 15" path, loses balance , stops, reaches for wall  4. Gait with vertical head turns: 2   3) Normal: performs head turns smoothly with no change in gait   2) Mild impairment: performs head turns smoothly with slight velocity, I.e.   Minor disruption to smooth gait path or uses AD   1) Moderate impairment: performs head turns with moderate change in gait   velocity, slows down, staggers but recovers, can continue to walk   0) Severe impairment: performs task with severe disruption of gait, i.e.   Staggers outside of 15" path, loses balance , stops, reaches for wall  5. Gait and pivot turn: 3   3)  Normal: pivot turns safely within 3 sec and stops quickly with no LOB   2)  Mild impairment: pivot turns safely in > 3 sec and stops with no " LOB   1) Moderate impairment: turns slowly, requires verbal cueing, requires   several small steps to catch balance following turn and stop   0)  Severe impairment: cannot turn safely, requires assistance to stop and   Turn  6. Step over obstacle:  3   3) normal: is able to step over box without changing speed, no LOB   2)  Mild impairment: is able to step over box, but must slow down and adjust   steps to clear box safely   1)  Moderate impairment: is able to step over box but must stop, then step   over. May require verbal cueing   0) Severe impairment: cannot perform without assistance  7. Step around obstacle: 2   3) Normal: able to walk around cones safely without changing gait speed;   no LOB   2)  Mild impairment: able to step around cones, but must slow down to adjust   steps to clear cones   1) moderate impairment: able to clear cones but must significantly slow   speed to accomplish or requires verbal cueing   0)  Severe impairment: unable to clear cones, walks into 1 or both cones, or   requires physical assistance  8. Steps: 2   3) Normal: alternating feet, no rail   2) Mild impairment: alternating feet, must use rail   1) Moderate impairment: 2 feet a stair, must use rail   0)  Severe impairment: cannot perform safely  Total:     RUIZ Assessment  1. Sitting to Standin - able to stand without using hands and stabilize independently  2. Standing Unsupported: 4 - able to stand safely 2 minutes without hold  3. Sitting Unsupported: 4 - able to sit safely and securely 2 minutes  4. Standing to Sittin - sits safely with minimal use of hands  5. Pivot Transfer: 4 - able to trnasfer safely with minor use of hands  6. Standing with Eyes Closed: 4 - albe to stand 10 seconds safely  7. Standing with Feet Together: 4 - able to place feet together independently and stand 1 minute safely  8. Reaching Forward with Outstretched Arm: 4 - can reach forward confidently 25 cm/10 inches  9. Retrieving Object  from Floor: 3 - able to pick slipper but needs supervision  10. Turning to Look Behind: 4 - looks behind from both sides and weights shifts well  11. Turning 360 Degrees: 4 - able to turn 360 in seconds or less  12. Placing Alternate Foot on Step: 2 - able to complete 4 steps without aid with supervision  13. Standing with One Foot in Front: 3 - able to place foot ahead of other independently and hold 30 seconds  14. Standing on One Foot: 1 - tries to lift leg and unable to hold 3 seconds but remains standing independently  Total: 49  Maximum: 56  (low fall risk)  0-20= high fall risk  21-40= moderate fall risk  41-56= low fall risk    Fall risk cut-off scores:   Elderly and History of falls: <51/56   Elderly and No history of falls: <42/56   CVA: <45/56       Mary participated in gait training to improve functional mobility and safety for 0 minutes, including:       Home Exercises Provided and Patient Education Provided     Education provided:   - increased joint tension due to swelling, use of ice to decrease swelling    Written Home Exercises Provided: yes.  Exercises were reviewed and Mary was able to demonstrate them prior to the end of the session.  Mary demonstrated good  understanding of the education provided.     See EMR under Patient Instructions for exercises provided 6/28/2019.    Assessment     Mary tolerates reassessment well today. Pt continues to demonstrate stable scores on DGI, Davidson, walking speeds, and TUG. Pt and PT discuss discharge as she has reached a plateau in skilled PT services at this time. Pt is reluctant to discharge but PT explants to pt how much progress she has made since start of care. PT and pt discuss her continuation of exercise on her own at the gym. Final two PT sessions to focus on HEP and assisting in establishing use of machines at gym. Pt's goals have been updated and remain appropriate for next two sessions.       Mary is progressing well towards her goals.   Pt  prognosis is Good.      Pt will continue to benefit from skilled outpatient physical therapy to address the deficits listed in the problem list box on initial evaluation, provide pt/family education and to maximize pt's level of independence in the home and community environment.      Pt's spiritual, cultural and educational needs considered and pt agreeable to plan of care and goals.     Anticipated barriers to physical therapy: none     Goals:   Short Term GOALS: (5 weeks)  1. Pt to increase 30 second sit to stand score to 9 without use of UE. MET (7/23/2019)  2. Pt to increase Davidson Balance Score to 43 in order to decrease her risk for fall. MET (8/23/2019)  3. Pt to increase TUG score with use of AD to </= 20 seconds.MET (7/23/2019)  4. Pt will increase [place]athanael condition 2 to 20 seconds with SBA. MET (8/23/2019)  Long Term GOALS: (10 weeks)  1. Pt will increase 30 second sit to stand score to 11 without use of UEs. MET (7/23/2019)  2. Pt will increase DGI score to 16 in order to decrease her risk for fall. MET (10/15/2019)   2a. New goal 10/15/2019: Pt will increase DGI score to at least 19 in order to improve overall mobility and decrease risk for fall. ongoing  3. Pt will pass [place] condition 2 with minimal sway. MET 1/23/2020  4. Pt will increase self selected walking speed to .7 in order to better be able to participate in pilgrimage. MET (7/23/2019)  4a (new goal 7/23/2019) Pt will increase self selected walking speed without AD to .9 in order to better be able to participate in pilgrimage. MET (10/15/2019)   5. Pt will increase TUG score with use of AD to </= 15 seconds in order to better participate in pilgrimage. MET (7/23/2019)  6. Pt will increase Davidson Balance score to 51 in order to decrease her risk for fall. MET (9/17/2019)   7. New goal 10/15/2019: Pt will pass condition 3 in [place] sensory testing in order to improve her overall balance.Met 1/23/2020  8. New goal 10/15/2019: Pt will improve Nathanael sensory  testing condition 6 to at least 20 seconds in order to improve balance. Met 1/23/2020  9. New goal 11/26/2019: Pt will increase score on 6MWT to at least 950' with or without AD. ongoing  Plan   PT to extend POC through 1/31/2020.  Continue to work on balance, HEP for home maintenance, and increasing endurance.    Efrain David, PT  01/23/2020

## 2020-01-28 ENCOUNTER — CLINICAL SUPPORT (OUTPATIENT)
Dept: REHABILITATION | Facility: HOSPITAL | Age: 80
End: 2020-01-28
Payer: MEDICARE

## 2020-01-28 DIAGNOSIS — R29.898 BILATERAL ARM WEAKNESS: ICD-10-CM

## 2020-01-28 DIAGNOSIS — Z74.09 IMPAIRED FUNCTIONAL MOBILITY, BALANCE, GAIT, AND ENDURANCE: ICD-10-CM

## 2020-01-28 PROCEDURE — 97112 NEUROMUSCULAR REEDUCATION: CPT | Mod: PO,CQ

## 2020-01-28 PROCEDURE — 97110 THERAPEUTIC EXERCISES: CPT | Mod: PO,CQ

## 2020-01-28 NOTE — PROGRESS NOTES
"  Physical Therapy Daily Treatment Note     Name: Mary Prince  Clinic Number: [id number]    Therapy Diagnosis:   Encounter Diagnoses   Name Primary?    Impaired functional mobility, balance, gait, and endurance     Bilateral arm weakness      Physician: Ahmet Maurer MD    Visit Date: 1/28/2020    Medical Diagnosis: B arm weakness  Evaluation Date: 5-15-19  Authorization Period Expiration: 12/2/2020  Plan of Care Certification Period: 9/17/2019-11/17/2019  NEW POC expiration: 12/31/2019  Extended POC expiration: 1/31/2020  Visit #/Visits authorized: 6/12 new referral  --  (38 total visits 2019)    Time In: 1100  Time Out: 1145  Total Billable Time: 45 minutes    Precautions: Standard and Fall    Subjective     Pt reports: After I had PT last week my L leg was bothering me so much I could barely walk. Its not bothering me anymore but it hurt until Sunday. "I've decided I'm not going to [facility]."    She was not compliant with home exercise program or going to the gym.  Response to previous treatment: felt fine  Functional change: ongoing    Pain: 1/10  Location: n/a    Objective   Mary participated in therapeutic exercises to improve functional mobility and safety for 15 minutes, including:  10 minutes recumbent bike, on lv 7.0    Mary participated in neuromuscular re-education activities to improve: Balance, Coordination, Kinesthetic, Proprioception and Posture for 30 minutes. The following activities were included:  // bars:    2 point wooden fitter board.: CGA   3 x 60 sec of trying to avoid moving medial/lateral with no UE support   2 x 60 sec of medial/lateral weight shifting with no UE support   3 x 60 sec of trying to avoid moving anterior/posterior with no UE support   2 x 60 sec of anterior/posterior weight shifting with no UE support    Bosu:   Flat side up:    2 x 10 reps of B LE single leg step to, no UE support   4 x 30 sec of B LE single leg stance with unilateral support on floor with out " UE support and CGA    Soft side up:   2 x 60 sec of static standing with eyes opened and CGA   2 x 60 sec of medial/lateral weight shifting with CGA   2 x 60 sec of anterior/posterior weight shifting with CGA    Mary participated in gait training to improve functional mobility and safety for 0 minutes, including:       Home Exercises Provided and Patient Education Provided     Education provided:   - increased joint tension due to swelling, use of ice to decrease swelling    Written Home Exercises Provided: yes.  Exercises were reviewed and Mary was able to demonstrate them prior to the end of the session.  Mary demonstrated good  understanding of the education provided.     See EMR under Patient Instructions for exercises provided 6/28/2019.    Assessment     Mary tolerated tx session well and did not have any problems noted.  Pt cont with balance activities and was able to perform most unsupported and with CGA. No loss of balance noted.   Cont with plan of care.        Mary is progressing well towards her goals.   Pt prognosis is Good.      Pt will continue to benefit from skilled outpatient physical therapy to address the deficits listed in the problem list box on initial evaluation, provide pt/family education and to maximize pt's level of independence in the home and community environment.      Pt's spiritual, cultural and educational needs considered and pt agreeable to plan of care and goals.     Anticipated barriers to physical therapy: none     Goals:   Short Term GOALS: (5 weeks)  1. Pt to increase 30 second sit to stand score to 9 without use of UE. MET (7/23/2019)  2. Pt to increase Davidson Balance Score to 43 in order to decrease her risk for fall. MET (8/23/2019)  3. Pt to increase TUG score with use of AD to </= 20 seconds.MET (7/23/2019)  4. Pt will increase Nathanael condition 2 to 20 seconds with SBA. MET (8/23/2019)  Long Term GOALS: (10 weeks)  1. Pt will increase 30 second sit to stand score  to 11 without use of UEs. MET (7/23/2019)  2. Pt will increase DGI score to 16 in order to decrease her risk for fall. MET (10/15/2019)   2a. New goal 10/15/2019: Pt will increase DGI score to at least 19 in order to improve overall mobility and decrease risk for fall. ongoing  3. Pt will pass [place] condition 2 with minimal sway. MET previously  4. Pt will increase self selected walking speed to .7 in order to better be able to participate in pilgrimage. MET (7/23/2019)  4a (new goal 7/23/2019) Pt will increase self selected walking speed without AD to .9 in order to better be able to participate in pilgrimage. MET (10/15/2019)   5. Pt will increase TUG score with use of AD to </= 15 seconds in order to better participate in pilgrimage. MET (7/23/2019)  6. Pt will increase Davidson Balance score to 51 in order to decrease her risk for fall. MET (9/17/2019)   7. New goal 10/15/2019: Pt will pass condition 3 in [place] sensory testing in order to improve her overall balance. ongoing  8. New goal 10/15/2019: Pt will improve [place] sensory testing condition 6 to at least 20 seconds in order to improve balance. Ongoing  9. New goal 11/26/2019: Pt will increase score on 6MWT to at least 950' with or without AD. ongoing  Plan   PT to extend POC through 1/31/2020.  Continue to work on balance, HEP for home maintenance, and increasing endurance.    Kate Taveras, PTA  01/28/2020

## 2020-01-30 ENCOUNTER — CLINICAL SUPPORT (OUTPATIENT)
Dept: REHABILITATION | Facility: HOSPITAL | Age: 80
End: 2020-01-30
Payer: MEDICARE

## 2020-01-30 DIAGNOSIS — Z74.09 IMPAIRED FUNCTIONAL MOBILITY, BALANCE, GAIT, AND ENDURANCE: Primary | ICD-10-CM

## 2020-01-30 DIAGNOSIS — R29.898 BILATERAL ARM WEAKNESS: ICD-10-CM

## 2020-01-30 PROCEDURE — 97110 THERAPEUTIC EXERCISES: CPT | Mod: PO

## 2020-01-30 PROCEDURE — 97112 NEUROMUSCULAR REEDUCATION: CPT | Mod: PO

## 2020-01-30 NOTE — PLAN OF CARE
"  Physical Therapy Daily Treatment Note/Discharge Summary     Name: Mary Prince  Clinic Number: [id number]    Therapy Diagnosis:   Encounter Diagnoses   Name Primary?    Impaired functional mobility, balance, gait, and endurance Yes    Bilateral arm weakness      Physician: Ahmet Maurer MD    Visit Date: 1/30/2020    Medical Diagnosis: B arm weakness  Evaluation Date: 5-15-19  Authorization Period Expiration: 12/2/2020  Plan of Care Certification Period: 9/17/2019-11/17/2019  NEW POC expiration: 12/31/2019  Extended POC expiration: 1/31/2020  Visit #/Visits authorized: 7/12 new referral  --  (38 total visits 2019)    Time In: 1144  Time Out: 1225  Total Billable Time: 41 minutes    Precautions: Standard and Fall    Subjective     Pt reports: She is doing well this morning. Broke her glasses on the way to therapy, but no new complaints. She received a shot in her L knee on Tuesday and it is now feeling better.     She was not compliant with home exercise program or going to the gym.  Response to previous treatment: felt fine  Functional change: ongoing    Pain: 1/10  Location: L knee    Objective   Mary participated in therapeutic exercises to improve functional mobility and safety for 16 minutes, including:  10 minutes recumbent stepper, on lv 5.0    6MWT: 1095'    Mary participated in neuromuscular re-education activities to improve: Balance, Coordination, Kinesthetic, Proprioception and Posture for 25 minutes. The following activities were included:    Bosu:   Flat side up:    2 x 30 sec of static stance, no UE support, CGA   4 x 30 sec of B LE single leg stance with unilateral support on floor with out UE support and CGA    Soft side up:   2 x 30 sec of static standing with eyes opened and CGA   2 x 30 sec of medial/lateral weight shifting with CGA    KIZZY SENSORY TESTING:  (P= Pass, F= Fail; note any sway; hold each position for 30")  Condition 1: (firm surface/feet together/eyes open) " P  Condition 2: (firm surface/feet together/eyes closed) P  Condition 3: (firm surface/feet in tandem/eyes open) P  Condition 4: (firm surface/feet in tandem/eyes closed) NT  Condition 5: (soft surface/feet together/eyes open) P  Condition 6: (soft surface/feet together/eyes closed) F; 21 sec  Condition 7: (Fakuda step test), measure distance varied from center starting position NT      Mary participated in gait training to improve functional mobility and safety for 0 minutes, including:       Home Exercises Provided and Patient Education Provided     Education provided:   - continue with HEP and activity at home/gym    Written Home Exercises Provided: yes.  Exercises were reviewed and Mary was able to demonstrate them prior to the end of the session.  Mary demonstrated good  understanding of the education provided.     See EMR under Patient Instructions for exercises provided 6/28/2019.    Assessment     Mary has made great progress during her time in PT. She has achieved all STGs and 10/11 LTGs. Pt's balance and gait have improved greatly. She did have a few set backs during her plan of care, including but not limited to a pretty major fall injuring her R knee as well as cardiac surgery. Pt persevered despite these set backs and has reached her maximum potential with PT at this time. Pt has decreased her risk for fall based on Davidson and DGI scores and continues to be motivated to exercise outside of sessions. She has been provided with exercises to execute on her own and educated on increasing gait endurance for her upcoming trip to [facility]. Pt is discharged from skilled PT services at this time.     Goals:   Short Term GOALS: (5 weeks)  1. Pt to increase 30 second sit to stand score to 9 without use of UE. MET (7/23/2019)  2. Pt to increase Davidson Balance Score to 43 in order to decrease her risk for fall. MET (8/23/2019)  3. Pt to increase TUG score with use of AD to </= 20 seconds.MET (7/23/2019)  4. Pt  will increase [place] condition 2 to 20 seconds with SBA. MET (8/23/2019)  Long Term GOALS: (10 weeks)  1. Pt will increase 30 second sit to stand score to 11 without use of UEs. MET (7/23/2019)  2. Pt will increase DGI score to 16 in order to decrease her risk for fall. MET (10/15/2019)   2a. New goal 10/15/2019: Pt will increase DGI score to at least 19 in order to improve overall mobility and decrease risk for fall. Not Met  3. Pt will pass [place] condition 2 with minimal sway. MET   4. Pt will increase self selected walking speed to .7 in order to better be able to participate in pilgrimage. MET (7/23/2019)  4a (new goal 7/23/2019) Pt will increase self selected walking speed without AD to .9 in order to better be able to participate in pilgrimage. MET (10/15/2019)   5. Pt will increase TUG score with use of AD to </= 15 seconds in order to better participate in pilgrimage. MET (7/23/2019)  6. Pt will increase Davidson Balance score to 51 in order to decrease her risk for fall. MET (9/17/2019)   7. New goal 10/15/2019: Pt will pass condition 3 in [place] sensory testing in order to improve her overall balance. MET 1/30/2020  8. New goal 10/15/2019: Pt will improve [place] sensory testing condition 6 to at least 20 seconds in order to improve balance. Met 1/30/2020  9. New goal 11/26/2019: Pt will increase score on 6MWT to at least 950' with or without AD. Met 1/30/2020  Plan   Pt is discharged from skilled PT services for maintenance of conditions on her own.     Efrain David, PT  01/30/2020

## 2020-07-17 ENCOUNTER — ANESTHESIA EVENT (OUTPATIENT)
Dept: SURGERY | Facility: OTHER | Age: 80
End: 2020-07-17
Payer: MEDICARE

## 2020-07-17 ENCOUNTER — HOSPITAL ENCOUNTER (OUTPATIENT)
Dept: PREADMISSION TESTING | Facility: OTHER | Age: 80
Discharge: HOME | End: 2020-07-17
Attending: ORTHOPAEDIC SURGERY
Payer: MEDICARE

## 2020-07-17 VITALS
TEMPERATURE: 98 F | BODY MASS INDEX: 30.91 KG/M2 | OXYGEN SATURATION: 95 % | HEIGHT: 62 IN | WEIGHT: 168 LBS | DIASTOLIC BLOOD PRESSURE: 59 MMHG | SYSTOLIC BLOOD PRESSURE: 130 MMHG | HEART RATE: 82 BPM

## 2020-07-17 DIAGNOSIS — Z01.818 PREOP TESTING: ICD-10-CM

## 2020-07-17 LAB
ABO + RH BLD: NORMAL
BACTERIA #/AREA URNS HPF: ABNORMAL /HPF
BILIRUB UR QL STRIP: NEGATIVE
BLD GP AB SCN CELLS X3 SERPL QL: NORMAL
CLARITY UR: ABNORMAL
COLOR UR: YELLOW
GLUCOSE UR QL STRIP: NEGATIVE
HGB UR QL STRIP: NEGATIVE
KETONES UR QL STRIP: NEGATIVE
LEUKOCYTE ESTERASE UR QL STRIP: ABNORMAL
MICROSCOPIC COMMENT: ABNORMAL
NITRITE UR QL STRIP: NEGATIVE
PH UR STRIP: 6 [PH] (ref 5–8)
PROT UR QL STRIP: ABNORMAL
RBC #/AREA URNS HPF: 1 /HPF (ref 0–4)
SP GR UR STRIP: 1.02 (ref 1–1.03)
SQUAMOUS #/AREA URNS HPF: 10 /HPF
URN SPEC COLLECT METH UR: ABNORMAL
UROBILINOGEN UR STRIP-ACNC: NEGATIVE EU/DL
WBC #/AREA URNS HPF: 15 /HPF (ref 0–5)

## 2020-07-17 PROCEDURE — 36415 COLL VENOUS BLD VENIPUNCTURE: CPT

## 2020-07-17 PROCEDURE — 87086 URINE CULTURE/COLONY COUNT: CPT

## 2020-07-17 PROCEDURE — 86901 BLOOD TYPING SEROLOGIC RH(D): CPT

## 2020-07-17 PROCEDURE — 81000 URINALYSIS NONAUTO W/SCOPE: CPT

## 2020-07-17 RX ORDER — ACETAMINOPHEN 500 MG
1000 TABLET ORAL
Status: CANCELLED | OUTPATIENT
Start: 2020-07-17 | End: 2020-07-17

## 2020-07-17 RX ORDER — LIDOCAINE HYDROCHLORIDE 10 MG/ML
0.5 INJECTION, SOLUTION EPIDURAL; INFILTRATION; INTRACAUDAL; PERINEURAL ONCE
Status: CANCELLED | OUTPATIENT
Start: 2020-07-17 | End: 2020-07-17

## 2020-07-17 RX ORDER — METOPROLOL SUCCINATE 50 MG/1
50 TABLET, EXTENDED RELEASE ORAL DAILY
COMMUNITY
Start: 2020-06-27 | End: 2021-01-05 | Stop reason: SDUPTHER

## 2020-07-17 RX ORDER — PRIMIDONE 50 MG/1
50 TABLET ORAL 3 TIMES DAILY
COMMUNITY
Start: 2020-07-07

## 2020-07-17 RX ORDER — SODIUM CHLORIDE, SODIUM LACTATE, POTASSIUM CHLORIDE, CALCIUM CHLORIDE 600; 310; 30; 20 MG/100ML; MG/100ML; MG/100ML; MG/100ML
INJECTION, SOLUTION INTRAVENOUS CONTINUOUS
Status: CANCELLED | OUTPATIENT
Start: 2020-07-17

## 2020-07-17 RX ORDER — FAMOTIDINE 20 MG/1
20 TABLET, FILM COATED ORAL
Status: CANCELLED | OUTPATIENT
Start: 2020-07-17 | End: 2020-07-17

## 2020-07-17 RX ORDER — ESCITALOPRAM OXALATE 10 MG/1
10 TABLET ORAL DAILY
COMMUNITY
Start: 2020-06-08

## 2020-07-17 NOTE — ANESTHESIA PREPROCEDURE EVALUATION
07/17/2020  Mary Prince is a 79 y.o., female.    Anesthesia Evaluation    I have reviewed the Patient Summary Reports.    I have reviewed the Nursing Notes. I have reviewed the NPO Status.      Review of Systems  Anesthesia Hx:  No problems with previous Anesthesia  History of prior surgery of interest to airway management or planning: heart surgery.   Social:  Non-Smoker    Cardiovascular:   Hypertension Valvular problems/Murmurs CAD    Denies Angina. TAVR Nov 2019 Doing well now!!   Pulmonary:  Pulmonary Normal    Renal/:  Renal/ Normal     Hepatic/GI:  Hepatic/GI Normal    Musculoskeletal:   Arthritis     Neurological:   Neuromuscular Disease,    Endocrine:   Diabetes, well controlled Hypothyroidism    Dermatological:  Skin Normal    Psych:  Psychiatric Normal           Physical Exam  General:  Obesity    Airway/Jaw/Neck:  Airway Findings: Mouth Opening: Normal Tongue: Normal  General Airway Assessment: Adult  Mallampati: II  TM Distance: Normal, at least 6 cm  Jaw/Neck Findings:     Neck ROM: Normal ROM      Dental:  Dental Findings: In tact             Anesthesia Plan  Type of Anesthesia, risks & benefits discussed:  Anesthesia Type:  spinal  Patient's Preference:   Intra-op Monitoring Plan:   Intra-op Monitoring Plan Comments:   Post Op Pain Control Plan: multimodal analgesia and per primary service following discharge from PACU  Post Op Pain Control Plan Comments:   Induction:   IV  Beta Blocker:         Informed Consent: Patient understands risks and agrees with Anesthesia plan.  Questions answered. Anesthesia consent signed with patient.  ASA Score: 3     Day of Surgery Review of History & Physical:    H&P update referred to the surgeon.     Anesthesia Plan Notes: Ok with spinal..   Cardiac clearance in chart.AVR Nov 2019        Ready For Surgery From Anesthesia Perspective.

## 2020-07-17 NOTE — DISCHARGE INSTRUCTIONS
Information to Prepare you for your Surgery    PRE-ADMIT TESTING -  [phone]    [address]  [place] [facility]          Your surgery has been scheduled at [facility]. We are pleased to have the opportunity to serve you. For Further Information please call [phone].    On the day of surgery please report to the Information Desk on the 1st floor.    · CONTACT YOUR PHYSICIAN'S OFFICE THE DAY PRIOR TO YOUR SURGERY TO OBTAIN YOUR ARRIVAL TIME.     · The evening before surgery do not eat anything after 9 p.m. ( this includes hard candy, chewing gum and mints).  You may only have GATORADE, POWERADE AND WATER  from 9 p.m. until you leave your home.   DO NOT DRINK ANY LIQUIDS ON THE WAY TO THE HOSPITAL.      SPECIAL MEDICATION INSTRUCTIONS: TAKE medications checked off by the Anesthesiologist on your Medication List.    Angiogram Patients: Take medications as instructed by your physician, including aspirin.     Surgery Patients:    If you take ASPIRIN - Your PHYSICIAN/SURGEON will need to inform you IF/OR when you need to stop taking aspirin prior to your surgery.     Do Not take any medications containing IBUPROFEN.  Do Not Wear any make-up or dark nail polish   (especially eye make-up) to surgery. If you come to surgery with makeup on you will be required to remove the makeup or nail polish.    Do not shave your surgical area at least 5 days prior to your surgery. The surgical prep will be performed at the hospital according to Infection Control regulations.    Leave all valuables at home.   Do Not wear any jewelry or watches, including any metal in body piercings. Jewelry must be removed prior to coming to the hospital.  There is a possibility that rings that are unable to be removed may be cut off if they are on the surgical extremity.    Contact Lens must be removed before surgery. Either do not wear the contact lens or bring a case and solution for  storage.  Please bring a container for eyeglasses or dentures as required.  Bring any paperwork your physician has provided, such as consent forms,  history and physicals, doctor's orders, etc.   Bring comfortable clothes that are loose fitting to wear upon discharge. Take into consideration the type of surgery being performed.  Maintain your diet as advised per your physician the day prior to surgery.      Adequate rest the night before surgery is advised.   Park in the Parking lot behind the hospital or in the [facility] across the street from the parking lot. Parking is complimentary.  If you will be discharged the same day as your procedure, please arrange for a responsible adult to drive you home or to accompany you if traveling by taxi.   YOU WILL NOT BE PERMITTED TO DRIVE OR TO LEAVE THE HOSPITAL ALONE AFTER SURGERY.   If you are being discharged the same day, it is strongly recommended that you arrange for someone to remain with you for the first 24 hrs following your surgery.    The Surgeon will speak to your family/visitor after your surgery regarding the outcome of your surgery and post op care.  The Surgeon may speak to you after your surgery, but there is a possibility you may not remember the details.  Please check with your family members regarding the conversation with the Surgeon.    We strongly recommend whoever is bringing you home be present for discharge instructions.  This will ensure a thorough understanding for your post op home care.    ALL CHILDREN MUST ALWAYS BE ACCOMPANIED BY AN ADULT.    Visitors-Refer to current Visitor policy handouts.    Thank you for your cooperation.  The Staff of [facility].                Bathing Instructions with Hibiclens     Shower the evening before and morning of your procedure with Hibiclens:   Wash your face with water and your regular face wash/soap   Apply Hibiclens directly on your skin or on a wet washcloth and wash  gently. When showering: Move away from the shower stream when applying Hibiclens to avoid rinsing off too soon.   Rinse thoroughly with warm water   Do not dilute Hibiclens         Dry off as usual, do not use any deodorant, powder, body lotions, perfume, after shave or cologne.

## 2020-07-19 LAB — BACTERIA UR CULT: NO GROWTH

## 2020-07-24 ENCOUNTER — HOSPITAL ENCOUNTER (OUTPATIENT)
Dept: PREADMISSION TESTING | Facility: OTHER | Age: 80
Discharge: HOME | End: 2020-07-24
Attending: ANESTHESIOLOGY
Payer: MEDICARE

## 2020-07-24 DIAGNOSIS — Z01.818 PREOP TESTING: ICD-10-CM

## 2020-07-24 PROCEDURE — U0003 INFECTIOUS AGENT DETECTION BY NUCLEIC ACID (DNA OR RNA); SEVERE ACUTE RESPIRATORY SYNDROME CORONAVIRUS 2 (SARS-COV-2) (CORONAVIRUS DISEASE [COVID-19]), AMPLIFIED PROBE TECHNIQUE, MAKING USE OF HIGH THROUGHPUT TECHNOLOGIES AS DESCRIBED BY CMS-2020-01-R: HCPCS

## 2020-07-24 NOTE — PRE ADMISSION SCREENING
Patient has insulin pump and was instructed by her doctor to turn her pump off AM of surgery.  Patient also given instructions on when she can safely restart insulin pump post surgery.

## 2020-07-25 LAB — SARS-COV-2 RNA RESP QL NAA+PROBE: NOT DETECTED

## 2020-07-27 ENCOUNTER — HOSPITAL ENCOUNTER (OUTPATIENT)
Facility: OTHER | Age: 80
Discharge: HOME HEALTH | End: 2020-07-28
Attending: ORTHOPAEDIC SURGERY | Admitting: ORTHOPAEDIC SURGERY
Payer: MEDICARE

## 2020-07-27 ENCOUNTER — ANESTHESIA (OUTPATIENT)
Dept: SURGERY | Facility: OTHER | Age: 80
End: 2020-07-27
Payer: MEDICARE

## 2020-07-27 DIAGNOSIS — I25.118 CORONARY ARTERY DISEASE OF NATIVE ARTERY OF NATIVE HEART WITH STABLE ANGINA PECTORIS: ICD-10-CM

## 2020-07-27 DIAGNOSIS — M17.12 PRIMARY OSTEOARTHRITIS OF LEFT KNEE: Primary | ICD-10-CM

## 2020-07-27 LAB
POCT GLUCOSE: 111 MG/DL (ref 70–110)
POCT GLUCOSE: 145 MG/DL (ref 70–110)

## 2020-07-27 PROCEDURE — C1776 JOINT DEVICE (IMPLANTABLE): HCPCS | Performed by: ORTHOPAEDIC SURGERY

## 2020-07-27 PROCEDURE — 63600175 PHARM REV CODE 636 W HCPCS: Performed by: ANESTHESIOLOGY

## 2020-07-27 PROCEDURE — 36000711: Performed by: ORTHOPAEDIC SURGERY

## 2020-07-27 PROCEDURE — C1713 ANCHOR/SCREW BN/BN,TIS/BN: HCPCS | Performed by: ORTHOPAEDIC SURGERY

## 2020-07-27 PROCEDURE — 94799 UNLISTED PULMONARY SVC/PX: CPT

## 2020-07-27 PROCEDURE — 94761 N-INVAS EAR/PLS OXIMETRY MLT: CPT

## 2020-07-27 PROCEDURE — 82962 GLUCOSE BLOOD TEST: CPT | Performed by: ORTHOPAEDIC SURGERY

## 2020-07-27 PROCEDURE — 25000003 PHARM REV CODE 250: Performed by: ANESTHESIOLOGY

## 2020-07-27 PROCEDURE — 99220 PR INITIAL OBSERVATION CARE,LEVL III: ICD-10-PCS | Mod: ,,, | Performed by: INTERNAL MEDICINE

## 2020-07-27 PROCEDURE — 71000033 HC RECOVERY, INTIAL HOUR: Performed by: ORTHOPAEDIC SURGERY

## 2020-07-27 PROCEDURE — 93005 ELECTROCARDIOGRAM TRACING: CPT | Mod: 59

## 2020-07-27 PROCEDURE — 37000009 HC ANESTHESIA EA ADD 15 MINS: Performed by: ORTHOPAEDIC SURGERY

## 2020-07-27 PROCEDURE — 97162 PT EVAL MOD COMPLEX 30 MIN: CPT

## 2020-07-27 PROCEDURE — 36000710: Performed by: ORTHOPAEDIC SURGERY

## 2020-07-27 PROCEDURE — 25000003 PHARM REV CODE 250: Performed by: NURSE ANESTHETIST, CERTIFIED REGISTERED

## 2020-07-27 PROCEDURE — 63600175 PHARM REV CODE 636 W HCPCS: Performed by: NURSE ANESTHETIST, CERTIFIED REGISTERED

## 2020-07-27 PROCEDURE — 25000003 PHARM REV CODE 250: Performed by: NURSE PRACTITIONER

## 2020-07-27 PROCEDURE — 27201423 OPTIME MED/SURG SUP & DEVICES STERILE SUPPLY: Performed by: ORTHOPAEDIC SURGERY

## 2020-07-27 PROCEDURE — 25000003 PHARM REV CODE 250: Performed by: ORTHOPAEDIC SURGERY

## 2020-07-27 PROCEDURE — 93010 EKG 12-LEAD: ICD-10-PCS | Mod: ,,, | Performed by: INTERNAL MEDICINE

## 2020-07-27 PROCEDURE — 63600175 PHARM REV CODE 636 W HCPCS: Performed by: ORTHOPAEDIC SURGERY

## 2020-07-27 PROCEDURE — 97110 THERAPEUTIC EXERCISES: CPT

## 2020-07-27 PROCEDURE — 93010 ELECTROCARDIOGRAM REPORT: CPT | Mod: ,,, | Performed by: INTERNAL MEDICINE

## 2020-07-27 PROCEDURE — 99220 PR INITIAL OBSERVATION CARE,LEVL III: CPT | Mod: ,,, | Performed by: INTERNAL MEDICINE

## 2020-07-27 PROCEDURE — C9290 INJ, BUPIVACAINE LIPOSOME: HCPCS | Performed by: ORTHOPAEDIC SURGERY

## 2020-07-27 PROCEDURE — 37000008 HC ANESTHESIA 1ST 15 MINUTES: Performed by: ORTHOPAEDIC SURGERY

## 2020-07-27 PROCEDURE — 97116 GAIT TRAINING THERAPY: CPT | Mod: CQ

## 2020-07-27 PROCEDURE — 71000039 HC RECOVERY, EACH ADD'L HOUR: Performed by: ORTHOPAEDIC SURGERY

## 2020-07-27 DEVICE — PATELLA NEXGEN ALL-POLY: Type: IMPLANTABLE DEVICE | Site: KNEE | Status: FUNCTIONAL

## 2020-07-27 DEVICE — CEMENT REFOBACIN BCR 1X40: Type: IMPLANTABLE DEVICE | Site: KNEE | Status: FUNCTIONAL

## 2020-07-27 DEVICE — TIBIAL NEXGEN OPTION STEM: Type: IMPLANTABLE DEVICE | Site: KNEE | Status: FUNCTIONAL

## 2020-07-27 DEVICE — IMPLANTABLE DEVICE: Type: IMPLANTABLE DEVICE | Site: KNEE | Status: FUNCTIONAL

## 2020-07-27 RX ORDER — MUPIROCIN 20 MG/G
1 OINTMENT TOPICAL 2 TIMES DAILY
Status: DISCONTINUED | OUTPATIENT
Start: 2020-07-27 | End: 2020-07-28 | Stop reason: HOSPADM

## 2020-07-27 RX ORDER — TRANEXAMIC ACID 100 MG/ML
1000 INJECTION, SOLUTION INTRAVENOUS
Status: DISCONTINUED | OUTPATIENT
Start: 2020-07-27 | End: 2020-07-27

## 2020-07-27 RX ORDER — CELECOXIB 200 MG/1
200 CAPSULE ORAL 2 TIMES DAILY
Status: CANCELLED | OUTPATIENT
Start: 2020-07-27

## 2020-07-27 RX ORDER — LIDOCAINE HCL/PF 100 MG/5ML
SYRINGE (ML) INTRAVENOUS
Status: DISCONTINUED | OUTPATIENT
Start: 2020-07-27 | End: 2020-07-27

## 2020-07-27 RX ORDER — FAMOTIDINE 20 MG/1
20 TABLET, FILM COATED ORAL 2 TIMES DAILY
Status: CANCELLED | OUTPATIENT
Start: 2020-07-27

## 2020-07-27 RX ORDER — ACETAMINOPHEN 500 MG
1000 TABLET ORAL EVERY 8 HOURS
Status: COMPLETED | OUTPATIENT
Start: 2020-07-27 | End: 2020-07-28

## 2020-07-27 RX ORDER — NAPROXEN SODIUM 220 MG/1
81 TABLET, FILM COATED ORAL 2 TIMES DAILY
Status: DISCONTINUED | OUTPATIENT
Start: 2020-07-28 | End: 2020-07-28 | Stop reason: HOSPADM

## 2020-07-27 RX ORDER — LEVOTHYROXINE SODIUM 112 UG/1
112 TABLET ORAL
Status: DISCONTINUED | OUTPATIENT
Start: 2020-07-28 | End: 2020-07-28 | Stop reason: HOSPADM

## 2020-07-27 RX ORDER — OXYCODONE HYDROCHLORIDE 5 MG/1
5 TABLET ORAL EVERY 4 HOURS PRN
Status: CANCELLED | OUTPATIENT
Start: 2020-07-27

## 2020-07-27 RX ORDER — ATORVASTATIN CALCIUM 20 MG/1
80 TABLET, FILM COATED ORAL NIGHTLY
Status: DISCONTINUED | OUTPATIENT
Start: 2020-07-27 | End: 2020-07-28 | Stop reason: HOSPADM

## 2020-07-27 RX ORDER — DOCUSATE SODIUM 100 MG/1
100 CAPSULE, LIQUID FILLED ORAL EVERY 12 HOURS
Status: DISCONTINUED | OUTPATIENT
Start: 2020-07-27 | End: 2020-07-28 | Stop reason: HOSPADM

## 2020-07-27 RX ORDER — BUPIVACAINE HYDROCHLORIDE AND EPINEPHRINE 2.5; 5 MG/ML; UG/ML
INJECTION, SOLUTION EPIDURAL; INFILTRATION; INTRACAUDAL; PERINEURAL
Status: DISCONTINUED | OUTPATIENT
Start: 2020-07-27 | End: 2020-07-27 | Stop reason: HOSPADM

## 2020-07-27 RX ORDER — MUPIROCIN 20 MG/G
1 OINTMENT TOPICAL 2 TIMES DAILY
Status: CANCELLED | OUTPATIENT
Start: 2020-07-27 | End: 2020-08-01

## 2020-07-27 RX ORDER — POLYETHYLENE GLYCOL 3350 17 G/17G
17 POWDER, FOR SOLUTION ORAL DAILY
Status: CANCELLED | OUTPATIENT
Start: 2020-07-28

## 2020-07-27 RX ORDER — ONDANSETRON 2 MG/ML
8 INJECTION INTRAMUSCULAR; INTRAVENOUS EVERY 8 HOURS PRN
Status: CANCELLED | OUTPATIENT
Start: 2020-07-27

## 2020-07-27 RX ORDER — ONDANSETRON 2 MG/ML
4 INJECTION INTRAMUSCULAR; INTRAVENOUS DAILY PRN
Status: DISCONTINUED | OUTPATIENT
Start: 2020-07-27 | End: 2020-07-27 | Stop reason: HOSPADM

## 2020-07-27 RX ORDER — ESCITALOPRAM OXALATE 10 MG/1
10 TABLET ORAL DAILY
Status: DISCONTINUED | OUTPATIENT
Start: 2020-07-27 | End: 2020-07-28 | Stop reason: HOSPADM

## 2020-07-27 RX ORDER — CELECOXIB 200 MG/1
200 CAPSULE ORAL 2 TIMES DAILY
Status: DISCONTINUED | OUTPATIENT
Start: 2020-07-27 | End: 2020-07-28 | Stop reason: HOSPADM

## 2020-07-27 RX ORDER — VANCOMYCIN HCL IN 5 % DEXTROSE 1.25 G/25
15 PLASTIC BAG, INJECTION (ML) INTRAVENOUS
Status: COMPLETED | OUTPATIENT
Start: 2020-07-27 | End: 2020-07-27

## 2020-07-27 RX ORDER — HYDROMORPHONE HYDROCHLORIDE 1 MG/ML
0.5 INJECTION, SOLUTION INTRAMUSCULAR; INTRAVENOUS; SUBCUTANEOUS EVERY 4 HOURS PRN
Status: DISCONTINUED | OUTPATIENT
Start: 2020-07-27 | End: 2020-07-28 | Stop reason: HOSPADM

## 2020-07-27 RX ORDER — SODIUM CHLORIDE 0.9 % (FLUSH) 0.9 %
3 SYRINGE (ML) INJECTION EVERY 6 HOURS PRN
Status: DISCONTINUED | OUTPATIENT
Start: 2020-07-27 | End: 2020-07-28 | Stop reason: HOSPADM

## 2020-07-27 RX ORDER — PHENYLEPHRINE HYDROCHLORIDE 10 MG/ML
INJECTION INTRAVENOUS
Status: DISCONTINUED | OUTPATIENT
Start: 2020-07-27 | End: 2020-07-27

## 2020-07-27 RX ORDER — RAMIPRIL 5 MG/1
10 CAPSULE ORAL NIGHTLY
Status: DISCONTINUED | OUTPATIENT
Start: 2020-07-27 | End: 2020-07-28 | Stop reason: HOSPADM

## 2020-07-27 RX ORDER — TRANEXAMIC ACID 100 MG/ML
INJECTION, SOLUTION INTRAVENOUS
Status: DISCONTINUED | OUTPATIENT
Start: 2020-07-27 | End: 2020-07-27

## 2020-07-27 RX ORDER — SODIUM CHLORIDE, SODIUM LACTATE, POTASSIUM CHLORIDE, CALCIUM CHLORIDE 600; 310; 30; 20 MG/100ML; MG/100ML; MG/100ML; MG/100ML
INJECTION, SOLUTION INTRAVENOUS CONTINUOUS
Status: DISCONTINUED | OUTPATIENT
Start: 2020-07-27 | End: 2020-07-27

## 2020-07-27 RX ORDER — SODIUM CHLORIDE 0.9 % (FLUSH) 0.9 %
3 SYRINGE (ML) INJECTION EVERY 6 HOURS PRN
Status: CANCELLED | OUTPATIENT
Start: 2020-07-27

## 2020-07-27 RX ORDER — ACETAMINOPHEN 325 MG/1
650 TABLET ORAL EVERY 6 HOURS PRN
Status: CANCELLED | OUTPATIENT
Start: 2020-07-28

## 2020-07-27 RX ORDER — OXYCODONE HYDROCHLORIDE 5 MG/1
10 TABLET ORAL EVERY 4 HOURS PRN
Status: DISCONTINUED | OUTPATIENT
Start: 2020-07-27 | End: 2020-07-28 | Stop reason: HOSPADM

## 2020-07-27 RX ORDER — CEFAZOLIN SODIUM 2 G/50ML
2 SOLUTION INTRAVENOUS
Status: COMPLETED | OUTPATIENT
Start: 2020-07-27 | End: 2020-07-28

## 2020-07-27 RX ORDER — PROPOFOL 10 MG/ML
VIAL (ML) INTRAVENOUS CONTINUOUS PRN
Status: DISCONTINUED | OUTPATIENT
Start: 2020-07-27 | End: 2020-07-27

## 2020-07-27 RX ORDER — DEXTROSE MONOHYDRATE AND SODIUM CHLORIDE 5; .9 G/100ML; G/100ML
INJECTION, SOLUTION INTRAVENOUS CONTINUOUS
Status: CANCELLED | OUTPATIENT
Start: 2020-07-27

## 2020-07-27 RX ORDER — NAPROXEN SODIUM 220 MG/1
81 TABLET, FILM COATED ORAL 2 TIMES DAILY
Status: CANCELLED | OUTPATIENT
Start: 2020-07-28

## 2020-07-27 RX ORDER — POLYETHYLENE GLYCOL 3350 17 G/17G
17 POWDER, FOR SOLUTION ORAL DAILY
Status: DISCONTINUED | OUTPATIENT
Start: 2020-07-28 | End: 2020-07-28 | Stop reason: HOSPADM

## 2020-07-27 RX ORDER — METOPROLOL SUCCINATE 50 MG/1
50 TABLET, EXTENDED RELEASE ORAL DAILY
Status: DISCONTINUED | OUTPATIENT
Start: 2020-07-27 | End: 2020-07-28 | Stop reason: HOSPADM

## 2020-07-27 RX ORDER — DIPHENHYDRAMINE HYDROCHLORIDE 50 MG/ML
25 INJECTION INTRAMUSCULAR; INTRAVENOUS EVERY 6 HOURS PRN
Status: CANCELLED | OUTPATIENT
Start: 2020-07-27

## 2020-07-27 RX ORDER — DOCUSATE SODIUM 100 MG/1
100 CAPSULE, LIQUID FILLED ORAL EVERY 12 HOURS
Status: CANCELLED | OUTPATIENT
Start: 2020-07-27

## 2020-07-27 RX ORDER — ONDANSETRON 2 MG/ML
INJECTION INTRAMUSCULAR; INTRAVENOUS
Status: DISCONTINUED | OUTPATIENT
Start: 2020-07-27 | End: 2020-07-27

## 2020-07-27 RX ORDER — ACETAMINOPHEN 500 MG
1000 TABLET ORAL
Status: COMPLETED | OUTPATIENT
Start: 2020-07-27 | End: 2020-07-27

## 2020-07-27 RX ORDER — OXYCODONE HYDROCHLORIDE 5 MG/1
5 TABLET ORAL EVERY 4 HOURS PRN
Status: DISCONTINUED | OUTPATIENT
Start: 2020-07-27 | End: 2020-07-28 | Stop reason: HOSPADM

## 2020-07-27 RX ORDER — GABAPENTIN 300 MG/1
300 CAPSULE ORAL 3 TIMES DAILY
Status: DISCONTINUED | OUTPATIENT
Start: 2020-07-27 | End: 2020-07-28 | Stop reason: HOSPADM

## 2020-07-27 RX ORDER — GLUCAGON 1 MG
1 KIT INJECTION
Status: DISCONTINUED | OUTPATIENT
Start: 2020-07-27 | End: 2020-07-28 | Stop reason: HOSPADM

## 2020-07-27 RX ORDER — DIPHENHYDRAMINE HYDROCHLORIDE 50 MG/ML
25 INJECTION INTRAMUSCULAR; INTRAVENOUS EVERY 6 HOURS PRN
Status: DISCONTINUED | OUTPATIENT
Start: 2020-07-27 | End: 2020-07-28 | Stop reason: HOSPADM

## 2020-07-27 RX ORDER — LETROZOLE 2.5 MG/1
2.5 TABLET, FILM COATED ORAL DAILY
Status: DISCONTINUED | OUTPATIENT
Start: 2020-07-27 | End: 2020-07-28 | Stop reason: HOSPADM

## 2020-07-27 RX ORDER — ALPRAZOLAM 0.25 MG/1
0.25 TABLET ORAL NIGHTLY PRN
Status: DISCONTINUED | OUTPATIENT
Start: 2020-07-27 | End: 2020-07-28 | Stop reason: HOSPADM

## 2020-07-27 RX ORDER — HYDROMORPHONE HYDROCHLORIDE 2 MG/ML
0.4 INJECTION, SOLUTION INTRAMUSCULAR; INTRAVENOUS; SUBCUTANEOUS EVERY 5 MIN PRN
Status: DISCONTINUED | OUTPATIENT
Start: 2020-07-27 | End: 2020-07-27 | Stop reason: HOSPADM

## 2020-07-27 RX ORDER — ACETAMINOPHEN 500 MG
1000 TABLET ORAL EVERY 8 HOURS
Status: CANCELLED | OUTPATIENT
Start: 2020-07-27 | End: 2020-07-28

## 2020-07-27 RX ORDER — OXYCODONE HYDROCHLORIDE 5 MG/1
5 TABLET ORAL
Status: DISCONTINUED | OUTPATIENT
Start: 2020-07-27 | End: 2020-07-27 | Stop reason: HOSPADM

## 2020-07-27 RX ORDER — MEPERIDINE HYDROCHLORIDE 25 MG/ML
12.5 INJECTION INTRAMUSCULAR; INTRAVENOUS; SUBCUTANEOUS ONCE AS NEEDED
Status: DISCONTINUED | OUTPATIENT
Start: 2020-07-27 | End: 2020-07-27 | Stop reason: HOSPADM

## 2020-07-27 RX ORDER — IBUPROFEN 200 MG
24 TABLET ORAL
Status: DISCONTINUED | OUTPATIENT
Start: 2020-07-27 | End: 2020-07-28 | Stop reason: HOSPADM

## 2020-07-27 RX ORDER — CEFAZOLIN SODIUM 1 G/3ML
2 INJECTION, POWDER, FOR SOLUTION INTRAMUSCULAR; INTRAVENOUS
Status: COMPLETED | OUTPATIENT
Start: 2020-07-27 | End: 2020-07-27

## 2020-07-27 RX ORDER — KETOROLAC TROMETHAMINE 30 MG/ML
INJECTION, SOLUTION INTRAMUSCULAR; INTRAVENOUS
Status: DISCONTINUED | OUTPATIENT
Start: 2020-07-27 | End: 2020-07-27 | Stop reason: HOSPADM

## 2020-07-27 RX ORDER — ACETAMINOPHEN 325 MG/1
650 TABLET ORAL EVERY 6 HOURS PRN
Status: DISCONTINUED | OUTPATIENT
Start: 2020-07-28 | End: 2020-07-28 | Stop reason: HOSPADM

## 2020-07-27 RX ORDER — ONDANSETRON 2 MG/ML
8 INJECTION INTRAMUSCULAR; INTRAVENOUS EVERY 8 HOURS PRN
Status: DISCONTINUED | OUTPATIENT
Start: 2020-07-27 | End: 2020-07-28 | Stop reason: HOSPADM

## 2020-07-27 RX ORDER — PROPOFOL 10 MG/ML
VIAL (ML) INTRAVENOUS
Status: DISCONTINUED | OUTPATIENT
Start: 2020-07-27 | End: 2020-07-27

## 2020-07-27 RX ORDER — SODIUM CHLORIDE 0.9 % (FLUSH) 0.9 %
3 SYRINGE (ML) INJECTION
Status: DISCONTINUED | OUTPATIENT
Start: 2020-07-27 | End: 2020-07-28 | Stop reason: HOSPADM

## 2020-07-27 RX ORDER — FAMOTIDINE 20 MG/1
20 TABLET, FILM COATED ORAL 2 TIMES DAILY
Status: DISCONTINUED | OUTPATIENT
Start: 2020-07-27 | End: 2020-07-28 | Stop reason: HOSPADM

## 2020-07-27 RX ORDER — CEFAZOLIN SODIUM 2 G/50ML
2 SOLUTION INTRAVENOUS
Status: CANCELLED | OUTPATIENT
Start: 2020-07-27 | End: 2020-07-28

## 2020-07-27 RX ORDER — DIPHENHYDRAMINE HYDROCHLORIDE 50 MG/ML
25 INJECTION INTRAMUSCULAR; INTRAVENOUS EVERY 6 HOURS PRN
Status: DISCONTINUED | OUTPATIENT
Start: 2020-07-27 | End: 2020-07-27 | Stop reason: HOSPADM

## 2020-07-27 RX ORDER — HYDROMORPHONE HYDROCHLORIDE 1 MG/ML
0.5 INJECTION, SOLUTION INTRAMUSCULAR; INTRAVENOUS; SUBCUTANEOUS EVERY 4 HOURS PRN
Status: CANCELLED | OUTPATIENT
Start: 2020-07-27

## 2020-07-27 RX ORDER — IBUPROFEN 200 MG
16 TABLET ORAL
Status: DISCONTINUED | OUTPATIENT
Start: 2020-07-27 | End: 2020-07-28 | Stop reason: HOSPADM

## 2020-07-27 RX ORDER — VANCOMYCIN HCL IN 5 % DEXTROSE 1.25 G/25
1250 PLASTIC BAG, INJECTION (ML) INTRAVENOUS
Status: COMPLETED | OUTPATIENT
Start: 2020-07-27 | End: 2020-07-27

## 2020-07-27 RX ORDER — MIDAZOLAM HYDROCHLORIDE 1 MG/ML
INJECTION INTRAMUSCULAR; INTRAVENOUS
Status: DISCONTINUED | OUTPATIENT
Start: 2020-07-27 | End: 2020-07-27

## 2020-07-27 RX ORDER — DEXTROSE MONOHYDRATE AND SODIUM CHLORIDE 5; .9 G/100ML; G/100ML
INJECTION, SOLUTION INTRAVENOUS CONTINUOUS
Status: DISCONTINUED | OUTPATIENT
Start: 2020-07-27 | End: 2020-07-27

## 2020-07-27 RX ORDER — OXYCODONE HYDROCHLORIDE 5 MG/1
10 TABLET ORAL EVERY 4 HOURS PRN
Status: CANCELLED | OUTPATIENT
Start: 2020-07-27

## 2020-07-27 RX ORDER — LIDOCAINE HYDROCHLORIDE 10 MG/ML
0.5 INJECTION, SOLUTION EPIDURAL; INFILTRATION; INTRACAUDAL; PERINEURAL ONCE
Status: DISCONTINUED | OUTPATIENT
Start: 2020-07-27 | End: 2020-07-27 | Stop reason: HOSPADM

## 2020-07-27 RX ORDER — FAMOTIDINE 20 MG/1
20 TABLET, FILM COATED ORAL
Status: COMPLETED | OUTPATIENT
Start: 2020-07-27 | End: 2020-07-27

## 2020-07-27 RX ORDER — ROPIVACAINE HYDROCHLORIDE 5 MG/ML
INJECTION, SOLUTION EPIDURAL; INFILTRATION; PERINEURAL
Status: DISCONTINUED | OUTPATIENT
Start: 2020-07-27 | End: 2020-07-27

## 2020-07-27 RX ADMIN — TRANEXAMIC ACID 760 MG: 100 INJECTION, SOLUTION INTRAVENOUS at 08:07

## 2020-07-27 RX ADMIN — ROPIVACAINE HYDROCHLORIDE 3 ML: 5 INJECTION, SOLUTION EPIDURAL; INFILTRATION; PERINEURAL at 08:07

## 2020-07-27 RX ADMIN — ESCITALOPRAM OXALATE 10 MG: 10 TABLET ORAL at 03:07

## 2020-07-27 RX ADMIN — ONDANSETRON 4 MG: 2 INJECTION INTRAMUSCULAR; INTRAVENOUS at 09:07

## 2020-07-27 RX ADMIN — GABAPENTIN 300 MG: 300 CAPSULE ORAL at 08:07

## 2020-07-27 RX ADMIN — PHENYLEPHRINE HYDROCHLORIDE 100 MCG: 10 INJECTION INTRAVENOUS at 08:07

## 2020-07-27 RX ADMIN — SODIUM CHLORIDE, SODIUM LACTATE, POTASSIUM CHLORIDE, AND CALCIUM CHLORIDE: 600; 310; 30; 20 INJECTION, SOLUTION INTRAVENOUS at 07:07

## 2020-07-27 RX ADMIN — SODIUM CHLORIDE, SODIUM LACTATE, POTASSIUM CHLORIDE, AND CALCIUM CHLORIDE: 600; 310; 30; 20 INJECTION, SOLUTION INTRAVENOUS at 08:07

## 2020-07-27 RX ADMIN — Medication 1250 MG: at 07:07

## 2020-07-27 RX ADMIN — FAMOTIDINE 20 MG: 20 TABLET ORAL at 08:07

## 2020-07-27 RX ADMIN — ACETAMINOPHEN 1000 MG: 500 TABLET ORAL at 09:07

## 2020-07-27 RX ADMIN — ACETAMINOPHEN 1000 MG: 500 TABLET ORAL at 03:07

## 2020-07-27 RX ADMIN — TRANEXAMIC ACID 760 MG: 100 INJECTION, SOLUTION INTRAVENOUS at 09:07

## 2020-07-27 RX ADMIN — CEFAZOLIN SODIUM 2 G: 2 SOLUTION INTRAVENOUS at 06:07

## 2020-07-27 RX ADMIN — PROPOFOL 50 MCG/KG/MIN: 10 INJECTION, EMULSION INTRAVENOUS at 08:07

## 2020-07-27 RX ADMIN — LIDOCAINE HYDROCHLORIDE 40 MG: 20 INJECTION, SOLUTION INTRAVENOUS at 08:07

## 2020-07-27 RX ADMIN — LETROZOLE 2.5 MG: 2.5 TABLET ORAL at 03:07

## 2020-07-27 RX ADMIN — ACETAMINOPHEN 1000 MG: 500 TABLET, FILM COATED ORAL at 06:07

## 2020-07-27 RX ADMIN — DOCUSATE SODIUM 100 MG: 100 CAPSULE, LIQUID FILLED ORAL at 08:07

## 2020-07-27 RX ADMIN — SODIUM CHLORIDE, SODIUM LACTATE, POTASSIUM CHLORIDE, AND CALCIUM CHLORIDE: 600; 310; 30; 20 INJECTION, SOLUTION INTRAVENOUS at 09:07

## 2020-07-27 RX ADMIN — ALPRAZOLAM 0.25 MG: 0.25 TABLET ORAL at 09:07

## 2020-07-27 RX ADMIN — ATORVASTATIN CALCIUM 80 MG: 20 TABLET, FILM COATED ORAL at 08:07

## 2020-07-27 RX ADMIN — RAMIPRIL 10 MG: 5 CAPSULE ORAL at 08:07

## 2020-07-27 RX ADMIN — METOPROLOL SUCCINATE 50 MG: 50 TABLET, EXTENDED RELEASE ORAL at 04:07

## 2020-07-27 RX ADMIN — PROPOFOL 10 MG: 10 INJECTION, EMULSION INTRAVENOUS at 08:07

## 2020-07-27 RX ADMIN — OXYCODONE 10 MG: 5 TABLET ORAL at 08:07

## 2020-07-27 RX ADMIN — PROPOFOL 30 MG: 10 INJECTION, EMULSION INTRAVENOUS at 08:07

## 2020-07-27 RX ADMIN — PHENYLEPHRINE HYDROCHLORIDE 50 MCG: 10 INJECTION INTRAVENOUS at 09:07

## 2020-07-27 RX ADMIN — CELECOXIB 200 MG: 200 CAPSULE ORAL at 08:07

## 2020-07-27 RX ADMIN — Medication 1250 MG: at 03:07

## 2020-07-27 RX ADMIN — FAMOTIDINE 20 MG: 20 TABLET, FILM COATED ORAL at 06:07

## 2020-07-27 RX ADMIN — MUPIROCIN 1 G: 20 OINTMENT TOPICAL at 08:07

## 2020-07-27 RX ADMIN — OXYCODONE 5 MG: 5 TABLET ORAL at 01:07

## 2020-07-27 RX ADMIN — GABAPENTIN 300 MG: 300 CAPSULE ORAL at 03:07

## 2020-07-27 RX ADMIN — MIDAZOLAM HYDROCHLORIDE 1 MG: 1 INJECTION, SOLUTION INTRAMUSCULAR; INTRAVENOUS at 08:07

## 2020-07-27 RX ADMIN — CEFAZOLIN 2 G: 330 INJECTION, POWDER, FOR SOLUTION INTRAMUSCULAR; INTRAVENOUS at 08:07

## 2020-07-27 NOTE — CONSULTS
Consult Note  Nephrology    Consult Requested By: Eric Simmons MD  Reason for Consult: CKD III/CAD/DM/HTN/Thyroid/Tremor    SUBJECTIVE:     History of Present Illness:  Patient is a 79 y.o. female presents with scheduled left knee repair.  Seen post op in PACU.  VSS.  Extensive medical hx as outlined below including CKD III with baseline Creat 1-1.1.  Pre-op/EPIC reviewed.  No CP/SOB/N/V/D/F/C.      Past Medical History:   Diagnosis Date    Cancer 2010    left breast XRT + lumpectomy; right lunpectomy + Chemotherapy (Dr. Santana) 8 of 12 treatments, stopped for side effects    Cancer     right breast    Cataract     right eye    CKD (chronic kidney disease), stage III     Coronary artery disease     total 4 stents over several years, last 2015    Diabetes mellitus, type 2     Insulin pump    Hyperlipidemia     Hypertension     Thyroid disease     Tremor, essential      Past Surgical History:   Procedure Laterality Date    APPENDECTOMY      BREAST SURGERY Bilateral     lumpectomy    CARDIAC VALVE SURGERY  2019    EYE SURGERY Bilateral     cataract    HYSTERECTOMY      JOINT REPLACEMENT Right     hip    SPINE SURGERY      laminectomy    TRANSCATHETER AORTIC VALVE REPLACEMENT (TAVR) N/A 11/20/2019    Procedure: REPLACEMENT, AORTIC VALVE, TRANSCATHETER (TAVR);  Surgeon: Balaji Shah MD;  Location: [facility] CATH LAB;  Service: Cardiology;  Laterality: N/A;     Family History   Problem Relation Age of Onset    No Known Problems Mother     No Known Problems Father     No Known Problems Sister     No Known Problems Brother     No Known Problems Maternal Aunt     No Known Problems Maternal Uncle     No Known Problems Paternal Aunt     No Known Problems Paternal Uncle     No Known Problems Maternal Grandmother     No Known Problems Maternal Grandfather     No Known Problems Paternal Grandmother     No Known Problems Paternal Grandfather     Anemia Neg Hx     Arrhythmia Neg Hx     Asthma Neg  Hx     Clotting disorder Neg Hx     Fainting Neg Hx     Heart attack Neg Hx     Heart disease Neg Hx     Heart failure Neg Hx     Hyperlipidemia Neg Hx     Hypertension Neg Hx     Stroke Neg Hx     Atrial Septal Defect Neg Hx      Social History     Tobacco Use    Smoking status: Never Smoker    Smokeless tobacco: Never Used   Substance Use Topics    Alcohol use: Yes     Comment: social    Drug use: No       Review of patient's allergies indicates:  No Known Allergies     Review of Systems:  Constitutional: No fever or chills  Respiratory: No cough or shortness of breath  Cardiovascular: No chest pain or palpitations  Gastrointestinal: No nausea or vomiting  Neurological: No confusion or weakness    OBJECTIVE:     Vital Signs (Most Recent)  Temp: 97.5 °F (36.4 °C) (07/27/20 0957)  Pulse: 69 (07/27/20 1030)  Resp: 16 (07/27/20 1030)  BP: 134/62 (07/27/20 1030)  SpO2: 100 % (07/27/20 1030)    Vital Signs Range (Last 24H):  Temp:  [97.4 °F (36.3 °C)-97.5 °F (36.4 °C)]   Pulse:  [69-75]   Resp:  [16-18]   BP: (106-135)/(56-62)   SpO2:  [96 %-100 %]       Intake/Output Summary (Last 24 hours) at 7/27/2020 1055  Last data filed at 7/27/2020 0959  Gross per 24 hour   Intake 1300 ml   Output --   Net 1300 ml       Physical Exam:  General appearance: Well developed, well nourished  Eyes:  Conjunctivae/corneas clear. PERRL.  Lungs: Normal respiratory effort,   clear to auscultation bilaterally   Heart: Regular rate and rhythm, S1, S2 normal, no murmur, rub or pauline.  Abdomen: Soft, non-tender non-distended; bowel sounds normal; no masses,  no organomegaly, insulin pump  Extremities: No cyanosis or clubbing. No edema.    Skin: Skin color, texture, turgor normal. No rashes or lesions  Neurologic: Normal strength and tone. No focal numbness or weakness   Left knee CDI  Sen      Diagnostic Results:  No orders to display       ASSESSMENT/PLAN:     1. S/P Left Knee (M17.12):  Per ortho/therapy teams.  2. CAD/TAVR  (I25.10):  Followed by [place] [place].  Place on tele and consult Dr. Saunders to follow along.    3. CKD III (N18.3):  Baseline creat 1-1.1.  Cont ACE.  No NSAIDS.  Renally dose meds, avoid nephrotoxins, and monitor I/O's closely.  RFP in am  4. HTN (I12.9):  meds with hold parameters.  5. Hypothyroid (E03.9):  Synthroid home dose.  6. DM on insulin pump (E10.22):  Follows Dr. Barreto at .  Continue medtronic 670G with current setting.  Change IVF's to NS.  ADA diet.  CBG 4-6/day and allow pt to bolus with meals.  See orders.   7. Essential Tremor (G25.0):  neurontin as home.  8. DVT prophy:  ASA BID.      Thanks for consult  See above  Will follow along.

## 2020-07-27 NOTE — PT/OT/SLP PROGRESS
Physical Therapy Treatment    Patient Name:  Mary Prince   MRN:  [id number]    Recommendations:     Discharge Recommendations:  home health PT, home health OT   Discharge Equipment Recommendations: none   Barriers to discharge: None    Assessment:     Mary Prince is a 79 y.o. female admitted with a medical diagnosis of Primary osteoarthritis of left knee.  She presents with the following impairments/functional limitations:  weakness, impaired endurance, impaired self care skills, impaired functional mobilty, gait instability, decreased lower extremity function, pain, decreased ROM, impaired skin, edema, orthopedic precautions ;pt with good mobility this PM, inc. Amb distance, NO LOB or SOB noted, good step-through gt pattern.    Rehab Prognosis: Good; patient would benefit from acute skilled PT services to address these deficits and reach maximum level of function.    Recent Surgery: Procedure(s) (LRB):  ARTHROPLASTY, KNEE, SIGHT ASSISTED (Left) Day of Surgery    Plan:     During this hospitalization, patient to be seen BID to address the identified rehab impairments via gait training, therapeutic activities, therapeutic exercises and progress toward the following goals:    · Plan of Care Expires:  08/03/20    Subjective     Chief Complaint: mild pain  Patient/Family Comments/goals: pt agreeable to session  Pain/Comfort:  · Pain Rating 1: 3/10(at rest)  · Location - Side 1: Left  · Location - Orientation 1: generalized  · Location 1: knee  · Pain Addressed 1: Pre-medicate for activity, Reposition, Distraction  · Pain Rating Post-Intervention 1: 5/10(with amb/ex's)      Objective:     Communicated with nurse prior to session.  Patient found up in chair with peripheral IV, cryotherapy, FCD, goodman catheter(pt's own insulin pump) upon PT entry to room.     General Precautions: Standard, fall, diabetic   Orthopedic Precautions:LLE weight bearing as tolerated   Braces: N/A     Functional Mobility:  · Bed  Mobility:     · Sit to Supine: stand by assistance  · Transfers:     · Sit to Stand:  contact guard assistance with rolling walker  · Gait: amb'd ~250' with RW and CGA, WBAT on LLE, step through gt pattern noted      AM-PAC 6 CLICK MOBILITY  Turning over in bed (including adjusting bedclothes, sheets and blankets)?: 3  Sitting down on and standing up from a chair with arms (e.g., wheelchair, bedside commode, etc.): 3  Moving from lying on back to sitting on the side of the bed?: 3  Moving to and from a bed to a chair (including a wheelchair)?: 3  Need to walk in hospital room?: 3  Climbing 3-5 steps with a railing?: 3  Basic Mobility Total Score: 18       Therapeutic Activities and Exercises:   issued written HEP and pt perf'd supine AP's, QS, GS, SLR's, hip abd/add, heelslides x 5 reps ea.    Patient left HOB elevated with all lines intact, call button in reach, bed alarm on, nurse notified, caregiver present and towel roll under L ankle, cryotherapy on L knee, FCD's on LE's...    GOALS:   Multidisciplinary Problems     Physical Therapy Goals        Problem: Physical Therapy Goal    Goal Priority Disciplines Outcome Goal Variances Interventions   Physical Therapy Goal     PT, PT/OT Ongoing, Progressing     Description: Goals to be met by: 8/3/2020     Patient will increase functional independence with mobility by performin. Supine to sit with supervision.   2. Sit to supine with supervision.   3. Sit<>stand transfer with supervision using rolling walker.   4. Gait > 200 feet with SBA using rolling walker.   5. Ascend/descend 3 stairs with 1 handrail with CGA LRAD.                   Time Tracking:     PT Received On: 20  PT Start Time: 1615     PT Stop Time: 1640  PT Total Time (min): 25 min     Billable Minutes: Gait Training 15 and Therapeutic Exercise 10    Treatment Type: Treatment  PT/PTA: PTA     PTA Visit Number: 0     Loulou Olvera PTA  2020

## 2020-07-27 NOTE — ANESTHESIA PROCEDURE NOTES
Spinal    Diagnosis: DJD knee  Patient location during procedure: holding area  Timeout: 7/27/2020 8:04 AM    Staffing  Authorizing Provider: Cruz Dowling MD  Performing Provider: Cruz Dowling MD    Staffing  Other anesthesia staff: Pavan Casarez MD  Preanesthetic Checklist  Completed: patient identified, site marked, surgical consent, pre-op evaluation, timeout performed, IV checked, risks and benefits discussed and monitors and equipment checked  Spinal Block  Patient position: sitting  Prep: ChloraPrep  Patient monitoring: heart rate, cardiac monitor, continuous pulse ox and frequent blood pressure checks  Approach: midline  Location: L2-3  Injection technique: single shot  CSF Fluid: clear free-flowing CSF  Needle  Needle type: pencil-tip   Needle gauge: 22 G  Needle length: 3.5 in  Additional Documentation: incremental injection, negative aspiration for heme and no paresthesia on injection  Needle localization: anatomical landmarks  Assessment  Ease of block: difficult  Patient's tolerance of the procedure: comfortable throughout block and no complaints

## 2020-07-27 NOTE — PLAN OF CARE
LMSW attempted to met with the patient at the bedside. Therapy at the bedside.     LMSW will re attempt.

## 2020-07-27 NOTE — PLAN OF CARE
LMSW met with the patient at the bedside    Patient has a RW, Rollator, BSC, and cane in the home. Patient understands her insurance company will assign her HH agency. [facility] is [facility] .     Patients PCP is correct on the face sheet. Patients choice pharmacy is [facility].      Patients family will transport the patient home at discharge.     CM team will continue to follow.                  07/27/20 9090   Discharge Assessment   Assessment Type Discharge Planning Assessment   Confirmed/corrected address and phone number on facesheet? Yes   Assessment information obtained from? Patient   Communicated expected length of stay with patient/caregiver no   Prior to hospitilization cognitive status: Alert/Oriented   Prior to hospitalization functional status: Independent;Assistive Equipment   Current cognitive status: Alert/Oriented   Current Functional Status: Assistive Equipment   Lives With significant other   Able to Return to Prior Arrangements yes   Is patient able to care for self after discharge? Yes   Patient's perception of discharge disposition home health   Readmission Within the Last 30 Days no previous admission in last 30 days   Patient currently being followed by outpatient case management? No   Patient currently receives any other outside agency services? No   Equipment Currently Used at Home 3-in-1 commode;walker, rolling;rollator;cane, straight   Do you have any problems affording any of your prescribed medications? No   Is the patient taking medications as prescribed? yes   Does the patient have transportation home? Yes   Transportation Anticipated family or friend will provide   Does the patient receive services at the Coumadin Clinic? No   Discharge Plan A Home Health   DME Needed Upon Discharge  none   Patient/Family in Agreement with Plan yes

## 2020-07-27 NOTE — PLAN OF CARE
Pt returned from surgery c dressing dry and intact  to left knee c ace wrap to cast padding and polar care on. Up in chair . Tolerated ambulation c PT well and denies pain. Po meds taken and IV antibiotics infusing. Sen cath patent c good output noted. Accu check monitored . Pt has insulin pump and adjusts her own insulin. Remains stable. Tolerating room air c O2 sat stable.  Post op teaching done and pt  also instructed on polar care . Pt instructed on safety precautions.

## 2020-07-27 NOTE — OR NURSING
Pt AAOx3, (+) movement noted to BLE and pt states (+) sensation to BLE. No c/o pain or nausea @ present and VSS, ready for transfer to inpatient room. Report called to NO Macedo

## 2020-07-27 NOTE — OP NOTE
[facility]  Operative Report    SUMMARY     Surgery Date: 7/27/2020     Surgeon(s) and Role:     * Eric Simmons MD - Primary    Assistant: Cole NGUYEN    Pre-op Diagnosis:  Primary osteoarthritis of left knee [M17.12]    Post-op Diagnosis:  Post-Op Diagnosis Codes:     * Primary osteoarthritis of left knee [M17.12]    Procedure(s) (LRB):  ARTHROPLASTY, KNEE, SIGHT ASSISTED (Left) (Sofie Nexgen CR)    Anesthesia: Spinal    Description of Procedure: The appropriate consent was signed. The patient understood and except all risks and complications. The patient was brought to the Operating Room after undergoing spinal anesthetic. Tourniquet was applied to the proximal operative leg. The lower extremity was then prepped and draped in a sterile manner. After exsanguination of Esmarch bandage, tourniquet was inflated to 300 mmHg. An anterior incision with a medial parapatellar arthrotomy was performed. The menisci, anterior cruciate ligament and patellar fat pad were excised. The patella was resurfaced and sized to a 29 mm patella.   Three holes were then drilled for the lugs and this was trialed. A hole was then  drilled in the distal femur, hima was placed down the femoral canal and the   distal femur cut in 6 degrees of valgus. The tibia was then cut utilizing the   Sofie navigation system in 0 degree varus valgus with 5 degrees in posterior   slope. Extension block was placed and full extension was noted. The femur was   then sized to a #D and #D cutting block was placed and the anterior and   posterior cuts as well as chamfer cuts were performed. The tibia was sized to a  size 3 and a trial was performed.Trials were performed and a 10 mm spacer was felt to be appropriate.The tibia was then prepared with the drill and the punch, and trials were   removed and the knee washed out. Aquamantys was used to paint the posterior   capsule and corners. Palacos cement with gentamicin was then mixed and   pressurized  sequentially in tibia, femur and patella. Components were impacted   and excess cement was removed. A trial 10 mm spacer was placed and knee   brought out to full extension. Once the cement was allowed to harden, the 10 mm  spacer was felt to be appropriate and this was locked into the tibial   baseplate. Tourniquet was deflated and hemostasis was obtained. Good patellar   tracking was noted. Exparel cocktail was injected into the fascia and   subcutaneous tissue. The fascial closure was obtained with a running #2 Quill suture. Subcutaneous closure was obtained with #1 and 2-0 Vicryl. Skin was then closed with the staples and a sterile compressive dressing was applied. The patient was then brought to the Recovery Room in a good condition.        Estimated Blood Loss: 100cc         Specimens:   Specimen (12h ago, onward)    None

## 2020-07-27 NOTE — PLAN OF CARE
[facility]  [address]  [place] [address]  [phone]               [facility] Orthopedic Discharge Orders    Home Tam         Skilled Nurse to admit patient with an additional visit to remove staples.  1W2    Expected Discharge Date: 7/28/20    Diagnoses:  Post-op  knee replacement    Patient is homebound due to:   Pt requires home health services due to taxing effort to leave the home as a result of immobility from Post-op knee replacement    Weight Bearing Status:   full weight bearing: left leg  FWB unless otherwise indicated.  Progress to cane as able.  Set up for outpatient PT as soon as able after staple removal once patient is MOD I with cane.    Physical Therapy:   3 times a week for 2 weeks (Call for further orders beyond 2 weeks)  - Ambulate with a rolling walker  - Progress to cane  - Instruct on ROM and strengthening of knee    Aide to provide assistance with personal care and  ADLs  2 times a week for 2 weeks    Wound Care:   Surgical dressing change:Starting on  post op day 2 then 3 times per week and prn saturation.Change dressing while knee in flexed position utilizing island dressing or apply gauze and cover with tegaderm.  Teach patient to change daily if draining.  Pt may shower if surgical dressing is waterproof. Protect surgical dressing from getting wet by using press and seal saranwrap or garbage bag. Removal and replacement of dressing after shower only needed if incision is suspected  to have gotten wet during shower.  Otherwise change as previously described depending on dressing/drainage. No soaking in the tub or hot tub use for 6 weeks.    PT/SN to remove staples 14 days Post-op and apply skin prep and steri-strips.    · Cold therapy/Ice: Encouraged at least 20 minutes 2-3 times daily or more if desired.  Incision must be kept waterproof while icing.  · Wear TEDS Bilateral Thigh High Stockings for 3 weeks. Shilpa hose x 3 weeks. Ok to remove shilpa hose 1-2  hours/day max if desired.       Contact:  Please contact the nurse practitioner, Susan at [phone] at Ext [phone]. with concerns.  She is in surgery M,W,F so if urgent and needs to be addressed prior to the end of the day call the  and they with contact her in the OR or Clinic.         BLOOD THINNER:    If sent home on Xarelto         -14 days post-op for TKR       -30 days post-op for THR     If sent home home on ASA    325mg   BID x 4 weeks     Once finished with prescribed blood thinner, patients can return to pre-surgical ASA dosage if they took ASA before surgery.       Outpatient Therapy: [facility]    [address] [address]   or  [address] [place] [address]    Call [phone] to schedule appointment  Fax [phone]    If need orders: Call Zahra at Ext [phone]    DME:  - rolling Walker  - 3 in 1 commode  - tub bench / shower chair  - Per PT/OT recommendation          Eric Simmons

## 2020-07-27 NOTE — PLAN OF CARE
Problem: Physical Therapy Goal  Goal: Physical Therapy Goal  Description: Goals to be met by: 8/3/2020     Patient will increase functional independence with mobility by performin. Supine to sit with supervision.   2. Sit to supine with supervision.   3. Sit<>stand transfer with supervision using rolling walker.   4. Gait > 200 feet with SBA using rolling walker.   5. Ascend/descend 3 stairs with 1 handrail with CGA LRAD.  Outcome: Ongoing, Progressing     Patient evaluated by PT and goals established. Patient with moderate pain during therapy session. AAROM knee flexion ROM 0-80. Bed mobility with Hemal to L LE, sit<>stand with CGA with RW, and amb x160ft with RW and CGA.  PT will continue to follow and progress as tolerated. Rec for d/c to home with HH PT/OT. Please see progress note for detailed plan of care and recommendations.

## 2020-07-27 NOTE — OR NURSING
Dr de la rosa notified that pt took 15 units of novolog this am and then turned insulin pump off; fiqyxm=139

## 2020-07-27 NOTE — PLAN OF CARE
[facility]  [address]  [place] [address]  [phone]               [facility] Orthopedic Discharge Orders    Home Tam         Skilled Nurse to admit patient with an additional visit to remove staples.  1W2    Expected Discharge Date: 7/28/20    Diagnoses:  Post-op  knee replacement    Patient is homebound due to:   Pt requires home health services due to taxing effort to leave the home as a result of immobility from Post-op knee replacement    Weight Bearing Status:   full weight bearing: left leg  FWB unless otherwise indicated.  Progress to cane as able.  Set up for outpatient PT as soon as able after staple removal once patient is MOD I with cane.    Physical Therapy:   3 times a week for 2 weeks (Call for further orders beyond 2 weeks)  - Ambulate with a rolling walker  - Progress to cane  - Instruct on ROM and strengthening of knee    Aide to provide assistance with personal care and  ADLs  2 times a week for 2 weeks    Wound Care:   Surgical dressing change:Starting on  post op day 2 then 3 times per week and prn saturation.Change dressing while knee in flexed position utilizing island dressing or apply gauze and cover with tegaderm.  Teach patient to change daily if draining.  Pt may shower if surgical dressing is waterproof. Protect surgical dressing from getting wet by using press and seal saranwrap or garbage bag. Removal and replacement of dressing after shower only needed if incision is suspected  to have gotten wet during shower.  Otherwise change as previously described depending on dressing/drainage. No soaking in the tub or hot tub use for 6 weeks.    PT/SN to remove staples 14 days Post-op and apply skin prep and steri-strips.    · Cold therapy/Ice: Encouraged at least 20 minutes 2-3 times daily or more if desired.  Incision must be kept waterproof while icing.  · Wear TEDS Bilateral Thigh High Stockings for 3 weeks. Shilpa hose x 3 weeks. Ok to remove shilpa hose 1-2  hours/day max if desired.       Contact:  Please contact the nurse practitioner, Susan at [phone] at Ext [phone]. with concerns.  She is in surgery M,W,F so if urgent and needs to be addressed prior to the end of the day call the  and they with contact her in the OR or Clinic.         BLOOD THINNER:    If sent home on Xarelto         -14 days post-op for TKR       -30 days post-op for THR     If sent home home on ASA    325mg   BID x 4 weeks     Once finished with prescribed blood thinner, patients can return to pre-surgical ASA dosage if they took ASA before surgery.       Outpatient Therapy: [facility]    [address] [address]   or  [address] [place] [address]    Call [phone] to schedule appointment  Fax [phone]    If need orders: Call Zahra at Ext [phone]    DME:  - rolling Walker  - 3 in 1 commode  - tub bench / shower chair  - Per PT/OT recommendation          Eric Simmons

## 2020-07-27 NOTE — PT/OT/SLP EVAL
Physical Therapy Evaluation    Patient Name:  Mary Prince   MRN:  [id number]    Recommendations:     Discharge Recommendations:  home health PT, home health OT   Discharge Equipment Recommendations: none   Barriers to discharge: Inaccessible home, Decreased caregiver support and increased fall risk    Assessment:     Mary Prince is a 79 y.o. female admitted with a medical diagnosis of Primary osteoarthritis of left knee. PMH significant for HTN, CAD, DM-T2, B shoulder joint stiffness and pain, essential tremor, and lumbar laminectomy (pt reports 2016, and resulting in foot drop (R>L)).  She presents with the following impairments/functional limitations:  weakness, impaired endurance, impaired functional mobilty, gait instability, impaired balance, impaired self care skills, decreased lower extremity function, decreased safety awareness, pain, decreased ROM, impaired skin, edema, orthopedic precautions .    Patient evaluated by PT and goals established. Patient with moderate pain during therapy session. AAROM knee flexion ROM 0-80. Bed mobility with Hemal to L LE, sit<>stand with CGA with RW, and amb x160ft with RW and CGA.  PT will continue to follow and progress as tolerated. Rec for d/c to home with HH PT/OT.    Rehab Prognosis: Good; patient would benefit from acute skilled PT services to address these deficits and reach maximum level of function.    Recent Surgery: Procedure(s) (LRB):  ARTHROPLASTY, KNEE, SIGHT ASSISTED (Left) Day of Surgery    Plan:     During this hospitalization, patient to be seen BID to address the identified rehab impairments via gait training, therapeutic activities, therapeutic exercises and progress toward the following goals:    · Plan of Care Expires:  08/03/20    Subjective     Chief Complaint: pt reports pain was starting to increase during session  Patient/Family Comments/goals: Pt reports that she has fallen 1x in her home and 1x at her neighbor's house (ascending the  "porch step) in the last 6 months. Pt reports: "If I've fallen, I've fallen in the house". At end of session, pt reports: "that wasn't bad at all"  Pain/Comfort:  Pain Rating 1: 5/10(pt reports increasing pain with TherEx)  Location - Side 1: Left  Location - Orientation 1: generalized  Location 1: knee  Pain Addressed 1: Pre-medicate for activity, Distraction  Pain Rating Post-Intervention 1: (no report of pain at end of session)    Patients cultural, spiritual, Christianity conflicts given the current situation: no    Living Environment:  Pt lives in a 2 story home with 3 MUKUND and 1 HR. Pt reports that she has a chair lift to get to the 2nd floor. Pt reports that there are 2 steps down into the den and she holds onto the stair railing and furniture to step down.  Prior to admission, patients level of function was independent with ambulation, ADLs, and IADLs. Pt reports that she uses the RW in the community, but not at home. Pt states that she holds onto furniture to get around her house.  Equipment used at home: cane, straight, bedside commode, walker, rolling(built in shower chair, chair lift to 2nd floor of house).  DME owned (not currently used): rolling walker in the community.  Upon discharge, patient will have assistance from Juan, who lives with her.    Objective:     Communicated with nurse Macedo prior to session.  Patient found HOB elevated with cryotherapy, FCD, goodman catheter, oxygen, peripheral IV(insulin pump, Sanya hose)  upon PT entry to room.    General Precautions: Standard, fall, diabetic   Orthopedic Precautions:LLE weight bearing as tolerated   Braces: N/A     Patient donned yellow socks and gait belt for OOB mobility. Patient donned mask for hallway ambulation.    Exams:  · Cognition:   · Patient is oriented to name, .  · Pt follows approximately 100% of one step commands.    · Mood: Pleasant and cooperative.   · Musculoskeletal:  · Posture: Protective guarding surgical joint  · LE ROM/Strength: " 5/5 bilateral hip flexion, nonsurgical knee extension, L ankle dorsiflexion. R ankle dorsiflexion 2+/5, with ankle placed in max df by PT, able to hold against moderate resistance; pt also able to actively dorsiflex with initial PT assistance through full available ROM. AROM surgical knee difficult to accurately assess with large bulky dressing, although knee flexion grossly 0>80 degrees. Surgical knee strength grossly 5/5 knee flexion and 3-/5 knee extension.  · Neuromuscular:  · Sensation: Intact to light touch bilateral LEs. Pt denied paresthesias.   · Coordination/Tone/Reflexes: No impairments identified with functional mobility. No formal testing performed.   · Balance: CGA for dynamic standing with bilateral UE support.   · Visual-vestibular: No impairments identified with functional mobility. No formal testing performed.  · Integument:  Visible skin intact and surgical extremity dressing clean and dry.   · Cardiopulmonary:  · O2 requirements: 3L NC  · Edema: Mild surgical extremity.    · Color/temperature/pulses: equal      Functional Mobility:  · Bed Mobility:     · Supine to Sit: minimum assistance with L LE  · Transfers:     · Sit to Stand:  contact guard assistance with rolling walker  · Gait: x160ft with RW and CGA  · Initial gait deviations of decreased stride length with step to gait pattern, decreased surgical knee flexion, and decreased surgical heel strike/toe off. Increased double limb support time. With verbal cues noted improvements in continuous step pattern with equal step length.  · Mildly impaired R foot clearance  · Balance:   · Static sitting EOB x 30sec without LOB  · Static standing x15sec with RW and CGA without LOB  · Dynamic standing with RW and CGA without LOB/sway    Therapeutic Activities and Exercises:  · Bed mobility, transfers, gait, and balance as indicated above.  · TherEx: ankle pumps, QS, GS, SAQ, SLR, hip abd, and heel slides x 10reps R LE  · AAROM for heel slides:  0-80deg  · No extensor lag noted with SLR  PT educated patient and Bill re:   PT plan of care/role of PT  Safety with OOB mobility  Use of RW  Positioning of LE and use of ice  Orthopedic precautions  Gait deviations  Therapeutic exercises - continue ankle pumps throughout day, work on increasing R ankle dorsiflexion ROM, strength  Pt and Bill verbalized understanding     AM-PAC 6 CLICK MOBILITY  Total Score:18     Patient left up in chair with all lines intact, call button in reach, Bill present and cryo, FCD.    GOALS:   Multidisciplinary Problems     Physical Therapy Goals        Problem: Physical Therapy Goal    Goal Priority Disciplines Outcome Goal Variances Interventions   Physical Therapy Goal     PT, PT/OT Ongoing, Progressing     Description: Goals to be met by: 8/3/2020     Patient will increase functional independence with mobility by performin. Supine to sit with supervision.   2. Sit to supine with supervision.   3. Sit<>stand transfer with supervision using rolling walker.   4. Gait > 200 feet with SBA using rolling walker.   5. Ascend/descend 3 stairs with 1 handrail with CGA LRAD.                   History:     Past Medical History:   Diagnosis Date    Cancer     left breast XRT + lumpectomy; right lunpectomy + Chemotherapy (Dr. Santana) 8 of 12 treatments, stopped for side effects    Cancer     right breast    Cataract     right eye    CKD (chronic kidney disease), stage III     Coronary artery disease     total 4 stents over several years, last     Diabetes mellitus, type 2     Insulin pump    Hyperlipidemia     Hypertension     Thyroid disease     Tremor, essential        Past Surgical History:   Procedure Laterality Date    APPENDECTOMY      BREAST SURGERY Bilateral     lumpectomy    CARDIAC VALVE SURGERY  2019    EYE SURGERY Bilateral     cataract    HYSTERECTOMY      JOINT REPLACEMENT Right     hip    SPINE SURGERY      laminectomy    TRANSCATHETER AORTIC VALVE  REPLACEMENT (TAVR) N/A 11/20/2019    Procedure: REPLACEMENT, AORTIC VALVE, TRANSCATHETER (TAVR);  Surgeon: Balaji Shah MD;  Location: [facility] CATH LAB;  Service: Cardiology;  Laterality: N/A;       Time Tracking:     PT Received On: 07/27/20  PT Start Time: 1403     PT Stop Time: 1441  PT Total Time (min): 38 min     Billable Minutes: Evaluation 20 and Therapeutic Exercise 18      AIDA Miller  07/27/2020

## 2020-07-27 NOTE — CONSULTS
Cardiology Consult  7/27/2020  3:43 PM    Attending Cardiologist: Azeem Olvera M.D.  Primary Care Provider: Jez Sheets MD  Chief Complaint/Reason For Consultation:  TAVR      Problem list  Patient Active Problem List   Diagnosis    Nonrheumatic aortic valve stenosis    Coronary artery disease of native artery of native heart with stable angina pectoris    Hyperlipidemia LDL goal <70    Essential hypertension    Type 2 diabetes mellitus with diabetic polyneuropathy, with long-term current use of insulin    Acquired hypothyroidism    Epidural intraspinal abscess    Shoulder joint stiffness, bilateral    Bilateral shoulder pain    Essential tremor    Nodular calcific aortic valve stenosis    S/P TAVR (transcatheter aortic valve replacement)    Insulin pump status    Acute on chronic diastolic heart failure    Primary osteoarthritis of left knee       CC:  L knee replacement    HPI:  Mary Prince is a 79 y.o.year-old female with TAVR at [facility] by Dr. Shah in December 2019 who presents for elective L knee replacement done this morning.  She is doing well.  She denies any CP, SOB, CYR.  She had post TAVR echo in December which showed EF 65%, mild-mod MR, mild TR, no paravalvular leak.  Her cardiologist is Dr. Velazco at [facility].      Medications  Current Facility-Administered Medications   Medication Dose Route Frequency Provider Last Rate Last Dose    acetaminophen tablet 1,000 mg  1,000 mg Oral Q8H Eric Simmons MD   1,000 mg at 07/27/20 1536    [START ON 7/28/2020] acetaminophen tablet 650 mg  650 mg Oral Q6H PRN Eric Simmons MD        ALPRAZolam tablet 0.25 mg  0.25 mg Oral Nightly PRN Juan F Zhao NP        [START ON 7/28/2020] aspirin chewable tablet 81 mg  81 mg Oral BID Eric Simmons MD        atorvastatin tablet 80 mg  80 mg Oral QHS Juan F Zhao NP        cefazolin (ANCEF) 2 gram in dextrose 5% 50 mL IVPB (premix)  2 g Intravenous Q8H Eric Simmons MD         celecoxib capsule 200 mg  200 mg Oral BID Eric Simmons MD        dextrose 50% injection 12.5 g  12.5 g Intravenous PRN Juan F Zhao NP        dextrose 50% injection 25 g  25 g Intravenous PRN Juan F Zhao NP        diphenhydrAMINE injection 25 mg  25 mg Intravenous Q6H PRN Eric Simmons MD        docusate sodium capsule 100 mg  100 mg Oral Q12H Eric Simmons MD        escitalopram oxalate tablet 10 mg  10 mg Oral Daily Juan F Zhao NP   10 mg at 07/27/20 1536    famotidine tablet 20 mg  20 mg Oral BID Eric Simmons MD        gabapentin capsule 300 mg  300 mg Oral TID Juan F Zhao NP   300 mg at 07/27/20 1535    glucagon (human recombinant) injection 1 mg  1 mg Intramuscular PRN Juan F Zhao NP        glucose chewable tablet 16 g  16 g Oral PRN Juan F Zhao NP        glucose chewable tablet 24 g  24 g Oral PRN Juan F Zhao NP        HYDROmorphone injection 0.5 mg  0.5 mg Intravenous Q4H PRN Eric Simmons MD        letrozole tablet 2.5 mg  2.5 mg Oral Daily Juan F Zhao NP   2.5 mg at 07/27/20 1536    [START ON 7/28/2020] levothyroxine tablet 112 mcg  112 mcg Oral Before breakfast Juan F Zhao NP        metoprolol succinate (TOPROL-XL) 24 hr tablet 50 mg  50 mg Oral Daily Juan F Zhao NP        mupirocin 2 % ointment 1 g  1 g Nasal BID Eric Simmons MD        ondansetron injection 8 mg  8 mg Intravenous Q8H PRN Eric Simmons MD        oxyCODONE immediate release tablet 10 mg  10 mg Oral Q4H PRN Eric Simmons MD        oxyCODONE immediate release tablet 5 mg  5 mg Oral Q4H PRN Eric Simmons MD        [START ON 7/28/2020] polyethylene glycol packet 17 g  17 g Oral Daily Eric Simmons MD        primidone split tablet 100 mg  100 mg Oral QHS Juan F Zhao NP        promethazine (PHENERGAN) 6.25 mg in dextrose 5 % 50 mL IVPB  6.25 mg Intravenous Q6H PRN Eric Simmons MD        ramipriL capsule 10 mg  10 mg Oral QHS Juan F Zhao NP         sodium chloride 0.9% flush 3 mL  3 mL Intravenous PRN Poncho Lopez MD        sodium chloride 0.9% flush 3 mL  3 mL Intravenous Q6H PRN Eric Simmons MD        vancomycin (VANCOCIN) 1250 mg in 5 % dextrose 250 mL IVPB  15 mg/kg Intravenous Q12H Eric Simmons .7 mL/hr at 07/27/20 1537 1,250 mg at 07/27/20 1537     Facility-Administered Medications Ordered in Other Encounters   Medication Dose Route Frequency Provider Last Rate Last Dose    lidocaine (PF) 10 mg/ml (1%) injection 10 mg  1 mL Intradermal Once Azeem Randhawa MD          Prior to Admission medications    Medication Sig Start Date End Date Taking? Authorizing Provider   ALPRAZolam (XANAX) 0.5 MG tablet Take 0.5 mg by mouth nightly as needed.  6/23/18  Yes Historical Provider, MD   aspirin (ECOTRIN LOW STRENGTH) 81 MG EC tablet Take 81 mg by mouth once daily.    Yes Historical Provider, MD   atorvastatin (LIPITOR) 80 MG tablet Take 80 mg by mouth every evening.    Yes Historical Provider, MD   escitalopram oxalate (LEXAPRO) 10 MG tablet  6/8/20  Yes Historical Provider, MD   furosemide (LASIX) 20 MG tablet Take 40 mg by mouth daily as needed.    Yes Historical Provider, MD   gabapentin (NEURONTIN) 600 MG tablet Take 3 tablets (1,800 mg total) by mouth 3 (three) times daily. 6/21/19 7/27/20 Yes Clayton Winslow MD   insulin aspart U-100 (NOVOLOG U-100 INSULIN ASPART) 100 unit/mL injection Insulin pump   Yes Historical Provider, MD   letrozole (FEMARA) 2.5 mg Tab Take 2.5 mg by mouth once daily .   Yes Historical Provider, MD   levothyroxine (SYNTHROID) 112 MCG tablet Take 112 mcg by mouth before breakfast.    Yes Historical Provider, MD   metoprolol succinate (TOPROL-XL) 50 MG 24 hr tablet  6/27/20  Yes Historical Provider, MD   primidone (MYSOLINE) 50 MG Tab  7/7/20  Yes Historical Provider, MD   ramipril (ALTACE) 10 MG capsule Take 10 mg by mouth.    Yes Historical Provider, MD   clopidogrel (PLAVIX) 75 mg tablet Take 75  mg by mouth once daily.     Historical Provider, MD   multivitamin capsule Take 1 capsule by mouth once daily.    Historical Provider, MD   potassium aminobenzoate 500 mg Cap Take by mouth once daily.    Historical Provider, MD   vit C/E/zinc/lutein/zeaxanthin (OCUVITE EYE HEALTH ORAL) Take by mouth.    Historical Provider, MD   vitamin D 1000 units Tab Take 1,000 Units by mouth once daily.    Historical Provider, MD         History  Past Medical History:   Diagnosis Date    Cancer 2010    left breast XRT + lumpectomy; right lunpectomy + Chemotherapy (Dr. Santana) 8 of 12 treatments, stopped for side effects    Cancer     right breast    Cataract     right eye    CKD (chronic kidney disease), stage III     Coronary artery disease     total 4 stents over several years, last 2015    Diabetes mellitus, type 2     Insulin pump    Hyperlipidemia     Hypertension     Thyroid disease     Tremor, essential      Past Surgical History:   Procedure Laterality Date    APPENDECTOMY      BREAST SURGERY Bilateral     lumpectomy    CARDIAC VALVE SURGERY  2019    EYE SURGERY Bilateral     cataract    HYSTERECTOMY      JOINT REPLACEMENT Right     hip    SPINE SURGERY      laminectomy    TRANSCATHETER AORTIC VALVE REPLACEMENT (TAVR) N/A 11/20/2019    Procedure: REPLACEMENT, AORTIC VALVE, TRANSCATHETER (TAVR);  Surgeon: Balaji Shah MD;  Location: [facility] CATH LAB;  Service: Cardiology;  Laterality: N/A;     Social History     Socioeconomic History    Marital status:      Spouse name: Not on file    Number of children: Not on file    Years of education: Not on file    Highest education level: Not on file   Occupational History    Not on file   Social Needs    Financial resource strain: Not on file    Food insecurity     Worry: Not on file     Inability: Not on file    Transportation needs     Medical: Not on file     Non-medical: Not on file   Tobacco Use    Smoking status: Never Smoker    Smokeless  tobacco: Never Used   Substance and Sexual Activity    Alcohol use: Yes     Comment: social    Drug use: No    Sexual activity: Not on file   Lifestyle    Physical activity     Days per week: Not on file     Minutes per session: Not on file    Stress: Not on file   Relationships    Social connections     Talks on phone: Not on file     Gets together: Not on file     Attends Worship service: Not on file     Active member of club or organization: Not on file     Attends meetings of clubs or organizations: Not on file     Relationship status: Not on file   Other Topics Concern    Not on file   Social History Narrative    Registered nurse now retired; 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    Respiratory: Negative.          Physical Exam  Wt Readings from Last 1 Encounters:   07/27/20 76.2 kg (167 lb 15.9 oz)     BP Readings from Last 3 Encounters:   07/27/20 (!) 135/94   07/17/20 (!) 130/59   12/18/19 (!) 142/62     Pulse Readings from Last 1 Encounters:   07/27/20 71     Body mass index is 30.73 kg/m².    Physical Exam   Constitutional: She is oriented to person, place, and time. She appears well-developed and well-nourished.   Neck: No JVD present. Carotid bruit is not present.   Cardiovascular: S1 normal and S2 normal.   Pulses:       Carotid pulses are 2+ on the right side and 2+ on the left side.       Radial pulses are 2+ on the right side and 2+ on the left side.        Dorsalis pedis pulses are 2+ on the right side and 2+ on the left side.   Pulmonary/Chest: Breath sounds normal.   Abdominal: Bowel sounds are normal.   Musculoskeletal:         General: No edema.      Comments: SCD in place   Neurological: She is alert and oriented to person, place, and time.   Skin: Skin is warm and dry.    Vitals reviewed.                Assessment and Plan:    1.  S/p L TKA  Doing well.  Cardiac stable    2. TAVR December 2019  Resuming ASA    3.  CAD, s/p stents  Stable, no angina.    4.  CKD  As per renal.      Thank you for allowing me to participate in the care of Mary Prince.    Time spent evaluating and treating patient 25 minutes with >50% of this time being face-to-face.     Azeem Olvera MD, F.A.C.C, F.S.C.A.I.

## 2020-07-27 NOTE — TRANSFER OF CARE
"Anesthesia Transfer of Care Note    Patient: Mary Prince    Procedure(s) Performed: Procedure(s) (LRB):  ARTHROPLASTY, KNEE, SIGHT ASSISTED (Left)    Patient location: PACU    Anesthesia Type: spinal    Transport from OR: Transported from OR on 2-3 L/min O2 by NC with adequate spontaneous ventilation    Post pain: adequate analgesia    Post assessment: no apparent anesthetic complications    Post vital signs: stable    Level of consciousness: awake and alert    Nausea/Vomiting: no nausea/vomiting    Complications: none    Transfer of care protocol was followed      Last vitals:   Visit Vitals  BP (!) 135/59 (BP Location: Right arm, Patient Position: Lying)   Pulse 75   Temp 36.3 °C (97.4 °F) (Oral)   Resp 18   Ht 5' 2" (1.575 m)   Wt 76.2 kg (168 lb)   SpO2 96%   BMI 30.73 kg/m²     "

## 2020-07-27 NOTE — PLAN OF CARE
HH orders sent to [facility] ends on July 31, 2020.        07/27/20 1549   Post-Acute Status   Post-Acute Authorization Home Health   Home Health Status Referrals Sent

## 2020-07-27 NOTE — ANESTHESIA POSTPROCEDURE EVALUATION
Anesthesia Post Evaluation    Patient: Mary Prince    Procedure(s) Performed: Procedure(s) (LRB):  ARTHROPLASTY, KNEE, SIGHT ASSISTED (Left)    Final Anesthesia Type: spinal    Patient location during evaluation: PACU  Patient participation: Yes- Able to Participate  Level of consciousness: awake and alert  Post-procedure vital signs: reviewed and stable  Pain management: adequate  Airway patency: patent    PONV status at discharge: No PONV  Anesthetic complications: no      Cardiovascular status: blood pressure returned to baseline  Respiratory status: unassisted  Hydration status: euvolemic  Follow-up not needed.          Vitals Value Taken Time   /63 07/27/20 1130   Temp 36.4 °C (97.5 °F) 07/27/20 0957   Pulse 75 07/27/20 1134   Resp 16 07/27/20 1100   SpO2 100 % 07/27/20 1134   Vitals shown include unvalidated device data.      No case tracking events are documented in the log.      Pain/Luzmaria Score: Pain Rating Prior to Med Admin: 0 (7/27/2020  6:49 AM)  Luzmaria Score: 9 (7/27/2020 11:00 AM)

## 2020-07-28 VITALS
SYSTOLIC BLOOD PRESSURE: 106 MMHG | HEART RATE: 78 BPM | TEMPERATURE: 98 F | DIASTOLIC BLOOD PRESSURE: 50 MMHG | RESPIRATION RATE: 17 BRPM | BODY MASS INDEX: 30.91 KG/M2 | HEIGHT: 62 IN | OXYGEN SATURATION: 93 % | WEIGHT: 168 LBS

## 2020-07-28 PROBLEM — M17.12 PRIMARY OSTEOARTHRITIS OF LEFT KNEE: Status: RESOLVED | Noted: 2020-07-27 | Resolved: 2020-07-28

## 2020-07-28 LAB
ANION GAP SERPL CALC-SCNC: 10 MMOL/L (ref 8–16)
BUN SERPL-MCNC: 26 MG/DL (ref 8–23)
CALCIUM SERPL-MCNC: 8.1 MG/DL (ref 8.7–10.5)
CHLORIDE SERPL-SCNC: 104 MMOL/L (ref 95–110)
CO2 SERPL-SCNC: 24 MMOL/L (ref 23–29)
CREAT SERPL-MCNC: 1.1 MG/DL (ref 0.5–1.4)
ERYTHROCYTE [DISTWIDTH] IN BLOOD BY AUTOMATED COUNT: 13.6 % (ref 11.5–14.5)
EST. GFR  (AFRICAN AMERICAN): 55 ML/MIN/1.73 M^2
EST. GFR  (NON AFRICAN AMERICAN): 48 ML/MIN/1.73 M^2
GLUCOSE SERPL-MCNC: 73 MG/DL (ref 70–110)
HCT VFR BLD AUTO: 31.9 % (ref 37–48.5)
HGB BLD-MCNC: 10.5 G/DL (ref 12–16)
MCH RBC QN AUTO: 33.8 PG (ref 27–31)
MCHC RBC AUTO-ENTMCNC: 32.9 G/DL (ref 32–36)
MCV RBC AUTO: 103 FL (ref 82–98)
PLATELET # BLD AUTO: 99 K/UL (ref 150–350)
PMV BLD AUTO: 12.5 FL (ref 9.2–12.9)
POCT GLUCOSE: 165 MG/DL (ref 70–110)
POTASSIUM SERPL-SCNC: 4.5 MMOL/L (ref 3.5–5.1)
RBC # BLD AUTO: 3.11 M/UL (ref 4–5.4)
SODIUM SERPL-SCNC: 138 MMOL/L (ref 136–145)
WBC # BLD AUTO: 6.85 K/UL (ref 3.9–12.7)

## 2020-07-28 PROCEDURE — 97530 THERAPEUTIC ACTIVITIES: CPT | Mod: CQ

## 2020-07-28 PROCEDURE — 97116 GAIT TRAINING THERAPY: CPT | Mod: CQ,59

## 2020-07-28 PROCEDURE — 25000003 PHARM REV CODE 250: Performed by: NURSE PRACTITIONER

## 2020-07-28 PROCEDURE — 99225 PR SUBSEQUENT OBSERVATION CARE,LEVEL II: ICD-10-PCS | Mod: ,,, | Performed by: INTERNAL MEDICINE

## 2020-07-28 PROCEDURE — 94761 N-INVAS EAR/PLS OXIMETRY MLT: CPT

## 2020-07-28 PROCEDURE — 97110 THERAPEUTIC EXERCISES: CPT | Mod: CQ

## 2020-07-28 PROCEDURE — 80048 BASIC METABOLIC PNL TOTAL CA: CPT

## 2020-07-28 PROCEDURE — 36415 COLL VENOUS BLD VENIPUNCTURE: CPT

## 2020-07-28 PROCEDURE — 97165 OT EVAL LOW COMPLEX 30 MIN: CPT

## 2020-07-28 PROCEDURE — 99225 PR SUBSEQUENT OBSERVATION CARE,LEVEL II: CPT | Mod: ,,, | Performed by: INTERNAL MEDICINE

## 2020-07-28 PROCEDURE — 85027 COMPLETE CBC AUTOMATED: CPT

## 2020-07-28 PROCEDURE — 97535 SELF CARE MNGMENT TRAINING: CPT | Mod: 59

## 2020-07-28 PROCEDURE — 63600175 PHARM REV CODE 636 W HCPCS: Performed by: ORTHOPAEDIC SURGERY

## 2020-07-28 PROCEDURE — 25000003 PHARM REV CODE 250: Performed by: ORTHOPAEDIC SURGERY

## 2020-07-28 RX ORDER — OXYCODONE AND ACETAMINOPHEN 5; 325 MG/1; MG/1
TABLET ORAL
Qty: 50 TABLET | Refills: 0 | Status: SHIPPED | OUTPATIENT
Start: 2020-07-28 | End: 2021-01-05

## 2020-07-28 RX ORDER — NAPROXEN SODIUM 220 MG/1
81 TABLET, FILM COATED ORAL 2 TIMES DAILY
Qty: 60 TABLET | Refills: 0 | Status: SHIPPED | OUTPATIENT
Start: 2020-07-28 | End: 2021-01-05 | Stop reason: SDUPTHER

## 2020-07-28 RX ADMIN — ACETAMINOPHEN 1000 MG: 500 TABLET ORAL at 05:07

## 2020-07-28 RX ADMIN — CEFAZOLIN SODIUM 2 G: 2 SOLUTION INTRAVENOUS at 12:07

## 2020-07-28 RX ADMIN — FAMOTIDINE 20 MG: 20 TABLET ORAL at 08:07

## 2020-07-28 RX ADMIN — CELECOXIB 200 MG: 200 CAPSULE ORAL at 08:07

## 2020-07-28 RX ADMIN — GABAPENTIN 300 MG: 300 CAPSULE ORAL at 08:07

## 2020-07-28 RX ADMIN — ESCITALOPRAM OXALATE 10 MG: 10 TABLET ORAL at 08:07

## 2020-07-28 RX ADMIN — LEVOTHYROXINE SODIUM 112 MCG: 112 TABLET ORAL at 05:07

## 2020-07-28 RX ADMIN — MUPIROCIN 1 G: 20 OINTMENT TOPICAL at 09:07

## 2020-07-28 RX ADMIN — ASPIRIN 81 MG 81 MG: 81 TABLET ORAL at 08:07

## 2020-07-28 RX ADMIN — METOPROLOL SUCCINATE 50 MG: 50 TABLET, EXTENDED RELEASE ORAL at 08:07

## 2020-07-28 NOTE — PLAN OF CARE
Problem: Diabetes Comorbidity  Goal: Blood Glucose Level Within Desired Range  Outcome: Ongoing, Progressing     Problem: Adult Inpatient Plan of Care  Goal: Plan of Care Review  Outcome: Met  Goal: Patient-Specific Goal (Individualization)  Outcome: Met  Goal: Absence of Hospital-Acquired Illness or Injury  Outcome: Met  Goal: Optimal Comfort and Wellbeing  Outcome: Met  Goal: Readiness for Transition of Care  Outcome: Met  Goal: Rounds/Family Conference  Outcome: Met     Problem: Infection  Goal: Infection Symptom Resolution  Outcome: Met     Problem: Fall Injury Risk  Goal: Absence of Fall and Fall-Related Injury  Outcome: Met

## 2020-07-28 NOTE — PROGRESS NOTES
"Progress Note  Orthopedics    Admit Date: 7/27/2020   Patient ID: Mary Prince is a 79 y.o. female.  Postop day 1.  Left total knee replacement.  Having some diarrhea.  Otherwise doing well.  Dressing dry left knee, neurovascularly intact.  Ambulated 250 ft.  Plan for discharge with home health later today if feeling better.  Will follow up in 4 weeks in the office.            Eric Simmons      Vital Sign (recent):  BP (!) 106/50 (BP Location: Right arm, Patient Position: Sitting)   Pulse 72   Temp 98.3 °F (36.8 °C) (Oral)   Resp 17   Ht 5' 2" (1.575 m)   Wt 76.2 kg (167 lb 15.9 oz)   SpO2 (!) 93%   Breastfeeding No   BMI 30.73 kg/m²       Laboratory:    CBC:   Recent Labs   Lab 07/28/20  0357   WBC 6.85   RBC 3.11*   HGB 10.5*   HCT 31.9*   PLT 99*   *   MCH 33.8*   MCHC 32.9       CMP:   Recent Labs   Lab 07/28/20 0357   GLU 73   CALCIUM 8.1*      K 4.5   CO2 24      BUN 26*   CREATININE 1.1           Intake/Output Summary (Last 24 hours) at 7/28/2020 0848  Last data filed at 7/27/2020 1801  Gross per 24 hour   Intake 2580 ml   Output 500 ml   Net 2080 ml         Current Medications:   aspirin  81 mg Oral BID    atorvastatin  80 mg Oral QHS    celecoxib  200 mg Oral BID    docusate sodium  100 mg Oral Q12H    escitalopram oxalate  10 mg Oral Daily    famotidine  20 mg Oral BID    gabapentin  300 mg Oral TID    letrozole  2.5 mg Oral Daily    levothyroxine  112 mcg Oral Before breakfast    metoprolol succinate  50 mg Oral Daily    mupirocin  1 g Nasal BID    polyethylene glycol  17 g Oral Daily    primidone  100 mg Oral QHS    ramipriL  10 mg Oral QHS       Continuous Infusions:  PRN Meds:.acetaminophen, ALPRAZolam, dextrose 50%, dextrose 50%, diphenhydrAMINE, glucagon (human recombinant), glucose, glucose, HYDROmorphone, ondansetron, oxyCODONE, oxyCODONE, promethazine (PHENERGAN) IVPB, sodium chloride 0.9%, sodium chloride 0.9%  "

## 2020-07-28 NOTE — NURSING
Pt discharged home w/home health. PIV to right hand d/c'd, catheter intact, site covered w/gauze and secured w/Coban. Drsg/Ace wrap to LLE CDI, Polar Ice in place (left knee). Telemetry removed and tele box placed in bin @ Nursing Station. Pt left the unit w/Transport via w/c,  present @ discharge.

## 2020-07-28 NOTE — PT/OT/SLP PROGRESS
Physical Therapy Treatment    Patient Name:  Mary Prince   MRN:  [id number]    Recommendations:     Discharge Recommendations:  home health PT, home health OT   Discharge Equipment Recommendations: none   Barriers to discharge: None    Assessment:     Mary Prince is a 79 y.o. female admitted with a medical diagnosis of Primary osteoarthritis of left knee.  She presents with the following impairments/functional limitations:  weakness, impaired endurance, impaired self care skills, impaired functional mobilty, gait instability, impaired balance, decreased lower extremity function, decreased safety awareness, orthopedic precautions ,  Pt presented sitting in bedside chair upon arrival of PT. Pt agreeable to PT. Pt sit <> stand w/ RW and SBA. Pt amb'd 300' w/ RW And SBA. Pt ascended/descended 4 stairs w/ BHR and CGA.    Rehab Prognosis: Good; patient would benefit from acute skilled PT services to address these deficits and reach maximum level of function.    Recent Surgery: Procedure(s) (LRB):  ARTHROPLASTY, KNEE, SIGHT ASSISTED (Left) 1 Day Post-Op    Plan:     During this hospitalization, patient to be seen BID to address the identified rehab impairments via gait training, therapeutic activities, therapeutic exercises and progress toward the following goals:    · Plan of Care Expires:  08/03/20    Subjective     Chief Complaint: none stated  Patient/Family Comments/goals: none stated  Pain/Comfort:  · Pain Rating 1: 0/10  · Location - Side 1: Left  · Location - Orientation 1: generalized  · Location 1: knee  · Pain Addressed 1: Pre-medicate for activity, Reposition, Distraction, Cessation of Activity  · Pain Rating Post-Intervention 1: 1/10      Objective:     Communicated with nsJaylin) prior to session.  Patient found up in chair with cryotherapy, peripheral IV upon PT entry to room.     General Precautions: Standard, diabetic, fall   Orthopedic Precautions:LLE weight bearing as tolerated   Braces:  N/A     Functional Mobility:  · Transfers:     · Sit to Stand:  stand by assistance with rolling walker  · Gait: 300' w/ RW and SBA  · Stairs:  Pt ascended/descended 4 stair(s) with No Assistive Device with bilateral handrails with Contact Guard Assistance.       AM-PAC 6 CLICK MOBILITY  Turning over in bed (including adjusting bedclothes, sheets and blankets)?: 4  Sitting down on and standing up from a chair with arms (e.g., wheelchair, bedside commode, etc.): 3  Moving from lying on back to sitting on the side of the bed?: 3  Moving to and from a bed to a chair (including a wheelchair)?: 3  Need to walk in hospital room?: 3  Climbing 3-5 steps with a railing?: 3  Basic Mobility Total Score: 19       Therapeutic Activities and Exercises:   Pt performed reclined therapeutic exercises including ankle pumps, quad sets, glute sets, SLR, SAQs, heel slides, abd/add x 10 reps with verbal and tactile cues.       Patient left up in chair with all lines intact, call button in reach, ns notified and spouse present..    GOALS:   Multidisciplinary Problems     Physical Therapy Goals        Problem: Physical Therapy Goal    Goal Priority Disciplines Outcome Goal Variances Interventions   Physical Therapy Goal     PT, PT/OT Ongoing, Progressing     Description: Goals to be met by: 8/3/2020     Patient will increase functional independence with mobility by performin. Supine to sit with supervision.   2. Sit to supine with supervision.   3. Sit<>stand transfer with supervision using rolling walker.   4. Gait > 200 feet with SBA using rolling walker.   5. Ascend/descend 3 stairs with 1 handrail with CGA LRAD.                   Time Tracking:     PT Received On: 20  PT Start Time: 1025     PT Stop Time: 1103  PT Total Time (min): 38 min     Billable Minutes: Gait Training 13, Therapeutic Activity 12 and Therapeutic Exercise 13    Treatment Type: Treatment  PT/PTA: PTA     PTA Visit Number: 1     Israel Aquino  PTA  07/28/2020

## 2020-07-28 NOTE — PLAN OF CARE
PHN assigned Plusreena GALLAGHER. AVS updated.    No DME needs for discharge.     CM needs addressed for discharge.     Family to transport home.        07/28/20 0906   Final Note   Assessment Type Final Discharge Note   Anticipated Discharge Disposition Home-Health   What phone number can be called within the next 1-3 days to see how you are doing after discharge? [phone]   Right Care Referral Info   Post Acute Recommendation No Care

## 2020-07-28 NOTE — PROGRESS NOTES
"    Cardiology Progress Note    7/28/2020  9:20 AM    Subjective/Interim:      Doing well.  No CP, SOB.     Objective:   24-hour Vitals:  Temp:  [96.1 °F (35.6 °C)-98.3 °F (36.8 °C)] 98.3 °F (36.8 °C)  Pulse:  [56-80] 72  Resp:  [16-18] 17  SpO2:  [93 %-100 %] 93 %  BP: ()/(44-94) 106/50    Physical Examination:  Vitals: BP (!) 106/50 (BP Location: Right arm, Patient Position: Sitting)   Pulse 72   Temp 98.3 °F (36.8 °C) (Oral)   Resp 17   Ht 5' 2" (1.575 m)   Wt 76.2 kg (167 lb 15.9 oz)   SpO2 (!) 93%   Breastfeeding No   BMI 30.73 kg/m²     Physical Exam    Laboratory:  Trended Lab Data:  Recent Labs   Lab 07/28/20  0357   WBC 6.85   HGB 10.5*   HCT 31.9*   PLT 99*       Recent Labs   Lab 07/28/20  0357      K 4.5      CO2 24   BUN 26*   GLU 73   CALCIUM 8.1*       No results for input(s): PROT, ALBUMIN, BILITOT, AST, ALT, ALKPHOS in the last 168 hours.    No results for input(s): PROTIME, PTT, INR in the last 168 hours.    Cardiac: No results for input(s): TROPONINI, CKTOTAL, CKMB, BNP in the last 168 hours.    FLP: No results found for: CHOL, HDL, LDLCALC, TRIG, CHOLHDL  DM:   Lab Results   Component Value Date    CREATININE 1.1 07/28/2020     Thyroid: No results found for: TSH, FREET4, H5ALMFP, S3URIGM, THYROIDAB  Anemia: No results found for: IRON, TIBC, FERRITIN, FOZQMEXE36, FOLATE  Urinalysis:   Lab Results   Component Value Date    LABURIN No growth 07/17/2020    COLORU Yellow 07/17/2020    SPECGRAV 1.020 07/17/2020    NITRITE Negative 07/17/2020    KETONESU Negative 07/17/2020    UROBILINOGEN Negative 07/17/2020     @      Other Results:  EKG (my interpretation):      Radiology:  X-ray Knee 1 Or 2 View Left    Result Date: 7/27/2020  EXAMINATION: XR KNEE 1 OR 2 VIEW LEFT CLINICAL HISTORY: Total Knee Replacement; TECHNIQUE: Left knee AP and cross-table lateral views. COMPARISON: None FINDINGS: There is left total knee arthroplasty and posterior resurfacing of the patella with " prosthetic components in satisfactory position and alignment.  I detect no complication of the procedure.  Skin staples are present over the anterior skin margin.  Subcutaneous emphysema noted in the lower extremity.     Please see above. Electronically signed by: Lexus Randolph MD Date:    07/27/2020 Time:    12:23        Current Medications:     Infusions:       Scheduled:   aspirin  81 mg Oral BID    atorvastatin  80 mg Oral QHS    celecoxib  200 mg Oral BID    docusate sodium  100 mg Oral Q12H    escitalopram oxalate  10 mg Oral Daily    famotidine  20 mg Oral BID    gabapentin  300 mg Oral TID    letrozole  2.5 mg Oral Daily    levothyroxine  112 mcg Oral Before breakfast    metoprolol succinate  50 mg Oral Daily    mupirocin  1 g Nasal BID    polyethylene glycol  17 g Oral Daily    primidone  100 mg Oral QHS    ramipriL  10 mg Oral QHS        PRN:  acetaminophen, ALPRAZolam, dextrose 50%, dextrose 50%, diphenhydrAMINE, glucagon (human recombinant), glucose, glucose, HYDROmorphone, ondansetron, oxyCODONE, oxyCODONE, promethazine (PHENERGAN) IVPB, sodium chloride 0.9%, sodium chloride 0.9%     Assessment and Plan:     Mary Prince is a 79 y.o.female with  1.  S/p L TKA  Doing well.  Cardiac stable     2. TAVR December 2019  Resuming ASA     3.  CAD, s/p stents  Stable, no angina.     4.  CKD  As per renal.     F/u with her cardiologist, Dr. Velazco.  Discussed with Dr. Simmons.    Azeem Olvera MD        Disclaimer: This document was created using voice recognition software (M*Modal Fluency Direct). Although it may be edited, this document may contain errors related to incorrect recognition of the spoken word. Please call the physician if clarification is needed.

## 2020-07-28 NOTE — PLAN OF CARE
Problem: Occupational Therapy Goal  Goal: Occupational Therapy Goal  Description: Goals to be met by: 7/28/20    Patient will increase functional independence with ADLs by performing:    Verbalize left TKA precautions and how to adhere to TKA precautions during ADLs/ADL mobility. MET  Groom standing at sink at SBA level adhering to TKA precautions. MET  Don underwear at Min A level adhering to TKA precautions. MET  Don shoes at Min A level adhering to TKA precautions.  Max A  Toilet/Chair transfers at CGA level adhering to TKA precautions.  MET  Toileting at Min A level adhering to TKA precautions.  Not addressed due to pt not needing to toilet but deduct from LB dressing and transfers, pt would be able to perform at Min A.       Outcome: Ongoing, Progressing   Evaluation complete, goals established and tx session complete with  present for caregiver training.  Pt is grossly overall Min A level with self care tasks except will need increased assist with donning shoes and ELLIOTT hose.  Pt reports has adaptive equipment at home and is Indep with use of adaptive equipment for LB dressing.  Recommend home with 24 hr assist and Home Health OT and PT.  Pt has all needed DME.  Acute care OT to sign off at this time.

## 2020-07-28 NOTE — PLAN OF CARE
Problem: Physical Therapy Goal  Goal: Physical Therapy Goal  Description: Goals to be met by: 8/3/2020     Patient will increase functional independence with mobility by performin. Supine to sit with supervision.   2. Sit to supine with supervision.   3. Sit<>stand transfer with supervision using rolling walker.   4. Gait > 200 feet with SBA using rolling walker.   5. Ascend/descend 3 stairs with 1 handrail with CGA LRAD.  Outcome: Ongoing, Progressing   Pt presented sitting in bedside chair upon arrival of PT. Pt agreeable to PT. Pt sit <> stand w/ RW and SBA. Pt amb'd 300' w/ RW And SBA. Pt ascended/descended 4 stairs w/ BHR and CGA

## 2020-07-31 NOTE — DISCHARGE SUMMARY
[facility]  Short Stay  Discharge Summary  TOTAL KNEE REPLACEMENT    Admit Date: 7/27/2020    Discharge Date and Time: 7/28/2020 12:50 PM      Discharge Attending Physician: Eric Simmons MD    Hospital Course:  Mary Prince,is a 79 y.o. female with severe osteoarthritis L knee, unrelieved with the conservative measures. The patient was admitted on  7/27/2020 and underwent L total knee replacement with computer-assisted navigation. Postoperatively, weightbearing as tolerated with a walker and CPM machine was initiated on postop day #1. Mary Prince did quite well and was discharged on 7/28/2020  with home health physical therapy and nursing. The patient will be on Percocet for pain and aspirin 325 mg p.o. b.i.d. with meals x4 weeks.  A CPM machine will will be sent home with the patient. Postoperative follow up will be in the office in 4 weeks.       Final Diagnoses:    Principal Problem: Primary osteoarthritis of left knee   Secondary Diagnoses:   Active Hospital Problems   No active problems to display.      Resolved Hospital Problems    Diagnosis Date Resolved POA    *Primary osteoarthritis of left knee [M17.12] 07/28/2020 Yes       Discharged Condition: good    Disposition: Home-Health Care [facility]    Follow up/Patient Instructions:    Medications:  Reconciled Home Medications:      Medication List      START taking these medications    aspirin 81 MG Chew  Take 1 tablet (81 mg total) by mouth 2 (two) times daily.  Replaces: ECOTRIN LOW STRENGTH 81 MG EC tablet     oxyCODONE-acetaminophen 5-325 mg per tablet  Commonly known as: PERCOCET  Take 1 tablet by mouth every 4 hours as needed or 2 tablets every 6 hours as needed        CONTINUE taking these medications    ALPRAZolam 0.5 MG tablet  Commonly known as: XANAX  Take 0.5 mg by mouth nightly as needed.     ALTACE 10 MG capsule  Generic drug: ramipriL  Take 10 mg by mouth.     atorvastatin 80 MG tablet  Commonly known as: LIPITOR  Take 80 mg  by mouth every evening.     clopidogreL 75 mg tablet  Commonly known as: PLAVIX  Take 75 mg by mouth once daily.     escitalopram oxalate 10 MG tablet  Commonly known as: LEXAPRO     FEMARA 2.5 mg Tab  Generic drug: letrozole  Take 2.5 mg by mouth once daily .     furosemide 20 MG tablet  Commonly known as: LASIX  Take 40 mg by mouth daily as needed.     gabapentin 600 MG tablet  Commonly known as: NEURONTIN  Take 3 tablets (1,800 mg total) by mouth 3 (three) times daily.     metoprolol succinate 50 MG 24 hr tablet  Commonly known as: TOPROL-XL     multivitamin capsule  Take 1 capsule by mouth once daily.     NovoLOG U-100 Insulin aspart 100 unit/mL injection  Generic drug: insulin aspart U-100  Insulin pump     [facility]  Take by mouth.     potassium aminobenzoate 500 mg Cap  Take by mouth once daily.     primidone 50 MG Tab  Commonly known as: MYSOLINE     SYNTHROID 112 MCG tablet  Generic drug: levothyroxine  Take 112 mcg by mouth before breakfast.     vitamin D 1000 units Tab  Commonly known as: VITAMIN D3  Take 1,000 Units by mouth once daily.        STOP taking these medications    ECOTRIN LOW STRENGTH 81 MG EC tablet  Generic drug: aspirin  Replaced by: aspirin 81 MG Chew          Discharge Procedure Orders   Other restrictions (specify):   Order Comments: Follow post op instructions given in office     Notify your health care provider if you experience any of the following:  temperature >100.4     Notify your health care provider if you experience any of the following:  severe uncontrolled pain     Notify your health care provider if you experience any of the following:  redness, tenderness, or signs of infection (pain, swelling, redness, odor or green/yellow discharge around incision site)     Change dressing (specify)   Order Comments: Dressing change to be performed by Home Health nurse.     Follow-up Information     [facility].    Why: they will call you to schedule first  appointment  Contact information:  [address], [address]  [place] [address] [phone]           Eric Simmons MD In 4 weeks.    Specialty: Orthopedic Surgery  Contact information:  [address]  [facility]  [facility] [address] [phone]

## 2020-11-03 ENCOUNTER — OFFICE VISIT (OUTPATIENT)
Dept: CARDIOLOGY | Facility: CLINIC | Age: 80
End: 2020-11-03
Payer: MEDICARE

## 2020-11-03 ENCOUNTER — HOSPITAL ENCOUNTER (OUTPATIENT)
Dept: CARDIOLOGY | Facility: HOSPITAL | Age: 80
Discharge: HOME | End: 2020-11-03
Attending: INTERNAL MEDICINE
Payer: MEDICARE

## 2020-11-03 VITALS
HEIGHT: 62 IN | WEIGHT: 167 LBS | DIASTOLIC BLOOD PRESSURE: 68 MMHG | SYSTOLIC BLOOD PRESSURE: 136 MMHG | BODY MASS INDEX: 31.52 KG/M2 | SYSTOLIC BLOOD PRESSURE: 140 MMHG | OXYGEN SATURATION: 98 % | WEIGHT: 171.31 LBS | DIASTOLIC BLOOD PRESSURE: 70 MMHG | BODY MASS INDEX: 30.73 KG/M2 | HEIGHT: 62 IN | HEART RATE: 60 BPM | HEART RATE: 75 BPM

## 2020-11-03 DIAGNOSIS — I34.2 NONRHEUMATIC MITRAL VALVE STENOSIS: ICD-10-CM

## 2020-11-03 DIAGNOSIS — I50.32 CHRONIC DIASTOLIC HEART FAILURE: ICD-10-CM

## 2020-11-03 DIAGNOSIS — Z95.2 S/P TAVR (TRANSCATHETER AORTIC VALVE REPLACEMENT): ICD-10-CM

## 2020-11-03 DIAGNOSIS — I25.118 CORONARY ARTERY DISEASE OF NATIVE ARTERY OF NATIVE HEART WITH STABLE ANGINA PECTORIS: ICD-10-CM

## 2020-11-03 DIAGNOSIS — I35.0 NONRHEUMATIC AORTIC VALVE STENOSIS: ICD-10-CM

## 2020-11-03 LAB
ASCENDING AORTA: 2.36 CM
AV INDEX (PROSTH): 0.48
AV MEAN GRADIENT: 9 MMHG
AV PEAK GRADIENT: 16 MMHG
AV VALVE AREA: 2.21 CM2
AV VELOCITY RATIO: 0.44
BSA FOR ECHO PROCEDURE: 1.82 M2
CV ECHO LV RWT: 0.4 CM
DOP CALC AO PEAK VEL: 2.02 M/S
DOP CALC AO VTI: 47.94 CM
DOP CALC LVOT AREA: 4.6 CM2
DOP CALC LVOT DIAMETER: 2.42 CM
DOP CALC LVOT PEAK VEL: 0.88 M/S
DOP CALC LVOT STROKE VOLUME: 105.88 CM3
DOP CALCLVOT PEAK VEL VTI: 23.03 CM
E WAVE DECELERATION TIME: 320.73 MSEC
E/A RATIO: 1.01
E/E' RATIO: 29.2 M/S
ECHO LV POSTERIOR WALL: 0.82 CM (ref 0.6–1.1)
FRACTIONAL SHORTENING: 31 % (ref 28–44)
INTERVENTRICULAR SEPTUM: 0.97 CM (ref 0.6–1.1)
LA MAJOR: 5.17 CM
LA MINOR: 4.73 CM
LA WIDTH: 3.71 CM
LEFT ATRIUM SIZE: 3.53 CM
LEFT ATRIUM VOLUME INDEX: 31.1 ML/M2
LEFT ATRIUM VOLUME: 54.99 CM3
LEFT INTERNAL DIMENSION IN SYSTOLE: 2.85 CM (ref 2.1–4)
LEFT VENTRICLE DIASTOLIC VOLUME INDEX: 42.48 ML/M2
LEFT VENTRICLE DIASTOLIC VOLUME: 75.21 ML
LEFT VENTRICLE MASS INDEX: 64 G/M2
LEFT VENTRICLE SYSTOLIC VOLUME INDEX: 17.4 ML/M2
LEFT VENTRICLE SYSTOLIC VOLUME: 30.76 ML
LEFT VENTRICULAR INTERNAL DIMENSION IN DIASTOLE: 4.12 CM (ref 3.5–6)
LEFT VENTRICULAR MASS: 114.16 G
LV LATERAL E/E' RATIO: 29.2 M/S
LV SEPTAL E/E' RATIO: 29.2 M/S
MV MEAN GRADIENT: 6 MMHG
MV PEAK A VEL: 1.45 M/S
MV PEAK E VEL: 1.46 M/S
MV STENOSIS PRESSURE HALF TIME: 93.01 MS
MV VALVE AREA P 1/2 METHOD: 2.37 CM2
PISA TR MAX VEL: 2.64 M/S
PULM VEIN S/D RATIO: 1.75
PV PEAK D VEL: 0.28 M/S
PV PEAK S VEL: 0.49 M/S
RA MAJOR: 5.04 CM
RA PRESSURE: 3 MMHG
RA WIDTH: 3.2 CM
RIGHT VENTRICULAR END-DIASTOLIC DIMENSION: 3.51 CM
RV TISSUE DOPPLER FREE WALL SYSTOLIC VELOCITY 1 (APICAL 4 CHAMBER VIEW): 11.71 CM/S
SINUS: 2.51 CM
STJ: 2.38 CM
TDI LATERAL: 0.05 M/S
TDI SEPTAL: 0.05 M/S
TDI: 0.05 M/S
TR MAX PG: 28 MMHG
TRICUSPID ANNULAR PLANE SYSTOLIC EXCURSION: 1.59 CM
TV REST PULMONARY ARTERY PRESSURE: 31 MMHG

## 2020-11-03 PROCEDURE — 93306 TTE W/DOPPLER COMPLETE: CPT | Mod: 26,,, | Performed by: INTERNAL MEDICINE

## 2020-11-03 PROCEDURE — 1159F PR MEDICATION LIST DOCUMENTED IN MEDICAL RECORD: ICD-10-PCS | Mod: S$GLB,,, | Performed by: INTERNAL MEDICINE

## 2020-11-03 PROCEDURE — 99999 PR PBB SHADOW E&M-EST. PATIENT-LVL IV: CPT | Mod: PBBFAC,,,

## 2020-11-03 PROCEDURE — 99214 PR OFFICE/OUTPT VISIT, EST, LEVL IV, 30-39 MIN: ICD-10-PCS | Mod: S$GLB,,, | Performed by: INTERNAL MEDICINE

## 2020-11-03 PROCEDURE — 1159F MED LIST DOCD IN RCRD: CPT | Mod: S$GLB,,, | Performed by: INTERNAL MEDICINE

## 2020-11-03 PROCEDURE — 93306 ECHO (CUPID ONLY): ICD-10-PCS | Mod: 26,,, | Performed by: INTERNAL MEDICINE

## 2020-11-03 PROCEDURE — 1126F AMNT PAIN NOTED NONE PRSNT: CPT | Mod: S$GLB,,, | Performed by: INTERNAL MEDICINE

## 2020-11-03 PROCEDURE — 3075F PR MOST RECENT SYSTOLIC BLOOD PRESS GE 130-139MM HG: ICD-10-PCS | Mod: CPTII,S$GLB,, | Performed by: INTERNAL MEDICINE

## 2020-11-03 PROCEDURE — 3078F PR MOST RECENT DIASTOLIC BLOOD PRESSURE < 80 MM HG: ICD-10-PCS | Mod: CPTII,S$GLB,, | Performed by: INTERNAL MEDICINE

## 2020-11-03 PROCEDURE — 3075F SYST BP GE 130 - 139MM HG: CPT | Mod: CPTII,S$GLB,, | Performed by: INTERNAL MEDICINE

## 2020-11-03 PROCEDURE — 93306 TTE W/DOPPLER COMPLETE: CPT

## 2020-11-03 PROCEDURE — 1126F PR PAIN SEVERITY QUANTIFIED, NO PAIN PRESENT: ICD-10-PCS | Mod: S$GLB,,, | Performed by: INTERNAL MEDICINE

## 2020-11-03 PROCEDURE — 99999 PR PBB SHADOW E&M-EST. PATIENT-LVL IV: ICD-10-PCS | Mod: PBBFAC,,,

## 2020-11-03 PROCEDURE — 99214 OFFICE O/P EST MOD 30 MIN: CPT | Mod: S$GLB,,, | Performed by: INTERNAL MEDICINE

## 2020-11-03 PROCEDURE — 3078F DIAST BP <80 MM HG: CPT | Mod: CPTII,S$GLB,, | Performed by: INTERNAL MEDICINE

## 2020-11-03 NOTE — PROGRESS NOTES
Subjective:    Patient ID:  Mary Prince is a 79 y.o. female who presents for follow-up of TAVR.     Referring Physician: Dr. Velazco      HPI  Mary Prince is a 79 y.o. female with a past medical history of HTN, CAD s/p Proximal to Mid LAD PCI, and HLD who presents for a one year TAVR follow up. She is s/p successful left transfemoral 26 mm evolute valve for heavily calcified LV outflow tract and severe aortic stenosis. She states she is doing incredibly well today. Her main limitation is left knee pain. She is s/p left knee replacement at the end of July undergoing physical therapy. Reports no SOB with exercise. She continues to follow closely with Dr. Velazco.     NYHA: I CCS: 0    Review of Systems   Constitution: Negative for chills and fever.   HENT: Negative for sore throat.    Eyes: Negative for blurred vision.   Cardiovascular: Negative for chest pain, claudication, cyanosis, dyspnea on exertion, irregular heartbeat, leg swelling, near-syncope, orthopnea, palpitations, paroxysmal nocturnal dyspnea and syncope.   Respiratory: Negative for cough and sputum production.    Hematologic/Lymphatic: Does not bruise/bleed easily.   Skin: Negative for itching, rash and suspicious lesions.   Musculoskeletal: Positive for back pain and joint pain. Negative for falls.   Gastrointestinal: Negative for abdominal pain and change in bowel habit.   Genitourinary: Negative for dysuria.   Neurological: Negative for disturbances in coordination, dizziness and loss of balance.   Psychiatric/Behavioral: Negative for altered mental status.          Past Medical History:   Diagnosis Date    Cancer 2010    left breast XRT + lumpectomy; right lunpectomy + Chemotherapy (Dr. Santana) 8 of 12 treatments, stopped for side effects    Cancer     right breast    Cataract     right eye    CKD (chronic kidney disease), stage III     Coronary artery disease     total 4 stents over several years, last 2015    Diabetes  mellitus, type 2     Insulin pump    Hyperlipidemia     Hypertension     Thyroid disease     Tremor, essential        Current Outpatient Medications:     ALPRAZolam (XANAX) 0.5 MG tablet, Take 0.5 mg by mouth nightly as needed. , Disp: , Rfl:     aspirin 81 MG Chew, Take 1 tablet (81 mg total) by mouth 2 (two) times daily., Disp: 60 tablet, Rfl: 0    atorvastatin (LIPITOR) 80 MG tablet, Take 80 mg by mouth every evening. , Disp: , Rfl:     clopidogrel (PLAVIX) 75 mg tablet, Take 75 mg by mouth once daily. , Disp: , Rfl:     escitalopram oxalate (LEXAPRO) 10 MG tablet, Take 10 mg by mouth once daily. , Disp: , Rfl:     furosemide (LASIX) 20 MG tablet, Take 40 mg by mouth daily as needed. , Disp: , Rfl:     gabapentin (NEURONTIN) 600 MG tablet, Take 3 tablets (1,800 mg total) by mouth 3 (three) times daily., Disp: 270 tablet, Rfl: 11    insulin aspart U-100 (NOVOLOG U-100 INSULIN ASPART) 100 unit/mL injection, Insulin pump, Disp: , Rfl:     letrozole (FEMARA) 2.5 mg Tab, Take 2.5 mg by mouth once daily ., Disp: , Rfl:     levothyroxine (SYNTHROID) 112 MCG tablet, Take 112 mcg by mouth before breakfast. , Disp: , Rfl:     multivitamin capsule, Take 1 capsule by mouth once daily., Disp: , Rfl:     primidone (MYSOLINE) 50 MG Tab, Take by mouth once daily. , Disp: , Rfl:     ramipril (ALTACE) 10 MG capsule, Take 10 mg by mouth once daily. , Disp: , Rfl:     vit C/E/zinc/lutein/zeaxanthin (OCUVITE EYE HEALTH ORAL), Take 1 tablet by mouth once daily. , Disp: , Rfl:     vitamin D 1000 units Tab, Take 1,000 Units by mouth once daily., Disp: , Rfl:     metoprolol succinate (TOPROL-XL) 50 MG 24 hr tablet, , Disp: , Rfl:     oxyCODONE-acetaminophen (PERCOCET) 5-325 mg per tablet, Take 1 tablet by mouth every 4 hours as needed or 2 tablets every 6 hours as needed (Patient not taking: Reported on 11/3/2020), Disp: 50 tablet, Rfl: 0    potassium aminobenzoate 500 mg Cap, Take by mouth once daily., Disp: , Rfl:  "  No current facility-administered medications for this visit.     Facility-Administered Medications Ordered in Other Visits:     lidocaine (PF) 10 mg/ml (1%) injection 10 mg, 1 mL, Intradermal, Once, Azeem Randhawa MD    Objective:    Physical Exam   Constitutional: She is oriented to person, place, and time. She appears well-developed and well-nourished. No distress.   HENT:   Head: Normocephalic and atraumatic.   Eyes: EOM are normal.   Neck: Normal range of motion. No JVD present.   Cardiovascular: Normal rate, regular rhythm and intact distal pulses.   Murmur heard.   Harsh midsystolic murmur is present with a grade of 1/6 at the upper right sternal border.  Pulmonary/Chest: Effort normal and breath sounds normal. No respiratory distress.   Abdominal: Soft. She exhibits no distension.   Musculoskeletal:         General: No edema.   Neurological: She is alert and oriented to person, place, and time.   Skin: Skin is warm and dry. She is not diaphoretic.   Vitals reviewed.          Vitals:    11/03/20 1119 11/03/20 1121   BP: (!) 143/75 136/68   BP Location: Right arm Left arm   Patient Position: Sitting Sitting   BP Method: Large (Automatic) Large (Automatic)   Pulse: 75 75   SpO2: 98%    Weight: 77.7 kg (171 lb 4.8 oz)    Height: 5' 2" (1.575 m)      Body mass index is 31.33 kg/m².    Test(s) Reviewed  I have reviewed the following in detail:  [] Stress test   [] Angiography   [x] Echocardiogram   [x] Labs: H/H 11.7/36.1; Cr 1.0   [] Other:     TTE Today  · The left ventricle is normal in size with normal systolic function. The estimated ejection fraction is 65%.Indeterminate diastolic function.  · Normal right ventricular systolic function. .  · Mild right atrial enlargement.  · There is a transcutaneously-placed aortic bioprosthesis present. There is no aortic aortic insufficiency present. Prosthetic aortic valve is normal.  · There is mild mitral stenosis.  · The mean diastolic gradient across " the mitral valve is 6 mmHg ; MVA 2.37  · The MR is mild to mild-moderate ( 1-2+).  · Normal central venous pressure (3 mmHg).  · The estimated PA systolic pressure is 31 mmHg.    Assessment:   S/P TAVR (transcatheter aortic valve replacement)  Successful left transfemoral 26 mm evolute valve for heavily calcified LV outflow tract and severe aortic stenosis. TTE today personally reviewed which showed no PVL.    Nonrheumatic aortic valve stenosis  Resolved post TAVR. NYHA now I. Mean gradient 9 mmHg, peak velocity 2.02 m/s.     Coronary artery disease of native artery of native heart with stable angina pectoris  [facility] (Date 8/13/19): Nonobstructive CAD. Patent LAD stent. Asymptomatic. On ASA/Statin.     Chronic diastolic heart failure  Stable. Continue Lasix.     Plan:     1. Follow up with Valve Clinic PRN.   2. Continue ASA indefinitely. OK to discharge Plavix from a TAVR standpoint. She has continued per Dr Velazco. Will defer d/c to him.   3. SBE prophylaxis for life.   4. Continue to follow up with Cardiology, Dr. Velazco.       Ada Ruiz, DEANA  Valve and Structural Heart Disease  [facility]

## 2020-11-03 NOTE — ASSESSMENT & PLAN NOTE
[facility] (Date 8/13/19): Nonobstructive CAD. Patent LAD stent. Asymptomatic. On ASA/Statin.

## 2020-12-23 NOTE — TELEPHONE ENCOUNTER
After her EMG you told her to increase the gabapentin to 3 in the morning, 3 in the afternoon and 2 at night. She wants it sent to [facility] [place]         ----- Message from Kerry Urbano sent at 6/21/2019  2:53 PM CDT -----  Contact: self  [phone]  .Type: Patient Call Back    Who called: self     What is the request in detail:PT is requesting results from EMG that was done on 06/14    Can the clinic reply by [facility]? Call back     Would the patient rather a call back or a response via [facility]?  Call back     Best call back number: [phone]          
Influenza Vaccination

## 2021-01-05 ENCOUNTER — OFFICE VISIT (OUTPATIENT)
Dept: CARDIOLOGY | Facility: CLINIC | Age: 81
End: 2021-01-05
Payer: MEDICARE

## 2021-01-05 ENCOUNTER — TELEPHONE (OUTPATIENT)
Dept: CARDIOLOGY | Facility: CLINIC | Age: 81
End: 2021-01-05

## 2021-01-05 VITALS
BODY MASS INDEX: 32.25 KG/M2 | WEIGHT: 175.25 LBS | HEIGHT: 62 IN | SYSTOLIC BLOOD PRESSURE: 109 MMHG | DIASTOLIC BLOOD PRESSURE: 54 MMHG | HEART RATE: 73 BPM

## 2021-01-05 DIAGNOSIS — I35.0 NONRHEUMATIC AORTIC VALVE STENOSIS: Chronic | ICD-10-CM

## 2021-01-05 DIAGNOSIS — Z95.5 S/P CORONARY ARTERY STENT PLACEMENT: Chronic | ICD-10-CM

## 2021-01-05 DIAGNOSIS — I25.10 ARTERIOSCLEROSIS OF CORONARY ARTERY: Primary | Chronic | ICD-10-CM

## 2021-01-05 DIAGNOSIS — G25.0 ESSENTIAL TREMOR: Chronic | ICD-10-CM

## 2021-01-05 DIAGNOSIS — I21.9 MYOCARDIAL INFARCTION, UNSPECIFIED MI TYPE, UNSPECIFIED ARTERY: Chronic | ICD-10-CM

## 2021-01-05 DIAGNOSIS — Z95.2 S/P TAVR (TRANSCATHETER AORTIC VALVE REPLACEMENT): ICD-10-CM

## 2021-01-05 DIAGNOSIS — E78.2 MIXED HYPERLIPIDEMIA: Chronic | ICD-10-CM

## 2021-01-05 PROBLEM — E11.42 DIABETIC PERIPHERAL NEUROPATHY ASSOCIATED WITH TYPE 2 DIABETES MELLITUS: Status: ACTIVE | Noted: 2021-01-05

## 2021-01-05 PROBLEM — E11.9 TYPE 2 DIABETES MELLITUS: Status: ACTIVE | Noted: 2018-07-08

## 2021-01-05 PROBLEM — I51.9 HEART DISEASE: Status: RESOLVED | Noted: 2018-07-08 | Resolved: 2021-01-05

## 2021-01-05 PROBLEM — I51.9 HEART DISEASE: Status: ACTIVE | Noted: 2018-07-08

## 2021-01-05 PROBLEM — J45.909 ASTHMA: Status: ACTIVE | Noted: 2021-01-05

## 2021-01-05 PROCEDURE — 3074F PR MOST RECENT SYSTOLIC BLOOD PRESSURE < 130 MM HG: ICD-10-PCS | Mod: CPTII,S$GLB,, | Performed by: INTERNAL MEDICINE

## 2021-01-05 PROCEDURE — 3078F DIAST BP <80 MM HG: CPT | Mod: CPTII,S$GLB,, | Performed by: INTERNAL MEDICINE

## 2021-01-05 PROCEDURE — 1100F PTFALLS ASSESS-DOCD GE2>/YR: CPT | Mod: CPTII,S$GLB,, | Performed by: INTERNAL MEDICINE

## 2021-01-05 PROCEDURE — 1100F PR PT FALLS ASSESS DOC 2+ FALLS/FALL W/INJURY/YR: ICD-10-PCS | Mod: CPTII,S$GLB,, | Performed by: INTERNAL MEDICINE

## 2021-01-05 PROCEDURE — 1126F PR PAIN SEVERITY QUANTIFIED, NO PAIN PRESENT: ICD-10-PCS | Mod: S$GLB,,, | Performed by: INTERNAL MEDICINE

## 2021-01-05 PROCEDURE — 3288F FALL RISK ASSESSMENT DOCD: CPT | Mod: CPTII,S$GLB,, | Performed by: INTERNAL MEDICINE

## 2021-01-05 PROCEDURE — 1126F AMNT PAIN NOTED NONE PRSNT: CPT | Mod: S$GLB,,, | Performed by: INTERNAL MEDICINE

## 2021-01-05 PROCEDURE — 1159F PR MEDICATION LIST DOCUMENTED IN MEDICAL RECORD: ICD-10-PCS | Mod: S$GLB,,, | Performed by: INTERNAL MEDICINE

## 2021-01-05 PROCEDURE — 99999 PR PBB SHADOW E&M-EST. PATIENT-LVL IV: ICD-10-PCS | Mod: PBBFAC,,,

## 2021-01-05 PROCEDURE — 3074F SYST BP LT 130 MM HG: CPT | Mod: CPTII,S$GLB,, | Performed by: INTERNAL MEDICINE

## 2021-01-05 PROCEDURE — 3078F PR MOST RECENT DIASTOLIC BLOOD PRESSURE < 80 MM HG: ICD-10-PCS | Mod: CPTII,S$GLB,, | Performed by: INTERNAL MEDICINE

## 2021-01-05 PROCEDURE — 1159F MED LIST DOCD IN RCRD: CPT | Mod: S$GLB,,, | Performed by: INTERNAL MEDICINE

## 2021-01-05 PROCEDURE — 99999 PR PBB SHADOW E&M-EST. PATIENT-LVL IV: CPT | Mod: PBBFAC,,,

## 2021-01-05 PROCEDURE — 99214 OFFICE O/P EST MOD 30 MIN: CPT | Mod: S$GLB,,, | Performed by: INTERNAL MEDICINE

## 2021-01-05 PROCEDURE — 3288F PR FALLS RISK ASSESSMENT DOCUMENTED: ICD-10-PCS | Mod: CPTII,S$GLB,, | Performed by: INTERNAL MEDICINE

## 2021-01-05 PROCEDURE — 99214 PR OFFICE/OUTPT VISIT, EST, LEVL IV, 30-39 MIN: ICD-10-PCS | Mod: S$GLB,,, | Performed by: INTERNAL MEDICINE

## 2021-01-05 RX ORDER — CYCLOSPORINE 0.5 MG/ML
1 EMULSION OPHTHALMIC
COMMUNITY
Start: 2020-10-08 | End: 2023-09-27 | Stop reason: CLARIF

## 2021-01-05 RX ORDER — LEVOTHYROXINE SODIUM 200 UG/1
200 TABLET ORAL
COMMUNITY
End: 2021-04-26 | Stop reason: CLARIF

## 2021-01-05 RX ORDER — INSULIN LISPRO 100 [IU]/ML
INJECTION, SOLUTION INTRAVENOUS; SUBCUTANEOUS
COMMUNITY
Start: 2021-01-02 | End: 2021-04-26 | Stop reason: CLARIF

## 2021-01-05 RX ORDER — NAPROXEN SODIUM 220 MG/1
81 TABLET, FILM COATED ORAL 2 TIMES DAILY
Status: ON HOLD | COMMUNITY
Start: 2016-10-29 | End: 2021-05-18 | Stop reason: SDUPTHER

## 2021-01-05 RX ORDER — LEVOTHYROXINE SODIUM 125 UG/1
TABLET ORAL
COMMUNITY
Start: 2020-12-03 | End: 2021-01-05 | Stop reason: DRUGHIGH

## 2021-01-11 ENCOUNTER — TELEPHONE (OUTPATIENT)
Dept: CARDIOLOGY | Facility: CLINIC | Age: 81
End: 2021-01-11

## 2021-01-11 DIAGNOSIS — G25.0 ESSENTIAL TREMOR: Primary | ICD-10-CM

## 2021-01-11 RX ORDER — MELOXICAM 15 MG
TABLET ORAL
COMMUNITY
Start: 2020-12-15 | End: 2021-04-26 | Stop reason: CLARIF

## 2021-01-11 RX ORDER — PROPRANOLOL HYDROCHLORIDE 20 MG/1
20 TABLET ORAL 3 TIMES DAILY
Qty: 90 TABLET | Refills: 11 | Status: SHIPPED | OUTPATIENT
Start: 2021-01-11 | End: 2022-01-25

## 2021-04-26 ENCOUNTER — HOSPITAL ENCOUNTER (OUTPATIENT)
Dept: PREADMISSION TESTING | Facility: OTHER | Age: 81
Discharge: HOME | End: 2021-04-26
Attending: ORTHOPAEDIC SURGERY
Payer: MEDICARE

## 2021-04-26 ENCOUNTER — ANESTHESIA EVENT (OUTPATIENT)
Dept: SURGERY | Facility: OTHER | Age: 81
End: 2021-04-26
Payer: MEDICARE

## 2021-04-26 VITALS
SYSTOLIC BLOOD PRESSURE: 124 MMHG | WEIGHT: 165 LBS | HEIGHT: 62 IN | HEART RATE: 74 BPM | OXYGEN SATURATION: 98 % | BODY MASS INDEX: 30.36 KG/M2 | TEMPERATURE: 98 F | DIASTOLIC BLOOD PRESSURE: 58 MMHG

## 2021-04-26 DIAGNOSIS — Z01.818 PREOP TESTING: Primary | ICD-10-CM

## 2021-04-26 LAB
ABO + RH BLD: NORMAL
ALBUMIN SERPL BCP-MCNC: 3.8 G/DL (ref 3.5–5.2)
ALP SERPL-CCNC: 67 U/L (ref 55–135)
ALT SERPL W/O P-5'-P-CCNC: 18 U/L (ref 10–44)
ANION GAP SERPL CALC-SCNC: 13 MMOL/L (ref 8–16)
AST SERPL-CCNC: 27 U/L (ref 10–40)
BACTERIA #/AREA URNS HPF: NORMAL /HPF
BASOPHILS # BLD AUTO: 0.05 K/UL (ref 0–0.2)
BASOPHILS NFR BLD: 0.8 % (ref 0–1.9)
BILIRUB SERPL-MCNC: 0.4 MG/DL (ref 0.1–1)
BILIRUB UR QL STRIP: NEGATIVE
BLD GP AB SCN CELLS X3 SERPL QL: NORMAL
BUN SERPL-MCNC: 33 MG/DL (ref 8–23)
CALCIUM SERPL-MCNC: 9.3 MG/DL (ref 8.7–10.5)
CHLORIDE SERPL-SCNC: 100 MMOL/L (ref 95–110)
CLARITY UR: CLEAR
CO2 SERPL-SCNC: 28 MMOL/L (ref 23–29)
COLOR UR: YELLOW
CREAT SERPL-MCNC: 1.1 MG/DL (ref 0.5–1.4)
DIFFERENTIAL METHOD: ABNORMAL
EOSINOPHIL # BLD AUTO: 0.4 K/UL (ref 0–0.5)
EOSINOPHIL NFR BLD: 5.5 % (ref 0–8)
ERYTHROCYTE [DISTWIDTH] IN BLOOD BY AUTOMATED COUNT: 13.7 % (ref 11.5–14.5)
EST. GFR  (AFRICAN AMERICAN): 55 ML/MIN/1.73 M^2
EST. GFR  (NON AFRICAN AMERICAN): 48 ML/MIN/1.73 M^2
GLUCOSE SERPL-MCNC: 60 MG/DL (ref 70–110)
GLUCOSE UR QL STRIP: NEGATIVE
HCT VFR BLD AUTO: 36.6 % (ref 37–48.5)
HGB BLD-MCNC: 12.2 G/DL (ref 12–16)
HGB UR QL STRIP: ABNORMAL
HYALINE CASTS #/AREA URNS LPF: 0 /LPF
IMM GRANULOCYTES # BLD AUTO: 0.02 K/UL (ref 0–0.04)
IMM GRANULOCYTES NFR BLD AUTO: 0.3 % (ref 0–0.5)
KETONES UR QL STRIP: NEGATIVE
LEUKOCYTE ESTERASE UR QL STRIP: ABNORMAL
LYMPHOCYTES # BLD AUTO: 1.4 K/UL (ref 1–4.8)
LYMPHOCYTES NFR BLD: 20.9 % (ref 18–48)
MCH RBC QN AUTO: 33.7 PG (ref 27–31)
MCHC RBC AUTO-ENTMCNC: 33.3 G/DL (ref 32–36)
MCV RBC AUTO: 101 FL (ref 82–98)
MICROSCOPIC COMMENT: NORMAL
MONOCYTES # BLD AUTO: 0.9 K/UL (ref 0.3–1)
MONOCYTES NFR BLD: 13.6 % (ref 4–15)
NEUTROPHILS # BLD AUTO: 3.9 K/UL (ref 1.8–7.7)
NEUTROPHILS NFR BLD: 58.9 % (ref 38–73)
NITRITE UR QL STRIP: NEGATIVE
NRBC BLD-RTO: 0 /100 WBC
PH UR STRIP: 7 [PH] (ref 5–8)
PLATELET # BLD AUTO: 137 K/UL (ref 150–450)
PMV BLD AUTO: 12.7 FL (ref 9.2–12.9)
POTASSIUM SERPL-SCNC: 4.5 MMOL/L (ref 3.5–5.1)
PROT SERPL-MCNC: 7.4 G/DL (ref 6–8.4)
PROT UR QL STRIP: NEGATIVE
RBC # BLD AUTO: 3.62 M/UL (ref 4–5.4)
RBC #/AREA URNS HPF: 1 /HPF (ref 0–4)
SODIUM SERPL-SCNC: 141 MMOL/L (ref 136–145)
SP GR UR STRIP: 1.01 (ref 1–1.03)
SQUAMOUS #/AREA URNS HPF: 2 /HPF
URN SPEC COLLECT METH UR: ABNORMAL
UROBILINOGEN UR STRIP-ACNC: NEGATIVE EU/DL
WBC # BLD AUTO: 6.54 K/UL (ref 3.9–12.7)
WBC #/AREA URNS HPF: 1 /HPF (ref 0–5)

## 2021-04-26 PROCEDURE — 93010 ELECTROCARDIOGRAM REPORT: CPT | Mod: ,,, | Performed by: INTERNAL MEDICINE

## 2021-04-26 PROCEDURE — 85025 COMPLETE CBC W/AUTO DIFF WBC: CPT | Performed by: ORTHOPAEDIC SURGERY

## 2021-04-26 PROCEDURE — 81000 URINALYSIS NONAUTO W/SCOPE: CPT | Performed by: ORTHOPAEDIC SURGERY

## 2021-04-26 PROCEDURE — 36415 COLL VENOUS BLD VENIPUNCTURE: CPT | Performed by: ORTHOPAEDIC SURGERY

## 2021-04-26 PROCEDURE — 86900 BLOOD TYPING SEROLOGIC ABO: CPT | Performed by: ORTHOPAEDIC SURGERY

## 2021-04-26 PROCEDURE — 80053 COMPREHEN METABOLIC PANEL: CPT | Performed by: ORTHOPAEDIC SURGERY

## 2021-04-26 PROCEDURE — 93005 ELECTROCARDIOGRAM TRACING: CPT

## 2021-04-26 PROCEDURE — 93010 EKG 12-LEAD: ICD-10-PCS | Mod: ,,, | Performed by: INTERNAL MEDICINE

## 2021-04-26 RX ORDER — METOPROLOL TARTRATE 50 MG/1
50 TABLET ORAL NIGHTLY
Status: ON HOLD | COMMUNITY
End: 2021-05-18 | Stop reason: HOSPADM

## 2021-04-26 RX ORDER — SODIUM CHLORIDE, SODIUM LACTATE, POTASSIUM CHLORIDE, CALCIUM CHLORIDE 600; 310; 30; 20 MG/100ML; MG/100ML; MG/100ML; MG/100ML
INJECTION, SOLUTION INTRAVENOUS CONTINUOUS
Status: CANCELLED | OUTPATIENT
Start: 2021-04-26

## 2021-04-26 RX ORDER — LEVOTHYROXINE SODIUM 125 UG/1
125 TABLET ORAL
COMMUNITY

## 2021-04-30 ENCOUNTER — LAB VISIT (OUTPATIENT)
Dept: INTERNAL MEDICINE | Facility: CLINIC | Age: 81
End: 2021-04-30
Payer: MEDICARE

## 2021-04-30 DIAGNOSIS — Z01.818 PRE-OP TESTING: ICD-10-CM

## 2021-04-30 LAB — SARS-COV-2 RNA RESP QL NAA+PROBE: NOT DETECTED

## 2021-04-30 PROCEDURE — U0003 INFECTIOUS AGENT DETECTION BY NUCLEIC ACID (DNA OR RNA); SEVERE ACUTE RESPIRATORY SYNDROME CORONAVIRUS 2 (SARS-COV-2) (CORONAVIRUS DISEASE [COVID-19]), AMPLIFIED PROBE TECHNIQUE, MAKING USE OF HIGH THROUGHPUT TECHNOLOGIES AS DESCRIBED BY CMS-2020-01-R: HCPCS | Performed by: ANESTHESIOLOGY

## 2021-04-30 PROCEDURE — U0005 INFEC AGEN DETEC AMPLI PROBE: HCPCS | Performed by: ANESTHESIOLOGY

## 2021-05-03 ENCOUNTER — ANESTHESIA (OUTPATIENT)
Dept: SURGERY | Facility: OTHER | Age: 81
End: 2021-05-03
Payer: MEDICARE

## 2021-05-03 ENCOUNTER — HOSPITAL ENCOUNTER (OUTPATIENT)
Facility: OTHER | Age: 81
Discharge: HOME HEALTH | End: 2021-05-05
Attending: ORTHOPAEDIC SURGERY | Admitting: ORTHOPAEDIC SURGERY
Payer: MEDICARE

## 2021-05-03 DIAGNOSIS — Z01.818 PRE-OP TESTING: ICD-10-CM

## 2021-05-03 DIAGNOSIS — I35.0 NONRHEUMATIC AORTIC VALVE STENOSIS: ICD-10-CM

## 2021-05-03 DIAGNOSIS — M17.11 PRIMARY OSTEOARTHRITIS OF RIGHT KNEE: Primary | ICD-10-CM

## 2021-05-03 LAB
ESTIMATED AVG GLUCOSE: 200 MG/DL (ref 68–131)
HBA1C MFR BLD: 8.6 % (ref 4–5.6)
POCT GLUCOSE: 124 MG/DL (ref 70–110)
POCT GLUCOSE: 147 MG/DL (ref 70–110)
POCT GLUCOSE: 235 MG/DL (ref 70–110)
POCT GLUCOSE: 302 MG/DL (ref 70–110)

## 2021-05-03 PROCEDURE — 25000003 PHARM REV CODE 250: Performed by: NURSE ANESTHETIST, CERTIFIED REGISTERED

## 2021-05-03 PROCEDURE — 36000711: Performed by: ORTHOPAEDIC SURGERY

## 2021-05-03 PROCEDURE — 96372 THER/PROPH/DIAG INJ SC/IM: CPT | Mod: 59

## 2021-05-03 PROCEDURE — 94761 N-INVAS EAR/PLS OXIMETRY MLT: CPT

## 2021-05-03 PROCEDURE — C9399 UNCLASSIFIED DRUGS OR BIOLOG: HCPCS | Performed by: NURSE PRACTITIONER

## 2021-05-03 PROCEDURE — 37000009 HC ANESTHESIA EA ADD 15 MINS: Performed by: ORTHOPAEDIC SURGERY

## 2021-05-03 PROCEDURE — 82962 GLUCOSE BLOOD TEST: CPT | Performed by: ORTHOPAEDIC SURGERY

## 2021-05-03 PROCEDURE — C9290 INJ, BUPIVACAINE LIPOSOME: HCPCS | Performed by: ORTHOPAEDIC SURGERY

## 2021-05-03 PROCEDURE — 71000033 HC RECOVERY, INTIAL HOUR: Performed by: ORTHOPAEDIC SURGERY

## 2021-05-03 PROCEDURE — 94799 UNLISTED PULMONARY SVC/PX: CPT

## 2021-05-03 PROCEDURE — 63600175 PHARM REV CODE 636 W HCPCS: Performed by: SPECIALIST

## 2021-05-03 PROCEDURE — 63600175 PHARM REV CODE 636 W HCPCS: Performed by: NURSE ANESTHETIST, CERTIFIED REGISTERED

## 2021-05-03 PROCEDURE — 63600175 PHARM REV CODE 636 W HCPCS: Performed by: ORTHOPAEDIC SURGERY

## 2021-05-03 PROCEDURE — 25000003 PHARM REV CODE 250: Performed by: ORTHOPAEDIC SURGERY

## 2021-05-03 PROCEDURE — 25000003 PHARM REV CODE 250: Performed by: SPECIALIST

## 2021-05-03 PROCEDURE — 97530 THERAPEUTIC ACTIVITIES: CPT

## 2021-05-03 PROCEDURE — 37000008 HC ANESTHESIA 1ST 15 MINUTES: Performed by: ORTHOPAEDIC SURGERY

## 2021-05-03 PROCEDURE — 83036 HEMOGLOBIN GLYCOSYLATED A1C: CPT | Performed by: NURSE PRACTITIONER

## 2021-05-03 PROCEDURE — 63600175 PHARM REV CODE 636 W HCPCS

## 2021-05-03 PROCEDURE — 25000003 PHARM REV CODE 250: Performed by: NURSE PRACTITIONER

## 2021-05-03 PROCEDURE — 71000039 HC RECOVERY, EACH ADD'L HOUR: Performed by: ORTHOPAEDIC SURGERY

## 2021-05-03 PROCEDURE — 36415 COLL VENOUS BLD VENIPUNCTURE: CPT | Performed by: NURSE PRACTITIONER

## 2021-05-03 PROCEDURE — G0378 HOSPITAL OBSERVATION PER HR: HCPCS

## 2021-05-03 PROCEDURE — 36000710: Performed by: ORTHOPAEDIC SURGERY

## 2021-05-03 PROCEDURE — C1776 JOINT DEVICE (IMPLANTABLE): HCPCS | Performed by: ORTHOPAEDIC SURGERY

## 2021-05-03 PROCEDURE — 97116 GAIT TRAINING THERAPY: CPT | Mod: CQ

## 2021-05-03 PROCEDURE — 97110 THERAPEUTIC EXERCISES: CPT | Mod: CQ

## 2021-05-03 PROCEDURE — C1713 ANCHOR/SCREW BN/BN,TIS/BN: HCPCS | Performed by: ORTHOPAEDIC SURGERY

## 2021-05-03 PROCEDURE — 27201423 OPTIME MED/SURG SUP & DEVICES STERILE SUPPLY: Performed by: ORTHOPAEDIC SURGERY

## 2021-05-03 PROCEDURE — 99900035 HC TECH TIME PER 15 MIN (STAT)

## 2021-05-03 PROCEDURE — 97166 OT EVAL MOD COMPLEX 45 MIN: CPT

## 2021-05-03 PROCEDURE — 97535 SELF CARE MNGMENT TRAINING: CPT | Mod: 59

## 2021-05-03 PROCEDURE — 63600175 PHARM REV CODE 636 W HCPCS: Performed by: NURSE PRACTITIONER

## 2021-05-03 DEVICE — CEMENT REFOBACIN BCR 1X40: Type: IMPLANTABLE DEVICE | Site: KNEE | Status: FUNCTIONAL

## 2021-05-03 DEVICE — IMPLANTABLE DEVICE: Type: IMPLANTABLE DEVICE | Site: KNEE | Status: FUNCTIONAL

## 2021-05-03 RX ORDER — IBUPROFEN 200 MG
24 TABLET ORAL
Status: DISCONTINUED | OUTPATIENT
Start: 2021-05-03 | End: 2021-05-05 | Stop reason: HOSPADM

## 2021-05-03 RX ORDER — HYDROMORPHONE HYDROCHLORIDE 1 MG/ML
0.5 INJECTION, SOLUTION INTRAMUSCULAR; INTRAVENOUS; SUBCUTANEOUS EVERY 4 HOURS PRN
Status: DISPENSED | OUTPATIENT
Start: 2021-05-03 | End: 2021-05-04

## 2021-05-03 RX ORDER — KETOROLAC TROMETHAMINE 30 MG/ML
INJECTION, SOLUTION INTRAMUSCULAR; INTRAVENOUS
Status: DISCONTINUED | OUTPATIENT
Start: 2021-05-03 | End: 2021-05-03 | Stop reason: HOSPADM

## 2021-05-03 RX ORDER — ROPIVACAINE HYDROCHLORIDE 5 MG/ML
INJECTION, SOLUTION EPIDURAL; INFILTRATION; PERINEURAL
Status: COMPLETED | OUTPATIENT
Start: 2021-05-03 | End: 2021-05-03

## 2021-05-03 RX ORDER — VANCOMYCIN HCL IN 5 % DEXTROSE 1G/250ML
15 PLASTIC BAG, INJECTION (ML) INTRAVENOUS
Status: COMPLETED | OUTPATIENT
Start: 2021-05-03 | End: 2021-05-03

## 2021-05-03 RX ORDER — GLUCAGON 1 MG
1 KIT INJECTION
Status: DISCONTINUED | OUTPATIENT
Start: 2021-05-03 | End: 2021-05-05 | Stop reason: HOSPADM

## 2021-05-03 RX ORDER — ESCITALOPRAM OXALATE 10 MG/1
10 TABLET ORAL DAILY
Status: DISCONTINUED | OUTPATIENT
Start: 2021-05-03 | End: 2021-05-05 | Stop reason: HOSPADM

## 2021-05-03 RX ORDER — METOCLOPRAMIDE HYDROCHLORIDE 5 MG/ML
5 INJECTION INTRAMUSCULAR; INTRAVENOUS EVERY 6 HOURS PRN
Status: DISCONTINUED | OUTPATIENT
Start: 2021-05-03 | End: 2021-05-05 | Stop reason: HOSPADM

## 2021-05-03 RX ORDER — DEXTROSE MONOHYDRATE AND SODIUM CHLORIDE 5; .9 G/100ML; G/100ML
INJECTION, SOLUTION INTRAVENOUS CONTINUOUS
Status: DISCONTINUED | OUTPATIENT
Start: 2021-05-03 | End: 2021-05-03

## 2021-05-03 RX ORDER — DIPHENHYDRAMINE HYDROCHLORIDE 50 MG/ML
25 INJECTION INTRAMUSCULAR; INTRAVENOUS EVERY 6 HOURS PRN
Status: DISCONTINUED | OUTPATIENT
Start: 2021-05-03 | End: 2021-05-05 | Stop reason: HOSPADM

## 2021-05-03 RX ORDER — DIPHENHYDRAMINE HYDROCHLORIDE 50 MG/ML
25 INJECTION INTRAMUSCULAR; INTRAVENOUS EVERY 6 HOURS PRN
Status: DISCONTINUED | OUTPATIENT
Start: 2021-05-03 | End: 2021-05-03 | Stop reason: HOSPADM

## 2021-05-03 RX ORDER — LIDOCAINE HYDROCHLORIDE 20 MG/ML
INJECTION INTRAVENOUS
Status: DISCONTINUED | OUTPATIENT
Start: 2021-05-03 | End: 2021-05-03

## 2021-05-03 RX ORDER — INSULIN ASPART 100 [IU]/ML
0-5 INJECTION, SOLUTION INTRAVENOUS; SUBCUTANEOUS
Status: DISCONTINUED | OUTPATIENT
Start: 2021-05-03 | End: 2021-05-05 | Stop reason: HOSPADM

## 2021-05-03 RX ORDER — FENTANYL CITRATE 50 UG/ML
INJECTION, SOLUTION INTRAMUSCULAR; INTRAVENOUS
Status: DISCONTINUED | OUTPATIENT
Start: 2021-05-03 | End: 2021-05-03

## 2021-05-03 RX ORDER — MEPERIDINE HYDROCHLORIDE 25 MG/ML
12.5 INJECTION INTRAMUSCULAR; INTRAVENOUS; SUBCUTANEOUS ONCE AS NEEDED
Status: DISCONTINUED | OUTPATIENT
Start: 2021-05-03 | End: 2021-05-03 | Stop reason: HOSPADM

## 2021-05-03 RX ORDER — DOCUSATE SODIUM 100 MG/1
100 CAPSULE, LIQUID FILLED ORAL EVERY 12 HOURS
Status: DISCONTINUED | OUTPATIENT
Start: 2021-05-03 | End: 2021-05-05 | Stop reason: HOSPADM

## 2021-05-03 RX ORDER — LISINOPRIL 5 MG/1
5 TABLET ORAL DAILY
Status: DISCONTINUED | OUTPATIENT
Start: 2021-05-04 | End: 2021-05-05 | Stop reason: HOSPADM

## 2021-05-03 RX ORDER — MIDAZOLAM HYDROCHLORIDE 1 MG/ML
INJECTION INTRAMUSCULAR; INTRAVENOUS
Status: DISCONTINUED | OUTPATIENT
Start: 2021-05-03 | End: 2021-05-03

## 2021-05-03 RX ORDER — PROPRANOLOL HYDROCHLORIDE 10 MG/1
20 TABLET ORAL 3 TIMES DAILY
Status: DISCONTINUED | OUTPATIENT
Start: 2021-05-03 | End: 2021-05-05 | Stop reason: HOSPADM

## 2021-05-03 RX ORDER — LETROZOLE 2.5 MG/1
2.5 TABLET, FILM COATED ORAL DAILY
Status: DISCONTINUED | OUTPATIENT
Start: 2021-05-04 | End: 2021-05-05 | Stop reason: HOSPADM

## 2021-05-03 RX ORDER — ACETAMINOPHEN 500 MG
1000 TABLET ORAL EVERY 8 HOURS
Status: COMPLETED | OUTPATIENT
Start: 2021-05-03 | End: 2021-05-04

## 2021-05-03 RX ORDER — OXYCODONE HYDROCHLORIDE 5 MG/1
5 TABLET ORAL EVERY 4 HOURS PRN
Status: DISCONTINUED | OUTPATIENT
Start: 2021-05-03 | End: 2021-05-05 | Stop reason: HOSPADM

## 2021-05-03 RX ORDER — SODIUM CHLORIDE 0.9 % (FLUSH) 0.9 %
3 SYRINGE (ML) INJECTION
Status: DISCONTINUED | OUTPATIENT
Start: 2021-05-03 | End: 2021-05-05 | Stop reason: HOSPADM

## 2021-05-03 RX ORDER — MUPIROCIN 20 MG/G
1 OINTMENT TOPICAL 2 TIMES DAILY
Status: DISCONTINUED | OUTPATIENT
Start: 2021-05-03 | End: 2021-05-05 | Stop reason: HOSPADM

## 2021-05-03 RX ORDER — ONDANSETRON 2 MG/ML
8 INJECTION INTRAMUSCULAR; INTRAVENOUS EVERY 8 HOURS PRN
Status: DISCONTINUED | OUTPATIENT
Start: 2021-05-03 | End: 2021-05-05 | Stop reason: HOSPADM

## 2021-05-03 RX ORDER — CELECOXIB 200 MG/1
200 CAPSULE ORAL 2 TIMES DAILY
Status: DISCONTINUED | OUTPATIENT
Start: 2021-05-03 | End: 2021-05-03

## 2021-05-03 RX ORDER — OXYCODONE HYDROCHLORIDE 5 MG/1
5 TABLET ORAL
Status: DISCONTINUED | OUTPATIENT
Start: 2021-05-03 | End: 2021-05-03 | Stop reason: HOSPADM

## 2021-05-03 RX ORDER — TRANEXAMIC ACID 100 MG/ML
INJECTION, SOLUTION INTRAVENOUS
Status: DISCONTINUED | OUTPATIENT
Start: 2021-05-03 | End: 2021-05-03

## 2021-05-03 RX ORDER — PRIMIDONE 50 MG/1
50 TABLET ORAL DAILY
Status: DISCONTINUED | OUTPATIENT
Start: 2021-05-04 | End: 2021-05-05 | Stop reason: HOSPADM

## 2021-05-03 RX ORDER — PROPOFOL 10 MG/ML
VIAL (ML) INTRAVENOUS CONTINUOUS PRN
Status: DISCONTINUED | OUTPATIENT
Start: 2021-05-03 | End: 2021-05-03

## 2021-05-03 RX ORDER — EPINEPHRINE 1 MG/ML
INJECTION, SOLUTION INTRACARDIAC; INTRAMUSCULAR; INTRAVENOUS; SUBCUTANEOUS
Status: DISCONTINUED | OUTPATIENT
Start: 2021-05-03 | End: 2021-05-03 | Stop reason: HOSPADM

## 2021-05-03 RX ORDER — PROPOFOL 10 MG/ML
VIAL (ML) INTRAVENOUS
Status: DISCONTINUED | OUTPATIENT
Start: 2021-05-03 | End: 2021-05-03

## 2021-05-03 RX ORDER — BUPIVACAINE HYDROCHLORIDE 2.5 MG/ML
INJECTION, SOLUTION EPIDURAL; INFILTRATION; INTRACAUDAL
Status: DISCONTINUED | OUTPATIENT
Start: 2021-05-03 | End: 2021-05-03 | Stop reason: HOSPADM

## 2021-05-03 RX ORDER — SODIUM CHLORIDE 0.9 % (FLUSH) 0.9 %
3 SYRINGE (ML) INJECTION EVERY 6 HOURS PRN
Status: DISCONTINUED | OUTPATIENT
Start: 2021-05-03 | End: 2021-05-05 | Stop reason: HOSPADM

## 2021-05-03 RX ORDER — VANCOMYCIN HCL IN 5 % DEXTROSE 1G/250ML
15 PLASTIC BAG, INJECTION (ML) INTRAVENOUS ONCE
Status: COMPLETED | OUTPATIENT
Start: 2021-05-03 | End: 2021-05-03

## 2021-05-03 RX ORDER — SODIUM CHLORIDE, SODIUM LACTATE, POTASSIUM CHLORIDE, CALCIUM CHLORIDE 600; 310; 30; 20 MG/100ML; MG/100ML; MG/100ML; MG/100ML
INJECTION, SOLUTION INTRAVENOUS CONTINUOUS
Status: DISCONTINUED | OUTPATIENT
Start: 2021-05-03 | End: 2021-05-04

## 2021-05-03 RX ORDER — PHENYLEPHRINE HYDROCHLORIDE 10 MG/ML
INJECTION INTRAVENOUS
Status: DISCONTINUED | OUTPATIENT
Start: 2021-05-03 | End: 2021-05-03

## 2021-05-03 RX ORDER — ALPRAZOLAM 0.25 MG/1
0.25 TABLET ORAL NIGHTLY PRN
Status: DISCONTINUED | OUTPATIENT
Start: 2021-05-03 | End: 2021-05-05 | Stop reason: HOSPADM

## 2021-05-03 RX ORDER — GABAPENTIN 300 MG/1
300 CAPSULE ORAL 2 TIMES DAILY
Status: DISCONTINUED | OUTPATIENT
Start: 2021-05-03 | End: 2021-05-04

## 2021-05-03 RX ORDER — SODIUM CHLORIDE 9 MG/ML
INJECTION, SOLUTION INTRAVENOUS CONTINUOUS
Status: DISCONTINUED | OUTPATIENT
Start: 2021-05-03 | End: 2021-05-04

## 2021-05-03 RX ORDER — IBUPROFEN 200 MG
16 TABLET ORAL
Status: DISCONTINUED | OUTPATIENT
Start: 2021-05-03 | End: 2021-05-05 | Stop reason: HOSPADM

## 2021-05-03 RX ORDER — ATORVASTATIN CALCIUM 20 MG/1
80 TABLET, FILM COATED ORAL NIGHTLY
Status: DISCONTINUED | OUTPATIENT
Start: 2021-05-03 | End: 2021-05-05 | Stop reason: HOSPADM

## 2021-05-03 RX ORDER — CEFAZOLIN SODIUM 2 G/50ML
2 SOLUTION INTRAVENOUS
Status: COMPLETED | OUTPATIENT
Start: 2021-05-03 | End: 2021-05-04

## 2021-05-03 RX ORDER — CEFAZOLIN SODIUM 1 G/3ML
2 INJECTION, POWDER, FOR SOLUTION INTRAMUSCULAR; INTRAVENOUS
Status: COMPLETED | OUTPATIENT
Start: 2021-05-03 | End: 2021-05-03

## 2021-05-03 RX ORDER — NAPROXEN SODIUM 220 MG/1
81 TABLET, FILM COATED ORAL 2 TIMES DAILY
Status: DISCONTINUED | OUTPATIENT
Start: 2021-05-04 | End: 2021-05-05 | Stop reason: HOSPADM

## 2021-05-03 RX ORDER — LEVOTHYROXINE SODIUM 125 UG/1
125 TABLET ORAL
Status: DISCONTINUED | OUTPATIENT
Start: 2021-05-04 | End: 2021-05-05 | Stop reason: HOSPADM

## 2021-05-03 RX ORDER — OXYCODONE HYDROCHLORIDE 5 MG/1
10 TABLET ORAL EVERY 4 HOURS PRN
Status: DISCONTINUED | OUTPATIENT
Start: 2021-05-03 | End: 2021-05-05 | Stop reason: HOSPADM

## 2021-05-03 RX ORDER — ONDANSETRON 2 MG/ML
4 INJECTION INTRAMUSCULAR; INTRAVENOUS DAILY PRN
Status: DISCONTINUED | OUTPATIENT
Start: 2021-05-03 | End: 2021-05-03 | Stop reason: HOSPADM

## 2021-05-03 RX ORDER — HYDROMORPHONE HYDROCHLORIDE 2 MG/ML
0.4 INJECTION, SOLUTION INTRAMUSCULAR; INTRAVENOUS; SUBCUTANEOUS EVERY 5 MIN PRN
Status: DISCONTINUED | OUTPATIENT
Start: 2021-05-03 | End: 2021-05-03 | Stop reason: HOSPADM

## 2021-05-03 RX ORDER — POLYETHYLENE GLYCOL 3350 17 G/17G
17 POWDER, FOR SOLUTION ORAL DAILY
Status: DISCONTINUED | OUTPATIENT
Start: 2021-05-04 | End: 2021-05-05 | Stop reason: HOSPADM

## 2021-05-03 RX ADMIN — CEFAZOLIN SODIUM 2 G: 2 SOLUTION INTRAVENOUS at 05:05

## 2021-05-03 RX ADMIN — PROPOFOL 50 MCG/KG/MIN: 10 INJECTION, EMULSION INTRAVENOUS at 08:05

## 2021-05-03 RX ADMIN — SODIUM CHLORIDE: 0.9 INJECTION, SOLUTION INTRAVENOUS at 12:05

## 2021-05-03 RX ADMIN — VANCOMYCIN HYDROCHLORIDE 1000 MG: 1 INJECTION, POWDER, LYOPHILIZED, FOR SOLUTION INTRAVENOUS at 09:05

## 2021-05-03 RX ADMIN — ALPRAZOLAM 0.25 MG: 0.25 TABLET ORAL at 09:05

## 2021-05-03 RX ADMIN — ACETAMINOPHEN 1000 MG: 500 TABLET, FILM COATED ORAL at 02:05

## 2021-05-03 RX ADMIN — PROPRANOLOL HYDROCHLORIDE 20 MG: 10 TABLET ORAL at 02:05

## 2021-05-03 RX ADMIN — ROPIVACAINE HYDROCHLORIDE 3 ML: 5 INJECTION, SOLUTION EPIDURAL; INFILTRATION; PERINEURAL at 08:05

## 2021-05-03 RX ADMIN — GABAPENTIN 300 MG: 300 CAPSULE ORAL at 09:05

## 2021-05-03 RX ADMIN — OXYCODONE HYDROCHLORIDE 10 MG: 5 TABLET ORAL at 07:05

## 2021-05-03 RX ADMIN — SODIUM CHLORIDE: 0.9 INJECTION, SOLUTION INTRAVENOUS at 05:05

## 2021-05-03 RX ADMIN — INSULIN DETEMIR 7 UNITS: 100 INJECTION, SOLUTION SUBCUTANEOUS at 09:05

## 2021-05-03 RX ADMIN — OXYCODONE HYDROCHLORIDE 5 MG: 5 TABLET ORAL at 11:05

## 2021-05-03 RX ADMIN — ATORVASTATIN CALCIUM 80 MG: 20 TABLET, FILM COATED ORAL at 09:05

## 2021-05-03 RX ADMIN — PHENYLEPHRINE HYDROCHLORIDE 100 MCG: 10 INJECTION INTRAVENOUS at 09:05

## 2021-05-03 RX ADMIN — INSULIN ASPART 2 UNITS: 100 INJECTION, SOLUTION INTRAVENOUS; SUBCUTANEOUS at 09:05

## 2021-05-03 RX ADMIN — CEFAZOLIN 2 G: 330 INJECTION, POWDER, FOR SOLUTION INTRAMUSCULAR; INTRAVENOUS at 08:05

## 2021-05-03 RX ADMIN — VANCOMYCIN HYDROCHLORIDE 1000 MG: 1 INJECTION, POWDER, LYOPHILIZED, FOR SOLUTION INTRAVENOUS at 07:05

## 2021-05-03 RX ADMIN — TRANEXAMIC ACID 748 MG: 100 INJECTION, SOLUTION INTRAVENOUS at 07:05

## 2021-05-03 RX ADMIN — MIDAZOLAM HYDROCHLORIDE 1 MG: 1 INJECTION, SOLUTION INTRAMUSCULAR; INTRAVENOUS at 08:05

## 2021-05-03 RX ADMIN — DOCUSATE SODIUM 100 MG: 100 CAPSULE, LIQUID FILLED ORAL at 09:05

## 2021-05-03 RX ADMIN — TRANEXAMIC ACID 748 MG: 100 INJECTION, SOLUTION INTRAVENOUS at 08:05

## 2021-05-03 RX ADMIN — MUPIROCIN 1 G: 20 OINTMENT TOPICAL at 09:05

## 2021-05-03 RX ADMIN — PROPOFOL 20 MG: 10 INJECTION, EMULSION INTRAVENOUS at 08:05

## 2021-05-03 RX ADMIN — DOCUSATE SODIUM 100 MG: 100 CAPSULE, LIQUID FILLED ORAL at 12:05

## 2021-05-03 RX ADMIN — ACETAMINOPHEN 1000 MG: 500 TABLET, FILM COATED ORAL at 09:05

## 2021-05-03 RX ADMIN — FENTANYL CITRATE 50 MCG: 50 INJECTION, SOLUTION INTRAMUSCULAR; INTRAVENOUS at 08:05

## 2021-05-03 RX ADMIN — MUPIROCIN 1 G: 20 OINTMENT TOPICAL at 12:05

## 2021-05-03 RX ADMIN — PROPRANOLOL HYDROCHLORIDE 20 MG: 10 TABLET ORAL at 09:05

## 2021-05-03 RX ADMIN — OXYCODONE 5 MG: 5 TABLET ORAL at 03:05

## 2021-05-03 RX ADMIN — PROPOFOL 10 MG: 10 INJECTION, EMULSION INTRAVENOUS at 08:05

## 2021-05-03 RX ADMIN — ESCITALOPRAM OXALATE 10 MG: 10 TABLET ORAL at 02:05

## 2021-05-03 RX ADMIN — LIDOCAINE HYDROCHLORIDE 30 MG: 20 INJECTION, SOLUTION INTRAVENOUS at 08:05

## 2021-05-04 LAB
ALBUMIN SERPL BCP-MCNC: 2.6 G/DL (ref 3.5–5.2)
ANION GAP SERPL CALC-SCNC: 10 MMOL/L (ref 8–16)
BUN SERPL-MCNC: 25 MG/DL (ref 8–23)
CALCIUM SERPL-MCNC: 8.1 MG/DL (ref 8.7–10.5)
CHLORIDE SERPL-SCNC: 102 MMOL/L (ref 95–110)
CO2 SERPL-SCNC: 24 MMOL/L (ref 23–29)
CREAT SERPL-MCNC: 1.1 MG/DL (ref 0.5–1.4)
ERYTHROCYTE [DISTWIDTH] IN BLOOD BY AUTOMATED COUNT: 13.6 % (ref 11.5–14.5)
EST. GFR  (AFRICAN AMERICAN): 55 ML/MIN/1.73 M^2
EST. GFR  (NON AFRICAN AMERICAN): 48 ML/MIN/1.73 M^2
GLUCOSE SERPL-MCNC: 224 MG/DL (ref 70–110)
HCT VFR BLD AUTO: 30.5 % (ref 37–48.5)
HGB BLD-MCNC: 10.2 G/DL (ref 12–16)
MCH RBC QN AUTO: 33.9 PG (ref 27–31)
MCHC RBC AUTO-ENTMCNC: 33.4 G/DL (ref 32–36)
MCV RBC AUTO: 101 FL (ref 82–98)
PHOSPHATE SERPL-MCNC: 3.1 MG/DL (ref 2.7–4.5)
PLATELET # BLD AUTO: 92 K/UL (ref 150–450)
PMV BLD AUTO: 12.3 FL (ref 9.2–12.9)
POCT GLUCOSE: 211 MG/DL (ref 70–110)
POCT GLUCOSE: 216 MG/DL (ref 70–110)
POCT GLUCOSE: 223 MG/DL (ref 70–110)
POCT GLUCOSE: 261 MG/DL (ref 70–110)
POTASSIUM SERPL-SCNC: 5 MMOL/L (ref 3.5–5.1)
RBC # BLD AUTO: 3.01 M/UL (ref 4–5.4)
SODIUM SERPL-SCNC: 136 MMOL/L (ref 136–145)
WBC # BLD AUTO: 6.41 K/UL (ref 3.9–12.7)

## 2021-05-04 PROCEDURE — 25000003 PHARM REV CODE 250: Performed by: NURSE PRACTITIONER

## 2021-05-04 PROCEDURE — 97535 SELF CARE MNGMENT TRAINING: CPT

## 2021-05-04 PROCEDURE — 99220 PR INITIAL OBSERVATION CARE,LEVL III: CPT | Mod: ,,, | Performed by: INTERNAL MEDICINE

## 2021-05-04 PROCEDURE — 99900035 HC TECH TIME PER 15 MIN (STAT)

## 2021-05-04 PROCEDURE — G0378 HOSPITAL OBSERVATION PER HR: HCPCS

## 2021-05-04 PROCEDURE — 80069 RENAL FUNCTION PANEL: CPT | Performed by: NURSE PRACTITIONER

## 2021-05-04 PROCEDURE — 96372 THER/PROPH/DIAG INJ SC/IM: CPT

## 2021-05-04 PROCEDURE — 63600175 PHARM REV CODE 636 W HCPCS: Performed by: ORTHOPAEDIC SURGERY

## 2021-05-04 PROCEDURE — 63600175 PHARM REV CODE 636 W HCPCS: Performed by: NURSE PRACTITIONER

## 2021-05-04 PROCEDURE — 94799 UNLISTED PULMONARY SVC/PX: CPT

## 2021-05-04 PROCEDURE — 97530 THERAPEUTIC ACTIVITIES: CPT

## 2021-05-04 PROCEDURE — 25000003 PHARM REV CODE 250: Performed by: ORTHOPAEDIC SURGERY

## 2021-05-04 PROCEDURE — 36415 COLL VENOUS BLD VENIPUNCTURE: CPT | Performed by: NURSE PRACTITIONER

## 2021-05-04 PROCEDURE — 97116 GAIT TRAINING THERAPY: CPT

## 2021-05-04 PROCEDURE — 94761 N-INVAS EAR/PLS OXIMETRY MLT: CPT

## 2021-05-04 PROCEDURE — 99220 PR INITIAL OBSERVATION CARE,LEVL III: ICD-10-PCS | Mod: ,,, | Performed by: INTERNAL MEDICINE

## 2021-05-04 PROCEDURE — 85027 COMPLETE CBC AUTOMATED: CPT | Performed by: ORTHOPAEDIC SURGERY

## 2021-05-04 RX ORDER — GABAPENTIN 300 MG/1
600 CAPSULE ORAL 2 TIMES DAILY
Status: DISCONTINUED | OUTPATIENT
Start: 2021-05-04 | End: 2021-05-05

## 2021-05-04 RX ORDER — INSULIN ASPART 100 [IU]/ML
3 INJECTION, SOLUTION INTRAVENOUS; SUBCUTANEOUS
Status: DISCONTINUED | OUTPATIENT
Start: 2021-05-04 | End: 2021-05-05

## 2021-05-04 RX ADMIN — GABAPENTIN 300 MG: 300 CAPSULE ORAL at 08:05

## 2021-05-04 RX ADMIN — CEFAZOLIN SODIUM 2 G: 2 SOLUTION INTRAVENOUS at 01:05

## 2021-05-04 RX ADMIN — DOCUSATE SODIUM 100 MG: 100 CAPSULE, LIQUID FILLED ORAL at 08:05

## 2021-05-04 RX ADMIN — INSULIN ASPART 2 UNITS: 100 INJECTION, SOLUTION INTRAVENOUS; SUBCUTANEOUS at 08:05

## 2021-05-04 RX ADMIN — INSULIN ASPART 3 UNITS: 100 INJECTION, SOLUTION INTRAVENOUS; SUBCUTANEOUS at 06:05

## 2021-05-04 RX ADMIN — LISINOPRIL 5 MG: 5 TABLET ORAL at 08:05

## 2021-05-04 RX ADMIN — PRIMIDONE 50 MG: 50 TABLET ORAL at 08:05

## 2021-05-04 RX ADMIN — OXYCODONE HYDROCHLORIDE 10 MG: 5 TABLET ORAL at 01:05

## 2021-05-04 RX ADMIN — MUPIROCIN 1 G: 20 OINTMENT TOPICAL at 08:05

## 2021-05-04 RX ADMIN — INSULIN ASPART 2 UNITS: 100 INJECTION, SOLUTION INTRAVENOUS; SUBCUTANEOUS at 06:05

## 2021-05-04 RX ADMIN — LEVOTHYROXINE SODIUM 125 MCG: 0.12 TABLET ORAL at 05:05

## 2021-05-04 RX ADMIN — OXYCODONE HYDROCHLORIDE 10 MG: 5 TABLET ORAL at 10:05

## 2021-05-04 RX ADMIN — ACETAMINOPHEN 1000 MG: 500 TABLET, FILM COATED ORAL at 05:05

## 2021-05-04 RX ADMIN — INSULIN ASPART 3 UNITS: 100 INJECTION, SOLUTION INTRAVENOUS; SUBCUTANEOUS at 12:05

## 2021-05-04 RX ADMIN — GABAPENTIN 600 MG: 300 CAPSULE ORAL at 08:05

## 2021-05-04 RX ADMIN — PROPRANOLOL HYDROCHLORIDE 20 MG: 10 TABLET ORAL at 08:05

## 2021-05-04 RX ADMIN — ESCITALOPRAM OXALATE 10 MG: 10 TABLET ORAL at 08:05

## 2021-05-04 RX ADMIN — PROPRANOLOL HYDROCHLORIDE 20 MG: 10 TABLET ORAL at 02:05

## 2021-05-04 RX ADMIN — LETROZOLE 2.5 MG: 2.5 TABLET ORAL at 08:05

## 2021-05-04 RX ADMIN — OXYCODONE HYDROCHLORIDE 10 MG: 5 TABLET ORAL at 06:05

## 2021-05-04 RX ADMIN — ATORVASTATIN CALCIUM 80 MG: 20 TABLET, FILM COATED ORAL at 08:05

## 2021-05-04 RX ADMIN — ASPIRIN 81 MG CHEWABLE TABLET 81 MG: 81 TABLET CHEWABLE at 08:05

## 2021-05-04 RX ADMIN — OXYCODONE HYDROCHLORIDE 10 MG: 5 TABLET ORAL at 08:05

## 2021-05-05 ENCOUNTER — HOSPITAL ENCOUNTER (INPATIENT)
Facility: HOSPITAL | Age: 81
LOS: 15 days | Discharge: HOME HEALTH | DRG: 554 | End: 2021-05-20
Attending: HOSPITALIST | Admitting: HOSPITALIST
Payer: MEDICARE

## 2021-05-05 VITALS
HEIGHT: 62 IN | SYSTOLIC BLOOD PRESSURE: 114 MMHG | TEMPERATURE: 99 F | DIASTOLIC BLOOD PRESSURE: 55 MMHG | OXYGEN SATURATION: 94 % | HEART RATE: 66 BPM | WEIGHT: 170 LBS | BODY MASS INDEX: 31.28 KG/M2 | RESPIRATION RATE: 16 BRPM

## 2021-05-05 DIAGNOSIS — M17.11 PRIMARY OSTEOARTHRITIS OF RIGHT KNEE: ICD-10-CM

## 2021-05-05 LAB
ERYTHROCYTE [DISTWIDTH] IN BLOOD BY AUTOMATED COUNT: 13.6 % (ref 11.5–14.5)
HCT VFR BLD AUTO: 31.4 % (ref 37–48.5)
HGB BLD-MCNC: 10.5 G/DL (ref 12–16)
MCH RBC QN AUTO: 34.1 PG (ref 27–31)
MCHC RBC AUTO-ENTMCNC: 33.4 G/DL (ref 32–36)
MCV RBC AUTO: 102 FL (ref 82–98)
PLATELET # BLD AUTO: 99 K/UL (ref 150–450)
PMV BLD AUTO: 12.3 FL (ref 9.2–12.9)
POCT GLUCOSE: 190 MG/DL (ref 70–110)
POCT GLUCOSE: 195 MG/DL (ref 70–110)
POCT GLUCOSE: 206 MG/DL (ref 70–110)
RBC # BLD AUTO: 3.08 M/UL (ref 4–5.4)
SARS-COV-2 RDRP RESP QL NAA+PROBE: NEGATIVE
WBC # BLD AUTO: 7.33 K/UL (ref 3.9–12.7)

## 2021-05-05 PROCEDURE — U0002 COVID-19 LAB TEST NON-CDC: HCPCS | Performed by: ORTHOPAEDIC SURGERY

## 2021-05-05 PROCEDURE — 85027 COMPLETE CBC AUTOMATED: CPT | Performed by: ORTHOPAEDIC SURGERY

## 2021-05-05 PROCEDURE — 97116 GAIT TRAINING THERAPY: CPT

## 2021-05-05 PROCEDURE — 96372 THER/PROPH/DIAG INJ SC/IM: CPT

## 2021-05-05 PROCEDURE — G0378 HOSPITAL OBSERVATION PER HR: HCPCS

## 2021-05-05 PROCEDURE — 25000003 PHARM REV CODE 250: Performed by: ORTHOPAEDIC SURGERY

## 2021-05-05 PROCEDURE — 63600175 PHARM REV CODE 636 W HCPCS: Performed by: NURSE PRACTITIONER

## 2021-05-05 PROCEDURE — 11000004 HC SNF PRIVATE

## 2021-05-05 PROCEDURE — 94761 N-INVAS EAR/PLS OXIMETRY MLT: CPT

## 2021-05-05 PROCEDURE — 25000003 PHARM REV CODE 250: Performed by: NURSE PRACTITIONER

## 2021-05-05 PROCEDURE — 99225 PR SUBSEQUENT OBSERVATION CARE,LEVEL II: CPT | Mod: ,,, | Performed by: INTERNAL MEDICINE

## 2021-05-05 PROCEDURE — 97530 THERAPEUTIC ACTIVITIES: CPT

## 2021-05-05 PROCEDURE — 36415 COLL VENOUS BLD VENIPUNCTURE: CPT | Performed by: ORTHOPAEDIC SURGERY

## 2021-05-05 PROCEDURE — 99225 PR SUBSEQUENT OBSERVATION CARE,LEVEL II: ICD-10-PCS | Mod: ,,, | Performed by: INTERNAL MEDICINE

## 2021-05-05 PROCEDURE — C9399 UNCLASSIFIED DRUGS OR BIOLOG: HCPCS | Performed by: ORTHOPAEDIC SURGERY

## 2021-05-05 RX ORDER — IBUPROFEN 200 MG
16 TABLET ORAL
Status: DISCONTINUED | OUTPATIENT
Start: 2021-05-05 | End: 2021-05-20 | Stop reason: HOSPADM

## 2021-05-05 RX ORDER — PRIMIDONE 50 MG/1
50 TABLET ORAL DAILY
Status: CANCELLED | OUTPATIENT
Start: 2021-05-06

## 2021-05-05 RX ORDER — TALC
6 POWDER (GRAM) TOPICAL NIGHTLY PRN
Status: CANCELLED | OUTPATIENT
Start: 2021-05-05

## 2021-05-05 RX ORDER — MUPIROCIN 20 MG/G
1 OINTMENT TOPICAL 2 TIMES DAILY
Status: DISPENSED | OUTPATIENT
Start: 2021-05-05 | End: 2021-05-08

## 2021-05-05 RX ORDER — IBUPROFEN 200 MG
24 TABLET ORAL
Status: CANCELLED | OUTPATIENT
Start: 2021-05-05

## 2021-05-05 RX ORDER — POLYETHYLENE GLYCOL 3350 17 G/17G
17 POWDER, FOR SOLUTION ORAL DAILY
Status: DISCONTINUED | OUTPATIENT
Start: 2021-05-06 | End: 2021-05-20 | Stop reason: HOSPADM

## 2021-05-05 RX ORDER — PRIMIDONE 50 MG/1
50 TABLET ORAL DAILY
Status: DISCONTINUED | OUTPATIENT
Start: 2021-05-06 | End: 2021-05-20 | Stop reason: HOSPADM

## 2021-05-05 RX ORDER — NAPROXEN SODIUM 220 MG/1
81 TABLET, FILM COATED ORAL 2 TIMES DAILY
Status: DISCONTINUED | OUTPATIENT
Start: 2021-05-05 | End: 2021-05-20 | Stop reason: HOSPADM

## 2021-05-05 RX ORDER — ACETAMINOPHEN 325 MG/1
650 TABLET ORAL EVERY 6 HOURS PRN
Status: DISCONTINUED | OUTPATIENT
Start: 2021-05-05 | End: 2021-05-20 | Stop reason: HOSPADM

## 2021-05-05 RX ORDER — GABAPENTIN 300 MG/1
600 CAPSULE ORAL 2 TIMES DAILY
Status: CANCELLED | OUTPATIENT
Start: 2021-05-05

## 2021-05-05 RX ORDER — ESCITALOPRAM OXALATE 10 MG/1
10 TABLET ORAL DAILY
Status: DISCONTINUED | OUTPATIENT
Start: 2021-05-06 | End: 2021-05-20 | Stop reason: HOSPADM

## 2021-05-05 RX ORDER — GLUCAGON 1 MG
1 KIT INJECTION
Status: DISCONTINUED | OUTPATIENT
Start: 2021-05-05 | End: 2021-05-20 | Stop reason: HOSPADM

## 2021-05-05 RX ORDER — LEVOTHYROXINE SODIUM 125 UG/1
125 TABLET ORAL
Status: CANCELLED | OUTPATIENT
Start: 2021-05-06

## 2021-05-05 RX ORDER — LETROZOLE 2.5 MG/1
2.5 TABLET, FILM COATED ORAL DAILY
Status: DISCONTINUED | OUTPATIENT
Start: 2021-05-06 | End: 2021-05-20 | Stop reason: HOSPADM

## 2021-05-05 RX ORDER — POLYETHYLENE GLYCOL 3350 17 G/17G
17 POWDER, FOR SOLUTION ORAL DAILY
Status: CANCELLED | OUTPATIENT
Start: 2021-05-06

## 2021-05-05 RX ORDER — PROPRANOLOL HYDROCHLORIDE 10 MG/1
20 TABLET ORAL 3 TIMES DAILY
Status: CANCELLED | OUTPATIENT
Start: 2021-05-05

## 2021-05-05 RX ORDER — ACETAMINOPHEN 325 MG/1
650 TABLET ORAL EVERY 6 HOURS PRN
Status: CANCELLED | OUTPATIENT
Start: 2021-05-05

## 2021-05-05 RX ORDER — GABAPENTIN 300 MG/1
600 CAPSULE ORAL 3 TIMES DAILY
Status: DISCONTINUED | OUTPATIENT
Start: 2021-05-05 | End: 2021-05-05 | Stop reason: HOSPADM

## 2021-05-05 RX ORDER — KETOROLAC TROMETHAMINE 30 MG/ML
15 INJECTION, SOLUTION INTRAMUSCULAR; INTRAVENOUS ONCE
Status: COMPLETED | OUTPATIENT
Start: 2021-05-05 | End: 2021-05-05

## 2021-05-05 RX ORDER — ATORVASTATIN CALCIUM 20 MG/1
80 TABLET, FILM COATED ORAL NIGHTLY
Status: DISCONTINUED | OUTPATIENT
Start: 2021-05-05 | End: 2021-05-20 | Stop reason: HOSPADM

## 2021-05-05 RX ORDER — LETROZOLE 2.5 MG/1
2.5 TABLET, FILM COATED ORAL DAILY
Status: CANCELLED | OUTPATIENT
Start: 2021-05-06

## 2021-05-05 RX ORDER — DOCUSATE SODIUM 100 MG/1
100 CAPSULE, LIQUID FILLED ORAL EVERY 12 HOURS
Status: DISCONTINUED | OUTPATIENT
Start: 2021-05-05 | End: 2021-05-10

## 2021-05-05 RX ORDER — OXYCODONE HYDROCHLORIDE 10 MG/1
10 TABLET ORAL EVERY 4 HOURS PRN
Status: DISCONTINUED | OUTPATIENT
Start: 2021-05-05 | End: 2021-05-20 | Stop reason: HOSPADM

## 2021-05-05 RX ORDER — INSULIN ASPART 100 [IU]/ML
5 INJECTION, SOLUTION INTRAVENOUS; SUBCUTANEOUS
Status: CANCELLED | OUTPATIENT
Start: 2021-05-05

## 2021-05-05 RX ORDER — IBUPROFEN 200 MG
16 TABLET ORAL
Status: CANCELLED | OUTPATIENT
Start: 2021-05-05

## 2021-05-05 RX ORDER — ATORVASTATIN CALCIUM 20 MG/1
80 TABLET, FILM COATED ORAL NIGHTLY
Status: CANCELLED | OUTPATIENT
Start: 2021-05-05

## 2021-05-05 RX ORDER — ALPRAZOLAM 0.25 MG/1
0.25 TABLET ORAL NIGHTLY PRN
Status: CANCELLED | OUTPATIENT
Start: 2021-05-05

## 2021-05-05 RX ORDER — AMOXICILLIN 250 MG
1 CAPSULE ORAL 2 TIMES DAILY
Status: CANCELLED | OUTPATIENT
Start: 2021-05-05

## 2021-05-05 RX ORDER — OXYCODONE HYDROCHLORIDE 5 MG/1
5 TABLET ORAL EVERY 4 HOURS PRN
Status: DISCONTINUED | OUTPATIENT
Start: 2021-05-05 | End: 2021-05-20 | Stop reason: HOSPADM

## 2021-05-05 RX ORDER — INSULIN ASPART 100 [IU]/ML
5 INJECTION, SOLUTION INTRAVENOUS; SUBCUTANEOUS
Status: DISCONTINUED | OUTPATIENT
Start: 2021-05-06 | End: 2021-05-06

## 2021-05-05 RX ORDER — INSULIN ASPART 100 [IU]/ML
5 INJECTION, SOLUTION INTRAVENOUS; SUBCUTANEOUS
Status: DISCONTINUED | OUTPATIENT
Start: 2021-05-05 | End: 2021-05-05 | Stop reason: HOSPADM

## 2021-05-05 RX ORDER — OXYCODONE HYDROCHLORIDE 5 MG/1
5 TABLET ORAL EVERY 4 HOURS PRN
Status: CANCELLED | OUTPATIENT
Start: 2021-05-05

## 2021-05-05 RX ORDER — MUPIROCIN 20 MG/G
1 OINTMENT TOPICAL 2 TIMES DAILY
Status: CANCELLED | OUTPATIENT
Start: 2021-05-05 | End: 2021-05-08

## 2021-05-05 RX ORDER — LISINOPRIL 2.5 MG/1
5 TABLET ORAL DAILY
Status: DISCONTINUED | OUTPATIENT
Start: 2021-05-06 | End: 2021-05-20 | Stop reason: HOSPADM

## 2021-05-05 RX ORDER — INSULIN ASPART 100 [IU]/ML
0-5 INJECTION, SOLUTION INTRAVENOUS; SUBCUTANEOUS
Status: CANCELLED | OUTPATIENT
Start: 2021-05-05

## 2021-05-05 RX ORDER — INSULIN ASPART 100 [IU]/ML
0-5 INJECTION, SOLUTION INTRAVENOUS; SUBCUTANEOUS
Status: DISCONTINUED | OUTPATIENT
Start: 2021-05-05 | End: 2021-05-10

## 2021-05-05 RX ORDER — GLUCAGON 1 MG
1 KIT INJECTION
Status: CANCELLED | OUTPATIENT
Start: 2021-05-05

## 2021-05-05 RX ORDER — ALPRAZOLAM 0.25 MG/1
0.25 TABLET ORAL NIGHTLY PRN
Status: DISCONTINUED | OUTPATIENT
Start: 2021-05-05 | End: 2021-05-20 | Stop reason: HOSPADM

## 2021-05-05 RX ORDER — OXYCODONE HYDROCHLORIDE 5 MG/1
10 TABLET ORAL EVERY 4 HOURS PRN
Status: CANCELLED | OUTPATIENT
Start: 2021-05-05

## 2021-05-05 RX ORDER — CALCIUM CARBONATE 200(500)MG
500 TABLET,CHEWABLE ORAL 2 TIMES DAILY PRN
Status: DISCONTINUED | OUTPATIENT
Start: 2021-05-05 | End: 2021-05-20 | Stop reason: HOSPADM

## 2021-05-05 RX ORDER — ESCITALOPRAM OXALATE 10 MG/1
10 TABLET ORAL DAILY
Status: CANCELLED | OUTPATIENT
Start: 2021-05-06

## 2021-05-05 RX ORDER — CALCIUM CARBONATE 200(500)MG
500 TABLET,CHEWABLE ORAL 2 TIMES DAILY PRN
Status: CANCELLED | OUTPATIENT
Start: 2021-05-05

## 2021-05-05 RX ORDER — IBUPROFEN 200 MG
24 TABLET ORAL
Status: DISCONTINUED | OUTPATIENT
Start: 2021-05-05 | End: 2021-05-20 | Stop reason: HOSPADM

## 2021-05-05 RX ORDER — NAPROXEN SODIUM 220 MG/1
81 TABLET, FILM COATED ORAL 2 TIMES DAILY
Status: CANCELLED | OUTPATIENT
Start: 2021-05-05

## 2021-05-05 RX ORDER — AMOXICILLIN 250 MG
1 CAPSULE ORAL 2 TIMES DAILY
Status: DISCONTINUED | OUTPATIENT
Start: 2021-05-05 | End: 2021-05-20 | Stop reason: HOSPADM

## 2021-05-05 RX ORDER — PROPRANOLOL HYDROCHLORIDE 10 MG/1
20 TABLET ORAL 3 TIMES DAILY
Status: DISCONTINUED | OUTPATIENT
Start: 2021-05-05 | End: 2021-05-20 | Stop reason: HOSPADM

## 2021-05-05 RX ORDER — DOCUSATE SODIUM 100 MG/1
100 CAPSULE, LIQUID FILLED ORAL EVERY 12 HOURS
Status: CANCELLED | OUTPATIENT
Start: 2021-05-05

## 2021-05-05 RX ORDER — TALC
6 POWDER (GRAM) TOPICAL NIGHTLY PRN
Status: DISCONTINUED | OUTPATIENT
Start: 2021-05-05 | End: 2021-05-20 | Stop reason: HOSPADM

## 2021-05-05 RX ORDER — KETOROLAC TROMETHAMINE 30 MG/ML
15 INJECTION, SOLUTION INTRAMUSCULAR; INTRAVENOUS ONCE
Status: DISCONTINUED | OUTPATIENT
Start: 2021-05-05 | End: 2021-05-05

## 2021-05-05 RX ORDER — LISINOPRIL 5 MG/1
5 TABLET ORAL DAILY
Status: CANCELLED | OUTPATIENT
Start: 2021-05-06

## 2021-05-05 RX ADMIN — OXYCODONE HYDROCHLORIDE 10 MG: 5 TABLET ORAL at 02:05

## 2021-05-05 RX ADMIN — PROPRANOLOL HYDROCHLORIDE 20 MG: 10 TABLET ORAL at 10:05

## 2021-05-05 RX ADMIN — KETOROLAC TROMETHAMINE 15 MG: 30 INJECTION, SOLUTION INTRAMUSCULAR at 10:05

## 2021-05-05 RX ADMIN — ATORVASTATIN CALCIUM 80 MG: 20 TABLET, FILM COATED ORAL at 10:05

## 2021-05-05 RX ADMIN — INSULIN DETEMIR 20 UNITS: 100 INJECTION, SOLUTION SUBCUTANEOUS at 10:05

## 2021-05-05 RX ADMIN — PROPRANOLOL HYDROCHLORIDE 20 MG: 10 TABLET ORAL at 08:05

## 2021-05-05 RX ADMIN — ASPIRIN 81 MG CHEWABLE TABLET 81 MG: 81 TABLET CHEWABLE at 10:05

## 2021-05-05 RX ADMIN — ESCITALOPRAM OXALATE 10 MG: 10 TABLET ORAL at 08:05

## 2021-05-05 RX ADMIN — OXYCODONE HYDROCHLORIDE 10 MG: 5 TABLET ORAL at 10:05

## 2021-05-05 RX ADMIN — DOCUSATE SODIUM 100 MG: 100 CAPSULE, LIQUID FILLED ORAL at 08:05

## 2021-05-05 RX ADMIN — LETROZOLE 2.5 MG: 2.5 TABLET ORAL at 10:05

## 2021-05-05 RX ADMIN — INSULIN ASPART 5 UNITS: 100 INJECTION, SOLUTION INTRAVENOUS; SUBCUTANEOUS at 12:05

## 2021-05-05 RX ADMIN — MUPIROCIN 1 G: 20 OINTMENT TOPICAL at 08:05

## 2021-05-05 RX ADMIN — PRIMIDONE 50 MG: 50 TABLET ORAL at 08:05

## 2021-05-05 RX ADMIN — OXYCODONE 5 MG: 5 TABLET ORAL at 10:05

## 2021-05-05 RX ADMIN — LISINOPRIL 5 MG: 5 TABLET ORAL at 08:05

## 2021-05-05 RX ADMIN — OXYCODONE HYDROCHLORIDE 10 MG: 10 TABLET ORAL at 10:05

## 2021-05-05 RX ADMIN — DOCUSATE SODIUM AND SENNOSIDES 1 TABLET: 8.6; 5 TABLET, FILM COATED ORAL at 10:05

## 2021-05-05 RX ADMIN — GABAPENTIN 600 MG: 300 CAPSULE ORAL at 08:05

## 2021-05-05 RX ADMIN — INSULIN ASPART 5 UNITS: 100 INJECTION, SOLUTION INTRAVENOUS; SUBCUTANEOUS at 08:05

## 2021-05-05 RX ADMIN — OXYCODONE HYDROCHLORIDE 10 MG: 5 TABLET ORAL at 06:05

## 2021-05-05 RX ADMIN — ASPIRIN 81 MG CHEWABLE TABLET 81 MG: 81 TABLET CHEWABLE at 08:05

## 2021-05-05 RX ADMIN — INSULIN ASPART 2 UNITS: 100 INJECTION, SOLUTION INTRAVENOUS; SUBCUTANEOUS at 08:05

## 2021-05-05 RX ADMIN — LEVOTHYROXINE SODIUM 125 MCG: 0.12 TABLET ORAL at 06:05

## 2021-05-05 RX ADMIN — PROPRANOLOL HYDROCHLORIDE 20 MG: 10 TABLET ORAL at 02:05

## 2021-05-05 RX ADMIN — GABAPENTIN 600 MG: 300 CAPSULE ORAL at 04:05

## 2021-05-06 LAB
BASOPHILS # BLD AUTO: 0.03 K/UL (ref 0–0.2)
BASOPHILS NFR BLD: 0.4 % (ref 0–1.9)
DIFFERENTIAL METHOD: ABNORMAL
EOSINOPHIL # BLD AUTO: 0.4 K/UL (ref 0–0.5)
EOSINOPHIL NFR BLD: 6 % (ref 0–8)
ERYTHROCYTE [DISTWIDTH] IN BLOOD BY AUTOMATED COUNT: 13.9 % (ref 11.5–14.5)
HCT VFR BLD AUTO: 27.3 % (ref 37–48.5)
HGB BLD-MCNC: 9.3 G/DL (ref 12–16)
IMM GRANULOCYTES # BLD AUTO: 0.02 K/UL (ref 0–0.04)
IMM GRANULOCYTES NFR BLD AUTO: 0.3 % (ref 0–0.5)
LYMPHOCYTES # BLD AUTO: 1.1 K/UL (ref 1–4.8)
LYMPHOCYTES NFR BLD: 15.6 % (ref 18–48)
MCH RBC QN AUTO: 34.8 PG (ref 27–31)
MCHC RBC AUTO-ENTMCNC: 34.1 G/DL (ref 32–36)
MCV RBC AUTO: 102 FL (ref 82–98)
MONOCYTES # BLD AUTO: 1 K/UL (ref 0.3–1)
MONOCYTES NFR BLD: 14.7 % (ref 4–15)
NEUTROPHILS # BLD AUTO: 4.3 K/UL (ref 1.8–7.7)
NEUTROPHILS NFR BLD: 63 % (ref 38–73)
NRBC BLD-RTO: 0 /100 WBC
PLATELET # BLD AUTO: 101 K/UL (ref 150–450)
PMV BLD AUTO: 12.8 FL (ref 9.2–12.9)
POCT GLUCOSE: 162 MG/DL (ref 70–110)
POCT GLUCOSE: 190 MG/DL (ref 70–110)
POCT GLUCOSE: 198 MG/DL (ref 70–110)
POCT GLUCOSE: 207 MG/DL (ref 70–110)
RBC # BLD AUTO: 2.67 M/UL (ref 4–5.4)
WBC # BLD AUTO: 6.86 K/UL (ref 3.9–12.7)

## 2021-05-06 PROCEDURE — 97161 PT EVAL LOW COMPLEX 20 MIN: CPT

## 2021-05-06 PROCEDURE — 97530 THERAPEUTIC ACTIVITIES: CPT

## 2021-05-06 PROCEDURE — 97166 OT EVAL MOD COMPLEX 45 MIN: CPT

## 2021-05-06 PROCEDURE — 25000003 PHARM REV CODE 250: Performed by: NURSE PRACTITIONER

## 2021-05-06 PROCEDURE — 97116 GAIT TRAINING THERAPY: CPT

## 2021-05-06 PROCEDURE — 25000003 PHARM REV CODE 250: Performed by: ORTHOPAEDIC SURGERY

## 2021-05-06 PROCEDURE — 85025 COMPLETE CBC W/AUTO DIFF WBC: CPT | Performed by: ORTHOPAEDIC SURGERY

## 2021-05-06 PROCEDURE — 11000004 HC SNF PRIVATE

## 2021-05-06 PROCEDURE — 36415 COLL VENOUS BLD VENIPUNCTURE: CPT | Performed by: ORTHOPAEDIC SURGERY

## 2021-05-06 PROCEDURE — 63600175 PHARM REV CODE 636 W HCPCS: Performed by: ORTHOPAEDIC SURGERY

## 2021-05-06 PROCEDURE — 97110 THERAPEUTIC EXERCISES: CPT

## 2021-05-06 PROCEDURE — 97535 SELF CARE MNGMENT TRAINING: CPT

## 2021-05-06 RX ORDER — ACETAMINOPHEN 500 MG
1000 TABLET ORAL EVERY 8 HOURS
Status: DISCONTINUED | OUTPATIENT
Start: 2021-05-06 | End: 2021-05-20 | Stop reason: HOSPADM

## 2021-05-06 RX ORDER — BISACODYL 10 MG
10 SUPPOSITORY, RECTAL RECTAL ONCE
Status: COMPLETED | OUTPATIENT
Start: 2021-05-06 | End: 2021-05-06

## 2021-05-06 RX ORDER — INSULIN ASPART 100 [IU]/ML
6 INJECTION, SOLUTION INTRAVENOUS; SUBCUTANEOUS
Status: DISCONTINUED | OUTPATIENT
Start: 2021-05-06 | End: 2021-05-10

## 2021-05-06 RX ORDER — METHOCARBAMOL 500 MG/1
500 TABLET, FILM COATED ORAL 4 TIMES DAILY
Status: DISCONTINUED | OUTPATIENT
Start: 2021-05-06 | End: 2021-05-20 | Stop reason: HOSPADM

## 2021-05-06 RX ORDER — LEVALBUTEROL INHALATION SOLUTION 0.63 MG/3ML
0.63 SOLUTION RESPIRATORY (INHALATION) EVERY 6 HOURS PRN
Status: DISCONTINUED | OUTPATIENT
Start: 2021-05-06 | End: 2021-05-20 | Stop reason: HOSPADM

## 2021-05-06 RX ORDER — GABAPENTIN 400 MG/1
1200 CAPSULE ORAL 3 TIMES DAILY
Status: DISCONTINUED | OUTPATIENT
Start: 2021-05-06 | End: 2021-05-20 | Stop reason: HOSPADM

## 2021-05-06 RX ADMIN — GABAPENTIN 1200 MG: 400 CAPSULE ORAL at 02:05

## 2021-05-06 RX ADMIN — LISINOPRIL 5 MG: 2.5 TABLET ORAL at 08:05

## 2021-05-06 RX ADMIN — PROPRANOLOL HYDROCHLORIDE 20 MG: 10 TABLET ORAL at 08:05

## 2021-05-06 RX ADMIN — LEVOTHYROXINE SODIUM 125 MCG: 75 TABLET ORAL at 05:05

## 2021-05-06 RX ADMIN — ESCITALOPRAM OXALATE 10 MG: 10 TABLET ORAL at 08:05

## 2021-05-06 RX ADMIN — INSULIN ASPART 5 UNITS: 100 INJECTION, SOLUTION INTRAVENOUS; SUBCUTANEOUS at 12:05

## 2021-05-06 RX ADMIN — OXYCODONE HYDROCHLORIDE 10 MG: 10 TABLET ORAL at 10:05

## 2021-05-06 RX ADMIN — OXYCODONE 5 MG: 5 TABLET ORAL at 05:05

## 2021-05-06 RX ADMIN — INSULIN ASPART 2 UNITS: 100 INJECTION, SOLUTION INTRAVENOUS; SUBCUTANEOUS at 12:05

## 2021-05-06 RX ADMIN — ATORVASTATIN CALCIUM 80 MG: 20 TABLET, FILM COATED ORAL at 09:05

## 2021-05-06 RX ADMIN — GABAPENTIN 1200 MG: 400 CAPSULE ORAL at 09:05

## 2021-05-06 RX ADMIN — INSULIN ASPART 5 UNITS: 100 INJECTION, SOLUTION INTRAVENOUS; SUBCUTANEOUS at 08:05

## 2021-05-06 RX ADMIN — INSULIN DETEMIR 23 UNITS: 100 INJECTION, SOLUTION SUBCUTANEOUS at 09:05

## 2021-05-06 RX ADMIN — METHOCARBAMOL 500 MG: 500 TABLET ORAL at 04:05

## 2021-05-06 RX ADMIN — ACETAMINOPHEN 1000 MG: 500 TABLET, FILM COATED ORAL at 09:05

## 2021-05-06 RX ADMIN — PROPRANOLOL HYDROCHLORIDE 20 MG: 10 TABLET ORAL at 02:05

## 2021-05-06 RX ADMIN — DOCUSATE SODIUM AND SENNOSIDES 1 TABLET: 8.6; 5 TABLET, FILM COATED ORAL at 08:05

## 2021-05-06 RX ADMIN — DOCUSATE SODIUM AND SENNOSIDES 1 TABLET: 8.6; 5 TABLET, FILM COATED ORAL at 09:05

## 2021-05-06 RX ADMIN — MUPIROCIN 1 G: 20 OINTMENT TOPICAL at 08:05

## 2021-05-06 RX ADMIN — ACETAMINOPHEN 1000 MG: 500 TABLET, FILM COATED ORAL at 02:05

## 2021-05-06 RX ADMIN — ASPIRIN 81 MG CHEWABLE TABLET 81 MG: 81 TABLET CHEWABLE at 08:05

## 2021-05-06 RX ADMIN — BISACODYL 10 MG: 10 SUPPOSITORY RECTAL at 03:05

## 2021-05-06 RX ADMIN — OXYCODONE HYDROCHLORIDE 10 MG: 10 TABLET ORAL at 05:05

## 2021-05-06 RX ADMIN — PRIMIDONE 50 MG: 50 TABLET ORAL at 08:05

## 2021-05-06 RX ADMIN — LETROZOLE 2.5 MG: 2.5 TABLET ORAL at 08:05

## 2021-05-06 RX ADMIN — ASPIRIN 81 MG CHEWABLE TABLET 81 MG: 81 TABLET CHEWABLE at 09:05

## 2021-05-06 RX ADMIN — PROPRANOLOL HYDROCHLORIDE 20 MG: 10 TABLET ORAL at 09:05

## 2021-05-06 RX ADMIN — DOCUSATE SODIUM 100 MG: 100 CAPSULE, LIQUID FILLED ORAL at 09:05

## 2021-05-06 RX ADMIN — METHOCARBAMOL 500 MG: 500 TABLET ORAL at 09:05

## 2021-05-06 RX ADMIN — INSULIN ASPART 6 UNITS: 100 INJECTION, SOLUTION INTRAVENOUS; SUBCUTANEOUS at 05:05

## 2021-05-07 LAB
POCT GLUCOSE: 114 MG/DL (ref 70–110)
POCT GLUCOSE: 124 MG/DL (ref 70–110)
POCT GLUCOSE: 158 MG/DL (ref 70–110)
POCT GLUCOSE: 179 MG/DL (ref 70–110)

## 2021-05-07 PROCEDURE — 25000003 PHARM REV CODE 250: Performed by: ORTHOPAEDIC SURGERY

## 2021-05-07 PROCEDURE — 25000003 PHARM REV CODE 250: Performed by: NURSE PRACTITIONER

## 2021-05-07 PROCEDURE — 97530 THERAPEUTIC ACTIVITIES: CPT

## 2021-05-07 PROCEDURE — 11000004 HC SNF PRIVATE

## 2021-05-07 PROCEDURE — 97116 GAIT TRAINING THERAPY: CPT

## 2021-05-07 PROCEDURE — 97535 SELF CARE MNGMENT TRAINING: CPT | Mod: CO

## 2021-05-07 PROCEDURE — 97110 THERAPEUTIC EXERCISES: CPT | Mod: CO

## 2021-05-07 PROCEDURE — 97110 THERAPEUTIC EXERCISES: CPT

## 2021-05-07 RX ADMIN — ALPRAZOLAM 0.25 MG: 0.25 TABLET ORAL at 09:05

## 2021-05-07 RX ADMIN — PROPRANOLOL HYDROCHLORIDE 20 MG: 10 TABLET ORAL at 08:05

## 2021-05-07 RX ADMIN — METHOCARBAMOL 500 MG: 500 TABLET ORAL at 08:05

## 2021-05-07 RX ADMIN — ASPIRIN 81 MG CHEWABLE TABLET 81 MG: 81 TABLET CHEWABLE at 08:05

## 2021-05-07 RX ADMIN — LISINOPRIL 5 MG: 2.5 TABLET ORAL at 08:05

## 2021-05-07 RX ADMIN — ACETAMINOPHEN 1000 MG: 500 TABLET, FILM COATED ORAL at 09:05

## 2021-05-07 RX ADMIN — GABAPENTIN 1200 MG: 400 CAPSULE ORAL at 08:05

## 2021-05-07 RX ADMIN — ACETAMINOPHEN 1000 MG: 500 TABLET, FILM COATED ORAL at 05:05

## 2021-05-07 RX ADMIN — DOCUSATE SODIUM AND SENNOSIDES 1 TABLET: 8.6; 5 TABLET, FILM COATED ORAL at 08:05

## 2021-05-07 RX ADMIN — METHOCARBAMOL 500 MG: 500 TABLET ORAL at 01:05

## 2021-05-07 RX ADMIN — ATORVASTATIN CALCIUM 80 MG: 20 TABLET, FILM COATED ORAL at 08:05

## 2021-05-07 RX ADMIN — LETROZOLE 2.5 MG: 2.5 TABLET ORAL at 08:05

## 2021-05-07 RX ADMIN — PRIMIDONE 50 MG: 50 TABLET ORAL at 08:05

## 2021-05-07 RX ADMIN — METHOCARBAMOL 500 MG: 500 TABLET ORAL at 05:05

## 2021-05-07 RX ADMIN — ESCITALOPRAM OXALATE 10 MG: 10 TABLET ORAL at 08:05

## 2021-05-07 RX ADMIN — ACETAMINOPHEN 1000 MG: 500 TABLET, FILM COATED ORAL at 01:05

## 2021-05-07 RX ADMIN — GABAPENTIN 1200 MG: 400 CAPSULE ORAL at 03:05

## 2021-05-07 RX ADMIN — INSULIN DETEMIR 23 UNITS: 100 INJECTION, SOLUTION SUBCUTANEOUS at 09:05

## 2021-05-07 RX ADMIN — LEVOTHYROXINE SODIUM 125 MCG: 75 TABLET ORAL at 05:05

## 2021-05-07 RX ADMIN — OXYCODONE HYDROCHLORIDE 10 MG: 10 TABLET ORAL at 03:05

## 2021-05-07 RX ADMIN — PROPRANOLOL HYDROCHLORIDE 20 MG: 10 TABLET ORAL at 03:05

## 2021-05-07 RX ADMIN — POLYETHYLENE GLYCOL 3350 17 G: 17 POWDER, FOR SOLUTION ORAL at 08:05

## 2021-05-07 RX ADMIN — MUPIROCIN 1 G: 20 OINTMENT TOPICAL at 08:05

## 2021-05-07 RX ADMIN — OXYCODONE HYDROCHLORIDE 10 MG: 10 TABLET ORAL at 08:05

## 2021-05-07 RX ADMIN — INSULIN ASPART 6 UNITS: 100 INJECTION, SOLUTION INTRAVENOUS; SUBCUTANEOUS at 12:05

## 2021-05-07 RX ADMIN — INSULIN ASPART 6 UNITS: 100 INJECTION, SOLUTION INTRAVENOUS; SUBCUTANEOUS at 08:05

## 2021-05-07 RX ADMIN — INSULIN ASPART 6 UNITS: 100 INJECTION, SOLUTION INTRAVENOUS; SUBCUTANEOUS at 05:05

## 2021-05-08 LAB
POCT GLUCOSE: 198 MG/DL (ref 70–110)
POCT GLUCOSE: 200 MG/DL (ref 70–110)
POCT GLUCOSE: 206 MG/DL (ref 70–110)
POCT GLUCOSE: 86 MG/DL (ref 70–110)

## 2021-05-08 PROCEDURE — 63600175 PHARM REV CODE 636 W HCPCS: Performed by: NURSE PRACTITIONER

## 2021-05-08 PROCEDURE — 97110 THERAPEUTIC EXERCISES: CPT | Mod: CQ

## 2021-05-08 PROCEDURE — 97542 WHEELCHAIR MNGMENT TRAINING: CPT | Mod: CQ

## 2021-05-08 PROCEDURE — C9399 UNCLASSIFIED DRUGS OR BIOLOG: HCPCS | Performed by: NURSE PRACTITIONER

## 2021-05-08 PROCEDURE — 25000003 PHARM REV CODE 250: Performed by: ORTHOPAEDIC SURGERY

## 2021-05-08 PROCEDURE — 99305 PR NURSING FACILITY CARE, INIT, MOD SEVERITY: ICD-10-PCS | Mod: AI,,, | Performed by: HOSPITALIST

## 2021-05-08 PROCEDURE — 97530 THERAPEUTIC ACTIVITIES: CPT | Mod: CQ

## 2021-05-08 PROCEDURE — 97116 GAIT TRAINING THERAPY: CPT | Mod: CQ

## 2021-05-08 PROCEDURE — 99305 1ST NF CARE MODERATE MDM 35: CPT | Mod: AI,,, | Performed by: HOSPITALIST

## 2021-05-08 PROCEDURE — 25000003 PHARM REV CODE 250: Performed by: NURSE PRACTITIONER

## 2021-05-08 PROCEDURE — 11000004 HC SNF PRIVATE

## 2021-05-08 RX ADMIN — OXYCODONE HYDROCHLORIDE 10 MG: 10 TABLET ORAL at 08:05

## 2021-05-08 RX ADMIN — INSULIN ASPART 6 UNITS: 100 INJECTION, SOLUTION INTRAVENOUS; SUBCUTANEOUS at 05:05

## 2021-05-08 RX ADMIN — ACETAMINOPHEN 1000 MG: 500 TABLET, FILM COATED ORAL at 01:05

## 2021-05-08 RX ADMIN — METHOCARBAMOL 500 MG: 500 TABLET ORAL at 08:05

## 2021-05-08 RX ADMIN — GABAPENTIN 1200 MG: 400 CAPSULE ORAL at 02:05

## 2021-05-08 RX ADMIN — INSULIN ASPART 1 UNITS: 100 INJECTION, SOLUTION INTRAVENOUS; SUBCUTANEOUS at 08:05

## 2021-05-08 RX ADMIN — GABAPENTIN 1200 MG: 400 CAPSULE ORAL at 08:05

## 2021-05-08 RX ADMIN — GABAPENTIN 1200 MG: 400 CAPSULE ORAL at 09:05

## 2021-05-08 RX ADMIN — METHOCARBAMOL 500 MG: 500 TABLET ORAL at 09:05

## 2021-05-08 RX ADMIN — ASPIRIN 81 MG CHEWABLE TABLET 81 MG: 81 TABLET CHEWABLE at 09:05

## 2021-05-08 RX ADMIN — PRIMIDONE 50 MG: 50 TABLET ORAL at 09:05

## 2021-05-08 RX ADMIN — ACETAMINOPHEN 1000 MG: 500 TABLET, FILM COATED ORAL at 05:05

## 2021-05-08 RX ADMIN — INSULIN ASPART 6 UNITS: 100 INJECTION, SOLUTION INTRAVENOUS; SUBCUTANEOUS at 12:05

## 2021-05-08 RX ADMIN — PROPRANOLOL HYDROCHLORIDE 20 MG: 10 TABLET ORAL at 09:05

## 2021-05-08 RX ADMIN — PROPRANOLOL HYDROCHLORIDE 20 MG: 10 TABLET ORAL at 02:05

## 2021-05-08 RX ADMIN — PROPRANOLOL HYDROCHLORIDE 20 MG: 10 TABLET ORAL at 08:05

## 2021-05-08 RX ADMIN — ACETAMINOPHEN 1000 MG: 500 TABLET, FILM COATED ORAL at 10:05

## 2021-05-08 RX ADMIN — METHOCARBAMOL 500 MG: 500 TABLET ORAL at 04:05

## 2021-05-08 RX ADMIN — METHOCARBAMOL 500 MG: 500 TABLET ORAL at 01:05

## 2021-05-08 RX ADMIN — LISINOPRIL 5 MG: 2.5 TABLET ORAL at 09:05

## 2021-05-08 RX ADMIN — INSULIN DETEMIR 23 UNITS: 100 INJECTION, SOLUTION SUBCUTANEOUS at 08:05

## 2021-05-08 RX ADMIN — LEVOTHYROXINE SODIUM 125 MCG: 75 TABLET ORAL at 05:05

## 2021-05-08 RX ADMIN — LETROZOLE 2.5 MG: 2.5 TABLET ORAL at 09:05

## 2021-05-08 RX ADMIN — INSULIN ASPART 6 UNITS: 100 INJECTION, SOLUTION INTRAVENOUS; SUBCUTANEOUS at 09:05

## 2021-05-08 RX ADMIN — ESCITALOPRAM OXALATE 10 MG: 10 TABLET ORAL at 09:05

## 2021-05-08 RX ADMIN — OXYCODONE HYDROCHLORIDE 10 MG: 10 TABLET ORAL at 10:05

## 2021-05-08 RX ADMIN — ATORVASTATIN CALCIUM 80 MG: 20 TABLET, FILM COATED ORAL at 08:05

## 2021-05-08 RX ADMIN — DOCUSATE SODIUM 100 MG: 100 CAPSULE, LIQUID FILLED ORAL at 08:05

## 2021-05-09 LAB
POCT GLUCOSE: 110 MG/DL (ref 70–110)
POCT GLUCOSE: 191 MG/DL (ref 70–110)
POCT GLUCOSE: 239 MG/DL (ref 70–110)
POCT GLUCOSE: 241 MG/DL (ref 70–110)

## 2021-05-09 PROCEDURE — 11000004 HC SNF PRIVATE

## 2021-05-09 PROCEDURE — 25000003 PHARM REV CODE 250: Performed by: NURSE PRACTITIONER

## 2021-05-09 PROCEDURE — 25000003 PHARM REV CODE 250: Performed by: ORTHOPAEDIC SURGERY

## 2021-05-09 RX ADMIN — ACETAMINOPHEN 1000 MG: 500 TABLET, FILM COATED ORAL at 09:05

## 2021-05-09 RX ADMIN — PROPRANOLOL HYDROCHLORIDE 20 MG: 10 TABLET ORAL at 02:05

## 2021-05-09 RX ADMIN — OXYCODONE HYDROCHLORIDE 10 MG: 10 TABLET ORAL at 09:05

## 2021-05-09 RX ADMIN — ASPIRIN 81 MG CHEWABLE TABLET 81 MG: 81 TABLET CHEWABLE at 08:05

## 2021-05-09 RX ADMIN — ASPIRIN 81 MG CHEWABLE TABLET 81 MG: 81 TABLET CHEWABLE at 09:05

## 2021-05-09 RX ADMIN — PROPRANOLOL HYDROCHLORIDE 20 MG: 10 TABLET ORAL at 08:05

## 2021-05-09 RX ADMIN — METHOCARBAMOL 500 MG: 500 TABLET ORAL at 09:05

## 2021-05-09 RX ADMIN — ALPRAZOLAM 0.25 MG: 0.25 TABLET ORAL at 08:05

## 2021-05-09 RX ADMIN — ESCITALOPRAM OXALATE 10 MG: 10 TABLET ORAL at 09:05

## 2021-05-09 RX ADMIN — ACETAMINOPHEN 1000 MG: 500 TABLET, FILM COATED ORAL at 06:05

## 2021-05-09 RX ADMIN — OXYCODONE HYDROCHLORIDE 10 MG: 10 TABLET ORAL at 08:05

## 2021-05-09 RX ADMIN — ACETAMINOPHEN 1000 MG: 500 TABLET, FILM COATED ORAL at 01:05

## 2021-05-09 RX ADMIN — METHOCARBAMOL 500 MG: 500 TABLET ORAL at 04:05

## 2021-05-09 RX ADMIN — PRIMIDONE 50 MG: 50 TABLET ORAL at 09:05

## 2021-05-09 RX ADMIN — LISINOPRIL 5 MG: 2.5 TABLET ORAL at 09:05

## 2021-05-09 RX ADMIN — GABAPENTIN 1200 MG: 400 CAPSULE ORAL at 09:05

## 2021-05-09 RX ADMIN — LEVOTHYROXINE SODIUM 125 MCG: 75 TABLET ORAL at 06:05

## 2021-05-09 RX ADMIN — GABAPENTIN 1200 MG: 400 CAPSULE ORAL at 02:05

## 2021-05-09 RX ADMIN — ATORVASTATIN CALCIUM 80 MG: 20 TABLET, FILM COATED ORAL at 08:05

## 2021-05-09 RX ADMIN — LETROZOLE 2.5 MG: 2.5 TABLET ORAL at 09:05

## 2021-05-09 RX ADMIN — GABAPENTIN 1200 MG: 400 CAPSULE ORAL at 08:05

## 2021-05-09 RX ADMIN — INSULIN ASPART 6 UNITS: 100 INJECTION, SOLUTION INTRAVENOUS; SUBCUTANEOUS at 09:05

## 2021-05-09 RX ADMIN — METHOCARBAMOL 500 MG: 500 TABLET ORAL at 01:05

## 2021-05-09 RX ADMIN — INSULIN ASPART 6 UNITS: 100 INJECTION, SOLUTION INTRAVENOUS; SUBCUTANEOUS at 05:05

## 2021-05-09 RX ADMIN — PROPRANOLOL HYDROCHLORIDE 20 MG: 10 TABLET ORAL at 09:05

## 2021-05-09 RX ADMIN — INSULIN DETEMIR 23 UNITS: 100 INJECTION, SOLUTION SUBCUTANEOUS at 09:05

## 2021-05-09 RX ADMIN — DOCUSATE SODIUM 100 MG: 100 CAPSULE, LIQUID FILLED ORAL at 08:05

## 2021-05-09 RX ADMIN — INSULIN ASPART 6 UNITS: 100 INJECTION, SOLUTION INTRAVENOUS; SUBCUTANEOUS at 12:05

## 2021-05-10 LAB
ANION GAP SERPL CALC-SCNC: 6 MMOL/L (ref 8–16)
BASOPHILS # BLD AUTO: 0.07 K/UL (ref 0–0.2)
BASOPHILS NFR BLD: 1.4 % (ref 0–1.9)
BUN SERPL-MCNC: 23 MG/DL (ref 8–23)
CALCIUM SERPL-MCNC: 8.9 MG/DL (ref 8.7–10.5)
CHLORIDE SERPL-SCNC: 101 MMOL/L (ref 95–110)
CO2 SERPL-SCNC: 29 MMOL/L (ref 23–29)
CREAT SERPL-MCNC: 1 MG/DL (ref 0.5–1.4)
DIFFERENTIAL METHOD: ABNORMAL
EOSINOPHIL # BLD AUTO: 0.6 K/UL (ref 0–0.5)
EOSINOPHIL NFR BLD: 11.9 % (ref 0–8)
ERYTHROCYTE [DISTWIDTH] IN BLOOD BY AUTOMATED COUNT: 14.7 % (ref 11.5–14.5)
EST. GFR  (AFRICAN AMERICAN): >60 ML/MIN/1.73 M^2
EST. GFR  (NON AFRICAN AMERICAN): 53.3 ML/MIN/1.73 M^2
GLUCOSE SERPL-MCNC: 263 MG/DL (ref 70–110)
HCT VFR BLD AUTO: 27.4 % (ref 37–48.5)
HGB BLD-MCNC: 8.9 G/DL (ref 12–16)
IMM GRANULOCYTES # BLD AUTO: 0.03 K/UL (ref 0–0.04)
IMM GRANULOCYTES NFR BLD AUTO: 0.6 % (ref 0–0.5)
LYMPHOCYTES # BLD AUTO: 1 K/UL (ref 1–4.8)
LYMPHOCYTES NFR BLD: 19.7 % (ref 18–48)
MAGNESIUM SERPL-MCNC: 2.1 MG/DL (ref 1.6–2.6)
MCH RBC QN AUTO: 34.2 PG (ref 27–31)
MCHC RBC AUTO-ENTMCNC: 32.5 G/DL (ref 32–36)
MCV RBC AUTO: 105 FL (ref 82–98)
MONOCYTES # BLD AUTO: 0.7 K/UL (ref 0.3–1)
MONOCYTES NFR BLD: 15.2 % (ref 4–15)
NEUTROPHILS # BLD AUTO: 2.5 K/UL (ref 1.8–7.7)
NEUTROPHILS NFR BLD: 51.2 % (ref 38–73)
NRBC BLD-RTO: 0 /100 WBC
PHOSPHATE SERPL-MCNC: 2.8 MG/DL (ref 2.7–4.5)
PLATELET # BLD AUTO: 151 K/UL (ref 150–450)
PMV BLD AUTO: 11.8 FL (ref 9.2–12.9)
POCT GLUCOSE: 132 MG/DL (ref 70–110)
POCT GLUCOSE: 152 MG/DL (ref 70–110)
POCT GLUCOSE: 168 MG/DL (ref 70–110)
POCT GLUCOSE: 253 MG/DL (ref 70–110)
POCT GLUCOSE: 255 MG/DL (ref 70–110)
POTASSIUM SERPL-SCNC: 5.3 MMOL/L (ref 3.5–5.1)
RBC # BLD AUTO: 2.6 M/UL (ref 4–5.4)
SODIUM SERPL-SCNC: 136 MMOL/L (ref 136–145)
WBC # BLD AUTO: 4.88 K/UL (ref 3.9–12.7)

## 2021-05-10 PROCEDURE — 84100 ASSAY OF PHOSPHORUS: CPT | Performed by: NURSE PRACTITIONER

## 2021-05-10 PROCEDURE — 97110 THERAPEUTIC EXERCISES: CPT | Mod: CO

## 2021-05-10 PROCEDURE — 97530 THERAPEUTIC ACTIVITIES: CPT

## 2021-05-10 PROCEDURE — 97116 GAIT TRAINING THERAPY: CPT

## 2021-05-10 PROCEDURE — 36415 COLL VENOUS BLD VENIPUNCTURE: CPT | Performed by: ORTHOPAEDIC SURGERY

## 2021-05-10 PROCEDURE — 36415 COLL VENOUS BLD VENIPUNCTURE: CPT | Performed by: NURSE PRACTITIONER

## 2021-05-10 PROCEDURE — 85025 COMPLETE CBC W/AUTO DIFF WBC: CPT | Performed by: ORTHOPAEDIC SURGERY

## 2021-05-10 PROCEDURE — 97110 THERAPEUTIC EXERCISES: CPT

## 2021-05-10 PROCEDURE — 97535 SELF CARE MNGMENT TRAINING: CPT | Mod: CO

## 2021-05-10 PROCEDURE — 83735 ASSAY OF MAGNESIUM: CPT | Performed by: NURSE PRACTITIONER

## 2021-05-10 PROCEDURE — 11000004 HC SNF PRIVATE

## 2021-05-10 PROCEDURE — 80048 BASIC METABOLIC PNL TOTAL CA: CPT | Performed by: NURSE PRACTITIONER

## 2021-05-10 PROCEDURE — 25000003 PHARM REV CODE 250: Performed by: NURSE PRACTITIONER

## 2021-05-10 PROCEDURE — 25000003 PHARM REV CODE 250: Performed by: ORTHOPAEDIC SURGERY

## 2021-05-10 RX ORDER — INSULIN ASPART 100 [IU]/ML
8 INJECTION, SOLUTION INTRAVENOUS; SUBCUTANEOUS
Status: DISCONTINUED | OUTPATIENT
Start: 2021-05-10 | End: 2021-05-13

## 2021-05-10 RX ORDER — INSULIN ASPART 100 [IU]/ML
0-5 INJECTION, SOLUTION INTRAVENOUS; SUBCUTANEOUS
Status: DISCONTINUED | OUTPATIENT
Start: 2021-05-10 | End: 2021-05-20 | Stop reason: HOSPADM

## 2021-05-10 RX ORDER — FUROSEMIDE 20 MG/1
20 TABLET ORAL 2 TIMES DAILY
Status: COMPLETED | OUTPATIENT
Start: 2021-05-10 | End: 2021-05-13

## 2021-05-10 RX ADMIN — DOCUSATE SODIUM 100 MG: 100 CAPSULE, LIQUID FILLED ORAL at 09:05

## 2021-05-10 RX ADMIN — PROPRANOLOL HYDROCHLORIDE 20 MG: 10 TABLET ORAL at 08:05

## 2021-05-10 RX ADMIN — PROPRANOLOL HYDROCHLORIDE 20 MG: 10 TABLET ORAL at 09:05

## 2021-05-10 RX ADMIN — ASPIRIN 81 MG CHEWABLE TABLET 81 MG: 81 TABLET CHEWABLE at 08:05

## 2021-05-10 RX ADMIN — FUROSEMIDE 20 MG: 20 TABLET ORAL at 05:05

## 2021-05-10 RX ADMIN — ACETAMINOPHEN 1000 MG: 500 TABLET, FILM COATED ORAL at 09:05

## 2021-05-10 RX ADMIN — LETROZOLE 2.5 MG: 2.5 TABLET ORAL at 09:05

## 2021-05-10 RX ADMIN — METHOCARBAMOL 500 MG: 500 TABLET ORAL at 09:05

## 2021-05-10 RX ADMIN — INSULIN ASPART 1 UNITS: 100 INJECTION, SOLUTION INTRAVENOUS; SUBCUTANEOUS at 12:05

## 2021-05-10 RX ADMIN — ACETAMINOPHEN 1000 MG: 500 TABLET, FILM COATED ORAL at 02:05

## 2021-05-10 RX ADMIN — LEVOTHYROXINE SODIUM 125 MCG: 75 TABLET ORAL at 05:05

## 2021-05-10 RX ADMIN — INSULIN ASPART 6 UNITS: 100 INJECTION, SOLUTION INTRAVENOUS; SUBCUTANEOUS at 12:05

## 2021-05-10 RX ADMIN — ASPIRIN 81 MG CHEWABLE TABLET 81 MG: 81 TABLET CHEWABLE at 09:05

## 2021-05-10 RX ADMIN — PRIMIDONE 50 MG: 50 TABLET ORAL at 09:05

## 2021-05-10 RX ADMIN — METHOCARBAMOL 500 MG: 500 TABLET ORAL at 08:05

## 2021-05-10 RX ADMIN — PROPRANOLOL HYDROCHLORIDE 20 MG: 10 TABLET ORAL at 03:05

## 2021-05-10 RX ADMIN — METHOCARBAMOL 500 MG: 500 TABLET ORAL at 01:05

## 2021-05-10 RX ADMIN — INSULIN DETEMIR 23 UNITS: 100 INJECTION, SOLUTION SUBCUTANEOUS at 08:05

## 2021-05-10 RX ADMIN — LISINOPRIL 5 MG: 2.5 TABLET ORAL at 09:05

## 2021-05-10 RX ADMIN — ATORVASTATIN CALCIUM 80 MG: 20 TABLET, FILM COATED ORAL at 08:05

## 2021-05-10 RX ADMIN — GABAPENTIN 1200 MG: 400 CAPSULE ORAL at 09:05

## 2021-05-10 RX ADMIN — OXYCODONE 5 MG: 5 TABLET ORAL at 05:05

## 2021-05-10 RX ADMIN — GABAPENTIN 1200 MG: 400 CAPSULE ORAL at 08:05

## 2021-05-10 RX ADMIN — OXYCODONE HYDROCHLORIDE 10 MG: 10 TABLET ORAL at 10:05

## 2021-05-10 RX ADMIN — METHOCARBAMOL 500 MG: 500 TABLET ORAL at 04:05

## 2021-05-10 RX ADMIN — ALPRAZOLAM 0.25 MG: 0.25 TABLET ORAL at 08:05

## 2021-05-10 RX ADMIN — GABAPENTIN 1200 MG: 400 CAPSULE ORAL at 03:05

## 2021-05-10 RX ADMIN — INSULIN ASPART 8 UNITS: 100 INJECTION, SOLUTION INTRAVENOUS; SUBCUTANEOUS at 04:05

## 2021-05-10 RX ADMIN — DOCUSATE SODIUM AND SENNOSIDES 1 TABLET: 8.6; 5 TABLET, FILM COATED ORAL at 08:05

## 2021-05-10 RX ADMIN — INSULIN ASPART 6 UNITS: 100 INJECTION, SOLUTION INTRAVENOUS; SUBCUTANEOUS at 08:05

## 2021-05-10 RX ADMIN — DOCUSATE SODIUM AND SENNOSIDES 1 TABLET: 8.6; 5 TABLET, FILM COATED ORAL at 09:05

## 2021-05-10 RX ADMIN — ESCITALOPRAM OXALATE 10 MG: 10 TABLET ORAL at 09:05

## 2021-05-11 LAB
POCT GLUCOSE: 241 MG/DL (ref 70–110)
POCT GLUCOSE: 245 MG/DL (ref 70–110)
POCT GLUCOSE: 273 MG/DL (ref 70–110)
POCT GLUCOSE: 279 MG/DL (ref 70–110)

## 2021-05-11 PROCEDURE — 11000004 HC SNF PRIVATE

## 2021-05-11 PROCEDURE — 97530 THERAPEUTIC ACTIVITIES: CPT

## 2021-05-11 PROCEDURE — 97116 GAIT TRAINING THERAPY: CPT

## 2021-05-11 PROCEDURE — 25000003 PHARM REV CODE 250: Performed by: NURSE PRACTITIONER

## 2021-05-11 PROCEDURE — 97535 SELF CARE MNGMENT TRAINING: CPT

## 2021-05-11 PROCEDURE — 25000003 PHARM REV CODE 250: Performed by: ORTHOPAEDIC SURGERY

## 2021-05-11 PROCEDURE — 63600175 PHARM REV CODE 636 W HCPCS: Performed by: NURSE PRACTITIONER

## 2021-05-11 PROCEDURE — 97110 THERAPEUTIC EXERCISES: CPT

## 2021-05-11 RX ADMIN — GABAPENTIN 1200 MG: 400 CAPSULE ORAL at 02:05

## 2021-05-11 RX ADMIN — ACETAMINOPHEN 1000 MG: 500 TABLET, FILM COATED ORAL at 09:05

## 2021-05-11 RX ADMIN — PRIMIDONE 50 MG: 50 TABLET ORAL at 08:05

## 2021-05-11 RX ADMIN — INSULIN ASPART 8 UNITS: 100 INJECTION, SOLUTION INTRAVENOUS; SUBCUTANEOUS at 01:05

## 2021-05-11 RX ADMIN — METHOCARBAMOL 500 MG: 500 TABLET ORAL at 08:05

## 2021-05-11 RX ADMIN — OXYCODONE HYDROCHLORIDE 10 MG: 10 TABLET ORAL at 04:05

## 2021-05-11 RX ADMIN — METHOCARBAMOL 500 MG: 500 TABLET ORAL at 05:05

## 2021-05-11 RX ADMIN — METHOCARBAMOL 500 MG: 500 TABLET ORAL at 02:05

## 2021-05-11 RX ADMIN — INSULIN ASPART 8 UNITS: 100 INJECTION, SOLUTION INTRAVENOUS; SUBCUTANEOUS at 08:05

## 2021-05-11 RX ADMIN — ESCITALOPRAM OXALATE 10 MG: 10 TABLET ORAL at 08:05

## 2021-05-11 RX ADMIN — ATORVASTATIN CALCIUM 80 MG: 20 TABLET, FILM COATED ORAL at 08:05

## 2021-05-11 RX ADMIN — FUROSEMIDE 20 MG: 20 TABLET ORAL at 08:05

## 2021-05-11 RX ADMIN — LETROZOLE 2.5 MG: 2.5 TABLET ORAL at 08:05

## 2021-05-11 RX ADMIN — INSULIN ASPART 1 UNITS: 100 INJECTION, SOLUTION INTRAVENOUS; SUBCUTANEOUS at 08:05

## 2021-05-11 RX ADMIN — ACETAMINOPHEN 1000 MG: 500 TABLET, FILM COATED ORAL at 05:05

## 2021-05-11 RX ADMIN — DOCUSATE SODIUM AND SENNOSIDES 1 TABLET: 8.6; 5 TABLET, FILM COATED ORAL at 08:05

## 2021-05-11 RX ADMIN — PROPRANOLOL HYDROCHLORIDE 20 MG: 10 TABLET ORAL at 08:05

## 2021-05-11 RX ADMIN — GABAPENTIN 1200 MG: 400 CAPSULE ORAL at 08:05

## 2021-05-11 RX ADMIN — INSULIN ASPART 3 UNITS: 100 INJECTION, SOLUTION INTRAVENOUS; SUBCUTANEOUS at 01:05

## 2021-05-11 RX ADMIN — ASPIRIN 81 MG CHEWABLE TABLET 81 MG: 81 TABLET CHEWABLE at 08:05

## 2021-05-11 RX ADMIN — LISINOPRIL 5 MG: 2.5 TABLET ORAL at 08:05

## 2021-05-11 RX ADMIN — ACETAMINOPHEN 1000 MG: 500 TABLET, FILM COATED ORAL at 02:05

## 2021-05-11 RX ADMIN — INSULIN ASPART 2 UNITS: 100 INJECTION, SOLUTION INTRAVENOUS; SUBCUTANEOUS at 05:05

## 2021-05-11 RX ADMIN — INSULIN ASPART 2 UNITS: 100 INJECTION, SOLUTION INTRAVENOUS; SUBCUTANEOUS at 08:05

## 2021-05-11 RX ADMIN — INSULIN ASPART 8 UNITS: 100 INJECTION, SOLUTION INTRAVENOUS; SUBCUTANEOUS at 05:05

## 2021-05-11 RX ADMIN — FUROSEMIDE 20 MG: 20 TABLET ORAL at 05:05

## 2021-05-11 RX ADMIN — OXYCODONE HYDROCHLORIDE 10 MG: 10 TABLET ORAL at 10:05

## 2021-05-11 RX ADMIN — LEVOTHYROXINE SODIUM 125 MCG: 75 TABLET ORAL at 05:05

## 2021-05-11 RX ADMIN — INSULIN DETEMIR 25 UNITS: 100 INJECTION, SOLUTION SUBCUTANEOUS at 08:05

## 2021-05-11 RX ADMIN — PROPRANOLOL HYDROCHLORIDE 20 MG: 10 TABLET ORAL at 02:05

## 2021-05-12 LAB
ANION GAP SERPL CALC-SCNC: 8 MMOL/L (ref 8–16)
BUN SERPL-MCNC: 26 MG/DL (ref 8–23)
CALCIUM SERPL-MCNC: 9.3 MG/DL (ref 8.7–10.5)
CHLORIDE SERPL-SCNC: 96 MMOL/L (ref 95–110)
CO2 SERPL-SCNC: 29 MMOL/L (ref 23–29)
CREAT SERPL-MCNC: 1 MG/DL (ref 0.5–1.4)
EST. GFR  (AFRICAN AMERICAN): >60 ML/MIN/1.73 M^2
EST. GFR  (NON AFRICAN AMERICAN): 53.3 ML/MIN/1.73 M^2
GLUCOSE SERPL-MCNC: 245 MG/DL (ref 70–110)
POCT GLUCOSE: 189 MG/DL (ref 70–110)
POCT GLUCOSE: 195 MG/DL (ref 70–110)
POCT GLUCOSE: 219 MG/DL (ref 70–110)
POCT GLUCOSE: 259 MG/DL (ref 70–110)
POTASSIUM SERPL-SCNC: 4.9 MMOL/L (ref 3.5–5.1)
SODIUM SERPL-SCNC: 133 MMOL/L (ref 136–145)

## 2021-05-12 PROCEDURE — 11000004 HC SNF PRIVATE

## 2021-05-12 PROCEDURE — 97530 THERAPEUTIC ACTIVITIES: CPT

## 2021-05-12 PROCEDURE — 25000003 PHARM REV CODE 250: Performed by: NURSE PRACTITIONER

## 2021-05-12 PROCEDURE — 97110 THERAPEUTIC EXERCISES: CPT | Mod: CO

## 2021-05-12 PROCEDURE — 80048 BASIC METABOLIC PNL TOTAL CA: CPT | Performed by: NURSE PRACTITIONER

## 2021-05-12 PROCEDURE — 25000003 PHARM REV CODE 250: Performed by: ORTHOPAEDIC SURGERY

## 2021-05-12 PROCEDURE — 36415 COLL VENOUS BLD VENIPUNCTURE: CPT | Performed by: NURSE PRACTITIONER

## 2021-05-12 PROCEDURE — 97116 GAIT TRAINING THERAPY: CPT

## 2021-05-12 PROCEDURE — 63600175 PHARM REV CODE 636 W HCPCS: Performed by: NURSE PRACTITIONER

## 2021-05-12 PROCEDURE — 97535 SELF CARE MNGMENT TRAINING: CPT | Mod: CO

## 2021-05-12 PROCEDURE — 97110 THERAPEUTIC EXERCISES: CPT

## 2021-05-12 RX ADMIN — METHOCARBAMOL 500 MG: 500 TABLET ORAL at 10:05

## 2021-05-12 RX ADMIN — OXYCODONE HYDROCHLORIDE 10 MG: 10 TABLET ORAL at 08:05

## 2021-05-12 RX ADMIN — LISINOPRIL 5 MG: 2.5 TABLET ORAL at 08:05

## 2021-05-12 RX ADMIN — PROPRANOLOL HYDROCHLORIDE 20 MG: 10 TABLET ORAL at 03:05

## 2021-05-12 RX ADMIN — FUROSEMIDE 20 MG: 20 TABLET ORAL at 08:05

## 2021-05-12 RX ADMIN — LEVOTHYROXINE SODIUM 125 MCG: 75 TABLET ORAL at 05:05

## 2021-05-12 RX ADMIN — ACETAMINOPHEN 1000 MG: 500 TABLET, FILM COATED ORAL at 01:05

## 2021-05-12 RX ADMIN — METHOCARBAMOL 500 MG: 500 TABLET ORAL at 08:05

## 2021-05-12 RX ADMIN — DOCUSATE SODIUM AND SENNOSIDES 1 TABLET: 8.6; 5 TABLET, FILM COATED ORAL at 08:05

## 2021-05-12 RX ADMIN — INSULIN DETEMIR 25 UNITS: 100 INJECTION, SOLUTION SUBCUTANEOUS at 10:05

## 2021-05-12 RX ADMIN — ATORVASTATIN CALCIUM 80 MG: 20 TABLET, FILM COATED ORAL at 10:05

## 2021-05-12 RX ADMIN — INSULIN ASPART 2 UNITS: 100 INJECTION, SOLUTION INTRAVENOUS; SUBCUTANEOUS at 12:05

## 2021-05-12 RX ADMIN — OXYCODONE HYDROCHLORIDE 10 MG: 10 TABLET ORAL at 12:05

## 2021-05-12 RX ADMIN — INSULIN ASPART 2 UNITS: 100 INJECTION, SOLUTION INTRAVENOUS; SUBCUTANEOUS at 07:05

## 2021-05-12 RX ADMIN — PRIMIDONE 50 MG: 50 TABLET ORAL at 08:05

## 2021-05-12 RX ADMIN — INSULIN ASPART 8 UNITS: 100 INJECTION, SOLUTION INTRAVENOUS; SUBCUTANEOUS at 12:05

## 2021-05-12 RX ADMIN — METHOCARBAMOL 500 MG: 500 TABLET ORAL at 01:05

## 2021-05-12 RX ADMIN — GABAPENTIN 1200 MG: 400 CAPSULE ORAL at 10:05

## 2021-05-12 RX ADMIN — METHOCARBAMOL 500 MG: 500 TABLET ORAL at 05:05

## 2021-05-12 RX ADMIN — PROPRANOLOL HYDROCHLORIDE 20 MG: 10 TABLET ORAL at 08:05

## 2021-05-12 RX ADMIN — LETROZOLE 2.5 MG: 2.5 TABLET ORAL at 08:05

## 2021-05-12 RX ADMIN — GABAPENTIN 1200 MG: 400 CAPSULE ORAL at 03:05

## 2021-05-12 RX ADMIN — ESCITALOPRAM OXALATE 10 MG: 10 TABLET ORAL at 08:05

## 2021-05-12 RX ADMIN — GABAPENTIN 1200 MG: 400 CAPSULE ORAL at 08:05

## 2021-05-12 RX ADMIN — ACETAMINOPHEN 1000 MG: 500 TABLET, FILM COATED ORAL at 10:05

## 2021-05-12 RX ADMIN — PROPRANOLOL HYDROCHLORIDE 20 MG: 10 TABLET ORAL at 10:05

## 2021-05-12 RX ADMIN — ACETAMINOPHEN 1000 MG: 500 TABLET, FILM COATED ORAL at 06:05

## 2021-05-12 RX ADMIN — FUROSEMIDE 20 MG: 20 TABLET ORAL at 05:05

## 2021-05-12 RX ADMIN — INSULIN ASPART 8 UNITS: 100 INJECTION, SOLUTION INTRAVENOUS; SUBCUTANEOUS at 05:05

## 2021-05-12 RX ADMIN — ASPIRIN 81 MG CHEWABLE TABLET 81 MG: 81 TABLET CHEWABLE at 10:05

## 2021-05-12 RX ADMIN — ASPIRIN 81 MG CHEWABLE TABLET 81 MG: 81 TABLET CHEWABLE at 08:05

## 2021-05-12 RX ADMIN — INSULIN ASPART 8 UNITS: 100 INJECTION, SOLUTION INTRAVENOUS; SUBCUTANEOUS at 07:05

## 2021-05-13 LAB
ANION GAP SERPL CALC-SCNC: 8 MMOL/L (ref 8–16)
BASOPHILS # BLD AUTO: 0.08 K/UL (ref 0–0.2)
BASOPHILS NFR BLD: 1.2 % (ref 0–1.9)
BUN SERPL-MCNC: 26 MG/DL (ref 8–23)
CALCIUM SERPL-MCNC: 9.1 MG/DL (ref 8.7–10.5)
CHLORIDE SERPL-SCNC: 97 MMOL/L (ref 95–110)
CO2 SERPL-SCNC: 30 MMOL/L (ref 23–29)
CREAT SERPL-MCNC: 0.9 MG/DL (ref 0.5–1.4)
DIFFERENTIAL METHOD: ABNORMAL
EOSINOPHIL # BLD AUTO: 0.7 K/UL (ref 0–0.5)
EOSINOPHIL NFR BLD: 9.7 % (ref 0–8)
ERYTHROCYTE [DISTWIDTH] IN BLOOD BY AUTOMATED COUNT: 14.6 % (ref 11.5–14.5)
EST. GFR  (AFRICAN AMERICAN): >60 ML/MIN/1.73 M^2
EST. GFR  (NON AFRICAN AMERICAN): >60 ML/MIN/1.73 M^2
GLUCOSE SERPL-MCNC: 188 MG/DL (ref 70–110)
HCT VFR BLD AUTO: 28 % (ref 37–48.5)
HGB BLD-MCNC: 9.4 G/DL (ref 12–16)
IMM GRANULOCYTES # BLD AUTO: 0.06 K/UL (ref 0–0.04)
IMM GRANULOCYTES NFR BLD AUTO: 0.9 % (ref 0–0.5)
LYMPHOCYTES # BLD AUTO: 1.3 K/UL (ref 1–4.8)
LYMPHOCYTES NFR BLD: 18.5 % (ref 18–48)
MAGNESIUM SERPL-MCNC: 1.9 MG/DL (ref 1.6–2.6)
MCH RBC QN AUTO: 34.7 PG (ref 27–31)
MCHC RBC AUTO-ENTMCNC: 33.6 G/DL (ref 32–36)
MCV RBC AUTO: 103 FL (ref 82–98)
MONOCYTES # BLD AUTO: 1 K/UL (ref 0.3–1)
MONOCYTES NFR BLD: 14.3 % (ref 4–15)
NEUTROPHILS # BLD AUTO: 3.9 K/UL (ref 1.8–7.7)
NEUTROPHILS NFR BLD: 55.4 % (ref 38–73)
NRBC BLD-RTO: 0 /100 WBC
PHOSPHATE SERPL-MCNC: 4.2 MG/DL (ref 2.7–4.5)
PLATELET # BLD AUTO: 195 K/UL (ref 150–450)
PMV BLD AUTO: 11.4 FL (ref 9.2–12.9)
POCT GLUCOSE: 137 MG/DL (ref 70–110)
POCT GLUCOSE: 156 MG/DL (ref 70–110)
POCT GLUCOSE: 177 MG/DL (ref 70–110)
POCT GLUCOSE: 234 MG/DL (ref 70–110)
POTASSIUM SERPL-SCNC: 4.3 MMOL/L (ref 3.5–5.1)
RBC # BLD AUTO: 2.71 M/UL (ref 4–5.4)
SODIUM SERPL-SCNC: 135 MMOL/L (ref 136–145)
WBC # BLD AUTO: 6.93 K/UL (ref 3.9–12.7)

## 2021-05-13 PROCEDURE — 84100 ASSAY OF PHOSPHORUS: CPT | Performed by: NURSE PRACTITIONER

## 2021-05-13 PROCEDURE — 97110 THERAPEUTIC EXERCISES: CPT | Mod: CO

## 2021-05-13 PROCEDURE — 97535 SELF CARE MNGMENT TRAINING: CPT | Mod: CO

## 2021-05-13 PROCEDURE — 11000004 HC SNF PRIVATE

## 2021-05-13 PROCEDURE — 97110 THERAPEUTIC EXERCISES: CPT | Mod: CQ

## 2021-05-13 PROCEDURE — 97530 THERAPEUTIC ACTIVITIES: CPT | Mod: CO

## 2021-05-13 PROCEDURE — 36415 COLL VENOUS BLD VENIPUNCTURE: CPT | Performed by: NURSE PRACTITIONER

## 2021-05-13 PROCEDURE — 80048 BASIC METABOLIC PNL TOTAL CA: CPT | Performed by: NURSE PRACTITIONER

## 2021-05-13 PROCEDURE — 25000003 PHARM REV CODE 250: Performed by: NURSE PRACTITIONER

## 2021-05-13 PROCEDURE — 85025 COMPLETE CBC W/AUTO DIFF WBC: CPT | Performed by: ORTHOPAEDIC SURGERY

## 2021-05-13 PROCEDURE — 97530 THERAPEUTIC ACTIVITIES: CPT | Mod: CQ

## 2021-05-13 PROCEDURE — 25000003 PHARM REV CODE 250: Performed by: ORTHOPAEDIC SURGERY

## 2021-05-13 PROCEDURE — 83735 ASSAY OF MAGNESIUM: CPT | Performed by: NURSE PRACTITIONER

## 2021-05-13 PROCEDURE — 97116 GAIT TRAINING THERAPY: CPT | Mod: CQ

## 2021-05-13 RX ORDER — INSULIN ASPART 100 [IU]/ML
10 INJECTION, SOLUTION INTRAVENOUS; SUBCUTANEOUS
Status: DISCONTINUED | OUTPATIENT
Start: 2021-05-13 | End: 2021-05-17

## 2021-05-13 RX ADMIN — DOCUSATE SODIUM AND SENNOSIDES 1 TABLET: 8.6; 5 TABLET, FILM COATED ORAL at 08:05

## 2021-05-13 RX ADMIN — INSULIN ASPART 10 UNITS: 100 INJECTION, SOLUTION INTRAVENOUS; SUBCUTANEOUS at 05:05

## 2021-05-13 RX ADMIN — INSULIN ASPART 8 UNITS: 100 INJECTION, SOLUTION INTRAVENOUS; SUBCUTANEOUS at 12:05

## 2021-05-13 RX ADMIN — PROPRANOLOL HYDROCHLORIDE 20 MG: 10 TABLET ORAL at 02:05

## 2021-05-13 RX ADMIN — METHOCARBAMOL 500 MG: 500 TABLET ORAL at 08:05

## 2021-05-13 RX ADMIN — PROPRANOLOL HYDROCHLORIDE 20 MG: 10 TABLET ORAL at 08:05

## 2021-05-13 RX ADMIN — OXYCODONE HYDROCHLORIDE 10 MG: 10 TABLET ORAL at 12:05

## 2021-05-13 RX ADMIN — INSULIN DETEMIR 25 UNITS: 100 INJECTION, SOLUTION SUBCUTANEOUS at 08:05

## 2021-05-13 RX ADMIN — GABAPENTIN 1200 MG: 400 CAPSULE ORAL at 08:05

## 2021-05-13 RX ADMIN — ASPIRIN 81 MG CHEWABLE TABLET 81 MG: 81 TABLET CHEWABLE at 08:05

## 2021-05-13 RX ADMIN — INSULIN ASPART 2 UNITS: 100 INJECTION, SOLUTION INTRAVENOUS; SUBCUTANEOUS at 05:05

## 2021-05-13 RX ADMIN — GABAPENTIN 1200 MG: 400 CAPSULE ORAL at 02:05

## 2021-05-13 RX ADMIN — ACETAMINOPHEN 1000 MG: 500 TABLET, FILM COATED ORAL at 10:05

## 2021-05-13 RX ADMIN — METHOCARBAMOL 500 MG: 500 TABLET ORAL at 05:05

## 2021-05-13 RX ADMIN — ESCITALOPRAM OXALATE 10 MG: 10 TABLET ORAL at 08:05

## 2021-05-13 RX ADMIN — ACETAMINOPHEN 1000 MG: 500 TABLET, FILM COATED ORAL at 01:05

## 2021-05-13 RX ADMIN — ATORVASTATIN CALCIUM 80 MG: 20 TABLET, FILM COATED ORAL at 08:05

## 2021-05-13 RX ADMIN — INSULIN ASPART 8 UNITS: 100 INJECTION, SOLUTION INTRAVENOUS; SUBCUTANEOUS at 08:05

## 2021-05-13 RX ADMIN — METHOCARBAMOL 500 MG: 500 TABLET ORAL at 01:05

## 2021-05-13 RX ADMIN — ACETAMINOPHEN 1000 MG: 500 TABLET, FILM COATED ORAL at 05:05

## 2021-05-13 RX ADMIN — LISINOPRIL 5 MG: 2.5 TABLET ORAL at 08:05

## 2021-05-13 RX ADMIN — LEVOTHYROXINE SODIUM 125 MCG: 75 TABLET ORAL at 05:05

## 2021-05-13 RX ADMIN — PRIMIDONE 50 MG: 50 TABLET ORAL at 08:05

## 2021-05-13 RX ADMIN — FUROSEMIDE 20 MG: 20 TABLET ORAL at 08:05

## 2021-05-13 RX ADMIN — LETROZOLE 2.5 MG: 2.5 TABLET ORAL at 08:05

## 2021-05-14 LAB
POCT GLUCOSE: 188 MG/DL (ref 70–110)
POCT GLUCOSE: 194 MG/DL (ref 70–110)
POCT GLUCOSE: 195 MG/DL (ref 70–110)
POCT GLUCOSE: 223 MG/DL (ref 70–110)

## 2021-05-14 PROCEDURE — 25000003 PHARM REV CODE 250: Performed by: ORTHOPAEDIC SURGERY

## 2021-05-14 PROCEDURE — 97530 THERAPEUTIC ACTIVITIES: CPT | Mod: CQ

## 2021-05-14 PROCEDURE — 11000004 HC SNF PRIVATE

## 2021-05-14 PROCEDURE — 25000003 PHARM REV CODE 250: Performed by: NURSE PRACTITIONER

## 2021-05-14 PROCEDURE — 97535 SELF CARE MNGMENT TRAINING: CPT | Mod: CO

## 2021-05-14 PROCEDURE — 97116 GAIT TRAINING THERAPY: CPT | Mod: CQ

## 2021-05-14 PROCEDURE — 97110 THERAPEUTIC EXERCISES: CPT | Mod: CQ

## 2021-05-14 RX ADMIN — INSULIN ASPART 10 UNITS: 100 INJECTION, SOLUTION INTRAVENOUS; SUBCUTANEOUS at 12:05

## 2021-05-14 RX ADMIN — METHOCARBAMOL 500 MG: 500 TABLET ORAL at 05:05

## 2021-05-14 RX ADMIN — INSULIN ASPART 2 UNITS: 100 INJECTION, SOLUTION INTRAVENOUS; SUBCUTANEOUS at 05:05

## 2021-05-14 RX ADMIN — PROPRANOLOL HYDROCHLORIDE 20 MG: 10 TABLET ORAL at 08:05

## 2021-05-14 RX ADMIN — PROPRANOLOL HYDROCHLORIDE 20 MG: 10 TABLET ORAL at 03:05

## 2021-05-14 RX ADMIN — LETROZOLE 2.5 MG: 2.5 TABLET ORAL at 08:05

## 2021-05-14 RX ADMIN — LEVOTHYROXINE SODIUM 125 MCG: 75 TABLET ORAL at 05:05

## 2021-05-14 RX ADMIN — INSULIN ASPART 10 UNITS: 100 INJECTION, SOLUTION INTRAVENOUS; SUBCUTANEOUS at 05:05

## 2021-05-14 RX ADMIN — METHOCARBAMOL 500 MG: 500 TABLET ORAL at 09:05

## 2021-05-14 RX ADMIN — METHOCARBAMOL 500 MG: 500 TABLET ORAL at 01:05

## 2021-05-14 RX ADMIN — GABAPENTIN 1200 MG: 400 CAPSULE ORAL at 03:05

## 2021-05-14 RX ADMIN — DOCUSATE SODIUM AND SENNOSIDES 1 TABLET: 8.6; 5 TABLET, FILM COATED ORAL at 09:05

## 2021-05-14 RX ADMIN — LISINOPRIL 5 MG: 2.5 TABLET ORAL at 08:05

## 2021-05-14 RX ADMIN — ESCITALOPRAM OXALATE 10 MG: 10 TABLET ORAL at 08:05

## 2021-05-14 RX ADMIN — OXYCODONE HYDROCHLORIDE 10 MG: 10 TABLET ORAL at 08:05

## 2021-05-14 RX ADMIN — GABAPENTIN 1200 MG: 400 CAPSULE ORAL at 09:05

## 2021-05-14 RX ADMIN — ASPIRIN 81 MG CHEWABLE TABLET 81 MG: 81 TABLET CHEWABLE at 09:05

## 2021-05-14 RX ADMIN — INSULIN DETEMIR 25 UNITS: 100 INJECTION, SOLUTION SUBCUTANEOUS at 09:05

## 2021-05-14 RX ADMIN — PRIMIDONE 50 MG: 50 TABLET ORAL at 08:05

## 2021-05-14 RX ADMIN — PROPRANOLOL HYDROCHLORIDE 20 MG: 10 TABLET ORAL at 09:05

## 2021-05-14 RX ADMIN — ACETAMINOPHEN 1000 MG: 500 TABLET, FILM COATED ORAL at 05:05

## 2021-05-14 RX ADMIN — ACETAMINOPHEN 1000 MG: 500 TABLET, FILM COATED ORAL at 10:05

## 2021-05-14 RX ADMIN — INSULIN ASPART 10 UNITS: 100 INJECTION, SOLUTION INTRAVENOUS; SUBCUTANEOUS at 08:05

## 2021-05-14 RX ADMIN — ATORVASTATIN CALCIUM 80 MG: 20 TABLET, FILM COATED ORAL at 09:05

## 2021-05-14 RX ADMIN — ASPIRIN 81 MG CHEWABLE TABLET 81 MG: 81 TABLET CHEWABLE at 08:05

## 2021-05-14 RX ADMIN — METHOCARBAMOL 500 MG: 500 TABLET ORAL at 08:05

## 2021-05-14 RX ADMIN — GABAPENTIN 1200 MG: 400 CAPSULE ORAL at 08:05

## 2021-05-15 LAB
POCT GLUCOSE: 173 MG/DL (ref 70–110)
POCT GLUCOSE: 183 MG/DL (ref 70–110)
POCT GLUCOSE: 347 MG/DL (ref 70–110)
POCT GLUCOSE: 90 MG/DL (ref 70–110)

## 2021-05-15 PROCEDURE — 25000003 PHARM REV CODE 250: Performed by: ORTHOPAEDIC SURGERY

## 2021-05-15 PROCEDURE — 11000004 HC SNF PRIVATE

## 2021-05-15 PROCEDURE — 25000003 PHARM REV CODE 250: Performed by: NURSE PRACTITIONER

## 2021-05-15 RX ADMIN — OXYCODONE HYDROCHLORIDE 10 MG: 10 TABLET ORAL at 09:05

## 2021-05-15 RX ADMIN — PROPRANOLOL HYDROCHLORIDE 20 MG: 10 TABLET ORAL at 02:05

## 2021-05-15 RX ADMIN — INSULIN ASPART 10 UNITS: 100 INJECTION, SOLUTION INTRAVENOUS; SUBCUTANEOUS at 05:05

## 2021-05-15 RX ADMIN — PROPRANOLOL HYDROCHLORIDE 20 MG: 10 TABLET ORAL at 09:05

## 2021-05-15 RX ADMIN — INSULIN ASPART 10 UNITS: 100 INJECTION, SOLUTION INTRAVENOUS; SUBCUTANEOUS at 01:05

## 2021-05-15 RX ADMIN — METHOCARBAMOL 500 MG: 500 TABLET ORAL at 05:05

## 2021-05-15 RX ADMIN — METHOCARBAMOL 500 MG: 500 TABLET ORAL at 09:05

## 2021-05-15 RX ADMIN — INSULIN DETEMIR 25 UNITS: 100 INJECTION, SOLUTION SUBCUTANEOUS at 09:05

## 2021-05-15 RX ADMIN — GABAPENTIN 1200 MG: 400 CAPSULE ORAL at 02:05

## 2021-05-15 RX ADMIN — GABAPENTIN 1200 MG: 400 CAPSULE ORAL at 09:05

## 2021-05-15 RX ADMIN — LEVOTHYROXINE SODIUM 125 MCG: 75 TABLET ORAL at 06:05

## 2021-05-15 RX ADMIN — LETROZOLE 2.5 MG: 2.5 TABLET ORAL at 09:05

## 2021-05-15 RX ADMIN — ATORVASTATIN CALCIUM 80 MG: 20 TABLET, FILM COATED ORAL at 09:05

## 2021-05-15 RX ADMIN — ACETAMINOPHEN 1000 MG: 500 TABLET, FILM COATED ORAL at 09:05

## 2021-05-15 RX ADMIN — LISINOPRIL 5 MG: 2.5 TABLET ORAL at 09:05

## 2021-05-15 RX ADMIN — ACETAMINOPHEN 1000 MG: 500 TABLET, FILM COATED ORAL at 06:05

## 2021-05-15 RX ADMIN — INSULIN ASPART 4 UNITS: 100 INJECTION, SOLUTION INTRAVENOUS; SUBCUTANEOUS at 01:05

## 2021-05-15 RX ADMIN — ASPIRIN 81 MG CHEWABLE TABLET 81 MG: 81 TABLET CHEWABLE at 09:05

## 2021-05-15 RX ADMIN — ESCITALOPRAM OXALATE 10 MG: 10 TABLET ORAL at 09:05

## 2021-05-15 RX ADMIN — PRIMIDONE 50 MG: 50 TABLET ORAL at 09:05

## 2021-05-15 RX ADMIN — DOCUSATE SODIUM AND SENNOSIDES 1 TABLET: 8.6; 5 TABLET, FILM COATED ORAL at 09:05

## 2021-05-15 RX ADMIN — METHOCARBAMOL 500 MG: 500 TABLET ORAL at 02:05

## 2021-05-15 RX ADMIN — ACETAMINOPHEN 1000 MG: 500 TABLET, FILM COATED ORAL at 02:05

## 2021-05-15 RX ADMIN — INSULIN ASPART 10 UNITS: 100 INJECTION, SOLUTION INTRAVENOUS; SUBCUTANEOUS at 09:05

## 2021-05-16 LAB
POCT GLUCOSE: 137 MG/DL (ref 70–110)
POCT GLUCOSE: 146 MG/DL (ref 70–110)
POCT GLUCOSE: 162 MG/DL (ref 70–110)
POCT GLUCOSE: 93 MG/DL (ref 70–110)

## 2021-05-16 PROCEDURE — 97535 SELF CARE MNGMENT TRAINING: CPT | Mod: CO

## 2021-05-16 PROCEDURE — 25000003 PHARM REV CODE 250: Performed by: NURSE PRACTITIONER

## 2021-05-16 PROCEDURE — 97116 GAIT TRAINING THERAPY: CPT | Mod: CQ

## 2021-05-16 PROCEDURE — 11000004 HC SNF PRIVATE

## 2021-05-16 PROCEDURE — 25000003 PHARM REV CODE 250: Performed by: ORTHOPAEDIC SURGERY

## 2021-05-16 PROCEDURE — 97530 THERAPEUTIC ACTIVITIES: CPT | Mod: CQ

## 2021-05-16 PROCEDURE — 97110 THERAPEUTIC EXERCISES: CPT | Mod: CQ

## 2021-05-16 PROCEDURE — 97110 THERAPEUTIC EXERCISES: CPT | Mod: CO

## 2021-05-16 RX ADMIN — METHOCARBAMOL 500 MG: 500 TABLET ORAL at 08:05

## 2021-05-16 RX ADMIN — LEVOTHYROXINE SODIUM 125 MCG: 75 TABLET ORAL at 05:05

## 2021-05-16 RX ADMIN — ASPIRIN 81 MG CHEWABLE TABLET 81 MG: 81 TABLET CHEWABLE at 08:05

## 2021-05-16 RX ADMIN — DOCUSATE SODIUM AND SENNOSIDES 1 TABLET: 8.6; 5 TABLET, FILM COATED ORAL at 08:05

## 2021-05-16 RX ADMIN — PROPRANOLOL HYDROCHLORIDE 20 MG: 10 TABLET ORAL at 02:05

## 2021-05-16 RX ADMIN — PROPRANOLOL HYDROCHLORIDE 20 MG: 10 TABLET ORAL at 08:05

## 2021-05-16 RX ADMIN — INSULIN ASPART 10 UNITS: 100 INJECTION, SOLUTION INTRAVENOUS; SUBCUTANEOUS at 01:05

## 2021-05-16 RX ADMIN — ACETAMINOPHEN 1000 MG: 500 TABLET, FILM COATED ORAL at 05:05

## 2021-05-16 RX ADMIN — ACETAMINOPHEN 1000 MG: 500 TABLET, FILM COATED ORAL at 10:05

## 2021-05-16 RX ADMIN — METHOCARBAMOL 500 MG: 500 TABLET ORAL at 05:05

## 2021-05-16 RX ADMIN — ATORVASTATIN CALCIUM 80 MG: 20 TABLET, FILM COATED ORAL at 08:05

## 2021-05-16 RX ADMIN — OXYCODONE HYDROCHLORIDE 10 MG: 10 TABLET ORAL at 11:05

## 2021-05-16 RX ADMIN — MELATONIN TAB 3 MG 6 MG: 3 TAB at 08:05

## 2021-05-16 RX ADMIN — INSULIN DETEMIR 25 UNITS: 100 INJECTION, SOLUTION SUBCUTANEOUS at 08:05

## 2021-05-16 RX ADMIN — GABAPENTIN 1200 MG: 400 CAPSULE ORAL at 02:05

## 2021-05-16 RX ADMIN — INSULIN ASPART 10 UNITS: 100 INJECTION, SOLUTION INTRAVENOUS; SUBCUTANEOUS at 08:05

## 2021-05-16 RX ADMIN — METHOCARBAMOL 500 MG: 500 TABLET ORAL at 01:05

## 2021-05-16 RX ADMIN — LETROZOLE 2.5 MG: 2.5 TABLET ORAL at 08:05

## 2021-05-16 RX ADMIN — ESCITALOPRAM OXALATE 10 MG: 10 TABLET ORAL at 08:05

## 2021-05-16 RX ADMIN — LISINOPRIL 5 MG: 2.5 TABLET ORAL at 08:05

## 2021-05-16 RX ADMIN — INSULIN ASPART 10 UNITS: 100 INJECTION, SOLUTION INTRAVENOUS; SUBCUTANEOUS at 06:05

## 2021-05-16 RX ADMIN — ACETAMINOPHEN 1000 MG: 500 TABLET, FILM COATED ORAL at 01:05

## 2021-05-16 RX ADMIN — GABAPENTIN 1200 MG: 400 CAPSULE ORAL at 08:05

## 2021-05-16 RX ADMIN — PRIMIDONE 50 MG: 50 TABLET ORAL at 08:05

## 2021-05-17 LAB
ANION GAP SERPL CALC-SCNC: 9 MMOL/L (ref 8–16)
BASOPHILS # BLD AUTO: 0.09 K/UL (ref 0–0.2)
BASOPHILS NFR BLD: 1.5 % (ref 0–1.9)
BUN SERPL-MCNC: 24 MG/DL (ref 8–23)
CALCIUM SERPL-MCNC: 8.8 MG/DL (ref 8.7–10.5)
CHLORIDE SERPL-SCNC: 100 MMOL/L (ref 95–110)
CO2 SERPL-SCNC: 25 MMOL/L (ref 23–29)
CREAT SERPL-MCNC: 1 MG/DL (ref 0.5–1.4)
DIFFERENTIAL METHOD: ABNORMAL
EOSINOPHIL # BLD AUTO: 0.7 K/UL (ref 0–0.5)
EOSINOPHIL NFR BLD: 11 % (ref 0–8)
ERYTHROCYTE [DISTWIDTH] IN BLOOD BY AUTOMATED COUNT: 14.9 % (ref 11.5–14.5)
EST. GFR  (AFRICAN AMERICAN): >60 ML/MIN/1.73 M^2
EST. GFR  (NON AFRICAN AMERICAN): 53.3 ML/MIN/1.73 M^2
GLUCOSE SERPL-MCNC: 136 MG/DL (ref 70–110)
HCT VFR BLD AUTO: 30.2 % (ref 37–48.5)
HGB BLD-MCNC: 9.9 G/DL (ref 12–16)
IMM GRANULOCYTES # BLD AUTO: 0.04 K/UL (ref 0–0.04)
IMM GRANULOCYTES NFR BLD AUTO: 0.7 % (ref 0–0.5)
LYMPHOCYTES # BLD AUTO: 1.2 K/UL (ref 1–4.8)
LYMPHOCYTES NFR BLD: 20.6 % (ref 18–48)
MAGNESIUM SERPL-MCNC: 2 MG/DL (ref 1.6–2.6)
MCH RBC QN AUTO: 34.6 PG (ref 27–31)
MCHC RBC AUTO-ENTMCNC: 32.8 G/DL (ref 32–36)
MCV RBC AUTO: 106 FL (ref 82–98)
MONOCYTES # BLD AUTO: 0.7 K/UL (ref 0.3–1)
MONOCYTES NFR BLD: 12.3 % (ref 4–15)
NEUTROPHILS # BLD AUTO: 3.2 K/UL (ref 1.8–7.7)
NEUTROPHILS NFR BLD: 53.9 % (ref 38–73)
NRBC BLD-RTO: 0 /100 WBC
PHOSPHATE SERPL-MCNC: 4.9 MG/DL (ref 2.7–4.5)
PLATELET # BLD AUTO: 191 K/UL (ref 150–450)
PMV BLD AUTO: 11.7 FL (ref 9.2–12.9)
POCT GLUCOSE: 123 MG/DL (ref 70–110)
POCT GLUCOSE: 156 MG/DL (ref 70–110)
POCT GLUCOSE: 217 MG/DL (ref 70–110)
POCT GLUCOSE: 234 MG/DL (ref 70–110)
POTASSIUM SERPL-SCNC: 4.6 MMOL/L (ref 3.5–5.1)
RBC # BLD AUTO: 2.86 M/UL (ref 4–5.4)
SODIUM SERPL-SCNC: 134 MMOL/L (ref 136–145)
WBC # BLD AUTO: 6.01 K/UL (ref 3.9–12.7)

## 2021-05-17 PROCEDURE — 97116 GAIT TRAINING THERAPY: CPT | Mod: CQ

## 2021-05-17 PROCEDURE — 97110 THERAPEUTIC EXERCISES: CPT | Mod: CQ

## 2021-05-17 PROCEDURE — 84100 ASSAY OF PHOSPHORUS: CPT | Performed by: NURSE PRACTITIONER

## 2021-05-17 PROCEDURE — 85025 COMPLETE CBC W/AUTO DIFF WBC: CPT | Performed by: ORTHOPAEDIC SURGERY

## 2021-05-17 PROCEDURE — 83735 ASSAY OF MAGNESIUM: CPT | Performed by: NURSE PRACTITIONER

## 2021-05-17 PROCEDURE — 25000003 PHARM REV CODE 250: Performed by: NURSE PRACTITIONER

## 2021-05-17 PROCEDURE — 97110 THERAPEUTIC EXERCISES: CPT

## 2021-05-17 PROCEDURE — 97530 THERAPEUTIC ACTIVITIES: CPT | Mod: CQ

## 2021-05-17 PROCEDURE — 80048 BASIC METABOLIC PNL TOTAL CA: CPT | Performed by: NURSE PRACTITIONER

## 2021-05-17 PROCEDURE — 11000004 HC SNF PRIVATE

## 2021-05-17 PROCEDURE — 25000003 PHARM REV CODE 250: Performed by: ORTHOPAEDIC SURGERY

## 2021-05-17 PROCEDURE — 36415 COLL VENOUS BLD VENIPUNCTURE: CPT | Performed by: NURSE PRACTITIONER

## 2021-05-17 RX ORDER — ALUMINUM HYDROXIDE, MAGNESIUM HYDROXIDE, AND SIMETHICONE 2400; 240; 2400 MG/30ML; MG/30ML; MG/30ML
30 SUSPENSION ORAL EVERY 6 HOURS PRN
Status: DISCONTINUED | OUTPATIENT
Start: 2021-05-17 | End: 2021-05-20 | Stop reason: HOSPADM

## 2021-05-17 RX ORDER — INSULIN ASPART 100 [IU]/ML
11 INJECTION, SOLUTION INTRAVENOUS; SUBCUTANEOUS
Status: DISCONTINUED | OUTPATIENT
Start: 2021-05-18 | End: 2021-05-20 | Stop reason: HOSPADM

## 2021-05-17 RX ADMIN — INSULIN ASPART 10 UNITS: 100 INJECTION, SOLUTION INTRAVENOUS; SUBCUTANEOUS at 05:05

## 2021-05-17 RX ADMIN — INSULIN DETEMIR 25 UNITS: 100 INJECTION, SOLUTION SUBCUTANEOUS at 09:05

## 2021-05-17 RX ADMIN — METHOCARBAMOL 500 MG: 500 TABLET ORAL at 05:05

## 2021-05-17 RX ADMIN — OXYCODONE HYDROCHLORIDE 10 MG: 10 TABLET ORAL at 05:05

## 2021-05-17 RX ADMIN — INSULIN ASPART 10 UNITS: 100 INJECTION, SOLUTION INTRAVENOUS; SUBCUTANEOUS at 08:05

## 2021-05-17 RX ADMIN — INSULIN ASPART 2 UNITS: 100 INJECTION, SOLUTION INTRAVENOUS; SUBCUTANEOUS at 05:05

## 2021-05-17 RX ADMIN — ACETAMINOPHEN 1000 MG: 500 TABLET, FILM COATED ORAL at 05:05

## 2021-05-17 RX ADMIN — ACETAMINOPHEN 1000 MG: 500 TABLET, FILM COATED ORAL at 09:05

## 2021-05-17 RX ADMIN — LEVOTHYROXINE SODIUM 125 MCG: 75 TABLET ORAL at 05:05

## 2021-05-17 RX ADMIN — ASPIRIN 81 MG CHEWABLE TABLET 81 MG: 81 TABLET CHEWABLE at 09:05

## 2021-05-17 RX ADMIN — METHOCARBAMOL 500 MG: 500 TABLET ORAL at 09:05

## 2021-05-17 RX ADMIN — LISINOPRIL 5 MG: 2.5 TABLET ORAL at 08:05

## 2021-05-17 RX ADMIN — ESCITALOPRAM OXALATE 10 MG: 10 TABLET ORAL at 09:05

## 2021-05-17 RX ADMIN — METHOCARBAMOL 500 MG: 500 TABLET ORAL at 01:05

## 2021-05-17 RX ADMIN — PROPRANOLOL HYDROCHLORIDE 20 MG: 10 TABLET ORAL at 09:05

## 2021-05-17 RX ADMIN — PROPRANOLOL HYDROCHLORIDE 20 MG: 10 TABLET ORAL at 08:05

## 2021-05-17 RX ADMIN — GABAPENTIN 1200 MG: 400 CAPSULE ORAL at 09:05

## 2021-05-17 RX ADMIN — INSULIN ASPART 10 UNITS: 100 INJECTION, SOLUTION INTRAVENOUS; SUBCUTANEOUS at 12:05

## 2021-05-17 RX ADMIN — ATORVASTATIN CALCIUM 80 MG: 20 TABLET, FILM COATED ORAL at 09:05

## 2021-05-17 RX ADMIN — ACETAMINOPHEN 1000 MG: 500 TABLET, FILM COATED ORAL at 01:05

## 2021-05-17 RX ADMIN — LETROZOLE 2.5 MG: 2.5 TABLET ORAL at 09:05

## 2021-05-17 RX ADMIN — DOCUSATE SODIUM AND SENNOSIDES 1 TABLET: 8.6; 5 TABLET, FILM COATED ORAL at 09:05

## 2021-05-17 RX ADMIN — OXYCODONE HYDROCHLORIDE 10 MG: 10 TABLET ORAL at 09:05

## 2021-05-17 RX ADMIN — PRIMIDONE 50 MG: 50 TABLET ORAL at 09:05

## 2021-05-17 RX ADMIN — PROPRANOLOL HYDROCHLORIDE 20 MG: 10 TABLET ORAL at 03:05

## 2021-05-17 RX ADMIN — OXYCODONE HYDROCHLORIDE 10 MG: 10 TABLET ORAL at 01:05

## 2021-05-17 RX ADMIN — ASPIRIN 81 MG CHEWABLE TABLET 81 MG: 81 TABLET CHEWABLE at 08:05

## 2021-05-17 RX ADMIN — GABAPENTIN 1200 MG: 400 CAPSULE ORAL at 03:05

## 2021-05-17 RX ADMIN — INSULIN ASPART 2 UNITS: 100 INJECTION, SOLUTION INTRAVENOUS; SUBCUTANEOUS at 01:05

## 2021-05-17 RX ADMIN — MELATONIN TAB 3 MG 6 MG: 3 TAB at 09:05

## 2021-05-18 LAB
POCT GLUCOSE: 187 MG/DL (ref 70–110)
POCT GLUCOSE: 196 MG/DL (ref 70–110)
POCT GLUCOSE: 216 MG/DL (ref 70–110)
POCT GLUCOSE: 87 MG/DL (ref 70–110)

## 2021-05-18 PROCEDURE — 97110 THERAPEUTIC EXERCISES: CPT | Mod: CQ

## 2021-05-18 PROCEDURE — 97530 THERAPEUTIC ACTIVITIES: CPT | Mod: CQ

## 2021-05-18 PROCEDURE — 97110 THERAPEUTIC EXERCISES: CPT

## 2021-05-18 PROCEDURE — 97535 SELF CARE MNGMENT TRAINING: CPT

## 2021-05-18 PROCEDURE — 25000003 PHARM REV CODE 250: Performed by: ORTHOPAEDIC SURGERY

## 2021-05-18 PROCEDURE — 25000003 PHARM REV CODE 250: Performed by: NURSE PRACTITIONER

## 2021-05-18 PROCEDURE — 11000004 HC SNF PRIVATE

## 2021-05-18 PROCEDURE — C9399 UNCLASSIFIED DRUGS OR BIOLOG: HCPCS | Performed by: NURSE PRACTITIONER

## 2021-05-18 PROCEDURE — 97116 GAIT TRAINING THERAPY: CPT | Mod: CQ

## 2021-05-18 RX ORDER — AMOXICILLIN 250 MG
1 CAPSULE ORAL 2 TIMES DAILY
COMMUNITY
Start: 2021-05-18 | End: 2021-07-13

## 2021-05-18 RX ORDER — METHOCARBAMOL 500 MG/1
500 TABLET, FILM COATED ORAL 4 TIMES DAILY PRN
Qty: 40 TABLET | Refills: 0 | Status: SHIPPED | OUTPATIENT
Start: 2021-05-18 | End: 2021-05-30

## 2021-05-18 RX ORDER — NAPROXEN SODIUM 220 MG/1
81 TABLET, FILM COATED ORAL 2 TIMES DAILY
Refills: 0 | COMMUNITY
Start: 2021-05-20 | End: 2023-09-27

## 2021-05-18 RX ORDER — FUROSEMIDE 20 MG/1
40 TABLET ORAL DAILY
Start: 2021-05-18

## 2021-05-18 RX ORDER — ACETAMINOPHEN 325 MG/1
650 TABLET ORAL EVERY 6 HOURS PRN
Refills: 0 | COMMUNITY
Start: 2021-05-18

## 2021-05-18 RX ORDER — OXYCODONE HYDROCHLORIDE 5 MG/1
5 TABLET ORAL EVERY 4 HOURS PRN
Qty: 30 TABLET | Refills: 0 | Status: SHIPPED | OUTPATIENT
Start: 2021-05-18

## 2021-05-18 RX ORDER — GABAPENTIN 600 MG/1
1200 TABLET ORAL 3 TIMES DAILY
Qty: 180 TABLET | Refills: 2 | Status: SHIPPED | OUTPATIENT
Start: 2021-05-18

## 2021-05-18 RX ORDER — TALC
6 POWDER (GRAM) TOPICAL NIGHTLY PRN
Refills: 0 | COMMUNITY
Start: 2021-05-18 | End: 2021-07-13

## 2021-05-18 RX ADMIN — ACETAMINOPHEN 1000 MG: 500 TABLET, FILM COATED ORAL at 01:05

## 2021-05-18 RX ADMIN — PRIMIDONE 50 MG: 50 TABLET ORAL at 09:05

## 2021-05-18 RX ADMIN — GABAPENTIN 1200 MG: 400 CAPSULE ORAL at 02:05

## 2021-05-18 RX ADMIN — GABAPENTIN 1200 MG: 400 CAPSULE ORAL at 08:05

## 2021-05-18 RX ADMIN — INSULIN ASPART 11 UNITS: 100 INJECTION, SOLUTION INTRAVENOUS; SUBCUTANEOUS at 09:05

## 2021-05-18 RX ADMIN — ASPIRIN 81 MG CHEWABLE TABLET 81 MG: 81 TABLET CHEWABLE at 08:05

## 2021-05-18 RX ADMIN — METHOCARBAMOL 500 MG: 500 TABLET ORAL at 04:05

## 2021-05-18 RX ADMIN — PROPRANOLOL HYDROCHLORIDE 20 MG: 10 TABLET ORAL at 08:05

## 2021-05-18 RX ADMIN — ASPIRIN 81 MG CHEWABLE TABLET 81 MG: 81 TABLET CHEWABLE at 09:05

## 2021-05-18 RX ADMIN — METHOCARBAMOL 500 MG: 500 TABLET ORAL at 01:05

## 2021-05-18 RX ADMIN — PROPRANOLOL HYDROCHLORIDE 20 MG: 10 TABLET ORAL at 09:05

## 2021-05-18 RX ADMIN — ACETAMINOPHEN 1000 MG: 500 TABLET, FILM COATED ORAL at 09:05

## 2021-05-18 RX ADMIN — ACETAMINOPHEN 1000 MG: 500 TABLET, FILM COATED ORAL at 05:05

## 2021-05-18 RX ADMIN — LEVOTHYROXINE SODIUM 125 MCG: 75 TABLET ORAL at 05:05

## 2021-05-18 RX ADMIN — DOCUSATE SODIUM AND SENNOSIDES 1 TABLET: 8.6; 5 TABLET, FILM COATED ORAL at 09:05

## 2021-05-18 RX ADMIN — INSULIN ASPART 11 UNITS: 100 INJECTION, SOLUTION INTRAVENOUS; SUBCUTANEOUS at 06:05

## 2021-05-18 RX ADMIN — PROPRANOLOL HYDROCHLORIDE 20 MG: 10 TABLET ORAL at 02:05

## 2021-05-18 RX ADMIN — GABAPENTIN 1200 MG: 400 CAPSULE ORAL at 09:05

## 2021-05-18 RX ADMIN — METHOCARBAMOL 500 MG: 500 TABLET ORAL at 09:05

## 2021-05-18 RX ADMIN — MELATONIN TAB 3 MG 6 MG: 3 TAB at 08:05

## 2021-05-18 RX ADMIN — ATORVASTATIN CALCIUM 80 MG: 20 TABLET, FILM COATED ORAL at 08:05

## 2021-05-18 RX ADMIN — INSULIN DETEMIR 25 UNITS: 100 INJECTION, SOLUTION SUBCUTANEOUS at 08:05

## 2021-05-18 RX ADMIN — METHOCARBAMOL 500 MG: 500 TABLET ORAL at 08:05

## 2021-05-18 RX ADMIN — INSULIN ASPART 1 UNITS: 100 INJECTION, SOLUTION INTRAVENOUS; SUBCUTANEOUS at 08:05

## 2021-05-18 RX ADMIN — ESCITALOPRAM OXALATE 10 MG: 10 TABLET ORAL at 09:05

## 2021-05-18 RX ADMIN — OXYCODONE HYDROCHLORIDE 10 MG: 10 TABLET ORAL at 12:05

## 2021-05-18 RX ADMIN — INSULIN ASPART 11 UNITS: 100 INJECTION, SOLUTION INTRAVENOUS; SUBCUTANEOUS at 12:05

## 2021-05-18 RX ADMIN — LETROZOLE 2.5 MG: 2.5 TABLET ORAL at 09:05

## 2021-05-18 RX ADMIN — LISINOPRIL 5 MG: 2.5 TABLET ORAL at 09:05

## 2021-05-19 LAB
BILIRUB UR QL STRIP: NEGATIVE
CLARITY UR REFRACT.AUTO: CLEAR
COLOR UR AUTO: YELLOW
GLUCOSE UR QL STRIP: ABNORMAL
HGB UR QL STRIP: NEGATIVE
KETONES UR QL STRIP: NEGATIVE
LEUKOCYTE ESTERASE UR QL STRIP: NEGATIVE
NITRITE UR QL STRIP: NEGATIVE
PH UR STRIP: 5 [PH] (ref 5–8)
POCT GLUCOSE: 135 MG/DL (ref 70–110)
POCT GLUCOSE: 183 MG/DL (ref 70–110)
POCT GLUCOSE: 190 MG/DL (ref 70–110)
POCT GLUCOSE: 321 MG/DL (ref 70–110)
PROT UR QL STRIP: NEGATIVE
SP GR UR STRIP: 1.01 (ref 1–1.03)
URN SPEC COLLECT METH UR: ABNORMAL

## 2021-05-19 PROCEDURE — 11000004 HC SNF PRIVATE

## 2021-05-19 PROCEDURE — 81003 URINALYSIS AUTO W/O SCOPE: CPT | Performed by: NURSE PRACTITIONER

## 2021-05-19 PROCEDURE — 97530 THERAPEUTIC ACTIVITIES: CPT | Mod: CO

## 2021-05-19 PROCEDURE — 25000003 PHARM REV CODE 250: Performed by: NURSE PRACTITIONER

## 2021-05-19 PROCEDURE — 97110 THERAPEUTIC EXERCISES: CPT | Mod: CO

## 2021-05-19 PROCEDURE — 97116 GAIT TRAINING THERAPY: CPT

## 2021-05-19 PROCEDURE — 97110 THERAPEUTIC EXERCISES: CPT

## 2021-05-19 PROCEDURE — 25000003 PHARM REV CODE 250: Performed by: ORTHOPAEDIC SURGERY

## 2021-05-19 PROCEDURE — 97530 THERAPEUTIC ACTIVITIES: CPT

## 2021-05-19 RX ADMIN — GABAPENTIN 1200 MG: 400 CAPSULE ORAL at 08:05

## 2021-05-19 RX ADMIN — ASPIRIN 81 MG CHEWABLE TABLET 81 MG: 81 TABLET CHEWABLE at 09:05

## 2021-05-19 RX ADMIN — METHOCARBAMOL 500 MG: 500 TABLET ORAL at 12:05

## 2021-05-19 RX ADMIN — PROPRANOLOL HYDROCHLORIDE 20 MG: 10 TABLET ORAL at 09:05

## 2021-05-19 RX ADMIN — PRIMIDONE 50 MG: 50 TABLET ORAL at 08:05

## 2021-05-19 RX ADMIN — ESCITALOPRAM OXALATE 10 MG: 10 TABLET ORAL at 09:05

## 2021-05-19 RX ADMIN — MELATONIN TAB 3 MG 6 MG: 3 TAB at 09:05

## 2021-05-19 RX ADMIN — DOCUSATE SODIUM AND SENNOSIDES 1 TABLET: 8.6; 5 TABLET, FILM COATED ORAL at 09:05

## 2021-05-19 RX ADMIN — LETROZOLE 2.5 MG: 2.5 TABLET ORAL at 08:05

## 2021-05-19 RX ADMIN — INSULIN ASPART 11 UNITS: 100 INJECTION, SOLUTION INTRAVENOUS; SUBCUTANEOUS at 12:05

## 2021-05-19 RX ADMIN — ACETAMINOPHEN 1000 MG: 500 TABLET, FILM COATED ORAL at 01:05

## 2021-05-19 RX ADMIN — ATORVASTATIN CALCIUM 80 MG: 20 TABLET, FILM COATED ORAL at 09:05

## 2021-05-19 RX ADMIN — METHOCARBAMOL 500 MG: 500 TABLET ORAL at 04:05

## 2021-05-19 RX ADMIN — PROPRANOLOL HYDROCHLORIDE 20 MG: 10 TABLET ORAL at 02:05

## 2021-05-19 RX ADMIN — OXYCODONE HYDROCHLORIDE 10 MG: 10 TABLET ORAL at 09:05

## 2021-05-19 RX ADMIN — METHOCARBAMOL 500 MG: 500 TABLET ORAL at 09:05

## 2021-05-19 RX ADMIN — INSULIN ASPART 11 UNITS: 100 INJECTION, SOLUTION INTRAVENOUS; SUBCUTANEOUS at 08:05

## 2021-05-19 RX ADMIN — INSULIN ASPART 4 UNITS: 100 INJECTION, SOLUTION INTRAVENOUS; SUBCUTANEOUS at 12:05

## 2021-05-19 RX ADMIN — INSULIN DETEMIR 25 UNITS: 100 INJECTION, SOLUTION SUBCUTANEOUS at 09:05

## 2021-05-19 RX ADMIN — LEVOTHYROXINE SODIUM 125 MCG: 75 TABLET ORAL at 05:05

## 2021-05-19 RX ADMIN — GABAPENTIN 1200 MG: 400 CAPSULE ORAL at 02:05

## 2021-05-19 RX ADMIN — ACETAMINOPHEN 1000 MG: 500 TABLET, FILM COATED ORAL at 05:05

## 2021-05-19 RX ADMIN — LISINOPRIL 5 MG: 2.5 TABLET ORAL at 08:05

## 2021-05-19 RX ADMIN — INSULIN ASPART 11 UNITS: 100 INJECTION, SOLUTION INTRAVENOUS; SUBCUTANEOUS at 05:05

## 2021-05-19 RX ADMIN — ACETAMINOPHEN 1000 MG: 500 TABLET, FILM COATED ORAL at 09:05

## 2021-05-19 RX ADMIN — GABAPENTIN 1200 MG: 400 CAPSULE ORAL at 09:05

## 2021-05-20 VITALS
RESPIRATION RATE: 18 BRPM | TEMPERATURE: 98 F | HEART RATE: 62 BPM | BODY MASS INDEX: 32.91 KG/M2 | HEIGHT: 62 IN | DIASTOLIC BLOOD PRESSURE: 61 MMHG | SYSTOLIC BLOOD PRESSURE: 138 MMHG | WEIGHT: 178.81 LBS | OXYGEN SATURATION: 97 %

## 2021-05-20 LAB
POCT GLUCOSE: 126 MG/DL (ref 70–110)
POCT GLUCOSE: 157 MG/DL (ref 70–110)

## 2021-05-20 PROCEDURE — 25000003 PHARM REV CODE 250: Performed by: ORTHOPAEDIC SURGERY

## 2021-05-20 PROCEDURE — 25000003 PHARM REV CODE 250: Performed by: NURSE PRACTITIONER

## 2021-05-20 RX ADMIN — LEVOTHYROXINE SODIUM 125 MCG: 75 TABLET ORAL at 05:05

## 2021-05-20 RX ADMIN — DOCUSATE SODIUM AND SENNOSIDES 1 TABLET: 8.6; 5 TABLET, FILM COATED ORAL at 09:05

## 2021-05-20 RX ADMIN — INSULIN ASPART 11 UNITS: 100 INJECTION, SOLUTION INTRAVENOUS; SUBCUTANEOUS at 08:05

## 2021-05-20 RX ADMIN — ESCITALOPRAM OXALATE 10 MG: 10 TABLET ORAL at 09:05

## 2021-05-20 RX ADMIN — LETROZOLE 2.5 MG: 2.5 TABLET ORAL at 09:05

## 2021-05-20 RX ADMIN — LISINOPRIL 5 MG: 2.5 TABLET ORAL at 09:05

## 2021-05-20 RX ADMIN — METHOCARBAMOL 500 MG: 500 TABLET ORAL at 09:05

## 2021-05-20 RX ADMIN — ACETAMINOPHEN 1000 MG: 500 TABLET, FILM COATED ORAL at 05:05

## 2021-05-20 RX ADMIN — ASPIRIN 81 MG CHEWABLE TABLET 81 MG: 81 TABLET CHEWABLE at 09:05

## 2021-05-20 RX ADMIN — PROPRANOLOL HYDROCHLORIDE 20 MG: 10 TABLET ORAL at 09:05

## 2021-05-20 RX ADMIN — PRIMIDONE 50 MG: 50 TABLET ORAL at 09:05

## 2021-05-20 RX ADMIN — GABAPENTIN 1200 MG: 400 CAPSULE ORAL at 09:05

## 2021-06-08 ENCOUNTER — TELEPHONE (OUTPATIENT)
Dept: CARDIOLOGY | Facility: CLINIC | Age: 81
End: 2021-06-08

## 2021-06-30 ENCOUNTER — TELEPHONE (OUTPATIENT)
Dept: CARDIOLOGY | Facility: CLINIC | Age: 81
End: 2021-06-30

## 2021-07-13 ENCOUNTER — OFFICE VISIT (OUTPATIENT)
Dept: CARDIOLOGY | Facility: CLINIC | Age: 81
End: 2021-07-13
Payer: MEDICARE

## 2021-07-13 VITALS
DIASTOLIC BLOOD PRESSURE: 59 MMHG | WEIGHT: 164.69 LBS | BODY MASS INDEX: 26.47 KG/M2 | HEIGHT: 66 IN | SYSTOLIC BLOOD PRESSURE: 129 MMHG | HEART RATE: 72 BPM

## 2021-07-13 DIAGNOSIS — I25.10 CORONARY ARTERY DISEASE INVOLVING NATIVE CORONARY ARTERY OF NATIVE HEART WITHOUT ANGINA PECTORIS: Primary | Chronic | ICD-10-CM

## 2021-07-13 DIAGNOSIS — E78.2 MIXED HYPERLIPIDEMIA: Chronic | ICD-10-CM

## 2021-07-13 DIAGNOSIS — I50.32 CHRONIC DIASTOLIC HEART FAILURE: Chronic | ICD-10-CM

## 2021-07-13 DIAGNOSIS — Z95.5 S/P CORONARY ARTERY STENT PLACEMENT: ICD-10-CM

## 2021-07-13 DIAGNOSIS — I35.0 NONRHEUMATIC AORTIC VALVE STENOSIS: Chronic | ICD-10-CM

## 2021-07-13 DIAGNOSIS — Z95.2 S/P TAVR (TRANSCATHETER AORTIC VALVE REPLACEMENT): ICD-10-CM

## 2021-07-13 PROCEDURE — 99214 PR OFFICE/OUTPT VISIT, EST, LEVL IV, 30-39 MIN: ICD-10-PCS | Mod: S$GLB,,, | Performed by: INTERNAL MEDICINE

## 2021-07-13 PROCEDURE — 1159F PR MEDICATION LIST DOCUMENTED IN MEDICAL RECORD: ICD-10-PCS | Mod: S$GLB,,, | Performed by: INTERNAL MEDICINE

## 2021-07-13 PROCEDURE — 1126F PR PAIN SEVERITY QUANTIFIED, NO PAIN PRESENT: ICD-10-PCS | Mod: S$GLB,,, | Performed by: INTERNAL MEDICINE

## 2021-07-13 PROCEDURE — 1159F MED LIST DOCD IN RCRD: CPT | Mod: S$GLB,,, | Performed by: INTERNAL MEDICINE

## 2021-07-13 PROCEDURE — 99999 PR PBB SHADOW E&M-EST. PATIENT-LVL III: CPT | Mod: PBBFAC,,, | Performed by: INTERNAL MEDICINE

## 2021-07-13 PROCEDURE — 99999 PR PBB SHADOW E&M-EST. PATIENT-LVL III: ICD-10-PCS | Mod: PBBFAC,,, | Performed by: INTERNAL MEDICINE

## 2021-07-13 PROCEDURE — 99214 OFFICE O/P EST MOD 30 MIN: CPT | Mod: S$GLB,,, | Performed by: INTERNAL MEDICINE

## 2021-07-13 PROCEDURE — 1126F AMNT PAIN NOTED NONE PRSNT: CPT | Mod: S$GLB,,, | Performed by: INTERNAL MEDICINE

## 2021-08-17 NOTE — LETTER
May 7, 2019      Zurdo Haines Jr., MD  [address] [facility] [address]           [facility] [address] [facility] [address]  [address], [address]  [facility] [address]  Phone: [phone]  Fax: [phone]          Patient: Mary Prince   MR Number: [id number]   YOB: 1940   Date of Visit: 5/7/2019       Dear Dr. Zurdo Haines Jr.:    Thank you for referring Mary Prince to me for evaluation. Attached you will find relevant portions of my assessment and plan of care.    If you have questions, please do not hesitate to call me. I look forward to following Mary Prince along with you.    Sincerely,    Shu Quiñones PA-C    Enclosure  CC:  No Recipients    If you would like to receive this communication electronically, please contact [email or web address] or [phone] to request more information on [facility] Link access.    For providers and/or their staff who would like to refer a patient to [facility], please contact us through our one-stop-shop provider referral line, [facility] [place], at [phone].    If you feel you have received this communication in error or would no longer like to receive these types of communications, please e-mail [email or web address]         
no

## 2021-11-15 ENCOUNTER — TELEPHONE (OUTPATIENT)
Dept: CARDIOLOGY | Facility: CLINIC | Age: 81
End: 2021-11-15
Payer: MEDICARE

## 2022-01-25 DIAGNOSIS — G25.0 ESSENTIAL TREMOR: ICD-10-CM

## 2022-01-25 RX ORDER — PROPRANOLOL HYDROCHLORIDE 20 MG/1
20 TABLET ORAL 3 TIMES DAILY
Qty: 270 TABLET | Refills: 3 | Status: SHIPPED | OUTPATIENT
Start: 2022-01-25 | End: 2022-02-25

## 2022-01-25 NOTE — TELEPHONE ENCOUNTER
----- Message from Toya Agrawal sent at 1/25/2022 11:59 AM CST -----  Regarding: labs  Pt is returning your call and can be reached at [phone].  She gets her labs drawn at [facility].    Thank you

## 2022-02-25 ENCOUNTER — OFFICE VISIT (OUTPATIENT)
Dept: CARDIOLOGY | Facility: CLINIC | Age: 82
End: 2022-02-25
Payer: MEDICARE

## 2022-02-25 VITALS
HEART RATE: 74 BPM | SYSTOLIC BLOOD PRESSURE: 141 MMHG | WEIGHT: 172.81 LBS | DIASTOLIC BLOOD PRESSURE: 71 MMHG | BODY MASS INDEX: 27.77 KG/M2 | HEIGHT: 66 IN

## 2022-02-25 DIAGNOSIS — Z95.2 S/P TAVR (TRANSCATHETER AORTIC VALVE REPLACEMENT): ICD-10-CM

## 2022-02-25 DIAGNOSIS — Z95.5 S/P CORONARY ARTERY STENT PLACEMENT: ICD-10-CM

## 2022-02-25 DIAGNOSIS — I50.32 CHRONIC DIASTOLIC HEART FAILURE: ICD-10-CM

## 2022-02-25 DIAGNOSIS — G25.0 ESSENTIAL TREMOR: ICD-10-CM

## 2022-02-25 DIAGNOSIS — I70.0 ATHEROSCLEROSIS OF AORTA: ICD-10-CM

## 2022-02-25 DIAGNOSIS — I35.0 NONRHEUMATIC AORTIC VALVE STENOSIS: ICD-10-CM

## 2022-02-25 DIAGNOSIS — E78.2 MIXED HYPERLIPIDEMIA: ICD-10-CM

## 2022-02-25 DIAGNOSIS — I25.10 CORONARY ARTERY DISEASE INVOLVING NATIVE CORONARY ARTERY OF NATIVE HEART WITHOUT ANGINA PECTORIS: Primary | ICD-10-CM

## 2022-02-25 PROBLEM — J84.10 PULMONARY FIBROSIS: Status: ACTIVE | Noted: 2022-02-25

## 2022-02-25 PROBLEM — N18.31 CHRONIC KIDNEY DISEASE, STAGE 3A: Status: ACTIVE | Noted: 2022-02-25

## 2022-02-25 PROBLEM — I13.10 HYPERTENSIVE HEART AND RENAL DISEASE: Status: ACTIVE | Noted: 2022-02-25

## 2022-02-25 PROBLEM — E11.40 DIABETIC NEUROPATHY: Status: ACTIVE | Noted: 2022-02-25

## 2022-02-25 PROBLEM — I25.2 OLD MYOCARDIAL INFARCTION: Status: ACTIVE | Noted: 2022-02-25

## 2022-02-25 PROBLEM — E11.21 DIABETIC RENAL DISEASE: Status: ACTIVE | Noted: 2022-02-25

## 2022-02-25 PROCEDURE — 99999 PR PBB SHADOW E&M-EST. PATIENT-LVL III: ICD-10-PCS | Mod: PBBFAC,,, | Performed by: INTERNAL MEDICINE

## 2022-02-25 PROCEDURE — 99214 PR OFFICE/OUTPT VISIT, EST, LEVL IV, 30-39 MIN: ICD-10-PCS | Mod: S$GLB,,, | Performed by: INTERNAL MEDICINE

## 2022-02-25 PROCEDURE — 1126F PR PAIN SEVERITY QUANTIFIED, NO PAIN PRESENT: ICD-10-PCS | Mod: CPTII,S$GLB,, | Performed by: INTERNAL MEDICINE

## 2022-02-25 PROCEDURE — 3078F DIAST BP <80 MM HG: CPT | Mod: CPTII,S$GLB,, | Performed by: INTERNAL MEDICINE

## 2022-02-25 PROCEDURE — 3288F PR FALLS RISK ASSESSMENT DOCUMENTED: ICD-10-PCS | Mod: CPTII,S$GLB,, | Performed by: INTERNAL MEDICINE

## 2022-02-25 PROCEDURE — 1101F PR PT FALLS ASSESS DOC 0-1 FALLS W/OUT INJ PAST YR: ICD-10-PCS | Mod: CPTII,S$GLB,, | Performed by: INTERNAL MEDICINE

## 2022-02-25 PROCEDURE — 1101F PT FALLS ASSESS-DOCD LE1/YR: CPT | Mod: CPTII,S$GLB,, | Performed by: INTERNAL MEDICINE

## 2022-02-25 PROCEDURE — 3077F PR MOST RECENT SYSTOLIC BLOOD PRESSURE >= 140 MM HG: ICD-10-PCS | Mod: CPTII,S$GLB,, | Performed by: INTERNAL MEDICINE

## 2022-02-25 PROCEDURE — 1160F RVW MEDS BY RX/DR IN RCRD: CPT | Mod: CPTII,S$GLB,, | Performed by: INTERNAL MEDICINE

## 2022-02-25 PROCEDURE — 1160F PR REVIEW ALL MEDS BY PRESCRIBER/CLIN PHARMACIST DOCUMENTED: ICD-10-PCS | Mod: CPTII,S$GLB,, | Performed by: INTERNAL MEDICINE

## 2022-02-25 PROCEDURE — 99214 OFFICE O/P EST MOD 30 MIN: CPT | Mod: S$GLB,,, | Performed by: INTERNAL MEDICINE

## 2022-02-25 PROCEDURE — 3077F SYST BP >= 140 MM HG: CPT | Mod: CPTII,S$GLB,, | Performed by: INTERNAL MEDICINE

## 2022-02-25 PROCEDURE — 1159F PR MEDICATION LIST DOCUMENTED IN MEDICAL RECORD: ICD-10-PCS | Mod: CPTII,S$GLB,, | Performed by: INTERNAL MEDICINE

## 2022-02-25 PROCEDURE — 1126F AMNT PAIN NOTED NONE PRSNT: CPT | Mod: CPTII,S$GLB,, | Performed by: INTERNAL MEDICINE

## 2022-02-25 PROCEDURE — 3078F PR MOST RECENT DIASTOLIC BLOOD PRESSURE < 80 MM HG: ICD-10-PCS | Mod: CPTII,S$GLB,, | Performed by: INTERNAL MEDICINE

## 2022-02-25 PROCEDURE — 3288F FALL RISK ASSESSMENT DOCD: CPT | Mod: CPTII,S$GLB,, | Performed by: INTERNAL MEDICINE

## 2022-02-25 PROCEDURE — 1159F MED LIST DOCD IN RCRD: CPT | Mod: CPTII,S$GLB,, | Performed by: INTERNAL MEDICINE

## 2022-02-25 PROCEDURE — 99999 PR PBB SHADOW E&M-EST. PATIENT-LVL III: CPT | Mod: PBBFAC,,, | Performed by: INTERNAL MEDICINE

## 2022-02-25 RX ORDER — PROPRANOLOL HYDROCHLORIDE 80 MG/1
80 TABLET ORAL DAILY
Qty: 90 TABLET | Refills: 3 | Status: SHIPPED | OUTPATIENT
Start: 2022-02-25 | End: 2022-03-17

## 2022-02-25 NOTE — PROGRESS NOTES
Subjective:   02/25/2022     Patient ID:  Mary Prince is a 81 y.o. female who presents for evaulation of AVS, Coronary Artery Disease, s/p TAVR, and Congestive Heart Failure      Comes in for a regular visit.  She has not been having problems with chest pains or tightness, PND or orthopnea.  She does have increasing lower extremity edema.  This has occurred for the last several months.  She takes Lasix daily for this.  She cannot wear support hose, she cannot get them on.  She had recent right total knee replacement, several ultrasound subsequently have not shown evidence for DVT.    She does have coronary artery disease.  Cardiac catheterization 1 year ago showed no significant restenoses in the previously stented LAD.  She has not had chest pains or tightness.    She did have severe aortic stenosis and 1 year ago underwent a TAVR by Dr. Latham.  She has done very well since then.  Recent echocardiogram showed normal LV function and no significant aortic stenosis.    Hypercholesterolemia is treated with high-dose statin therapy.  A lipid profile shows a total cholesterol be 176, triglycerides 147, HDL 50, , on atorvastatin 80 mg daily.  I would plan to have this rechecked in 6 months.    Incidental note is made of an essential tremor.  She continues to have problems with this, now on propranolol instead of metoprolol.  Will increase to 80 mg of the extended release daily, increased to 80 b.i.d. if heart rate acceptable and blood pressure     ECHO from 11/03/2020:  · The left ventricle is normal in size with normal systolic function. The estimated ejection fraction is 65%.Indeterminate diastolic function.  · Normal right ventricular systolic function. .  · Mild right atrial enlargement.  · There is a transcutaneously-placed aortic bioprosthesis present. There is no aortic aortic insufficiency present. Prosthetic aortic valve is normal.  · There is mild mitral stenosis.  · The mean diastolic gradient  across the mitral valve is 6 mmHg ; MVA 2.37  · The MR is mild to mild-moderate ( 1-2+).  · Normal central venous pressure (3 mmHg).  · The estimated PA systolic pressure is 31 mmHg.     Notes from Dr. Shah:     Mary Prince is a 79 y.o. female with a past medical history of HTN, CAD s/p Proximal to Mid LAD PCI, and HLD who presents for a one year TAVR follow up. She is s/p successful left transfemoral 26 mm evolute valve for heavily calcified LV outflow tract and severe aortic stenosis. She states she is doing incredibly well today. Her main limitation is left knee pain. She is s/p left knee replacement at the end of July undergoing physical therapy. Reports no SOB with exercise. She continues to follow closely with Dr. Velazco.          Patient Active Problem List   Diagnosis    Aortic valve stenosis    Coronary artery disease    Mixed hyperlipidemia    Type 2 diabetes mellitus    Acquired hypothyroidism    Epidural intraspinal abscess    Shoulder joint stiffness, bilateral    Bilateral shoulder pain    Essential tremor    S/P TAVR (transcatheter aortic valve replacement)    Insulin pump status    Chronic diastolic heart failure    Osteoarthritis of left knee    Nonrheumatic mitral valve stenosis    S/P coronary artery stent placement    Asthma    Diabetic peripheral neuropathy associated with type 2 diabetes mellitus    Uncontrolled type 2 diabetes mellitus    Primary osteoarthritis of right knee    Old myocardial infarction    Pulmonary fibrosis    Diabetic renal disease    Diabetic neuropathy    Hypertensive heart and renal disease    Chronic kidney disease, stage 3a    Atherosclerosis of aorta        Past Medical History:   Diagnosis Date    Anticoagulant long-term use     asa    AVN (avascular necrosis of bone)     Cancer 2010    left breast XRT + lumpectomy; right lunpectomy + Chemotherapy (Dr. Santana) 8 of 12 treatments, stopped for side effects    Cancer     right  breast    Cataract     right eye    CKD (chronic kidney disease) stage 3, GFR 30-59 ml/min     CKD (chronic kidney disease), stage III     Coronary artery disease     total 4 stents over several years, last 2015    Diabetes mellitus, type 2     Insulin pump    Diabetic neuropathy     HLD (hyperlipidemia)     Hypertension     Insulin pump in place     Thyroid disease     Tremor, essential        Past Surgical History:   Procedure Laterality Date    APPENDECTOMY      BREAST SURGERY Bilateral     lumpectomy    CARDIAC VALVE SURGERY  2019    EYE SURGERY Bilateral     cataract    HYSTERECTOMY      JOINT REPLACEMENT Right     hip    JOINT REPLACEMENT Left     knee    SPINE SURGERY      laminectomy    TRANSCATHETER AORTIC VALVE REPLACEMENT (TAVR) N/A 11/20/2019    Procedure: REPLACEMENT, AORTIC VALVE, TRANSCATHETER (TAVR);  Surgeon: Balaji Shah MD;  Location: [facility] CATH LAB;  Service: Cardiology;  Laterality: N/A;       Review of patient's allergies indicates:   Allergen Reactions    Prozac [fluoxetine] Other (See Comments)    Sulfa (sulfonamide antibiotics)      Other reaction(s): Unknown         Current Outpatient Medications:     acetaminophen (TYLENOL) 325 MG tablet, Take 2 tablets (650 mg total) by mouth every 6 (six) hours as needed for Pain., Disp: , Rfl: 0    ALPRAZolam (XANAX) 0.5 MG tablet, Take 0.5 mg by mouth nightly as needed. , Disp: , Rfl:     aspirin 81 MG Chew, Take 1 tablet (81 mg total) by mouth 2 (two) times daily. for 14 days (Patient taking differently: Take 81 mg by mouth once daily.), Disp: , Rfl: 0    atorvastatin (LIPITOR) 80 MG tablet, Take 80 mg by mouth every evening. , Disp: , Rfl:     escitalopram oxalate (LEXAPRO) 10 MG tablet, Take 10 mg by mouth once daily. , Disp: , Rfl:     furosemide (LASIX) 20 MG tablet, Take 2 tablets (40 mg total) by mouth once daily., Disp: , Rfl:     gabapentin (NEURONTIN) 600 MG tablet, Take 2 tablets (1,200 mg total) by mouth 3  (three) times daily., Disp: 180 tablet, Rfl: 2    insulin aspart U-100 (NOVOLOG) 100 unit/mL injection, Insulin pump, Disp: , Rfl:     letrozole (FEMARA) 2.5 mg Tab, Take 2.5 mg by mouth once daily ., Disp: , Rfl:     levothyroxine (SYNTHROID) 125 MCG tablet, Take 125 mcg by mouth before breakfast., Disp: , Rfl:     oxyCODONE (ROXICODONE) 5 MG immediate release tablet, Take 1 tablet (5 mg total) by mouth every 4 (four) hours as needed for Pain., Disp: 30 tablet, Rfl: 0    potassium aminobenzoate 500 mg Cap, Take by mouth once daily., Disp: , Rfl:     primidone (MYSOLINE) 50 MG Tab, Take 50 mg by mouth 3 (three) times daily. , Disp: , Rfl:     ramipriL (ALTACE) 10 MG capsule, Take 10 mg by mouth once daily. , Disp: , Rfl:     RESTASIS MULTIDOSE 0.05 % Drop, Place 1 drop into both eyes as needed. , Disp: , Rfl:     vit C/E/zinc/lutein/zeaxanthin (OCUVITE EYE HEALTH ORAL), Take 1 tablet by mouth once daily. , Disp: , Rfl:     propranoloL (INDERAL) 80 MG tablet, Take 1 tablet (80 mg total) by mouth once daily., Disp: 90 tablet, Rfl: 3  No current facility-administered medications for this visit.    Facility-Administered Medications Ordered in Other Visits:     lidocaine (PF) 10 mg/ml (1%) injection 10 mg, 1 mL, Intradermal, Once, Azeem Randhawa MD            Objective:   Review of Systems   Constitutional: Negative for chills, decreased appetite, diaphoresis, fever, malaise/fatigue, weight gain and weight loss.   HENT: Negative for congestion, hearing loss, nosebleeds and sore throat.    Eyes: Negative for double vision, vision loss in left eye and vision loss in right eye.   Cardiovascular: Positive for leg swelling. Negative for chest pain, claudication, cyanosis, dyspnea on exertion, irregular heartbeat, near-syncope, orthopnea, palpitations, paroxysmal nocturnal dyspnea and syncope.   Respiratory: Negative for cough, hemoptysis, shortness of breath, sleep disturbances due to breathing, snoring  and wheezing.    Endocrine: Negative for cold intolerance and heat intolerance.   Hematologic/Lymphatic: Negative for adenopathy and bleeding problem. Bruises/bleeds easily.   Skin: Negative for itching, nail changes and rash.   Musculoskeletal: Positive for arthritis, back pain, falls, gout, muscle cramps and muscle weakness. Negative for myalgias.   Gastrointestinal: Negative for bloating, abdominal pain, anorexia, constipation, diarrhea, hematochezia, melena, nausea and vomiting.   Genitourinary: Negative for frequency, hematuria and nocturia.   Neurological: Positive for loss of balance and tremors. Negative for focal weakness, headaches, vertigo and weakness.   Psychiatric/Behavioral: Negative for altered mental status, depression and memory loss.   Allergic/Immunologic: Negative for hives.       Vitals:    02/25/22 1356   BP: (!) 141/71   Pulse: 74       Physical Exam  Vitals reviewed.   Constitutional:       General: She is not in acute distress.     Appearance: She is well-developed.   HENT:      Head: Normocephalic and atraumatic.      Nose: Nose normal.   Eyes:      Conjunctiva/sclera: Conjunctivae normal.      Pupils: Pupils are equal, round, and reactive to light.   Neck:      Vascular: No JVD.   Cardiovascular:      Rate and Rhythm: Normal rate and regular rhythm.      Pulses: Intact distal pulses.           Carotid pulses are 1+ on the right side and 1+ on the left side.       Radial pulses are 1+ on the right side and 1+ on the left side.        Femoral pulses are 1+ on the right side and 1+ on the left side.       Popliteal pulses are 1+ on the right side and 1+ on the left side.        Dorsalis pedis pulses are 1+ on the right side and 1+ on the left side.        Posterior tibial pulses are 1+ on the right side and 1+ on the left side.      Heart sounds: No murmur (Grade 1 systolic ejection murmur) heard.    No friction rub. No gallop.      Comments: 2+ lower extremity edema  Pulmonary:      Effort:  Pulmonary effort is normal. No respiratory distress.      Breath sounds: Normal breath sounds. No wheezing or rales.   Chest:      Chest wall: No tenderness.   Abdominal:      General: Bowel sounds are normal. There is no distension.      Palpations: Abdomen is soft.      Tenderness: There is no abdominal tenderness.   Musculoskeletal:         General: No tenderness or deformity. Normal range of motion.      Cervical back: Normal range of motion and neck supple.      Right lower leg: Edema (One to 2+ edema) present.      Left lower leg: Edema present.   Skin:     General: Skin is warm and dry.      Findings: No erythema or rash.   Neurological:      Mental Status: She is alert and oriented to person, place, and time.      Cranial Nerves: No cranial nerve deficit.      Motor: No abnormal muscle tone.      Coordination: Coordination normal.      Comments: Positive tremor   Psychiatric:         Behavior: Behavior normal.         Thought Content: Thought content normal.         Judgment: Judgment normal.       Recent echocardiogram  TTE Today  · The left ventricle is normal in size with normal systolic function. The estimated ejection fraction is 65%.Indeterminate diastolic function.  · Normal right ventricular systolic function. .  · Mild right atrial enlargement.  · There is a transcutaneously-placed aortic bioprosthesis present. There is no aortic aortic insufficiency present. Prosthetic aortic valve is normal.  · There is mild mitral stenosis.  · The mean diastolic gradient across the mitral valve is 6 mmHg ; MVA 2.37  · The MR is mild to mild-moderate ( 1-2+).  · Normal central venous pressure (3 mmHg).  · The estimated PA systolic pressure is 31 mmHg.    No results displayed because visit has over 200 results.      Admission on 05/03/2021, Discharged on 05/05/2021   Component Date Value    POCT Glucose 05/03/2021 124 (A)    POCT Glucose 05/03/2021 147 (A)    Hemoglobin A1C 05/03/2021 8.6 (A)    Estimated Avg  Glucose 05/03/2021 200 (A)    POCT Glucose 05/03/2021 235 (A)    POCT Glucose 05/03/2021 302 (A)    WBC 05/04/2021 6.41     RBC 05/04/2021 3.01 (A)    Hemoglobin 05/04/2021 10.2 (A)    Hematocrit 05/04/2021 30.5 (A)    MCV 05/04/2021 101 (A)    MCH 05/04/2021 33.9 (A)    MCHC 05/04/2021 33.4     RDW 05/04/2021 13.6     Platelets 05/04/2021 92 (A)    MPV 05/04/2021 12.3     Glucose 05/04/2021 224 (A)    Sodium 05/04/2021 136     Potassium 05/04/2021 5.0     Chloride 05/04/2021 102     CO2 05/04/2021 24     BUN 05/04/2021 25 (A)    Calcium 05/04/2021 8.1 (A)    Creatinine 05/04/2021 1.1     Albumin 05/04/2021 2.6 (A)    Phosphorus 05/04/2021 3.1     eGFR if  05/04/2021 55 (A)    eGFR if non African Amer* 05/04/2021 48 (A)    Anion Gap 05/04/2021 10     POCT Glucose 05/04/2021 216 (A)    POCT Glucose 05/04/2021 261 (A)    POCT Glucose 05/04/2021 223 (A)    POCT Glucose 05/04/2021 211 (A)    WBC 05/05/2021 7.33     RBC 05/05/2021 3.08 (A)    Hemoglobin 05/05/2021 10.5 (A)    Hematocrit 05/05/2021 31.4 (A)    MCV 05/05/2021 102 (A)    MCH 05/05/2021 34.1 (A)    MCHC 05/05/2021 33.4     RDW 05/05/2021 13.6     Platelets 05/05/2021 99 (A)    MPV 05/05/2021 12.3     POCT Glucose 05/05/2021 206 (A)    POCT Glucose 05/05/2021 195 (A)    SARS-CoV-2 RNA, Amplific* 05/05/2021 Negative    Lab Visit on 04/30/2021   Component Date Value    SARS-CoV2 (COVID-19) Jesse* 04/30/2021 Not Detected    Hospital Outpatient Visit on 04/26/2021   Component Date Value    WBC 04/26/2021 6.54     RBC 04/26/2021 3.62 (A)    Hemoglobin 04/26/2021 12.2     Hematocrit 04/26/2021 36.6 (A)    MCV 04/26/2021 101 (A)    MCH 04/26/2021 33.7 (A)    MCHC 04/26/2021 33.3     RDW 04/26/2021 13.7     Platelets 04/26/2021 137 (A)    MPV 04/26/2021 12.7     Immature Granulocytes 04/26/2021 0.3     Gran # (ANC) 04/26/2021 3.9     Immature Grans (Abs) 04/26/2021 0.02     Lymph # 04/26/2021 1.4      Mono # 04/26/2021 0.9     Eos # 04/26/2021 0.4     Baso # 04/26/2021 0.05     nRBC 04/26/2021 0     Gran % 04/26/2021 58.9     Lymph % 04/26/2021 20.9     Mono % 04/26/2021 13.6     Eosinophil % 04/26/2021 5.5     Basophil % 04/26/2021 0.8     Differential Method 04/26/2021 Automated     Sodium 04/26/2021 141     Potassium 04/26/2021 4.5     Chloride 04/26/2021 100     CO2 04/26/2021 28     Glucose 04/26/2021 60 (A)    BUN 04/26/2021 33 (A)    Creatinine 04/26/2021 1.1     Calcium 04/26/2021 9.3     Total Protein 04/26/2021 7.4     Albumin 04/26/2021 3.8     Total Bilirubin 04/26/2021 0.4     Alkaline Phosphatase 04/26/2021 67     AST 04/26/2021 27     ALT 04/26/2021 18     Anion Gap 04/26/2021 13     eGFR if  04/26/2021 55 (A)    eGFR if non African Amer* 04/26/2021 48 (A)    Group & Rh 04/26/2021 A NEG     Indirect Foreign 04/26/2021 NEG     Specimen UA 04/26/2021 Urine, Clean Catch     Color, UA 04/26/2021 Yellow     Appearance, UA 04/26/2021 Clear     pH, UA 04/26/2021 7.0     Specific [place], UA 04/26/2021 1.015     Protein, UA 04/26/2021 Negative     Glucose, UA 04/26/2021 Negative     Ketones, UA 04/26/2021 Negative     Bilirubin (UA) 04/26/2021 Negative     Occult Blood UA 04/26/2021 2+ (A)    Nitrite, UA 04/26/2021 Negative     Urobilinogen, UA 04/26/2021 Negative     Leukocytes, UA 04/26/2021 2+ (A)    RBC, UA 04/26/2021 1     WBC, UA 04/26/2021 1     Bacteria 04/26/2021 Rare     Squam Epithel, UA 04/26/2021 2     Hyaline Casts, UA 04/26/2021 0     Microscopic Comment 04/26/2021 SEE COMMENT         Assessment and Plan:     Coronary artery disease involving native coronary artery of native heart without angina pectoris  Comments:  If brain surgery needed, would be okay to stop aspirin for 7 days    Essential tremor  Comments:  Adaptive spoon recommended  Orders:  -     propranoloL (INDERAL) 80 MG tablet; Take 1 tablet (80 mg total) by mouth  once daily.  Dispense: 90 tablet; Refill: 3    Mixed hyperlipidemia    S/P coronary artery stent placement    Nonrheumatic aortic valve stenosis    Atherosclerosis of aorta  Comments:  Optimal blood pressure and lipid control    Chronic diastolic heart failure  Comments:  Lasix as needed, appears stable    S/P TAVR (transcatheter aortic valve replacement)         Follow up in about 6 months (around 8/25/2022).

## 2022-03-17 DIAGNOSIS — G25.0 ESSENTIAL TREMOR: ICD-10-CM

## 2022-03-17 RX ORDER — PROPRANOLOL HYDROCHLORIDE 80 MG/1
80 CAPSULE, EXTENDED RELEASE ORAL DAILY
COMMUNITY
End: 2022-03-17

## 2022-03-17 RX ORDER — PROPRANOLOL HYDROCHLORIDE 80 MG/1
80 CAPSULE, EXTENDED RELEASE ORAL DAILY
Qty: 90 CAPSULE | Refills: 3 | Status: SHIPPED | OUTPATIENT
Start: 2022-03-17 | End: 2022-11-22

## 2022-03-17 RX ORDER — PROPRANOLOL HYDROCHLORIDE 80 MG/1
80 TABLET ORAL DAILY
Qty: 90 TABLET | Refills: 3 | Status: CANCELLED | OUTPATIENT
Start: 2022-03-17 | End: 2023-03-17

## 2022-03-17 NOTE — TELEPHONE ENCOUNTER
----- Message from Toya Agrawal sent at 3/17/2022  1:23 PM CDT -----  Regarding: refill  Requesting an RX refill or new RX.  Is this a refill or new RX: refill  RX name and strength propranoloL (INDERAL) 80 MG tablet    Is this a 30 day or 90 day RX: 90  Pharmacy name and phone #   [facility] ([place]) - [place] - [address] [place]   Phone:  [phone]  Fax:  [phone]        The doctors have asked that we provide their patients with the following 2 reminders -- prescription refills can take up to 72 hours, and a friendly reminder that in the future you can use your [facility] account to request refills:       Pt states that the wrong rx was sent last month and she needs the 80mg once per day    Thank you

## 2022-04-06 NOTE — PT/OT/SLP EVAL
Occupational Therapy   Evaluation, Treatment and Discharge    Name: Mary Prince  MRN: [id number]  Admitting Diagnosis:  Primary osteoarthritis of left knee 1 Day Post-Op    Recommendations:     Discharge Recommendations: home health OT, home health PT(Assist from family)  Discharge Equipment Recommendations:  none  Barriers to discharge:  None    Assessment:     Mary Prince is a 79 y.o. female with a medical diagnosis of Primary osteoarthritis of left knee.  She presents with sitting up in bedside chair and agreeable to OT evaluation and tx session. Performance deficits affecting function: weakness, impaired endurance, impaired self care skills, impaired functional mobilty, gait instability, impaired balance, decreased ROM, impaired coordination, impaired cardiopulmonary response to activity, impaired joint extensibility, orthopedic precautions, pain, impaired skin, decreased lower extremity function.  Recommend discharge to home with assist and Home Health OT and PT.    Rehab Prognosis: Good; patient would benefit from acute skilled OT services to address these deficits and reach maximum level of function.       Plan:     Patient to be seen (OT evaluation and tx session complete and pt discharging to home today.) to address the above listed problems via self-care/home management, therapeutic activities  · Plan of Care Expires: 08/27/20  · Plan of Care Reviewed with: patient, spouse    Subjective     Chief Complaint: None  Patient/Family Comments/goals: Pt wanting to get dressed.  Pt stating she can't believe she had surgery yesterday and going home today.     Occupational Profile:  Living Environment: Lives in 2 story home with , chair lift to second floor, walk-in shower with built-in seat and grab bars  Previous level of function: Mod I/Indep; pt used walker for mobility and has drop foot from prior back surgery (R>L); has adaptive equipment for LB self care tasks if needed.  Pt is a retired  nurse.  Equipment Used at Home:  3-in-1 commode, cane, straight, walker, rolling, rollator(Chair to get to second floor of home. Grab bars in walk-in shower and built-in shower seat.)  Assistance upon Discharge:     Pain/Comfort:  · Pain Rating 1: (5/10 when walking during tx session.)  · Location - Side 1: Left  · Location - Orientation 1: generalized  · Location 1: knee  · Pain Addressed 1: Pre-medicate for activity, Reposition, Distraction, Cessation of Activity  · Pain Rating Post-Intervention 1: 3/10(Resting seated in chair with Polar Ice)    Patients cultural, spiritual, Anabaptist conflicts given the current situation: (None stated.)    Objective:     Communicated with: nurseMelva prior to session.  Patient found up in chair with peripheral IV, cryotherapy, telemetry(Insulin pump) upon OT entry to room.    General Precautions: Standard, diabetic, fall   Orthopedic Precautions:LLE weight bearing as tolerated   Braces: N/A     Occupational Performance:    Bed Mobility:    · NT    Functional Mobility/Transfers:  · Patient completed Sit <> Stand Transfer with Min A progressing to CGA with  rolling walker; pt needing cues for safety and technique  · Functional Mobility: Pt is performing chair transfers at CGA level with RW.  Pt needing verbal and tactile cueing at times.     Activities of Daily Living:  · Feeding:  independence    · Grooming: stand by assistance standing at sink  · Upper Body Dressing: Set up and CGA when standing to pull down dress over hips for safety.    · Lower Body Dressing: Donning underwear - Min A with pt instructed/trained in adaptive techniques; Pt needing Max A with donning left slip-on shoe and Min A with donning right slip-on shoe.  Pt's  instructed in how to assist pt with donning left shoe without pt twisting her knee.  OT asked pt if she wanted to be trained in using a long handle shoe horn to increase Indep with donning slip-on shoes until she can bend down farther  to reach her feet.  Pt stating she has adaptive equipment for LB dressing including a long handle shoe horn which she can use to increase her Indep with donning slip-on shoes while adhering to left TKA precautions.      Cognitive/Visual Perceptual:  Cognitive/Psychosocial Skills:     -       Oriented to: Person, Place, Time and Situation   -       Follows Commands/attention:Follows one-step commands, Follows two-step commands and Follows multistep  commands  -       Communication: clear/fluent  -       Memory: No Deficits noted  -       Safety awareness/insight to disability: intact   -       Mood/Affect/Coping skills/emotional control: Cooperative and Pleasant  Visual/Perceptual:      -No deficits noted      Physical Exam:  Balance:    -       Sitting balance - Good   Standing balacne with RW - CGA  Skin integrity: Visible skin intact and Surgical incision left knee but unable to see due to dressing and ACE wrap  Edema:  Unable to assess due left LE covered.  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  Upper Extremity Range of Motion:  WFL for self care tasks  Upper Extremity Strength: WFL for self care tasks except hand strength  Fine Motor Coordination: WFL for self care tasks but decreased strength in bilateral hands effecting manipulation of grooming items  Gross motor coordination:   Grossly WFL for self care tasks    AMPAC 6 Click ADL:  AMPAC Total Score: 18    Treatment & Education:  Role of OT/POC, left TKA precautions and how to adhere to precautoins during ADLs and ADL mobility, safety strategies to reduce fall risk during ADLs and ADL mobility, use of adaptive equipment for LB self care tasks as needed (pt has equipment and knowledgable on use), safe sit<>stand with RW, correct placement of RW when standing at sink for grooming tasks  Education:    Patient left up in chair with all lines intact, call button in reach,  and PTAWily  present and cryotherapy to elevated left knee.    GOALS:   Multidisciplinary Problems     Occupational Therapy Goals        Problem: Occupational Therapy Goal    Goal Priority Disciplines Outcome Interventions   Occupational Therapy Goal     OT, PT/OT Ongoing, Progressing    Description: Goals to be met by: 7/28/20    Patient will increase functional independence with ADLs by performing:    Verbalize left TKA precautions and how to adhere to TKA precautions during ADLs/ADL mobility.  Groom standing at sink at SBA level adhering to TKA precautions.  Don underwear at Min A level adhering to TKA precautions.  Don shoes at Min A level adhering to TKA precautions.  Toilet/Chair transfers at CGA level adhering to TKA precautions.  Toileting at Min A level adhering to TKA precautions.                        History:     Past Medical History:   Diagnosis Date    Cancer 2010    left breast XRT + lumpectomy; right lunpectomy + Chemotherapy (Dr. Santana) 8 of 12 treatments, stopped for side effects    Cancer     right breast    Cataract     right eye    CKD (chronic kidney disease), stage III     Coronary artery disease     total 4 stents over several years, last 2015    Diabetes mellitus, type 2     Insulin pump    Hyperlipidemia     Hypertension     Thyroid disease     Tremor, essential        Past Surgical History:   Procedure Laterality Date    APPENDECTOMY      BREAST SURGERY Bilateral     lumpectomy    CARDIAC VALVE SURGERY  2019    EYE SURGERY Bilateral     cataract    HYSTERECTOMY      JOINT REPLACEMENT Right     hip    SPINE SURGERY      laminectomy    TRANSCATHETER AORTIC VALVE REPLACEMENT (TAVR) N/A 11/20/2019    Procedure: REPLACEMENT, AORTIC VALVE, TRANSCATHETER (TAVR);  Surgeon: Balaji Shah MD;  Location[facility] CATH LAB;  Service: Cardiology;  Laterality: N/A;       Time Tracking:     OT Date of Treatment: 07/28/20  OT Start Time: 0950  OT Stop Time: 1030  OT Total Time (min): 40  min    Billable Minutes:Evaluation 15  Self Care/Home Management 25    JUAN Arciniega  7/28/2020     independent

## 2022-08-26 ENCOUNTER — TELEPHONE (OUTPATIENT)
Dept: CARDIOLOGY | Facility: CLINIC | Age: 82
End: 2022-08-26
Payer: MEDICARE

## 2022-08-26 NOTE — TELEPHONE ENCOUNTER
----- Message from Alvina Adams MA sent at 8/26/2022 11:15 AM CDT -----  Contact: self  Pt is calling because she has sob for 2 weeks, getting worse,has a valve replacement and she's 82 yrs old.Please call. [phone].

## 2022-09-09 ENCOUNTER — HOSPITAL ENCOUNTER (OUTPATIENT)
Dept: RADIOLOGY | Facility: HOSPITAL | Age: 82
Discharge: HOME | End: 2022-09-09
Attending: INTERNAL MEDICINE
Payer: MEDICARE

## 2022-09-09 ENCOUNTER — OFFICE VISIT (OUTPATIENT)
Dept: CARDIOLOGY | Facility: CLINIC | Age: 82
End: 2022-09-09
Payer: MEDICARE

## 2022-09-09 VITALS
SYSTOLIC BLOOD PRESSURE: 132 MMHG | DIASTOLIC BLOOD PRESSURE: 79 MMHG | HEIGHT: 66 IN | WEIGHT: 172 LBS | BODY MASS INDEX: 27.64 KG/M2 | RESPIRATION RATE: 20 BRPM | HEART RATE: 73 BPM

## 2022-09-09 DIAGNOSIS — I25.118 CORONARY ARTERY DISEASE OF NATIVE ARTERY OF NATIVE HEART WITH STABLE ANGINA PECTORIS: ICD-10-CM

## 2022-09-09 DIAGNOSIS — I50.33 ACUTE ON CHRONIC DIASTOLIC HEART FAILURE: Primary | ICD-10-CM

## 2022-09-09 DIAGNOSIS — I35.0 NONRHEUMATIC AORTIC VALVE STENOSIS: ICD-10-CM

## 2022-09-09 DIAGNOSIS — Z95.2 S/P TAVR (TRANSCATHETER AORTIC VALVE REPLACEMENT): ICD-10-CM

## 2022-09-09 DIAGNOSIS — I13.0 HYPERTENSIVE HEART AND RENAL DISEASE WITH RENAL FAILURE, STAGE 1 THROUGH STAGE 4 OR UNSPECIFIED CHRONIC KIDNEY DISEASE, WITH HEART FAILURE: ICD-10-CM

## 2022-09-09 DIAGNOSIS — Z95.5 S/P CORONARY ARTERY STENT PLACEMENT: ICD-10-CM

## 2022-09-09 DIAGNOSIS — I50.33 ACUTE ON CHRONIC DIASTOLIC HEART FAILURE: ICD-10-CM

## 2022-09-09 PROCEDURE — 93000 EKG 12-LEAD: ICD-10-PCS | Mod: S$GLB,,, | Performed by: INTERNAL MEDICINE

## 2022-09-09 PROCEDURE — 3078F DIAST BP <80 MM HG: CPT | Mod: CPTII,S$GLB,, | Performed by: INTERNAL MEDICINE

## 2022-09-09 PROCEDURE — 3075F PR MOST RECENT SYSTOLIC BLOOD PRESS GE 130-139MM HG: ICD-10-PCS | Mod: CPTII,S$GLB,, | Performed by: INTERNAL MEDICINE

## 2022-09-09 PROCEDURE — 99999 PR PBB SHADOW E&M-EST. PATIENT-LVL IV: ICD-10-PCS | Mod: PBBFAC,,, | Performed by: INTERNAL MEDICINE

## 2022-09-09 PROCEDURE — 1159F MED LIST DOCD IN RCRD: CPT | Mod: CPTII,S$GLB,, | Performed by: INTERNAL MEDICINE

## 2022-09-09 PROCEDURE — 99214 OFFICE O/P EST MOD 30 MIN: CPT | Mod: S$GLB,,, | Performed by: INTERNAL MEDICINE

## 2022-09-09 PROCEDURE — 1160F RVW MEDS BY RX/DR IN RCRD: CPT | Mod: CPTII,S$GLB,, | Performed by: INTERNAL MEDICINE

## 2022-09-09 PROCEDURE — 93000 ELECTROCARDIOGRAM COMPLETE: CPT | Mod: S$GLB,,, | Performed by: INTERNAL MEDICINE

## 2022-09-09 PROCEDURE — 99999 PR PBB SHADOW E&M-EST. PATIENT-LVL IV: CPT | Mod: PBBFAC,,, | Performed by: INTERNAL MEDICINE

## 2022-09-09 PROCEDURE — 71046 X-RAY EXAM CHEST 2 VIEWS: CPT | Mod: TC,PO

## 2022-09-09 PROCEDURE — 1159F PR MEDICATION LIST DOCUMENTED IN MEDICAL RECORD: ICD-10-PCS | Mod: CPTII,S$GLB,, | Performed by: INTERNAL MEDICINE

## 2022-09-09 PROCEDURE — 3288F FALL RISK ASSESSMENT DOCD: CPT | Mod: CPTII,S$GLB,, | Performed by: INTERNAL MEDICINE

## 2022-09-09 PROCEDURE — 3078F PR MOST RECENT DIASTOLIC BLOOD PRESSURE < 80 MM HG: ICD-10-PCS | Mod: CPTII,S$GLB,, | Performed by: INTERNAL MEDICINE

## 2022-09-09 PROCEDURE — 71046 XR CHEST PA AND LATERAL: ICD-10-PCS | Mod: 26,,, | Performed by: RADIOLOGY

## 2022-09-09 PROCEDURE — 3075F SYST BP GE 130 - 139MM HG: CPT | Mod: CPTII,S$GLB,, | Performed by: INTERNAL MEDICINE

## 2022-09-09 PROCEDURE — 1126F AMNT PAIN NOTED NONE PRSNT: CPT | Mod: CPTII,S$GLB,, | Performed by: INTERNAL MEDICINE

## 2022-09-09 PROCEDURE — 1126F PR PAIN SEVERITY QUANTIFIED, NO PAIN PRESENT: ICD-10-PCS | Mod: CPTII,S$GLB,, | Performed by: INTERNAL MEDICINE

## 2022-09-09 PROCEDURE — 1101F PT FALLS ASSESS-DOCD LE1/YR: CPT | Mod: CPTII,S$GLB,, | Performed by: INTERNAL MEDICINE

## 2022-09-09 PROCEDURE — 1101F PR PT FALLS ASSESS DOC 0-1 FALLS W/OUT INJ PAST YR: ICD-10-PCS | Mod: CPTII,S$GLB,, | Performed by: INTERNAL MEDICINE

## 2022-09-09 PROCEDURE — 71046 X-RAY EXAM CHEST 2 VIEWS: CPT | Mod: 26,,, | Performed by: RADIOLOGY

## 2022-09-09 PROCEDURE — 3288F PR FALLS RISK ASSESSMENT DOCUMENTED: ICD-10-PCS | Mod: CPTII,S$GLB,, | Performed by: INTERNAL MEDICINE

## 2022-09-09 PROCEDURE — 99214 PR OFFICE/OUTPT VISIT, EST, LEVL IV, 30-39 MIN: ICD-10-PCS | Mod: S$GLB,,, | Performed by: INTERNAL MEDICINE

## 2022-09-09 PROCEDURE — 1160F PR REVIEW ALL MEDS BY PRESCRIBER/CLIN PHARMACIST DOCUMENTED: ICD-10-PCS | Mod: CPTII,S$GLB,, | Performed by: INTERNAL MEDICINE

## 2022-09-09 RX ORDER — INSULIN LISPRO 100 [IU]/ML
INJECTION, SOLUTION INTRAVENOUS; SUBCUTANEOUS
COMMUNITY
Start: 2022-09-09

## 2022-09-09 NOTE — PROGRESS NOTES
Subjective:   09/09/2022     Patient ID:  Mary Prince is a 81 y.o. female who presents for evaulation of Coronary artery disease involving native coronary artery of      Comes in today with increasing shortness of breath.  She has had increasing edema.    She does have coronary artery disease.  Cardiac catheterization 2019 year ago showed no significant restenoses in the previously stented LAD.  She has not had chest pains or tightness.    She did have severe aortic stenosis and 2 year ago underwent a TAVR by Dr. Latham.  Follow-up echocardiogram showed normal LV function and no significant aortic stenosis.    Hypercholesterolemia is treated with high-dose statin therapy.  A lipid profile shows a total cholesterol be 176, triglycerides 147, HDL 50, , on atorvastatin 80 mg daily.  I would plan to have this rechecked in 6 months.            Patient Active Problem List   Diagnosis    Aortic valve stenosis    Coronary artery disease of native artery of native heart with stable angina pectoris    Mixed hyperlipidemia    Type 2 diabetes mellitus    Acquired hypothyroidism    Epidural intraspinal abscess    Shoulder joint stiffness, bilateral    Bilateral shoulder pain    Essential tremor    S/P TAVR (transcatheter aortic valve replacement)    Insulin pump status    Chronic diastolic heart failure    Osteoarthritis of left knee    Nonrheumatic mitral valve stenosis    S/P coronary artery stent placement    Asthma    Diabetic peripheral neuropathy associated with type 2 diabetes mellitus    Uncontrolled type 2 diabetes mellitus    Primary osteoarthritis of right knee    Old myocardial infarction    Pulmonary fibrosis    Diabetic renal disease    Diabetic neuropathy    Hypertensive heart and renal disease    Chronic kidney disease, stage 3a    Atherosclerosis of aorta        Past Medical History:   Diagnosis Date    Anticoagulant long-term use     asa    AVN (avascular necrosis of bone)     Cancer 2010    left  breast XRT + lumpectomy; right lunpectomy + Chemotherapy (Dr. Santana) 8 of 12 treatments, stopped for side effects    Cancer     right breast    Cataract     right eye    CKD (chronic kidney disease) stage 3, GFR 30-59 ml/min     CKD (chronic kidney disease), stage III     Coronary artery disease     total 4 stents over several years, last 2015    Diabetes mellitus, type 2     Insulin pump    Diabetic neuropathy     HLD (hyperlipidemia)     Hypertension     Insulin pump in place     Thyroid disease     Tremor, essential        Past Surgical History:   Procedure Laterality Date    APPENDECTOMY      BREAST SURGERY Bilateral     lumpectomy    CARDIAC VALVE SURGERY  2019    EYE SURGERY Bilateral     cataract    HYSTERECTOMY      JOINT REPLACEMENT Right     hip    JOINT REPLACEMENT Left     knee    SPINE SURGERY      laminectomy    TRANSCATHETER AORTIC VALVE REPLACEMENT (TAVR) N/A 11/20/2019    Procedure: REPLACEMENT, AORTIC VALVE, TRANSCATHETER (TAVR);  Surgeon: Balaji Shah MD;  Location: [facility] CATH LAB;  Service: Cardiology;  Laterality: N/A;       Review of patient's allergies indicates:   Allergen Reactions    Prozac [fluoxetine] Other (See Comments)    Sulfa (sulfonamide antibiotics)      Other reaction(s): Unknown         Current Outpatient Medications:     acetaminophen (TYLENOL) 325 MG tablet, Take 2 tablets (650 mg total) by mouth every 6 (six) hours as needed for Pain., Disp: , Rfl: 0    ALPRAZolam (XANAX) 0.5 MG tablet, Take 0.5 mg by mouth nightly as needed. , Disp: , Rfl:     atorvastatin (LIPITOR) 80 MG tablet, Take 80 mg by mouth every evening. , Disp: , Rfl:     escitalopram oxalate (LEXAPRO) 10 MG tablet, Take 10 mg by mouth once daily. , Disp: , Rfl:     furosemide (LASIX) 20 MG tablet, Take 2 tablets (40 mg total) by mouth once daily., Disp: , Rfl:     gabapentin (NEURONTIN) 600 MG tablet, Take 2 tablets (1,200 mg total) by mouth 3 (three) times daily., Disp: 180 tablet, Rfl: 2    insulin aspart  U-100 (NOVOLOG) 100 unit/mL injection, Insulin pump, Disp: , Rfl:     insulin lispro 100 unit/mL injection, Inject into the skin., Disp: , Rfl:     letrozole (FEMARA) 2.5 mg Tab, Take 2.5 mg by mouth once daily ., Disp: , Rfl:     levothyroxine (SYNTHROID) 125 MCG tablet, Take 125 mcg by mouth before breakfast., Disp: , Rfl:     oxyCODONE (ROXICODONE) 5 MG immediate release tablet, Take 1 tablet (5 mg total) by mouth every 4 (four) hours as needed for Pain., Disp: 30 tablet, Rfl: 0    potassium aminobenzoate 500 mg Cap, Take by mouth once daily., Disp: , Rfl:     primidone (MYSOLINE) 50 MG Tab, Take 50 mg by mouth 3 (three) times daily. , Disp: , Rfl:     propranoloL (INDERAL LA) 80 MG 24 hr capsule, Take 1 capsule (80 mg total) by mouth once daily., Disp: 90 capsule, Rfl: 3    ramipriL (ALTACE) 10 MG capsule, Take 10 mg by mouth once daily. , Disp: , Rfl:     RESTASIS MULTIDOSE 0.05 % Drop, Place 1 drop into both eyes as needed. , Disp: , Rfl:     vit C/E/zinc/lutein/zeaxanthin (OCUVITE EYE HEALTH ORAL), Take 1 tablet by mouth once daily. , Disp: , Rfl:     aspirin 81 MG Chew, Take 1 tablet (81 mg total) by mouth 2 (two) times daily. for 14 days (Patient taking differently: Take 81 mg by mouth once daily.), Disp: , Rfl: 0  No current facility-administered medications for this visit.    Facility-Administered Medications Ordered in Other Visits:     lidocaine (PF) 10 mg/ml (1%) injection 10 mg, 1 mL, Intradermal, Once, Azeem Randhawa MD            Objective:   Review of Systems   Constitutional: Negative for chills, decreased appetite, diaphoresis, fever, malaise/fatigue, weight gain and weight loss.   HENT:  Negative for congestion, hearing loss, nosebleeds and sore throat.    Eyes:  Negative for double vision, vision loss in left eye and vision loss in right eye.   Cardiovascular:  Positive for leg swelling. Negative for chest pain, claudication, cyanosis, dyspnea on exertion, irregular heartbeat,  near-syncope, orthopnea, palpitations, paroxysmal nocturnal dyspnea and syncope.   Respiratory:  Negative for cough, hemoptysis, shortness of breath, sleep disturbances due to breathing, snoring and wheezing.    Endocrine: Negative for cold intolerance and heat intolerance.   Hematologic/Lymphatic: Negative for adenopathy and bleeding problem. Bruises/bleeds easily.   Skin:  Negative for itching, nail changes and rash.   Musculoskeletal:  Positive for arthritis, back pain, falls, gout, muscle cramps and muscle weakness. Negative for myalgias.   Gastrointestinal:  Negative for bloating, abdominal pain, anorexia, constipation, diarrhea, hematochezia, melena, nausea and vomiting.   Genitourinary:  Negative for frequency, hematuria and nocturia.   Neurological:  Positive for loss of balance and tremors. Negative for focal weakness, headaches, vertigo and weakness.   Psychiatric/Behavioral:  Negative for altered mental status, depression and memory loss.    Allergic/Immunologic: Negative for hives.     Vitals:    09/09/22 1447   BP: 132/79   Pulse: 73   Resp: 20       Physical Exam  Vitals reviewed.   Constitutional:       General: She is not in acute distress.     Appearance: She is well-developed.   HENT:      Head: Normocephalic and atraumatic.      Nose: Nose normal.   Eyes:      Conjunctiva/sclera: Conjunctivae normal.      Pupils: Pupils are equal, round, and reactive to light.   Neck:      Vascular: No JVD.   Cardiovascular:      Rate and Rhythm: Normal rate and regular rhythm.      Pulses: Intact distal pulses.           Carotid pulses are 1+ on the right side and 1+ on the left side.       Radial pulses are 1+ on the right side and 1+ on the left side.        Femoral pulses are 1+ on the right side and 1+ on the left side.       Popliteal pulses are 1+ on the right side and 1+ on the left side.        Dorsalis pedis pulses are 1+ on the right side and 1+ on the left side.        Posterior tibial pulses are 1+ on  the right side and 1+ on the left side.      Heart sounds: No murmur (Grade 1 systolic ejection murmur) heard.    No friction rub. No gallop.      Comments: 2+ lower extremity edema  Pulmonary:      Effort: Pulmonary effort is normal. No respiratory distress.      Breath sounds: Normal breath sounds. No wheezing or rales.   Chest:      Chest wall: No tenderness.   Abdominal:      General: Bowel sounds are normal. There is no distension.      Palpations: Abdomen is soft.      Tenderness: There is no abdominal tenderness.   Musculoskeletal:         General: No tenderness or deformity. Normal range of motion.      Cervical back: Normal range of motion and neck supple.      Right lower leg: Edema (One to 2+ edema) present.      Left lower leg: Edema present.   Skin:     General: Skin is warm and dry.      Findings: No erythema or rash.   Neurological:      Mental Status: She is alert and oriented to person, place, and time.      Cranial Nerves: No cranial nerve deficit.      Motor: No abnormal muscle tone.      Coordination: Coordination normal.      Comments: Positive tremor   Psychiatric:         Behavior: Behavior normal.         Thought Content: Thought content normal.         Judgment: Judgment normal.     Recent echocardiogram  TTE Today  The left ventricle is normal in size with normal systolic function. The estimated ejection fraction is 65%.Indeterminate diastolic function.  Normal right ventricular systolic function. .  Mild right atrial enlargement.  There is a transcutaneously-placed aortic bioprosthesis present. There is no aortic aortic insufficiency present. Prosthetic aortic valve is normal.  There is mild mitral stenosis.  The mean diastolic gradient across the mitral valve is 6 mmHg ; MVA 2.37  The MR is mild to mild-moderate ( 1-2+).  Normal central venous pressure (3 mmHg).  The estimated PA systolic pressure is 31 mmHg.    No visits with results within 1 Year(s) from this visit.   Latest known visit  with results is:   No results displayed because visit has over 200 results.           Assessment and Plan:     Acute on chronic diastolic heart failure  -     Basic Metabolic Panel; Future; Expected date: 09/16/2022  -     CBC Auto Differential; Future; Expected date: 09/16/2022  -     NT-Pro Natriuretic Peptide; Future; Expected date: 09/16/2022  -     X-Ray Chest PA And Lateral; Future; Expected date: 09/09/2022  -     Echo; Future; Expected date: 09/16/2022  -     EKG 12-lead    Coronary artery disease of native artery of native heart with stable angina pectoris    Nonrheumatic aortic valve stenosis    Hypertensive heart and renal disease with renal failure, stage 1 through stage 4 or unspecified chronic kidney disease, with heart failure    S/P TAVR (transcatheter aortic valve replacement)    S/P coronary artery stent placement       Patient with increasing shortness of breath have her take furosemide 40 mg twice a day for the next 2 days.  Chest x-ray today, blood work repeated.  Will treat his diastolic heart failure.  No follow-ups on file.

## 2022-10-14 ENCOUNTER — TELEPHONE (OUTPATIENT)
Dept: NEUROSURGERY | Facility: CLINIC | Age: 82
End: 2022-10-14
Payer: MEDICARE

## 2022-10-19 ENCOUNTER — TELEPHONE (OUTPATIENT)
Dept: CARDIOLOGY | Facility: CLINIC | Age: 82
End: 2022-10-19
Payer: MEDICARE

## 2022-10-20 ENCOUNTER — TELEPHONE (OUTPATIENT)
Dept: CARDIOLOGY | Facility: CLINIC | Age: 82
End: 2022-10-20
Payer: MEDICARE

## 2022-10-20 NOTE — TELEPHONE ENCOUNTER
----- Message from Alvina Adams MA sent at 10/20/2022  4:52 PM CDT -----  Contact: self  Pt is returning your call. Please call [phone]. Dr. Velazco pt

## 2022-10-24 ENCOUNTER — TELEPHONE (OUTPATIENT)
Dept: NEUROSURGERY | Facility: CLINIC | Age: 82
End: 2022-10-24
Payer: MEDICARE

## 2022-10-27 ENCOUNTER — OFFICE VISIT (OUTPATIENT)
Dept: NEUROSURGERY | Facility: CLINIC | Age: 82
End: 2022-10-27
Payer: MEDICARE

## 2022-10-27 DIAGNOSIS — G25.0 ESSENTIAL TREMOR: Primary | ICD-10-CM

## 2022-10-27 PROCEDURE — 1159F MED LIST DOCD IN RCRD: CPT | Mod: CPTII,S$GLB,, | Performed by: NEUROLOGICAL SURGERY

## 2022-10-27 PROCEDURE — 1159F PR MEDICATION LIST DOCUMENTED IN MEDICAL RECORD: ICD-10-PCS | Mod: CPTII,S$GLB,, | Performed by: NEUROLOGICAL SURGERY

## 2022-10-27 PROCEDURE — 1100F PTFALLS ASSESS-DOCD GE2>/YR: CPT | Mod: CPTII,S$GLB,, | Performed by: NEUROLOGICAL SURGERY

## 2022-10-27 PROCEDURE — 1100F PR PT FALLS ASSESS DOC 2+ FALLS/FALL W/INJURY/YR: ICD-10-PCS | Mod: CPTII,S$GLB,, | Performed by: NEUROLOGICAL SURGERY

## 2022-10-27 PROCEDURE — 99999 PR PBB SHADOW E&M-EST. PATIENT-LVL II: CPT | Mod: PBBFAC,,, | Performed by: NEUROLOGICAL SURGERY

## 2022-10-27 PROCEDURE — 99999 PR PBB SHADOW E&M-EST. PATIENT-LVL II: ICD-10-PCS | Mod: PBBFAC,,, | Performed by: NEUROLOGICAL SURGERY

## 2022-10-27 PROCEDURE — 3288F PR FALLS RISK ASSESSMENT DOCUMENTED: ICD-10-PCS | Mod: CPTII,S$GLB,, | Performed by: NEUROLOGICAL SURGERY

## 2022-10-27 PROCEDURE — 99205 OFFICE O/P NEW HI 60 MIN: CPT | Mod: S$GLB,,, | Performed by: NEUROLOGICAL SURGERY

## 2022-10-27 PROCEDURE — 3288F FALL RISK ASSESSMENT DOCD: CPT | Mod: CPTII,S$GLB,, | Performed by: NEUROLOGICAL SURGERY

## 2022-10-27 PROCEDURE — 99205 PR OFFICE/OUTPT VISIT, NEW, LEVL V, 60-74 MIN: ICD-10-PCS | Mod: S$GLB,,, | Performed by: NEUROLOGICAL SURGERY

## 2022-10-27 NOTE — PROGRESS NOTES
"Neurosurgery  History & Physical    SUBJECTIVE:     Chief Complaint: essential tremor     History of Present Illness:  Mary Prince is a 81 y.o. right-handed female who is referred by Dr. Gardiner for consideration of DBS therapy for essential tremor. The tremor first began about 10 years. The tremor is mostly present in her hands, and the right side is more bothersome than the left. She also endorses some gait difficulty. She fell out of the front door into the shrubs two days ago. She denies head trauma but needed assistance from Juan and two neighbors to get up.     She has tried propranolol and primidone. She thinks that the tremor may respond to EtOH. Her mother's family had tremor. They have good longevity (e.g. her maternal aunt with tremor lived to be 96). She has felt "strange" since the recent increase in primidone dosing; she is no longer taking them because of the side effect and is taking a smaller dose instead.     Some days, she cannot feed herself and must depend on her significant other, Juan, who presents with her today and helps to provide some of the history. (He is her power of .) She can no longer write or do her own checkbook.     She cannot drive. She is tearful at times during our interview.     Her goals for surgery are to be able to write, drive (she just bought a new car, but Juan drives it). She also has trouble sleeping through the night (and often wakes up at 3 or 4 am). She and Juan live together in a 2-story home that has a chairlift.     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     The patient denies any bleeding or anesthetic complications with any previous surgery. She takes ASA81 for TAVR (2019 with Dr. Shah).     She has history of breast cancer; during the course of treatment she lost her hair and is not interested in having her head shaved. She also has history of lumbar " fusion with Dr. Anguiano that was complicated by Klebsiella and ultimately fixed by Dr. Maurer.       Review of patient's allergies indicates:   Allergen Reactions    Prozac [fluoxetine] Other (See Comments)    Sulfa (sulfonamide antibiotics)      Other reaction(s): Unknown       Current Outpatient Medications   Medication Sig Dispense Refill    acetaminophen (TYLENOL) 325 MG tablet Take 2 tablets (650 mg total) by mouth every 6 (six) hours as needed for Pain.  0    ALPRAZolam (XANAX) 0.5 MG tablet Take 0.5 mg by mouth nightly as needed.       atorvastatin (LIPITOR) 80 MG tablet Take 80 mg by mouth every evening.       escitalopram oxalate (LEXAPRO) 10 MG tablet Take 10 mg by mouth once daily.       furosemide (LASIX) 20 MG tablet Take 2 tablets (40 mg total) by mouth once daily.      gabapentin (NEURONTIN) 600 MG tablet Take 2 tablets (1,200 mg total) by mouth 3 (three) times daily. 180 tablet 2    insulin aspart U-100 (NOVOLOG) 100 unit/mL injection Insulin pump      insulin lispro 100 unit/mL injection Inject into the skin.      levothyroxine (SYNTHROID) 125 MCG tablet Take 125 mcg by mouth before breakfast.      primidone (MYSOLINE) 50 MG Tab Take 50 mg by mouth 3 (three) times daily.       propranoloL (INDERAL LA) 80 MG 24 hr capsule Take 1 capsule (80 mg total) by mouth once daily. 90 capsule 3    ramipriL (ALTACE) 10 MG capsule Take 10 mg by mouth once daily.       RESTASIS MULTIDOSE 0.05 % Drop Place 1 drop into both eyes as needed.       vit C/E/zinc/lutein/zeaxanthin (OCUVITE EYE HEALTH ORAL) Take 1 tablet by mouth once daily.       aspirin 81 MG Chew Take 1 tablet (81 mg total) by mouth 2 (two) times daily. for 14 days (Patient taking differently: Take 81 mg by mouth once daily.)  0    letrozole (FEMARA) 2.5 mg Tab Take 2.5 mg by mouth once daily .      oxyCODONE (ROXICODONE) 5 MG immediate release tablet Take 1 tablet (5 mg total) by mouth every 4 (four) hours as needed for Pain. (Patient not taking:  Reported on 10/27/2022) 30 tablet 0    potassium aminobenzoate 500 mg Cap Take by mouth once daily.       No current facility-administered medications for this visit.     Facility-Administered Medications Ordered in Other Visits   Medication Dose Route Frequency Provider Last Rate Last Admin    lidocaine (PF) 10 mg/ml (1%) injection 10 mg  1 mL Intradermal Once Azeem Randhawa MD           Past Medical History:   Diagnosis Date    Anticoagulant long-term use     asa    AVN (avascular necrosis of bone)     Cancer 2010    left breast XRT + lumpectomy; right lunpectomy + Chemotherapy (Dr. Santana) 8 of 12 treatments, stopped for side effects    Cancer     right breast    Cataract     right eye    CKD (chronic kidney disease) stage 3, GFR 30-59 ml/min     CKD (chronic kidney disease), stage III     Coronary artery disease     total 4 stents over several years, last 2015    Diabetes mellitus, type 2     Insulin pump    Diabetic neuropathy     HLD (hyperlipidemia)     Hypertension     Insulin pump in place     Thyroid disease     Tremor, essential      Past Surgical History:   Procedure Laterality Date    APPENDECTOMY      BREAST SURGERY Bilateral     lumpectomy    CARDIAC VALVE SURGERY  2019    EYE SURGERY Bilateral     cataract    HYSTERECTOMY      JOINT REPLACEMENT Right     hip    JOINT REPLACEMENT Left     knee    SPINE SURGERY      laminectomy    TRANSCATHETER AORTIC VALVE REPLACEMENT (TAVR) N/A 11/20/2019    Procedure: REPLACEMENT, AORTIC VALVE, TRANSCATHETER (TAVR);  Surgeon: Balaji Shah MD;  Location: [facility] CATH LAB;  Service: Cardiology;  Laterality: N/A;     Family History       Problem Relation (Age of Onset)    No Known Problems Mother, Father, Sister, Brother, Maternal Aunt, Maternal Uncle, Paternal Aunt, Paternal Uncle, Maternal Grandmother, Maternal Grandfather, Paternal Grandmother, Paternal Grandfather          Social History     Socioeconomic History    Marital status:    Tobacco  Use    Smoking status: Never    Smokeless tobacco: Never   Substance and Sexual Activity    Alcohol use: Yes     Comment: social    Drug use: No   Social History Narrative    Registered nurse now retired; 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   HENT:  Negative for nosebleeds.         Denies anosmia   Eyes:  Negative for visual disturbance.   Respiratory:  Negative for shortness of breath.    Cardiovascular:  Negative for chest pain.   Gastrointestinal:  Positive for constipation.        She has explosive bowel movements every three days or so    Endocrine: Positive for cold intolerance.   Genitourinary:  Negative for difficulty urinating.   Musculoskeletal:  Negative for neck pain.   Skin:  Negative for color change.   Neurological:  Positive for tremors.   Hematological:  Bruises/bleeds easily.   Psychiatric/Behavioral:  Negative for hallucinations. The patient is nervous/anxious.      OBJECTIVE:     Vital Signs     There is no height or weight on file to calculate BMI.      Physical Exam:    Constitutional: She appears well-developed and well-nourished. No distress.     Eyes: EOM are normal.     Abdominal: Soft.     Skin: Skin displays no rash on extremities.     Psych/Behavior: She is alert. She is oriented to person, place, and time.     Musculoskeletal:      Comments: Presents in wheelchair     Neurological:   Mild tremor, about equal bilaterally        Pulmonary: nonlabored respirations       Diagnostic Results:  No imaging available for review     ASSESSMENT/PLAN:     Mary Prince is a 81 y.o. female referred by Dr. Gardiner for consideration of DBS placement for essential tremor.     She still needs to complete some pre-surgical workup and be discussed with the [facility] movement disorders group in an interdisciplinary fashion prior to proceeding with surgery. She needs  neuropsychological evaluation, and she will also need a navigation-quality contrasted MRI scan for planning.     We had a lengthy discussion about the risks, benefits, and alternatives to deep brain stimulation.  Risks include, but are not limited to, bleeding, pain, infection, scarring, need for further/repeat procedure, failure to slow the progression of neurodegenerative disease symptoms, speech difficulty, balance difficulty, cognitive changes, loss of inhibition, intracranial hemorrhage, venous infarct, seizure, stroke, paralysis, and death.  Hardware-related complications may also occur.  This is an implanted device, meaning that any infection elsewhere in the body mass be treated immediately to minimize the risk of blood stream infection seeding the device. Should this happen, it is possible that the entire system would require removal. We also discussed that some patients with ET stimulation may become refractory.      We also reviewed the work flow employed at [facility] for placement of deep brain stimulation devices.  We discussed that the patient would be admitted for placement of a stereotactic head frame.  The patient would be transferred to CT scan to obtain a registration CT scan.  This would be merged with the already obtained MRI. The patient would be brought back to the operating room for definitive placement of the DBS lead.  This is to be done while awake. The patient expressed understanding thereof. Subsequently, the patient would undergo asleep implantation of extension cable and pulse generator.  The device would subsequently be programmed by our movement disorders colleagues.      She will need to be off of Aspirin and all other blood thinners for 10 days prior to surgery (about 3 weeks altogether).    I look forward to discussing the patient      I have encouraged the patient to contact the clinic in interim with any questions, concerns, or adverse clinical change.      I spent over an hour on  this encounter, more than half in direct consultation.

## 2022-11-07 ENCOUNTER — TELEPHONE (OUTPATIENT)
Dept: NEUROSURGERY | Facility: CLINIC | Age: 82
End: 2022-11-07
Payer: MEDICARE

## 2022-11-07 NOTE — TELEPHONE ENCOUNTER
"Spoke to patient who expresses desire to proceed with DBS procedure discussed with Dr. Ellis at their last office visit. Pt states she had a neuropsych eval done a "couple weeks" ago. Informed patient I would call her back after speaking to Dr. Ellis about timing and any required pre-op testing. Pt verbalized understanding.       ----- Message from Genna Meraz sent at 11/4/2022 12:14 PM CDT -----  Regarding: PT req return call to discuss new med and having more falls  Contact: [phone]  Please contact patient to discuss the new med the Primidone  She says she has been having more frequent falls and has fallen now 3 times since her last visit her Dr. Ellis  She really needs to speak to someone today - she has abner back the med she is unsure if maybe was too strong     Please contact patient to discuss moving forward with the DBS procedure, please call at your earliest opportunity      Patient can be contacted @# [phone]     "

## 2022-11-10 ENCOUNTER — TELEPHONE (OUTPATIENT)
Dept: CARDIOLOGY | Facility: CLINIC | Age: 82
End: 2022-11-10
Payer: MEDICARE

## 2022-11-10 NOTE — TELEPHONE ENCOUNTER
----- Message from Alvina Adams MA sent at 11/10/2022  3:07 PM CST -----  Contact: self  Pt is calling to get labs (have orders) done and stress test( no orders). Please call [phone].

## 2022-11-14 ENCOUNTER — TELEPHONE (OUTPATIENT)
Dept: CARDIOLOGY | Facility: CLINIC | Age: 82
End: 2022-11-14
Payer: MEDICARE

## 2022-11-14 NOTE — TELEPHONE ENCOUNTER
----- Message from Felicitas Johnson MA sent at 11/14/2022 10:49 AM CST -----  The patient is returning your phone called please call [phone]. Thank you.

## 2022-11-17 ENCOUNTER — TELEPHONE (OUTPATIENT)
Dept: CARDIOLOGY | Facility: CLINIC | Age: 82
End: 2022-11-17
Payer: MEDICARE

## 2022-11-17 NOTE — TELEPHONE ENCOUNTER
----- Message from Felicitas Johnson MA sent at 11/17/2022  2:39 PM CST -----  Lawanda the patient would like to talk to you about some test she need to schedule please call [phone]. Thank you.

## 2022-11-18 ENCOUNTER — TELEPHONE (OUTPATIENT)
Dept: NEUROSURGERY | Facility: CLINIC | Age: 82
End: 2022-11-18
Payer: MEDICARE

## 2022-11-18 NOTE — TELEPHONE ENCOUNTER
Per Dr. Ellis, pt needs to f/u with Dr. Gardiner.     ----- Message from Yasmany Teran RN sent at 11/17/2022  4:16 PM CST -----  Regarding: FW: appt for surg    ----- Message -----  From: Tremontana Chevalier  Sent: 11/17/2022  11:56 AM CST  To: [facility] Staff  Subject: appt for surg                                     Pt clling to spk with someone in Dr. MILTON Ellis's office regarding moving forward with scheduling the surgery for tremors. Pls call pt@ [phone]

## 2022-12-14 ENCOUNTER — TELEPHONE (OUTPATIENT)
Dept: CARDIOLOGY | Facility: CLINIC | Age: 82
End: 2022-12-14
Payer: MEDICARE

## 2023-02-15 ENCOUNTER — TELEPHONE (OUTPATIENT)
Dept: CARDIOLOGY | Facility: CLINIC | Age: 83
End: 2023-02-15
Payer: MEDICARE

## 2023-02-15 NOTE — TELEPHONE ENCOUNTER
----- Message from Alvina Adams MA sent at 2/15/2023  4:46 PM CST -----  Contact: self  Pt is returning your call. Please [phone].

## 2023-04-21 ENCOUNTER — TELEPHONE (OUTPATIENT)
Dept: NEUROSURGERY | Facility: CLINIC | Age: 83
End: 2023-04-21
Payer: MEDICARE

## 2023-04-21 NOTE — TELEPHONE ENCOUNTER
Mailbox full      ----- Message from Raysa Solis MA sent at 4/20/2023  4:40 PM CDT -----  Contact: [phone]    ----- Message -----  From: Casper Beltrán MA  Sent: 4/20/2023   1:35 PM CDT  To: [facility] Staff    Patient is calling to reschedule missed appt from December due to significant fall she had last year.  The patient can be reached at  [phone]

## 2023-08-22 NOTE — ASSESSMENT & PLAN NOTE
Spinal    Diagnosis: right hip arthroplasty  Patient location during procedure: OR  Start time: 8/22/2023 7:22 AM  Timeout: 8/22/2023 7:22 AM  End time: 8/22/2023 7:30 AM    Staffing  Authorizing Provider: Hemanth Baldwin MD  Performing Provider: Hemanth Baldwin MD    Staffing  Performed by: Hemanth Baldwin MD  Authorized by: Hemanth Baldwin MD    Spinal Block  Patient position: sitting  Prep: ChloraPrep  Patient monitoring: heart rate, cardiac monitor, continuous pulse ox and frequent blood pressure checks  Approach: left paramedian  Location: L3-4  Injection technique: single shot  CSF Fluid: clear free-flowing CSF  Needle  Needle type: Quincke   Needle gauge: 22 G  Needle length: 3.5 in  Additional Documentation: incremental injection, negative aspiration for heme and no paresthesia on injection  Needle localization: anatomical landmarks  Assessment  Sensory level: T10   Dermatomal levels determined by pinch or prick  Ease of block: easy  Patient's tolerance of the procedure: comfortable throughout block and no complaints             Successful left transfemoral 26 mm evolute valve for heavily calcified LV outflow tract and severe aortic stenosis. TTE today personally reviewed which showed

## 2023-09-27 ENCOUNTER — OFFICE VISIT (OUTPATIENT)
Dept: CARDIOLOGY | Facility: CLINIC | Age: 83
End: 2023-09-27
Payer: MEDICARE

## 2023-09-27 VITALS
SYSTOLIC BLOOD PRESSURE: 113 MMHG | BODY MASS INDEX: 25.86 KG/M2 | DIASTOLIC BLOOD PRESSURE: 66 MMHG | WEIGHT: 160.94 LBS | HEART RATE: 78 BPM | HEIGHT: 66 IN

## 2023-09-27 DIAGNOSIS — I25.2 OLD MYOCARDIAL INFARCTION: ICD-10-CM

## 2023-09-27 DIAGNOSIS — Z95.5 S/P CORONARY ARTERY STENT PLACEMENT: ICD-10-CM

## 2023-09-27 DIAGNOSIS — I50.32 CHRONIC DIASTOLIC HEART FAILURE: Primary | ICD-10-CM

## 2023-09-27 DIAGNOSIS — I13.0 HYPERTENSIVE HEART AND RENAL DISEASE WITH RENAL FAILURE, STAGE 1 THROUGH STAGE 4 OR UNSPECIFIED CHRONIC KIDNEY DISEASE, WITH HEART FAILURE: ICD-10-CM

## 2023-09-27 DIAGNOSIS — I34.2 NONRHEUMATIC MITRAL VALVE STENOSIS: ICD-10-CM

## 2023-09-27 DIAGNOSIS — E78.2 MIXED HYPERLIPIDEMIA: ICD-10-CM

## 2023-09-27 DIAGNOSIS — I70.0 ATHEROSCLEROSIS OF AORTA: ICD-10-CM

## 2023-09-27 DIAGNOSIS — Z95.2 S/P TAVR (TRANSCATHETER AORTIC VALVE REPLACEMENT): ICD-10-CM

## 2023-09-27 DIAGNOSIS — I25.118 CORONARY ARTERY DISEASE OF NATIVE ARTERY OF NATIVE HEART WITH STABLE ANGINA PECTORIS: ICD-10-CM

## 2023-09-27 PROCEDURE — 3288F FALL RISK ASSESSMENT DOCD: CPT | Mod: CPTII,S$GLB,, | Performed by: INTERNAL MEDICINE

## 2023-09-27 PROCEDURE — 99999 PR PBB SHADOW E&M-EST. PATIENT-LVL III: CPT | Mod: PBBFAC,,, | Performed by: INTERNAL MEDICINE

## 2023-09-27 PROCEDURE — 3074F PR MOST RECENT SYSTOLIC BLOOD PRESSURE < 130 MM HG: ICD-10-PCS | Mod: CPTII,S$GLB,, | Performed by: INTERNAL MEDICINE

## 2023-09-27 PROCEDURE — 3288F PR FALLS RISK ASSESSMENT DOCUMENTED: ICD-10-PCS | Mod: CPTII,S$GLB,, | Performed by: INTERNAL MEDICINE

## 2023-09-27 PROCEDURE — 1160F PR REVIEW ALL MEDS BY PRESCRIBER/CLIN PHARMACIST DOCUMENTED: ICD-10-PCS | Mod: CPTII,S$GLB,, | Performed by: INTERNAL MEDICINE

## 2023-09-27 PROCEDURE — 3078F DIAST BP <80 MM HG: CPT | Mod: CPTII,S$GLB,, | Performed by: INTERNAL MEDICINE

## 2023-09-27 PROCEDURE — 1126F PR PAIN SEVERITY QUANTIFIED, NO PAIN PRESENT: ICD-10-PCS | Mod: CPTII,S$GLB,, | Performed by: INTERNAL MEDICINE

## 2023-09-27 PROCEDURE — 1159F MED LIST DOCD IN RCRD: CPT | Mod: CPTII,S$GLB,, | Performed by: INTERNAL MEDICINE

## 2023-09-27 PROCEDURE — 99214 PR OFFICE/OUTPT VISIT, EST, LEVL IV, 30-39 MIN: ICD-10-PCS | Mod: S$GLB,,, | Performed by: INTERNAL MEDICINE

## 2023-09-27 PROCEDURE — 3074F SYST BP LT 130 MM HG: CPT | Mod: CPTII,S$GLB,, | Performed by: INTERNAL MEDICINE

## 2023-09-27 PROCEDURE — 1160F RVW MEDS BY RX/DR IN RCRD: CPT | Mod: CPTII,S$GLB,, | Performed by: INTERNAL MEDICINE

## 2023-09-27 PROCEDURE — 99214 OFFICE O/P EST MOD 30 MIN: CPT | Mod: S$GLB,,, | Performed by: INTERNAL MEDICINE

## 2023-09-27 PROCEDURE — 99999 PR PBB SHADOW E&M-EST. PATIENT-LVL III: ICD-10-PCS | Mod: PBBFAC,,, | Performed by: INTERNAL MEDICINE

## 2023-09-27 PROCEDURE — 1126F AMNT PAIN NOTED NONE PRSNT: CPT | Mod: CPTII,S$GLB,, | Performed by: INTERNAL MEDICINE

## 2023-09-27 PROCEDURE — 1100F PR PT FALLS ASSESS DOC 2+ FALLS/FALL W/INJURY/YR: ICD-10-PCS | Mod: CPTII,S$GLB,, | Performed by: INTERNAL MEDICINE

## 2023-09-27 PROCEDURE — 1100F PTFALLS ASSESS-DOCD GE2>/YR: CPT | Mod: CPTII,S$GLB,, | Performed by: INTERNAL MEDICINE

## 2023-09-27 PROCEDURE — 1159F PR MEDICATION LIST DOCUMENTED IN MEDICAL RECORD: ICD-10-PCS | Mod: CPTII,S$GLB,, | Performed by: INTERNAL MEDICINE

## 2023-09-27 PROCEDURE — 3078F PR MOST RECENT DIASTOLIC BLOOD PRESSURE < 80 MM HG: ICD-10-PCS | Mod: CPTII,S$GLB,, | Performed by: INTERNAL MEDICINE

## 2023-09-27 RX ORDER — LIFITEGRAST 50 MG/ML
SOLUTION/ DROPS OPHTHALMIC DAILY PRN
COMMUNITY
Start: 2023-04-04

## 2023-09-27 NOTE — PROGRESS NOTES
Subjective:   09/27/2023     Patient ID:  Mary Prince is a 82 y.o. female who presents for [facility] Follow Up        Comes in today with continued shortness of breath.  She has had stable edema.    She does have coronary artery disease.  Cardiac catheterization 2019 year ago showed no significant restenoses in the previously stented LAD.  She has not had chest pains or tightness.    She did have severe aortic stenosis and 3 year ago underwent a TAVR by Dr. Shah.  Follow-up echocardiogram showed normal LV function and no significant aortic stenosis.    Hypercholesterolemia is treated with high-dose statin therapy.  A lipid profile previously shows a total cholesterol be 176, triglycerides 147, HDL 50, , on atorvastatin 80 mg daily.  I would plan to have this rechecked by PCP.    She did have a fall, admitted to , now sees Dr. Fernandes              Patient Active Problem List   Diagnosis    Aortic valve stenosis    Coronary artery disease of native artery of native heart with stable angina pectoris    Mixed hyperlipidemia    Type 2 diabetes mellitus    Acquired hypothyroidism    Epidural intraspinal abscess    Shoulder joint stiffness, bilateral    Bilateral shoulder pain    Essential tremor    S/P TAVR (transcatheter aortic valve replacement)    Insulin pump status    Chronic diastolic heart failure    Osteoarthritis of left knee    Nonrheumatic mitral valve stenosis    S/P coronary artery stent placement    Asthma    Diabetic peripheral neuropathy associated with type 2 diabetes mellitus    Uncontrolled type 2 diabetes mellitus    Primary osteoarthritis of right knee    Old myocardial infarction    Pulmonary fibrosis    Diabetic renal disease    Diabetic neuropathy    Hypertensive heart and renal disease    Chronic kidney disease, stage 3a    Atherosclerosis of aorta        Past Medical History:   Diagnosis Date    Anticoagulant long-term use     asa    AVN (avascular necrosis of  bone)     Cancer 2010    left breast XRT + lumpectomy; right lunpectomy + Chemotherapy (Dr. Santana) 8 of 12 treatments, stopped for side effects    Cancer     right breast    Cataract     right eye    CKD (chronic kidney disease) stage 3, GFR 30-59 ml/min     CKD (chronic kidney disease), stage III     Coronary artery disease     total 4 stents over several years, last 2015    Diabetes mellitus, type 2     Insulin pump    Diabetic neuropathy     HLD (hyperlipidemia)     Hypertension     Insulin pump in place     Thyroid disease     Tremor, essential        Past Surgical History:   Procedure Laterality Date    APPENDECTOMY      BREAST SURGERY Bilateral     lumpectomy    CARDIAC VALVE SURGERY  2019    EYE SURGERY Bilateral     cataract    HYSTERECTOMY      JOINT REPLACEMENT Right     hip    JOINT REPLACEMENT Left     knee    SPINE SURGERY      laminectomy    TRANSCATHETER AORTIC VALVE REPLACEMENT (TAVR) N/A 11/20/2019    Procedure: REPLACEMENT, AORTIC VALVE, TRANSCATHETER (TAVR);  Surgeon: Balaji Shah MD;  Location: [facility] CATH LAB;  Service: Cardiology;  Laterality: N/A;       Review of patient's allergies indicates:   Allergen Reactions    Prozac [fluoxetine] Other (See Comments)    Sulfa (sulfonamide antibiotics)      Other reaction(s): Unknown         Current Outpatient Medications:     acetaminophen (TYLENOL) 325 MG tablet, Take 2 tablets (650 mg total) by mouth every 6 (six) hours as needed for Pain., Disp: , Rfl: 0    aspirin 81 MG Chew, Take 1 tablet (81 mg total) by mouth 2 (two) times daily. for 14 days (Patient taking differently: Take 81 mg by mouth once daily.), Disp: , Rfl: 0    atorvastatin (LIPITOR) 80 MG tablet, Take 80 mg by mouth every evening. , Disp: , Rfl:     furosemide (LASIX) 20 MG tablet, Take 2 tablets (40 mg total) by mouth once daily. (Patient taking differently: Take 40 mg by mouth once daily. As needed), Disp: , Rfl:     gabapentin (NEURONTIN) 600 MG tablet, Take 2 tablets (1,200 mg  total) by mouth 3 (three) times daily., Disp: 180 tablet, Rfl: 2    insulin aspart U-100 (NOVOLOG) 100 unit/mL injection, Insulin pump, Disp: , Rfl:     insulin lispro 100 unit/mL injection, Inject into the skin., Disp: , Rfl:     letrozole (FEMARA) 2.5 mg Tab, Take 2.5 mg by mouth once daily ., Disp: , Rfl:     levothyroxine (SYNTHROID) 125 MCG tablet, Take 125 mcg by mouth before breakfast., Disp: , Rfl:     primidone (MYSOLINE) 50 MG Tab, Take 50 mg by mouth 3 (three) times daily. , Disp: , Rfl:     propranoloL (INDERAL LA) 80 MG 24 hr capsule, TAKE 1 CAPSULE (80 MG TOTAL) BY MOUTH ONCE DAILY., Disp: 90 capsule, Rfl: 3    ramipriL (ALTACE) 10 MG capsule, Take 10 mg by mouth once daily. , Disp: , Rfl:     vit C/E/zinc/lutein/zeaxanthin (OCUVITE EYE HEALTH ORAL), Take 1 tablet by mouth once daily. , Disp: , Rfl:     XIIDRA 5 % Dpet, Place into both eyes daily as needed., Disp: , Rfl:     ALPRAZolam (XANAX) 0.5 MG tablet, Take 0.5 mg by mouth nightly as needed. , Disp: , Rfl:     escitalopram oxalate (LEXAPRO) 10 MG tablet, Take 10 mg by mouth once daily. , Disp: , Rfl:     oxyCODONE (ROXICODONE) 5 MG immediate release tablet, Take 1 tablet (5 mg total) by mouth every 4 (four) hours as needed for Pain. (Patient not taking: Reported on 10/27/2022), Disp: 30 tablet, Rfl: 0    potassium aminobenzoate 500 mg Cap, Take by mouth once daily., Disp: , Rfl:   No current facility-administered medications for this visit.    Facility-Administered Medications Ordered in Other Visits:     lidocaine (PF) 10 mg/ml (1%) injection 10 mg, 1 mL, Intradermal, Once, Azeem Randhawa MD            Objective:   Review of Systems   Constitutional: Negative for chills, decreased appetite, diaphoresis, fever, malaise/fatigue, weight gain and weight loss.   HENT:  Negative for congestion, hearing loss, nosebleeds and sore throat.    Eyes:  Negative for double vision, vision loss in left eye and vision loss in right eye.    Cardiovascular:  Positive for dyspnea on exertion and leg swelling. Negative for chest pain, claudication, cyanosis, irregular heartbeat, near-syncope, orthopnea, palpitations, paroxysmal nocturnal dyspnea and syncope.   Respiratory:  Negative for cough, hemoptysis, shortness of breath, sleep disturbances due to breathing, snoring and wheezing.    Endocrine: Negative for cold intolerance and heat intolerance.   Hematologic/Lymphatic: Negative for adenopathy and bleeding problem. Bruises/bleeds easily.   Skin:  Negative for itching, nail changes and rash.   Musculoskeletal:  Positive for arthritis, back pain, falls, gout, muscle cramps and muscle weakness. Negative for myalgias.   Gastrointestinal:  Negative for bloating, abdominal pain, anorexia, constipation, diarrhea, hematochezia, melena, nausea and vomiting.   Genitourinary:  Negative for frequency, hematuria and nocturia.   Neurological:  Positive for loss of balance and tremors. Negative for focal weakness, headaches, vertigo and weakness.   Psychiatric/Behavioral:  Negative for altered mental status, depression and memory loss.    Allergic/Immunologic: Negative for hives.       Vitals:    09/27/23 1505   BP: 113/66   Pulse: 78       Physical Exam  Vitals reviewed.   Constitutional:       General: She is not in acute distress.     Appearance: She is well-developed.   HENT:      Head: Normocephalic and atraumatic.      Nose: Nose normal.   Eyes:      Conjunctiva/sclera: Conjunctivae normal.      Pupils: Pupils are equal, round, and reactive to light.   Neck:      Vascular: No JVD.   Cardiovascular:      Rate and Rhythm: Normal rate and regular rhythm.      Pulses: Intact distal pulses.           Carotid pulses are 1+ on the right side and 1+ on the left side.       Radial pulses are 1+ on the right side and 1+ on the left side.        Femoral pulses are 1+ on the right side and 1+ on the left side.       Popliteal pulses are 1+ on the right side and 1+ on the  left side.        Dorsalis pedis pulses are 1+ on the right side and 1+ on the left side.        Posterior tibial pulses are 1+ on the right side and 1+ on the left side.      Heart sounds: No murmur (Grade 1 systolic ejection murmur) heard.     No friction rub. No gallop.      Comments: 2+ lower extremity edema  Pulmonary:      Effort: Pulmonary effort is normal. No respiratory distress.      Breath sounds: Normal breath sounds. No wheezing or rales.   Chest:      Chest wall: No tenderness.   Abdominal:      General: Bowel sounds are normal. There is no distension.      Palpations: Abdomen is soft.      Tenderness: There is no abdominal tenderness.   Musculoskeletal:         General: No tenderness or deformity. Normal range of motion.      Cervical back: Normal range of motion and neck supple.      Right lower leg: Edema (One to 1+ edema) present.      Left lower leg: Edema (1+) present.   Skin:     General: Skin is warm and dry.      Findings: No erythema or rash.   Neurological:      Mental Status: She is alert and oriented to person, place, and time.      Cranial Nerves: No cranial nerve deficit.      Motor: No abnormal muscle tone.      Coordination: Coordination normal.      Comments: Positive tremor   Psychiatric:         Behavior: Behavior normal.         Thought Content: Thought content normal.         Judgment: Judgment normal.       Recent echocardiogram  TTE Today  The left ventricle is normal in size with normal systolic function. The estimated ejection fraction is 65%.Indeterminate diastolic function.  Normal right ventricular systolic function. .  Mild right atrial enlargement.  There is a transcutaneously-placed aortic bioprosthesis present. There is no aortic aortic insufficiency present. Prosthetic aortic valve is normal.  There is mild mitral stenosis.  The mean diastolic gradient across the mitral valve is 6 mmHg ; MVA 2.37  The MR is mild to mild-moderate ( 1-2+).  Normal central venous pressure  (3 mmHg).  The estimated PA systolic pressure is 31 mmHg.    No visits with results within 1 Year(s) from this visit.   Latest known visit with results is:   No results displayed because visit has over 200 results.           Assessment and Plan:     Chronic diastolic heart failure  Comments:  Recheck echocardiogram  Orders:  -     Echo; Future; Expected date: 10/04/2023    Coronary artery disease of native artery of native heart with stable angina pectoris  Comments:  No angina    Atherosclerosis of aorta    S/P TAVR (transcatheter aortic valve replacement)  Comments:  Check echo    S/P coronary artery stent placement  Comments:  Previously no severe disease    Old myocardial infarction    Nonrheumatic mitral valve stenosis    Mixed hyperlipidemia  Comments:  Lipids with PCP    Hypertensive heart and renal disease with renal failure, stage 1 through stage 4 or unspecified chronic kidney disease, with heart failure           No follow-ups on file.

## 2023-11-20 ENCOUNTER — HOSPITAL ENCOUNTER (OUTPATIENT)
Dept: CARDIOLOGY | Facility: HOSPITAL | Age: 83
Discharge: HOME | End: 2023-11-20
Attending: INTERNAL MEDICINE
Payer: MEDICARE

## 2023-11-20 VITALS
SYSTOLIC BLOOD PRESSURE: 118 MMHG | HEIGHT: 66 IN | DIASTOLIC BLOOD PRESSURE: 70 MMHG | HEART RATE: 75 BPM | WEIGHT: 160 LBS | BODY MASS INDEX: 25.71 KG/M2

## 2023-11-20 DIAGNOSIS — I50.32 CHRONIC DIASTOLIC HEART FAILURE: ICD-10-CM

## 2023-11-20 LAB
ASCENDING AORTA: 2.72 CM
AV INDEX (PROSTH): 0.54
AV MEAN GRADIENT: 10 MMHG
AV PEAK GRADIENT: 18 MMHG
AV VALVE AREA BY VELOCITY RATIO: 2.44 CM²
AV VALVE AREA: 2.34 CM²
AV VELOCITY RATIO: 0.56
BSA FOR ECHO PROCEDURE: 1.84 M2
CV ECHO LV RWT: 0.37 CM
DOP CALC AO PEAK VEL: 2.15 M/S
DOP CALC AO VTI: 50.15 CM
DOP CALC LVOT AREA: 4.4 CM2
DOP CALC LVOT DIAMETER: 2.36 CM
DOP CALC LVOT PEAK VEL: 1.2 M/S
DOP CALC LVOT STROKE VOLUME: 117.35 CM3
DOP CALC MV VTI: 53.93 CM
DOP CALCLVOT PEAK VEL VTI: 26.84 CM
E WAVE DECELERATION TIME: 200.85 MSEC
E/A RATIO: 1.02
E/E' RATIO: 23.33 M/S
ECHO LV POSTERIOR WALL: 0.79 CM (ref 0.6–1.1)
FRACTIONAL SHORTENING: 41 % (ref 28–44)
HR MV ECHO: 78 BPM
INTERVENTRICULAR SEPTUM: 0.84 CM (ref 0.6–1.1)
LA MAJOR: 4.29 CM
LA MINOR: 4.25 CM
LA WIDTH: 3.05 CM
LEFT ATRIUM SIZE: 3.84 CM
LEFT ATRIUM VOLUME INDEX MOD: 19.3 ML/M2
LEFT ATRIUM VOLUME INDEX: 23.4 ML/M2
LEFT ATRIUM VOLUME MOD: 35.07 CM3
LEFT ATRIUM VOLUME: 42.51 CM3
LEFT INTERNAL DIMENSION IN SYSTOLE: 2.5 CM (ref 2.1–4)
LEFT VENTRICLE DIASTOLIC VOLUME INDEX: 44.22 ML/M2
LEFT VENTRICLE DIASTOLIC VOLUME: 80.48 ML
LEFT VENTRICLE MASS INDEX: 58 G/M2
LEFT VENTRICLE SYSTOLIC VOLUME INDEX: 12.3 ML/M2
LEFT VENTRICLE SYSTOLIC VOLUME: 22.42 ML
LEFT VENTRICULAR INTERNAL DIMENSION IN DIASTOLE: 4.24 CM (ref 3.5–6)
LEFT VENTRICULAR MASS: 105.46 G
LV LATERAL E/E' RATIO: 21.88 M/S
LV SEPTAL E/E' RATIO: 25 M/S
MV A" WAVE DURATION": 13.13 MSEC
MV MEAN GRADIENT: 9 MMHG
MV PEAK A VEL: 1.71 M/S
MV PEAK E VEL: 1.75 M/S
MV PEAK GRADIENT: 14 MMHG
MV STENOSIS PRESSURE HALF TIME: 58.25 MS
MV VALVE AREA BY CONTINUITY EQUATION: 2.18 CM2
MV VALVE AREA P 1/2 METHOD: 3.78 CM2
PISA TR MAX VEL: 3.22 M/S
PULM VEIN S/D RATIO: 1.2
PV PEAK D VEL: 0.35 M/S
PV PEAK S VEL: 0.42 M/S
RA MAJOR: 4.1 CM
RA PRESSURE ESTIMATED: 3 MMHG
RA WIDTH: 2.69 CM
RIGHT VENTRICULAR END-DIASTOLIC DIMENSION: 2.69 CM
RV TB RVSP: 6 MMHG
SINUS: 2.2 CM
STJ: 2.33 CM
TDI LATERAL: 0.08 M/S
TDI SEPTAL: 0.07 M/S
TDI: 0.08 M/S
TR MAX PG: 41 MMHG
TRICUSPID ANNULAR PLANE SYSTOLIC EXCURSION: 2.09 CM
TV REST PULMONARY ARTERY PRESSURE: 44 MMHG
Z-SCORE OF LEFT VENTRICULAR DIMENSION IN END DIASTOLE: -1.73
Z-SCORE OF LEFT VENTRICULAR DIMENSION IN END SYSTOLE: -1.73

## 2023-11-20 PROCEDURE — 93306 ECHO (CUPID ONLY): ICD-10-PCS | Mod: 26,,, | Performed by: INTERNAL MEDICINE

## 2023-11-20 PROCEDURE — 93306 TTE W/DOPPLER COMPLETE: CPT | Mod: 26,,, | Performed by: INTERNAL MEDICINE

## 2023-11-20 PROCEDURE — 93306 TTE W/DOPPLER COMPLETE: CPT

## 2023-11-28 ENCOUNTER — TELEPHONE (OUTPATIENT)
Dept: CARDIOLOGY | Facility: CLINIC | Age: 83
End: 2023-11-28
Payer: MEDICARE

## 2023-11-28 NOTE — TELEPHONE ENCOUNTER
----- Message from Oscar Velazco Jr., MD sent at 11/28/2023 12:40 PM CST -----  Regarding: ECHO  Contact: self  Results:   Left ventricular systolic function remains normal  Diastolic dysfunction is present   Mitral stenosis present of moderate severity due to mitral annular calcification.  Status post TAVR with normal valve function     TAVR valve shows normal function.  Otherwise, echocardiogram stable with nothing else to do.  ----- Message -----  From: Lawanda Evans MA  Sent: 11/28/2023  12:01 PM CST  To: Oscar Velazco Jr., MD      ----- Message -----  From: Alvina Adams MA  Sent: 11/28/2023  11:55 AM CST  To: Mora [facility] Staff    Pt had an echo on 11-20-23. She calling for results of echo. Please call [phone].

## 2023-11-29 ENCOUNTER — TELEPHONE (OUTPATIENT)
Dept: CARDIOLOGY | Facility: CLINIC | Age: 83
End: 2023-11-29
Payer: MEDICARE

## 2024-04-26 RX ORDER — PROPRANOLOL HYDROCHLORIDE 80 MG/1
80 CAPSULE, EXTENDED RELEASE ORAL DAILY
Qty: 90 CAPSULE | Refills: 3 | Status: SHIPPED | OUTPATIENT
Start: 2024-04-26 | End: 2025-04-26

## 2024-05-31 NOTE — TELEPHONE ENCOUNTER
LVM = Called pt to schedule np appt per dr. Ellis from referral from dr. Tess yao   Patient: Virgilio Swain    Procedure Summary       Date: 05/31/24 Room / Location: [facility] OR 04 / [facility] MAIN OR    Anesthesia Start: 0923 Anesthesia Stop: 1101    Procedure: CHOLECYSTECTOMY LAPAROSCOPIC INTRAOPERATIVE CHOLANGIOGRAM WITH [employer] (Abdomen) Diagnosis:     Surgeons: José Holloway MD Provider: Delmar Haddad MD    Anesthesia Type: general ASA Status: 2 - Emergent            Anesthesia Type: general    Vitals  Vitals Value Taken Time   /87 05/31/24 1201   Temp 97.9 °F (36.6 °C) 05/31/24 1157   Pulse 78 05/31/24 1201   Resp 13 05/31/24 1157   SpO2 91 % 05/31/24 1201   Vitals shown include unfiled device data.        Post Anesthesia Care and Evaluation    Patient location during evaluation: PACU  Patient participation: complete - patient participated  Level of consciousness: awake  Pain scale: See nurse's notes for pain score.  Pain management: adequate    Airway patency: patent  Anesthetic complications: No anesthetic complications  PONV Status: none  Cardiovascular status: acceptable  Respiratory status: acceptable and spontaneous ventilation  Hydration status: acceptable    Comments: Patient seen and examined postoperatively; vital signs stable; SpO2 greater than or equal to 90%; cardiopulmonary status stable; nausea/vomiting adequately controlled; pain adequately controlled; no apparent anesthesia complications; patient discharged from anesthesia care when discharge criteria were met

## 2024-06-04 ENCOUNTER — OFFICE VISIT (OUTPATIENT)
Dept: URGENT CARE | Facility: CLINIC | Age: 84
End: 2024-06-04
Payer: MEDICARE

## 2024-06-04 VITALS
SYSTOLIC BLOOD PRESSURE: 144 MMHG | WEIGHT: 160 LBS | RESPIRATION RATE: 18 BRPM | HEART RATE: 73 BPM | HEIGHT: 66 IN | DIASTOLIC BLOOD PRESSURE: 71 MMHG | OXYGEN SATURATION: 98 % | TEMPERATURE: 99 F | BODY MASS INDEX: 25.71 KG/M2

## 2024-06-04 DIAGNOSIS — S09.92XA NOSE INJURY, INITIAL ENCOUNTER: ICD-10-CM

## 2024-06-04 DIAGNOSIS — S02.2XXB OPEN FRACTURE OF NASAL BONE, INITIAL ENCOUNTER: Primary | ICD-10-CM

## 2024-06-04 DIAGNOSIS — S01.21XA LACERATION OF NOSE, INITIAL ENCOUNTER: ICD-10-CM

## 2024-06-04 PROCEDURE — 99204 OFFICE O/P NEW MOD 45 MIN: CPT | Mod: 25,S$GLB,,

## 2024-06-04 PROCEDURE — 12013 RPR F/E/E/N/L/M 2.6-5.0 CM: CPT | Mod: S$GLB,,,

## 2024-06-04 PROCEDURE — 70160 X-RAY EXAM OF NASAL BONES: CPT | Mod: FY,S$GLB,, | Performed by: RADIOLOGY

## 2024-06-04 RX ORDER — ACETAMINOPHEN 500 MG
1000 TABLET ORAL
Status: COMPLETED | OUTPATIENT
Start: 2024-06-04 | End: 2024-06-04

## 2024-06-04 RX ORDER — MUPIROCIN 20 MG/G
OINTMENT TOPICAL 3 TIMES DAILY
Qty: 30 G | Refills: 0 | Status: SHIPPED | OUTPATIENT
Start: 2024-06-04

## 2024-06-04 RX ORDER — CEPHALEXIN 500 MG/1
500 CAPSULE ORAL EVERY 6 HOURS
Qty: 24 CAPSULE | Refills: 0 | Status: SHIPPED | OUTPATIENT
Start: 2024-06-04 | End: 2024-06-10

## 2024-06-04 RX ADMIN — Medication 1000 MG: at 03:06

## 2024-06-04 NOTE — PROGRESS NOTES
"Subjective:      Patient ID: Mary Prince is a 83 y.o. female.    Vitals:  height is 5' 6" (1.676 m) and weight is 72.6 kg (160 lb). Her oral temperature is 98.5 °F (36.9 °C). Her blood pressure is 144/71 (abnormal) and her pulse is 73. Her respiration is 18 and oxygen saturation is 98%.     Chief Complaint: Fall    Pt was getting ready for physical therapy and dropped an earring and when she went to grab it, she fell onto hard wood floor. She fell face down on her nose area, she said she never lost consciousness. She has a mild headache and pain in the nose area. She stated she is having trouble breathing through the nose, but not SOB. She put ice on it.     Provider note starts below:  Patient presents to clinic for evaluation of nasal trauma.  Patient states she was getting dressed at her house and putting on earrings when 1 of her earrings dropped on the floor.  She bend over to pick the earring up and lost her balance, causing her to hit her nose on the hardwood floor.  Patient states she heard a "crack" when her nose hit the floor.  She reports nosebleed shortly after the incident.  She was able to stop the nosebleed with firm pressure for several minutes.  Since the accident, she has had increasing pain and swelling of the nose.  She also has laceration noted to nasal bridge.  Patient denies any loss of consciousness, passing out, confusion, altered mental status, vision changes, dizziness, lightheadedness, extremity weakness/numbness, facial asymmetry, speech changes, nausea, vomiting.  Tetanus was updated in 2023.  Patient is not on blood thinners.  She has tried applying ice to the area at home.    Fall  The accident occurred Less than 1 hour ago. The point of impact was the face. The pain is present in the nose. The pain is at a severity of 6/10. Associated symptoms include headaches. Pertinent negatives include no abdominal pain, bowel incontinence, fever, hearing loss, hematuria, loss of " consciousness, nausea, numbness, tingling, visual change or vomiting. She has tried ice for the symptoms. The treatment provided no relief.       Constitution: Negative for appetite change, chills and fever.   HENT:  Positive for facial swelling (nose), facial trauma (nose), congestion and nosebleeds. Negative for ear pain, ear discharge, dental problem, drooling, tongue pain, foreign body in nose, postnasal drip, sinus pain and sore throat.    Neck: Negative for neck pain and neck stiffness.   Cardiovascular:  Negative for chest pain and palpitations.   Eyes:  Negative for photophobia, vision loss, double vision and blurred vision.   Respiratory:  Negative for cough and shortness of breath.    Gastrointestinal:  Negative for abdominal pain, nausea, vomiting and bowel incontinence.   Genitourinary:  Negative for hematuria.   Musculoskeletal:  Negative for muscle cramps and muscle ache.   Skin:  Negative for pale and rash.   Neurological:  Positive for headaches. Negative for dizziness, light-headedness, passing out, facial drooping, speech difficulty, coordination disturbances, disorientation, altered mental status, loss of consciousness, numbness, tingling, seizures and tremors.   Psychiatric/Behavioral:  Negative for altered mental status, disorientation and confusion.       Objective:     Physical Exam   Constitutional: She is oriented to person, place, and time.  Non-toxic appearance. She does not appear ill. No distress.   HENT:   Head: Normocephalic.   Nose: Mucosal edema and sinus tenderness present. No septal deviation or nasal septal hematoma.       Tenderness, edema, and bruising noted to nasal bridge. Normal alignment of nasal bridge. There is a 3 cm linear well-approximated laceration spanning the superior aspect of nasal bridge with active bleeding. No septal hematoma appreciated on nasal inspection. No obvious septal deviation. No active epistaxis. Dried blood in bilateral nares.       Comments:  Tenderness, edema, and bruising noted to nasal bridge. Normal alignment of nasal bridge. There is a 3 cm linear well-approximated laceration spanning the superior aspect of nasal bridge with active bleeding. No septal hematoma appreciated on nasal inspection. No obvious septal deviation. No active epistaxis. Dried blood in bilateral nares.   Cardiovascular: Normal rate, regular rhythm, normal heart sounds and normal pulses.   Pulmonary/Chest: Effort normal and breath sounds normal.   Abdominal: Normal appearance.   Neurological: She is alert, oriented to person, place, and time and at baseline.   Skin: Skin is warm and dry.   Psychiatric: Her behavior is normal. Mood normal.   Nursing note and vitals reviewed.    Assessment:     XR Nasal Bones  Impression:  Recent fracture at the tip of the nasal bone.     Electronically signed by:Juan F Whittaker MD  Date:                                            06/04/2024  Time:                                           15:27      Laceration Repair    Date/Time: 6/4/2024 3:00 PM    Performed by: Drea Castillo PA-C  Authorized by: Drea Castillo PA-C  Consent Done: Yes  Consent: Verbal consent obtained.  Risks and benefits: risks, benefits and alternatives were discussed  Consent given by: patient  Patient identity confirmed: name and verbally with patient  Body area: head/neck  Location details: nose  Laceration length: 3 cm  Foreign bodies: no foreign bodies  Anesthesia: local infiltration    Anesthesia:  Local Anesthetic: lidocaine 1% without epinephrine  Anesthetic total: 2 mL  Preparation: Patient was prepped and draped in the usual sterile fashion.  Irrigation solution: saline  Skin closure: 6-0 Prolene  Number of sutures: 5  Technique: simple  Approximation: close  Approximation difficulty: simple  Dressing: open (no dressing)  Patient tolerance: Patient tolerated the procedure well with no immediate complications        1. Open fracture of nasal bone, initial  encounter    2. Laceration of nose, initial encounter    3. Nose injury, initial encounter        Plan:     Open fracture of nasal bone, initial encounter  -     acetaminophen tablet 1,000 mg  -     Ambulatory referral/consult to ENT  -     cephALEXin (KEFLEX) 500 MG capsule; Take 1 capsule (500 mg total) by mouth every 6 (six) hours. for 6 days  Dispense: 24 capsule; Refill: 0    Laceration of nose, initial encounter  -     mupirocin (BACTROBAN) 2 % ointment; Apply topically 3 (three) times daily.  Dispense: 30 g; Refill: 0  -     cephALEXin (KEFLEX) 500 MG capsule; Take 1 capsule (500 mg total) by mouth every 6 (six) hours. for 6 days  Dispense: 24 capsule; Refill: 0  -     Laceration Repair    Nose injury, initial encounter  -     XR NASAL BONES; Future; Expected date: 06/04/2024  -     cephALEXin (KEFLEX) 500 MG capsule; Take 1 capsule (500 mg total) by mouth every 6 (six) hours. for 6 days  Dispense: 24 capsule; Refill: 0      Medical Decision Making:   Urgent Care Management:  Tetanus was updated in 2023.        Patient Instructions   A nose fracture is a break or crack of the bone in the nose. It may also damage the septum. The septum is the wall that divides your nostrils. Your nose may look swollen and bruised. You may have pain around your nose and eyes. You may get bruises on your face or black eyes. Healing will depend on the extent of your injury and how you respond to treatment.    A laceration is a cut on your skin. It is most often caused by a sharp object like a knife blade, glass, or from other things with sharp edges. Sometimes, this kind of cut is shallow. Other times, it goes deep into the skin and muscles. Before the cut can be closed, it must be cleaned. Closing the wound is called a laceration repair.  Stitches are a special kind of thread and are also called sutures. They are used to close a skin cut or wound. They are often used for cuts that are deep or bleeding. Stitches work well for  cuts with jagged edges, or those that have fat or muscle showing. Stitches most often makes the skin heal faster and with less scarring. The skin is sewn together with special kinds of thread. Stitches are used to fix cuts on the outside of the skin. They are also used to fix cuts made in surgery and cuts inside the body. Stitches are also used to help control bleeding. Some stitches need to be taken out. Others melt away or dissolve on their own as the wound heals.    Care at home  Nose Fracture  Keflex every 6 hours for 7 days. This is an antibiotic to prevent infection. Please take to completion.   Tylenol or ibuprofen as needed for pain.   Place an ice pack or a bag of frozen peas wrapped in a towel over the painful part. Never put ice right on the skin. Do not leave the ice on more than 10 to 15 minutes at a time.  Prop your head on pillows when sleeping, lying down, or resting. This will help with swelling.  Do not blow your nose. Avoid sniffing. Gently pat or dab away any drainage with a clean cloth.  Open your mouth if you feel like you are going to sneeze.  Do not rub or massage your nose.  Avoid hitting or bumping your face. Protect your nose from bruising or bleeding and to avoid more injury.  If your nose is bleeding, hold your head forward to prevent blood going from down your throat.  Cover your nose to prevent water from going inside your nose while you bathe or shower.  Use caution when wearing eyeglasses or sunglasses.  Avoid tiring activities and exercise that makes you breathe hard.  Ask your doctor if you need to avoid bending down. Ask if you need to be careful with lifting things over 10 pounds (4.5 kg).  Laceration care  Your wound may drain a small amount of clear yellow fluid in the first few days.  Keep your wound clean and dry for the first 24 hours. After 24 hours, you can gently wash the wound with soap and water or take a shower.  Mupirocin, which is an antibiotic ointment, to the wound  1 to 2 times each day. If you want, you can cover your wound with a bandage. You can also leave it open to air if you prefer.  Wash your hands before and after you touch your wound or bandage.  Avoid activities that could hurt the area of your stitches for 1 to 2 weeks. If you hurt the same part of your body again, stitches can break, and the cut can open up again.  Do not try to take out the stitches yourself.    Follow up  Return to clinic in 5-7 days for suture removal.   A referral has been placed for you to be seen with an ENT doctor. You should receive a call from [facility] within the next 1-3 days to set up an appointment. If you do not receive a call, you may call our Referral Hotline at [phone] to make an appointment.    When do I need to call the doctor?   Go to the ER right away if clear fluid is leaking from your nose.  Fever of 100.4°F (38°C) or higher or a bad headache.  You have any change or worsening in your vision.  Trouble breathing through your nose because of the swelling.  More injury to your nose  A nosebleed that will not stop  Very bad pain even when taking drugs for the pain  You have a fever of 100.4°F (38°C) or higher or chills.  Your wound is swollen, red, or warm.  Your wound has thick yellow or green drainage.  The wound opens up.    Should you develop any worsening or new symptoms after leaving urgent care, it is recommended that you go to the ER for further/repeat evaluation.      Follow up with your PCP in 3-5 days after your urgent care visit.     Please remember that you have received care at an urgent care today. Urgent cares are not emergency rooms and are not equipped to handle life threatening emergencies and cannot rule in or out certain medical conditions and you may be released before all of your medical problems are known or treated, please schedule all follow up appointments as discussed and if you have worsening symptoms please go to the ER to rule out potential  life threatening problems, as discussed.

## 2024-06-04 NOTE — PROCEDURES
Laceration Repair    Date/Time: 6/4/2024 3:00 PM    Performed by: Drea Castillo PA-C  Authorized by: Drea Castillo PA-C  Consent Done: Yes  Consent: Verbal consent obtained.  Risks and benefits: risks, benefits and alternatives were discussed  Consent given by: patient  Patient identity confirmed: name and verbally with patient  Body area: head/neck  Location details: nose  Laceration length: 3 cm  Foreign bodies: no foreign bodies  Anesthesia: local infiltration    Anesthesia:  Local Anesthetic: lidocaine 1% without epinephrine  Anesthetic total: 2 mL  Preparation: Patient was prepped and draped in the usual sterile fashion.  Irrigation solution: saline  Skin closure: 6-0 Prolene  Number of sutures: 5  Technique: simple  Approximation: close  Approximation difficulty: simple  Dressing: open (no dressing)  Patient tolerance: Patient tolerated the procedure well with no immediate complications

## 2024-06-04 NOTE — PATIENT INSTRUCTIONS
A nose fracture is a break or crack of the bone in the nose. It may also damage the septum. The septum is the wall that divides your nostrils. Your nose may look swollen and bruised. You may have pain around your nose and eyes. You may get bruises on your face or black eyes. Healing will depend on the extent of your injury and how you respond to treatment.    A laceration is a cut on your skin. It is most often caused by a sharp object like a knife blade, glass, or from other things with sharp edges. Sometimes, this kind of cut is shallow. Other times, it goes deep into the skin and muscles. Before the cut can be closed, it must be cleaned. Closing the wound is called a laceration repair.  Stitches are a special kind of thread and are also called sutures. They are used to close a skin cut or wound. They are often used for cuts that are deep or bleeding. Stitches work well for cuts with jagged edges, or those that have fat or muscle showing. Stitches most often makes the skin heal faster and with less scarring. The skin is sewn together with special kinds of thread. Stitches are used to fix cuts on the outside of the skin. They are also used to fix cuts made in surgery and cuts inside the body. Stitches are also used to help control bleeding. Some stitches need to be taken out. Others melt away or dissolve on their own as the wound heals.    Care at home  Nose Fracture  Keflex every 6 hours for 7 days. This is an antibiotic to prevent infection. Please take to completion.   Tylenol or ibuprofen as needed for pain.   Place an ice pack or a bag of frozen peas wrapped in a towel over the painful part. Never put ice right on the skin. Do not leave the ice on more than 10 to 15 minutes at a time.  Prop your head on pillows when sleeping, lying down, or resting. This will help with swelling.  Do not blow your nose. Avoid sniffing. Gently pat or dab away any drainage with a clean cloth.  Open your mouth if you feel like you  are going to sneeze.  Do not rub or massage your nose.  Avoid hitting or bumping your face. Protect your nose from bruising or bleeding and to avoid more injury.  If your nose is bleeding, hold your head forward to prevent blood going from down your throat.  Cover your nose to prevent water from going inside your nose while you bathe or shower.  Use caution when wearing eyeglasses or sunglasses.  Avoid tiring activities and exercise that makes you breathe hard.  Ask your doctor if you need to avoid bending down. Ask if you need to be careful with lifting things over 10 pounds (4.5 kg).  Laceration care  Your wound may drain a small amount of clear yellow fluid in the first few days.  Keep your wound clean and dry for the first 24 hours. After 24 hours, you can gently wash the wound with soap and water or take a shower.  Mupirocin, which is an antibiotic ointment, to the wound 1 to 2 times each day. If you want, you can cover your wound with a bandage. You can also leave it open to air if you prefer.  Wash your hands before and after you touch your wound or bandage.  Avoid activities that could hurt the area of your stitches for 1 to 2 weeks. If you hurt the same part of your body again, stitches can break, and the cut can open up again.  Do not try to take out the stitches yourself.    Follow up  Return to clinic in 5-7 days for suture removal.   A referral has been placed for you to be seen with an ENT doctor. You should receive a call from [facility] within the next 1-3 days to set up an appointment. If you do not receive a call, you may call our Referral Hotline at [phone] to make an appointment.    When do I need to call the doctor?   Go to the ER right away if clear fluid is leaking from your nose.  Fever of 100.4°F (38°C) or higher or a bad headache.  You have any change or worsening in your vision.  Trouble breathing through your nose because of the swelling.  More injury to your nose  A nosebleed that  will not stop  Very bad pain even when taking drugs for the pain  You have a fever of 100.4°F (38°C) or higher or chills.  Your wound is swollen, red, or warm.  Your wound has thick yellow or green drainage.  The wound opens up.    Should you develop any worsening or new symptoms after leaving urgent care, it is recommended that you go to the ER for further/repeat evaluation.      Follow up with your PCP in 3-5 days after your urgent care visit.     Please remember that you have received care at an urgent care today. Urgent cares are not emergency rooms and are not equipped to handle life threatening emergencies and cannot rule in or out certain medical conditions and you may be released before all of your medical problems are known or treated, please schedule all follow up appointments as discussed and if you have worsening symptoms please go to the ER to rule out potential life threatening problems, as discussed.

## 2024-06-10 ENCOUNTER — OFFICE VISIT (OUTPATIENT)
Dept: URGENT CARE | Facility: CLINIC | Age: 84
End: 2024-06-10
Payer: MEDICARE

## 2024-06-10 VITALS
DIASTOLIC BLOOD PRESSURE: 69 MMHG | TEMPERATURE: 98 F | OXYGEN SATURATION: 95 % | SYSTOLIC BLOOD PRESSURE: 139 MMHG | HEART RATE: 66 BPM | HEIGHT: 63 IN | BODY MASS INDEX: 28.35 KG/M2 | RESPIRATION RATE: 18 BRPM | WEIGHT: 160 LBS

## 2024-06-10 DIAGNOSIS — Z48.02 VISIT FOR SUTURE REMOVAL: Primary | ICD-10-CM

## 2024-06-10 PROCEDURE — 99024 POSTOP FOLLOW-UP VISIT: CPT | Mod: S$GLB,,,

## 2024-06-10 NOTE — PROGRESS NOTES
"Subjective:      Patient ID: Mary Prince is a 83 y.o. female.    Vitals:  height is 5' 2.5" (1.588 m) and weight is 72.6 kg (160 lb). Her oral temperature is 97.9 °F (36.6 °C). Her blood pressure is 139/69 and her pulse is 66. Her respiration is 18 and oxygen saturation is 95%.     Chief Complaint: Suture / Staple Removal    This is a 83 y.o. female who presents today for suture remove.  Sutures are located on the bridge of the patient's nose, 5 sutures total.  Patient denies any complication with wound healing, no redness, swelling, or drainage from the area.  Sutures were placed 6 days ago.      Suture / Staple Removal  The sutures were placed 3 to 6 days ago. She tried antibiotic ointment use and oral antibiotics since the wound repair. The treatment provided significant relief. Her temperature was unmeasured prior to arrival. There has been no drainage from the wound. There is no redness present. There is no swelling present. There is no pain present.       Constitution: Negative for fever and generalized weakness.   HENT:  Negative for ear pain, sinus pain and sore throat.    Neck: Negative for neck pain.   Cardiovascular:  Negative for chest pain.   Respiratory:  Negative for cough and shortness of breath.    Gastrointestinal:  Negative for abdominal pain.   Skin:  Positive for laceration (Healing laceration to bridge of nose). Negative for erythema.   Neurological:  Negative for headaches.      Objective:     Physical Exam   Constitutional: She is oriented to person, place, and time. She appears well-developed.   HENT:   Head: Normocephalic and atraumatic. Head is without abrasion, without contusion and without laceration.   Ears:   Right Ear: External ear normal.   Left Ear: External ear normal.   Nose: Nose normal.   Mouth/Throat: Oropharynx is clear and moist and mucous membranes are normal.   Eyes: Conjunctivae, EOM and lids are normal. Pupils are equal, round, and reactive to light.   Neck: " Trachea normal and phonation normal. Neck supple.   Cardiovascular: Normal rate, regular rhythm and normal heart sounds.   Pulmonary/Chest: Effort normal and breath sounds normal. No stridor. No respiratory distress.   Musculoskeletal: Normal range of motion.         General: Normal range of motion.   Neurological: She is alert and oriented to person, place, and time.   Skin: Skin is warm, dry, intact and no rash. Capillary refill takes less than 2 seconds. Lacerations - head:  headNo abrasion, No burn, No bruising, No erythema and No ecchymosis   Psychiatric: Her speech is normal and behavior is normal. Judgment and thought content normal.   Nursing note and vitals reviewed.      Assessment:     1. Visit for suture removal        Plan:   Five sutures removed from bridge of patient's nose.  Wound edges approximated well, no redness, swelling, or drainage.  Wound is healing well.  Patient has no further complaints of pain or other concerns at this time.  Instructed her to follow up with her PCP as previously advised.    Visit for suture removal

## 2024-06-17 ENCOUNTER — OFFICE VISIT (OUTPATIENT)
Dept: OTOLARYNGOLOGY | Facility: CLINIC | Age: 84
End: 2024-06-17
Payer: MEDICARE

## 2024-06-17 VITALS
DIASTOLIC BLOOD PRESSURE: 84 MMHG | SYSTOLIC BLOOD PRESSURE: 155 MMHG | WEIGHT: 153.44 LBS | BODY MASS INDEX: 27.62 KG/M2 | HEART RATE: 67 BPM

## 2024-06-17 DIAGNOSIS — S02.2XXA CLOSED FRACTURE OF NASAL BONE, INITIAL ENCOUNTER: Primary | ICD-10-CM

## 2024-06-17 DIAGNOSIS — M95.0 NASAL DEFORMITY, ACQUIRED: ICD-10-CM

## 2024-06-17 DIAGNOSIS — J34.2 DEVIATED SEPTUM: ICD-10-CM

## 2024-06-17 DIAGNOSIS — J31.0 CHRONIC RHINITIS: ICD-10-CM

## 2024-06-17 PROCEDURE — 3079F DIAST BP 80-89 MM HG: CPT | Mod: CPTII,S$GLB,, | Performed by: PHYSICIAN ASSISTANT

## 2024-06-17 PROCEDURE — 3077F SYST BP >= 140 MM HG: CPT | Mod: CPTII,S$GLB,, | Performed by: PHYSICIAN ASSISTANT

## 2024-06-17 PROCEDURE — 99204 OFFICE O/P NEW MOD 45 MIN: CPT | Mod: S$GLB,,, | Performed by: PHYSICIAN ASSISTANT

## 2024-06-17 PROCEDURE — 99999 PR PBB SHADOW E&M-EST. PATIENT-LVL III: CPT | Mod: PBBFAC,,, | Performed by: PHYSICIAN ASSISTANT

## 2024-06-17 PROCEDURE — 1159F MED LIST DOCD IN RCRD: CPT | Mod: CPTII,S$GLB,, | Performed by: PHYSICIAN ASSISTANT

## 2024-06-17 PROCEDURE — 1126F AMNT PAIN NOTED NONE PRSNT: CPT | Mod: CPTII,S$GLB,, | Performed by: PHYSICIAN ASSISTANT

## 2024-06-17 RX ORDER — IPRATROPIUM BROMIDE 21 UG/1
2 SPRAY, METERED NASAL 3 TIMES DAILY PRN
Qty: 30 ML | Refills: 2 | Status: SHIPPED | OUTPATIENT
Start: 2024-06-17

## 2024-06-17 NOTE — PROGRESS NOTES
Subjective:     HPI: Mary Prince is a 83 y.o. female with insulin dependent DM, severe AS s/p TAVR, post laminectomy syndrome, essential tremors who was referred to me by Drea Castillo in consultation for facial fracture.       Patient reports recent fall onto her face at home on hardwood floor about 2 weeks ago.    She denies any preceding dizziness or lightheadedness and reports initial epistaxis.  She was seen at urgent care and underwent x-ray as well as sutures of her nose.    She reports since then having recurrent congestion as well as rhinorrhea.  Patient overall reports improved pain but does report some tenderness to palpation over the area.  Patient also states that the appearance of her nose has change since 2 weeks ago.    She denies any associated symptoms including: changes in vision/diplopia, jaw pain/pain with chewing, facial weakness, facial paresthesias/changes in sensation, and headache.      She has not had sinonasal surgery.    She does not recall a prior history of nasal trauma.    Past Medical/Past Surgical History  Past Medical History:   Diagnosis Date    Anticoagulant long-term use     asa    AVN (avascular necrosis of bone)     Cancer 2010    left breast XRT + lumpectomy; right lunpectomy + Chemotherapy (Dr. Santana) 8 of 12 treatments, stopped for side effects    Cancer     right breast    Cataract     right eye    CKD (chronic kidney disease) stage 3, GFR 30-59 ml/min     CKD (chronic kidney disease), stage III     Coronary artery disease     total 4 stents over several years, last 2015    Diabetes mellitus, type 2     Insulin pump    Diabetic neuropathy     HLD (hyperlipidemia)     Hypertension     Insulin pump in place     Thyroid disease     Tremor, essential      She has a past surgical history that includes Eye surgery (Bilateral); Hysterectomy; Spine surgery; Appendectomy; Breast surgery (Bilateral); Transcatheter aortic valve replacement (TAVR) (N/A, 11/20/2019);  Cardiac valuve replacement (2019); Joint replacement (Right); and Joint replacement (Left).    Family History/Social History  Her family history includes No Known Problems in her brother, father, maternal aunt, maternal grandfather, maternal grandmother, maternal uncle, mother, paternal aunt, paternal grandfather, paternal grandmother, paternal uncle, and sister.  She reports that she has never smoked. She has never used smokeless tobacco. She reports current alcohol use. She reports that she does not use drugs.    Allergies/Immunizations  She is allergic to prozac [fluoxetine] and sulfa (sulfonamide antibiotics).  Immunization History   Administered Date(s) Administered    COVID-19, MRNA, LN-S, PF (Pfizer) (Gray Cap) 05/24/2022    COVID-19, MRNA, LN-S, PF (Pfizer) (Purple Cap) 01/12/2021, 02/02/2021, 10/05/2021    DT (Pediatric) 09/22/2015, 09/22/2015    DTaP 01/01/2012    Influenza 09/16/2009, 09/16/2009, 11/28/2009, 08/29/2014, 08/29/2014, 09/18/2015, 09/18/2015, 09/11/2017, 09/11/2017, 09/10/2018, 09/10/2018, 09/09/2020    Influenza (FLUAD) - Quadrivalent - Adjuvanted - PF *Preferred* (65+) 10/05/2021, 09/26/2022    Influenza - High Dose - PF (65 years and older) 09/11/2017, 09/10/2018    Influenza - Quadrivalent 09/16/2009, 08/29/2014, 09/18/2015    Influenza - Quadrivalent - PF *Preferred* (6 months and older) 09/16/2009, 08/29/2014, 09/18/2015    Influenza - Trivalent (ADULT) 09/09/2020    Influenza A (H1N1) 2009 Monovalent - IM 11/28/2009    PPD Test 11/30/2022    Pneumococcal Conjugate - 13 Valent 04/17/2015, 11/14/2018, 11/14/2018    Pneumococcal Polysaccharide - 23 Valent 09/26/2022    Td (ADULT) 09/22/2015    Tdap 07/17/2023    Tetanus 09/22/2015        Medications   acetaminophen  ALPRAZolam  atorvastatin  EScitalopram oxalate  furosemide  gabapentin  insulin aspart U-100  insulin lispro  letrozole Tab  levothyroxine  LIDOcaine (PF) 10 mg/ml (1%)  mupirocin  OCUVITE EYE HEALTH ORAL  oxyCODONE  potassium  aminobenzoate Cap  primidone Tab  propranoloL  ramipriL  XIIDRA Dpet     Review of Systems     Constitutional: Negative for appetite change, chills, fatigue, fever and unexpected weight loss.      HENT: Negative for ear discharge, ear infection, ear pain, facial swelling, hearing loss, mouth sores, nosebleeds, postnasal drip, ringing in the ears, runny nose, sinus infection, sinus pressure, sore throat, stuffy nose, tonsil infection, dental problems, trouble swallowing and voice change.      Eyes:  Negative for change in eyesight, eye drainage, eye itching and photophobia.     Respiratory:  Negative for cough, shortness of breath, sleep apnea, snoring and wheezing.      Cardiovascular:  Positive for foot swelling.     Gastrointestinal:  Negative for abdominal pain, acid reflux, constipation, diarrhea, heartburn and vomiting.     Genitourinary: Positive for frequent urination.     Musc: Negative for aching joints, aching muscles, back pain and neck pain.     Skin: Negative for rash.     Allergy: Negative for food allergies and seasonal allergies.     Endocrine: Negative for cold intolerance and heat intolerance.      Neurological: Positive for tremors.     Hematologic: Positive for swollen glands.     Psychiatric: Positive for sleep disturbance.           Objective:     BP (!) 155/84 (BP Location: Right arm, Patient Position: Sitting)   Pulse 67   Wt 69.6 kg (153 lb 7 oz)   BMI 27.62 kg/m²        Constitutional:   Vital signs are normal. She appears well-developed and well-nourished. Normal speech.      Head:  Normocephalic. Head is with raccoon's eyes (mild ecchymosis bilaterally). Head is without TMJ tenderness (no crepitus or tenderness). Facial strength is normal.  Bilaterally (No gross paresthesias on palpation).      Ears:  Hearing normal to normal and whispered voice; external ear normal without scars, lesions, or masses; ear canal, tympanic membrane, and middle ear normal..   Right Ear: No lacerations. No  "tenderness. No mastoid tenderness. Tympanic membrane is not injected, not retracted and not bulging. No decreased hearing is noted.   Left Ear: No lacerations. No tenderness. No mastoid tenderness. Tympanic membrane is not injected and not bulging. No decreased hearing is noted.     Nose:  Nose normal including turbinates, nasal mucosa, sinuses and nasal septum. Septal deviation (mild left) present. No mucosal edema, rhinorrhea, nasal septal hematoma (No evidence of septal hematoma) or polyps. No epistaxis. Turbinates normal, no turbinate masses and no turbinate hypertrophy.  Right sinus exhibits no maxillary sinus tenderness and no frontal sinus tenderness. Left sinus exhibits no maxillary sinus tenderness and no frontal sinus tenderness.   L middle nasal bridge with bony prominence with focal TTP  BL paranasal indentations without bony tenderness  Linear well healed scar near glabella    Mouth/Throat  Oropharynx clear and moist without lesions or asymmetry, normal uvula midline and lips, teeth, and gums normal. No trismus.     Neck:  Neck normal without thyromegaly masses, asymmetry, normal tracheal structure, crepitus, and tenderness, trachea normal and phonation normal.     Pulmonary/Chest:   Effort normal.     Psychiatric:   Her speech is normal and behavior is normal.       Procedure    None    Data Reviewed  I personally reviewed the chart, including any outside records, and pertinent data below:    WBC   Date Value   11/22/2022 10.5 103/uL   05/17/2021 6.01 K/uL     Eosinophil % (%)   Date Value   05/17/2021 11.0 (H)     Eos # (K/uL)   Date Value   05/17/2021 0.7 (H)     Platelets (K/uL)   Date Value   05/17/2021 191     Glucose (mg/dL)   Date Value   05/17/2021 136 (H)     No results found for: "IGE"    I independently reviewed the images of the nasal x rays dated 6/4/24. Pertinent findings include likely acute fracture of anterior tip of nasal bone..    Assessment & Plan:     1. Closed fracture of nasal " bone, initial encounter  2. Nasal deformity, acquired  3. Deviated septum  -   No red flags on exam   - Nasal obstruction likely secondary to recent fall and chronic rhinitis.  No evidence of septal hematoma  and nose appears to be healing well  -  patient will be left with a nasal deformity following recent facial trauma but patient not interested in surgical intervention at this time  -  discussed use of saline spray daily to improve mucociliary clearance  - f/u as needed; discussed role of flonase  4. Chronic rhinitis  -     ipratropium (ATROVENT) 21 mcg (0.03 %) nasal spray; 2 sprays by Each Nostril route 3 (three) times daily as needed (runny nose).  Dispense: 30 mL; Refill: 2    I had a discussion with the patient and her  Bill  regarding her condition and the further workup and management options.    All questions were answered, and the patient is in agreement with the above.

## 2024-06-24 ENCOUNTER — TELEPHONE (OUTPATIENT)
Dept: NEUROSURGERY | Facility: CLINIC | Age: 84
End: 2024-06-24
Payer: MEDICARE

## 2024-06-24 NOTE — TELEPHONE ENCOUNTER
Called pt and scheduled appt for 7/23/24 at 1pm. Asked pt to arrive 15 minutes early and told to go to 8th floor of [facility]. Pt was told she would be contacted if she needed to any further records, documentation, or imaging.       ----- Message from Nikky Bal sent at 6/24/2024  2:13 PM CDT -----  Regarding: Referrerral  Good afternoon,     I received a referral on behalf of the patient attached from Dr Ahmet Maurer from [facility] recommending patient be evaluated by Dr Ellis.  Possibly for deep brain stimulation.  I have scanned the referral and notes into media mgr for your assessment.  Please review and advise or contact the patient to schedule.     Thank you,     Nikky Bal  [facility]

## 2024-07-24 ENCOUNTER — TELEPHONE (OUTPATIENT)
Dept: NEUROSURGERY | Facility: CLINIC | Age: 84
End: 2024-07-24
Payer: MEDICARE

## 2024-07-25 ENCOUNTER — OFFICE VISIT (OUTPATIENT)
Dept: NEUROSURGERY | Facility: CLINIC | Age: 84
End: 2024-07-25
Payer: MEDICARE

## 2024-07-25 VITALS — SYSTOLIC BLOOD PRESSURE: 118 MMHG | DIASTOLIC BLOOD PRESSURE: 65 MMHG | HEART RATE: 80 BPM

## 2024-07-25 DIAGNOSIS — G25.0 ESSENTIAL TREMOR: Primary | ICD-10-CM

## 2024-07-25 PROCEDURE — 1159F MED LIST DOCD IN RCRD: CPT | Mod: CPTII,S$GLB,, | Performed by: NEUROLOGICAL SURGERY

## 2024-07-25 PROCEDURE — 3074F SYST BP LT 130 MM HG: CPT | Mod: CPTII,S$GLB,, | Performed by: NEUROLOGICAL SURGERY

## 2024-07-25 PROCEDURE — 99999 PR PBB SHADOW E&M-EST. PATIENT-LVL III: CPT | Mod: PBBFAC,,, | Performed by: NEUROLOGICAL SURGERY

## 2024-07-25 PROCEDURE — 99215 OFFICE O/P EST HI 40 MIN: CPT | Mod: S$GLB,,, | Performed by: NEUROLOGICAL SURGERY

## 2024-07-25 PROCEDURE — 1100F PTFALLS ASSESS-DOCD GE2>/YR: CPT | Mod: CPTII,S$GLB,, | Performed by: NEUROLOGICAL SURGERY

## 2024-07-25 PROCEDURE — 3288F FALL RISK ASSESSMENT DOCD: CPT | Mod: CPTII,S$GLB,, | Performed by: NEUROLOGICAL SURGERY

## 2024-07-25 PROCEDURE — 3078F DIAST BP <80 MM HG: CPT | Mod: CPTII,S$GLB,, | Performed by: NEUROLOGICAL SURGERY

## 2024-07-25 NOTE — PROGRESS NOTES
"Neurosurgery  History & Physical    SUBJECTIVE:     Chief Complaint: essential tremor     History of Present Illness:  Mary Prince is a 83 y.o. right-handed female who is referred by Dr. Gardiner for consideration of DBS therapy for essential tremor. The tremor first began about 10 years. The tremor is mostly present in her hands, and the right side is more bothersome than the left. She also endorses some gait difficulty. She fell out of the front door into the shrubs two days ago. She denies head trauma but needed assistance from Juan and two neighbors to get up.     She has tried propranolol and primidone. She thinks that the tremor may respond to EtOH. Her mother's family had tremor. They have good longevity (e.g. her maternal aunt with tremor lived to be 96). She has felt "strange" since the recent increase in primidone dosing; she is no longer taking them because of the side effect and is taking a smaller dose instead.     Some days, she cannot feed herself and must depend on her significant other, Juan, who presents with her today and helps to provide some of the history. (He is her power of .) She can no longer write or do her own checkbook.     She cannot drive. She is tearful at times during our interview.     Her goals for surgery are to be able to write, drive (she just bought a new car, but Juan drives it). She also has trouble sleeping through the night (and often wakes up at 3 or 4 am). She and Juan live together in a 2-story home that has a chairlift.     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     The patient denies any bleeding or anesthetic complications with any previous surgery. She takes ASA81 for TAVR (2019 with Dr. Shah).     She has history of breast cancer; during the course of treatment she lost her hair and is not interested in having her head shaved. 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 With improvement of symptoms on prednisone, but pt has diabetes and has been taking prednisone monotherapy for >6 months at this point. With previous flareup on rapid prednisone taper to 5 mg.\par - Start methotrexate 10 mg q week, discussed adverse effects. Pt had CMP and CBC within normal limits in 4/2022\par - Start folic acid 1 mg q day\par - Decrease prednisone to 15 mg q day, advised pt that she can split the dose into 10 mg q AM and 5 mg q PM if she has dyspepsia with taking the dose all at once\par - f/u in 1 month lumbar fusion with Dr. Anguiano that was complicated by Klebsiella and ultimately fixed by Dr. Maurer.     As of 7/25/24, the patient returns after having been referred back by Dr. Maurer. She does not recall having met me previously.     Dr. Gardiner is no longer with [facility], so Ms. Prince has been seeing Dr. Figueroa. She is frustrated that her medications are not working well. She is embarrassed that she has to eat standing up at a counter because she cannot sit at a dining table and feed herself.     She cannot write birthday cards to her grandchildren. She wants independence back.     She does PT two days a week. Juan continues to drive.     Her most recent A1C was 8.2. Her insulin pump (Medtronic EndoDex-Med) needs to be re-programmed. Dr. Cabello is her endocrinologist.     She has some vocal tremor as well. She is no longer interested in driving.     Review of patient's allergies indicates:   Allergen Reactions    Prozac [fluoxetine] Other (See Comments)    Sulfa (sulfonamide antibiotics)      Other reaction(s): Unknown       Current Outpatient Medications   Medication Sig Dispense Refill    acetaminophen (TYLENOL) 325 MG tablet Take 2 tablets (650 mg total) by mouth every 6 (six) hours as needed for Pain.  0    ALPRAZolam (XANAX) 0.5 MG tablet Take 0.5 mg by mouth nightly as needed.       aspirin 81 MG Chew Take 1 tablet (81 mg total) by mouth 2 (two) times daily. for 14 days (Patient taking differently: Take 81 mg by mouth once daily.)  0    atorvastatin (LIPITOR) 80 MG tablet Take 80 mg by mouth every evening.       escitalopram oxalate (LEXAPRO) 10 MG tablet Take 10 mg by mouth once daily.       furosemide (LASIX) 20 MG tablet Take 2 tablets (40 mg total) by mouth once daily. (Patient taking differently: Take 40 mg by mouth once daily. As needed)      gabapentin (NEURONTIN) 600 MG tablet Take 2 tablets (1,200 mg total) by mouth 3 (three) times daily. 180 tablet 2    insulin aspart U-100 (NOVOLOG) 100 unit/mL injection  Insulin pump      insulin lispro 100 unit/mL injection Inject into the skin.      ipratropium (ATROVENT) 21 mcg (0.03 %) nasal spray 2 sprays by Each Nostril route 3 (three) times daily as needed (runny nose). 30 mL 2    letrozole (FEMARA) 2.5 mg Tab Take 2.5 mg by mouth once daily .      levothyroxine (SYNTHROID) 125 MCG tablet Take 125 mcg by mouth before breakfast.      mupirocin (BACTROBAN) 2 % ointment Apply topically 3 (three) times daily. 30 g 0    oxyCODONE (ROXICODONE) 5 MG immediate release tablet Take 1 tablet (5 mg total) by mouth every 4 (four) hours as needed for Pain. 30 tablet 0    potassium aminobenzoate 500 mg Cap Take by mouth once daily.      primidone (MYSOLINE) 50 MG Tab Take 50 mg by mouth 3 (three) times daily.       propranoloL (INDERAL LA) 80 MG 24 hr capsule TAKE 1 CAPSULE (80 MG TOTAL) BY MOUTH ONCE DAILY. 90 capsule 3    ramipriL (ALTACE) 10 MG capsule Take 10 mg by mouth once daily.       vit C/E/zinc/lutein/zeaxanthin (OCUVITE EYE HEALTH ORAL) Take 1 tablet by mouth once daily.       XIIDRA 5 % Dpet Place into both eyes daily as needed.       No current facility-administered medications for this visit.     Facility-Administered Medications Ordered in Other Visits   Medication Dose Route Frequency Provider Last Rate Last Admin    lidocaine (PF) 10 mg/ml (1%) injection 10 mg  1 mL Intradermal Once Azeem Randhawa MD           Past Medical History:   Diagnosis Date    Anticoagulant long-term use     asa    AVN (avascular necrosis of bone)     Cancer 2010    left breast XRT + lumpectomy; right lunpectomy + Chemotherapy (Dr. Santana) 8 of 12 treatments, stopped for side effects    Cancer     right breast    Cataract     right eye    CKD (chronic kidney disease) stage 3, GFR 30-59 ml/min     CKD (chronic kidney disease), stage III     Coronary artery disease     total 4 stents over several years, last 2015    Diabetes mellitus, type 2     Insulin pump    Diabetic neuropathy     HLD  (hyperlipidemia)     Hypertension     Insulin pump in place     Thyroid disease     Tremor, essential      Past Surgical History:   Procedure Laterality Date    APPENDECTOMY      BREAST SURGERY Bilateral     lumpectomy    CARDIAC VALVE SURGERY  2019    EYE SURGERY Bilateral     cataract    HYSTERECTOMY      JOINT REPLACEMENT Right     hip    JOINT REPLACEMENT Left     knee    SPINE SURGERY      laminectomy    TRANSCATHETER AORTIC VALVE REPLACEMENT (TAVR) N/A 11/20/2019    Procedure: REPLACEMENT, AORTIC VALVE, TRANSCATHETER (TAVR);  Surgeon: Balaji Shah MD;  Location: [facility] CATH LAB;  Service: Cardiology;  Laterality: N/A;     Family History       Problem Relation (Age of Onset)    No Known Problems Mother, Father, Sister, Brother, Maternal Aunt, Maternal Uncle, Paternal Aunt, Paternal Uncle, Maternal Grandmother, Maternal Grandfather, Paternal Grandmother, Paternal Grandfather          Social History     Socioeconomic History    Marital status:    Tobacco Use    Smoking status: Never    Smokeless tobacco: Never   Substance and Sexual Activity    Alcohol use: Yes     Comment: social    Drug use: No   Social History Narrative    Registered nurse now retired; 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   HENT:  Negative for nosebleeds.         Denies anosmia   Eyes:  Negative for visual disturbance.   Respiratory:  Negative for shortness of breath.    Cardiovascular:  Negative for chest pain.   Gastrointestinal:  Positive for constipation.        She has explosive bowel movements every three days or so    Endocrine: Positive for cold intolerance.   Genitourinary:  Negative for difficulty urinating.   Musculoskeletal:  Negative for neck pain.   Skin:  Negative for color change.   Neurological:  Positive for tremors.   Hematological:  Bruises/bleeds easily.   Psychiatric/Behavioral:   Negative for hallucinations. The patient is nervous/anxious.        OBJECTIVE:     Vital Signs     There is no height or weight on file to calculate BMI.      Physical Exam:    Constitutional: She appears well-developed and well-nourished. No distress.     Eyes: EOM are normal.     Abdominal: Soft.     Skin: Skin displays no rash on extremities.     Psych/Behavior: She is alert. She is oriented to person, place, and time.     Musculoskeletal:      Comments: Presents in wheelchair     Neurological:   Moderate tremor, worse on the right        Pulmonary: nonlabored respirations     Hematologic: no bruising noted       Diagnostic Results:  No imaging available for review     ASSESSMENT/PLAN:     Mary Prince is a 83 y.o. female referred by Dr. Maurer for consideration of DBS placement for essential tremor.     We had a lengthy discussion about the risks, benefits, and alternatives to deep brain stimulation.  Risks include, but are not limited to, bleeding, pain, infection, scarring, need for further/repeat procedure, failure to slow the progression of neurodegenerative disease symptoms, speech difficulty, balance difficulty, cognitive changes, loss of inhibition, intracranial hemorrhage, venous infarct, seizure, stroke, paralysis, and death. Hardware-related complications may also occur. This is an implanted device, meaning that any infection elsewhere in the body must be treated immediately to minimize the risk of blood stream infection seeding the device. Should this happen, it is possible that the entire system would require removal.  We also discussed that some patients become refractory to stimulation for ET about 5-10 years after implantation; the mechanism of this is not well understood but is an area of ongoing research.      We also reviewed the work flow employed at [facility] for placement of deep brain stimulation devices.  We discussed that the patient would be admitted Tuesday morning for placement of  5 fiducial screws.  We clarified this would require shaving the entire head.  After the fiducial screws are placed, the patient would be transferred to CT scan to obtain a fiducial registration CT scan.  This would be merged with the already obtained MRI.  The patient would be brought back to the operating room for definitive placement of the DBS lead.  This is to be done while awake. The patient expressed understanding thereof. The following week, the patient would be readmitted for asleep implantation of extension cable and pulse generator on an outpatient basis.  The device would subsequently be programmed by movement disorders neurology.      She will need to be off of Aspirin and all other blood thinners for 10 days prior to surgery (about 3 weeks altogether).    I look forward to discussing the patient further in interdisciplinary conference.      I have encouraged the patient to contact the clinic in interim with any questions, concerns, or adverse clinical change.      I spent over an hour on this encounter, more than half in direct consultation.

## 2024-07-26 ENCOUNTER — TELEPHONE (OUTPATIENT)
Dept: NEUROLOGY | Facility: CLINIC | Age: 84
End: 2024-07-26
Payer: MEDICARE

## 2024-07-26 NOTE — TELEPHONE ENCOUNTER
Called patient today about scheduling a DBS consultation with Dr. Conde. Patient picks up the call, I offer her a virtual appointment. Patient informs me that she cannot have it on Tuesday's and Thursday's. I inform the patient that she has virtual visit only on Monday's and Friday's. Patient was relief, she has been scheduled on September 16th at 3:40pm

## 2024-08-08 ENCOUNTER — TELEPHONE (OUTPATIENT)
Dept: NEUROLOGY | Facility: CLINIC | Age: 84
End: 2024-08-08
Payer: MEDICARE

## 2024-08-09 ENCOUNTER — TELEPHONE (OUTPATIENT)
Dept: NEUROLOGY | Facility: CLINIC | Age: 84
End: 2024-08-09
Payer: MEDICARE

## 2024-08-27 NOTE — PROGRESS NOTES
NEUROPSYCHOLOGY CONSULT (TELEHEALTH)  Referral Information  Name: Mary Prince  MRN: [id number]  : 1940  Age: 83 y.o.  Race: White  Gender: female  REFERRAL SOURCE: Michaelle Ellis MD   DATE CONDUCTED: 2024  SOURCES OF INFORMATION:  The following was gathered from a clinical interview with Ms. Mary Prince and review of the available medical records. Ms. Prince expressed an understanding of the purpose of the evaluation and consented to all procedures. Total licensed billing psychologists professional time including clinical interview, test administration and interpretation of tests administered by the billing psychologist, integration of test results and other clinical data, preparing the final report, and personally reporting results to the patient   Billin - 60 minutes  Telemedicine:   The patient location is: Home  The provider location is: Home  The chief complaint/medical necessity leading to consultation/medical necessity is: cognitive decline  Visit type: Virtual visit with audio  Total time spent with patient: 35 minutes  Each patient to whom he or she provides medical services by telemedicine is:  (1) informed of the relationship between the physician and patient and the respective role of any other health care provider with respect to management of the patient; and (2) notified that he or she may decline to receive medical services by telemedicine and may withdraw from such care at any time.  Consent/Emergency Plan: The patient expressed an understanding of the purpose of the evaluation and consented to all procedures. I informed the patient of limits to confidentiality and discussed an emergency plan.    NEUROPSYCHOLOGICAL EVALUATION - CONFIDENTIAL    SUMMARY/TREATMENT PLAN   Ms. Prince is an 83 year old female currently undergoing work-up for DBS in the setting of essential tremor.     ABILITY TO CONSENT: No concerns about her ability to consent to the proposed procedure.  "     SURGICAL EXPECTATIONS: Met with neurosurgery on 7/25. She seems to have realistic expectations, hoping that it can improve her daily functioning.      COGNITION: Neuropsychological testing on 10/9. She denied any cognitive changes.      NEUROPSYCHIATRIC SYMPTOMS: Unremarkable pre-morbid psychiatric history with no ICDs.     Diagnoses  Problem List Items Addressed This Visit          Neuro    Essential tremor    Cognitive changes - Primary    Current Assessment & Plan     Neurology: Scheduled with Dr. Conde.      Neuropsychology: Testing on 10/9.            Ms. Prince will be provided the results of the evaluation.     Thank you for allowing me to participate in Ms. Law care.  If you have any questions, please contact me at [phone].    Maverick Latham Psy.D., ABPP  Board Certified in Clinical Neuropsychology  Department of Neurology    HISTORY OF PRESENT ILLNESS: Ms. Mary Prince is an 83 y.o., right-handed, female with 15 years of education who was referred for a neuropsychological evaluation as part of pre-surgical DBS work-up for essential tremor (about 5 year onset). Worse in her dominant hand, to the extent that it's difficult to feed herself.  She indicated that she is "tired of dealing with it," noting that it causes functional difficulties and people tend to stare in public. With that said, she described herself as apprehensive about surgery, noting that she spoke with a DBS patient in the waiting room prior to her appointment with neurosurgery. That individual showed her multiple incisions in her head whereas she was under the impression that it was only 1 incision. She also had a very negative experience with surgery about 5 years ago where her back was much worse than pre-surgery.     Ms. Prince denied cognitive changes, feeling as though she is still "pretty sharp."    Impulse Control Disorders:  Carbohydrate Cravings: Denied. In fact, she is eating less than usual. "   Gambling/Spending: Denied. Never gambled.   Sexualized Behaviors: Denied    Neuropsychiatric Symptoms:  Hallucinations: Denied  Depression/Labile Mood: Denied   Anxiety: Denied, she can worry about getting to places on time since she can't drive.     DAILY FUNCTIONING:  BASIC ADLS:  Feeding: independent  Dressing: independent  Bathing: independent, converted bathroom to walk in shower due to difficulty stepping into her tub.     IADLS:  Support System: Resides independently. Her POA (Shayy Back) lives in [place] and is like a daughter to her. Raymond Sparks lives with her, provides transportation, and does chores around the house. She is not sure that she would consider him a significant other.   Appointment Management: independent, she keeps appointment details/print outs. Using a calendar is difficult since she has trouble writing due to tremor.   Medication Compliance: independent, she doesn't use a pillbox. She keeps her medications in the kitchen. She reported strong adherence, including gabapentin 4 times per daily.   Financial Management: No trouble remembering to pay her bills, but writing checks is a challenge. As a result, she has someone managing for her.   Cooking: Simple meal preparation. No issues when she does cook. She has Meals on Wheels delivered to her house.   Driving: She stopped driving after back surgery about 4-5 years ago. She suspects she could drive, but she doesn't want to risk it.       BRAIN HEALTH RISK FACTORS:  Hearing Loss: Denied  Falls: Denied. She ambulates as a walker, which she described as her security blanket.   Sleep: Sleep initiation and maintenance can be an issue, improved with melatonin. She wakes up a few times due to nocturia.     MEDICAL HISTORY: Ms. Prince  has a past medical history of Anticoagulant long-term use, AVN (avascular necrosis of bone), Cancer (2010), Cancer, Cataract, CKD (chronic kidney disease) stage 3, GFR 30-59 ml/min, CKD (chronic kidney disease),  "stage III, Coronary artery disease, Diabetes mellitus, type 2, Diabetic neuropathy, HLD (hyperlipidemia), Hypertension, Insulin pump in place, Thyroid disease, and Tremor, essential.    NEUROIMAGIN2022 Brain MRI: "There is no acute infarct, intracranial hemorrhage, or mass. Confluent bilateral periventricular and subcortical white matter FLAIR hyperintensities, likely related to chronic microvascular ischemic angiopathy. Similar appearing FLAIR hyperintensity is present at the paramedian karlos. No hydrocephalus. No extra-axial collection. Expected appearance of the vascular flow voids. The visualized intraorbital contents are normal. Bilateral orbital lens replacement. The paranasal sinuses and mastoid air cells are clear. "    SUBSTANCE USE: Ms. Prince reports that she has never smoked. She has never used smokeless tobacco. She drinks alcohol once per week. She denied a history of substance abuse.     CURRENT MEDICATIONS:    Current Outpatient Medications:     acetaminophen (TYLENOL) 325 MG tablet, Take 2 tablets (650 mg total) by mouth every 6 (six) hours as needed for Pain., Disp: , Rfl: 0    ALPRAZolam (XANAX) 0.5 MG tablet, Take 0.5 mg by mouth nightly as needed.  (Patient not taking: Reported on 2024), Disp: , Rfl:     aspirin 81 MG Chew, Take 1 tablet (81 mg total) by mouth 2 (two) times daily. for 14 days (Patient taking differently: Take 81 mg by mouth once daily.), Disp: , Rfl: 0    atorvastatin (LIPITOR) 80 MG tablet, Take 80 mg by mouth every evening. , Disp: , Rfl:     escitalopram oxalate (LEXAPRO) 10 MG tablet, Take 10 mg by mouth once daily. , Disp: , Rfl:     furosemide (LASIX) 20 MG tablet, Take 2 tablets (40 mg total) by mouth once daily. (Patient taking differently: Take 40 mg by mouth once daily. As needed), Disp: , Rfl:     gabapentin (NEURONTIN) 600 MG tablet, Take 2 tablets (1,200 mg total) by mouth 3 (three) times daily., Disp: 180 tablet, Rfl: 2    insulin aspart U-100 (NOVOLOG) " 100 unit/mL injection, Insulin pump, Disp: , Rfl:     insulin lispro 100 unit/mL injection, Inject into the skin., Disp: , Rfl:     ipratropium (ATROVENT) 21 mcg (0.03 %) nasal spray, 2 sprays by Each Nostril route 3 (three) times daily as needed (runny nose)., Disp: 30 mL, Rfl: 2    letrozole (FEMARA) 2.5 mg Tab, Take 2.5 mg by mouth once daily . (Patient not taking: Reported on 7/25/2024.), Disp: , Rfl:     levothyroxine (SYNTHROID) 125 MCG tablet, Take 125 mcg by mouth before breakfast., Disp: , Rfl:     mupirocin (BACTROBAN) 2 % ointment, Apply topically 3 (three) times daily. (Patient not taking: Reported on 7/25/2024), Disp: 30 g, Rfl: 0    oxyCODONE (ROXICODONE) 5 MG immediate release tablet, Take 1 tablet (5 mg total) by mouth every 4 (four) hours as needed for Pain. (Patient not taking: Reported on 7/25/2024), Disp: 30 tablet, Rfl: 0    potassium aminobenzoate 500 mg Cap, Take by mouth once daily., Disp: , Rfl:     primidone (MYSOLINE) 50 MG Tab, Take 50 mg by mouth 3 (three) times daily. , Disp: , Rfl:     propranoloL (INDERAL LA) 80 MG 24 hr capsule, TAKE 1 CAPSULE (80 MG TOTAL) BY MOUTH ONCE DAILY., Disp: 90 capsule, Rfl: 3    ramipriL (ALTACE) 10 MG capsule, Take 10 mg by mouth once daily. , Disp: , Rfl:     vit C/E/zinc/lutein/zeaxanthin (OCUVITE EYE HEALTH ORAL), Take 1 tablet by mouth once daily. , Disp: , Rfl:     XIIDRA 5 % Dpet, Place into both eyes daily as needed., Disp: , Rfl:   No current facility-administered medications for this visit.    Facility-Administered Medications Ordered in Other Visits:     lidocaine (PF) 10 mg/ml (1%) injection 10 mg, 1 mL, Intradermal, Once, Azeem Randhawa MD     PSYCHIATRIC HISTORY: She reported an unremarkable pre-morbid psychiatric history with no past mental health treatment. Grief related to 's death in 1985. Her whole life changed at that time.    SUICIDAL IDEATION:  History: Denied.   Active Suicidal Ideation:Denied  Plan/Intent:  Denied    FAMILY HISTORY: family history includes No Known Problems in her brother, father, maternal aunt, maternal grandfather, maternal grandmother, maternal uncle, mother, paternal aunt, paternal grandfather, paternal grandmother, paternal uncle, and sister.    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    Learning Difficulties: Denied   Repeated Grade: Denied     Vocation:   Highest Attained: OBGYN nurse. .     Relationship Status:  (,  by suicide, seemingly self-inflicted gun shot wound, but someone also reportedly saw someone in the building). No biological children. She has someone who is like a daughter to her and her children are like her grandchildren.     MENTAL STATUS AND OBSERVATIONS:  APPEARANCE: Unable to assess.   ALERTNESS/ORIENTATION: Attentive and alert. She demonstrated intact episodic memory for recent events and presented as a reliable historian.   GAIT/MOTOR: Unable to assess.   SENSORY: Unable to assess.   SPEECH/LANGUAGE: Normal in rate, rhythm, tone, and volume. Expressive and receptive language were grossly intact.  STATED MOOD/AFFECT: Mood was euthymic.   INTERPERSONAL BEHAVIOR: Rapport was quickly and easily established   THOUGHT PROCESSES: Thoughts seemed logical and goal-directed.     PROPOSED TEST BATTERY:  YEARS OF ED: 15    MOCA  TOPF  RBANS  YAS COPY  TRAILS  SDMT  FAS/ANIMALS  NAB NAMING  [id number]  GDS

## 2024-08-28 ENCOUNTER — OFFICE VISIT (OUTPATIENT)
Dept: NEUROLOGY | Facility: CLINIC | Age: 84
End: 2024-08-28
Payer: MEDICARE

## 2024-08-28 DIAGNOSIS — R41.89 COGNITIVE CHANGES: Primary | ICD-10-CM

## 2024-08-28 DIAGNOSIS — G25.0 ESSENTIAL TREMOR: ICD-10-CM

## 2024-09-16 ENCOUNTER — OFFICE VISIT (OUTPATIENT)
Dept: NEUROLOGY | Facility: CLINIC | Age: 84
End: 2024-09-16
Payer: MEDICARE

## 2024-09-16 DIAGNOSIS — G25.0 ESSENTIAL TREMOR: Primary | ICD-10-CM

## 2024-09-16 PROCEDURE — 99204 OFFICE O/P NEW MOD 45 MIN: CPT | Mod: 95,,, | Performed by: PSYCHIATRY & NEUROLOGY

## 2024-09-16 NOTE — PROGRESS NOTES
The patient location is: home  The chief complaint leading to consultation is: ET  Visit type: audiovisual  Total time spent with patient: 40mins  Each patient to whom he or she provides medical services by telemedicine is:  (1) informed of the relationship between the physician and patient and the respective role of any other health care provider with respect to management of the patient; and (2) notified that he or she may decline to receive medical services by telemedicine and may withdraw from such care at any time.    Notes: below    MOVEMENT DISORDERS CLINIC NEW VIDEO CONSULT NOTE    PCP/Referring Provider: Self, Aaareferral  No address on file  Date of Service: 9/16/2024    Chief Complaint: ET    HPI: Mary Prince is a R HANDED 83 y.o. female with a medical issues significant for ET, breast cancer, spinal abscess, aortic valve replacement, Asthma, CKD, Pulmonary fibrosis, DM2, coming for ET workup. Saw Dr. Guthrie briefly. Notes a progressive b/l postural hand tremor since 4 years. R hand is worse than L. No resting tremor. She struggles to eat. Cannot write. Stopped driving.   Cousin has tremors    Noted issues walking since her spinal abscess 4-5 years ago. Balance worsening over time. Uses s walker. Falls occasionally.   Has a medtronic insulin pump    Takes inderal 80mg ER, gagbapntin 1200mg QID, primidone 50mg TID  These medications imporve her tremor by 10%        Review of Systems: Review of Systems   Constitutional:  Negative for fever.   HENT:  Negative for congestion.    Eyes:  Negative for double vision.   Respiratory:  Negative for cough and shortness of breath.    Cardiovascular:  Negative for chest pain and leg swelling.   Gastrointestinal:  Negative for nausea.   Genitourinary:  Negative for dysuria.   Musculoskeletal:  Positive for falls.   Skin:  Negative for rash.   Neurological:  Positive for tremors. Negative for speech change and headaches.   Psychiatric/Behavioral:  Negative for  depression.              Current Medications:  Outpatient Encounter Medications as of 9/16/2024   Medication Sig Dispense Refill    acetaminophen (TYLENOL) 325 MG tablet Take 2 tablets (650 mg total) by mouth every 6 (six) hours as needed for Pain.  0    ALPRAZolam (XANAX) 0.5 MG tablet Take 0.5 mg by mouth nightly as needed.  (Patient not taking: Reported on 7/25/2024)      aspirin 81 MG Chew Take 1 tablet (81 mg total) by mouth 2 (two) times daily. for 14 days (Patient taking differently: Take 81 mg by mouth once daily.)  0    atorvastatin (LIPITOR) 80 MG tablet Take 80 mg by mouth every evening.       escitalopram oxalate (LEXAPRO) 10 MG tablet Take 10 mg by mouth once daily.       furosemide (LASIX) 20 MG tablet Take 2 tablets (40 mg total) by mouth once daily. (Patient taking differently: Take 40 mg by mouth once daily. As needed)      gabapentin (NEURONTIN) 600 MG tablet Take 2 tablets (1,200 mg total) by mouth 3 (three) times daily. 180 tablet 2    insulin aspart U-100 (NOVOLOG) 100 unit/mL injection Insulin pump      insulin lispro 100 unit/mL injection Inject into the skin.      ipratropium (ATROVENT) 21 mcg (0.03 %) nasal spray 2 sprays by Each Nostril route 3 (three) times daily as needed (runny nose). 30 mL 2    letrozole (FEMARA) 2.5 mg Tab Take 2.5 mg by mouth once daily . (Patient not taking: Reported on 7/25/2024.)      levothyroxine (SYNTHROID) 125 MCG tablet Take 125 mcg by mouth before breakfast.      mupirocin (BACTROBAN) 2 % ointment Apply topically 3 (three) times daily. (Patient not taking: Reported on 7/25/2024) 30 g 0    oxyCODONE (ROXICODONE) 5 MG immediate release tablet Take 1 tablet (5 mg total) by mouth every 4 (four) hours as needed for Pain. (Patient not taking: Reported on 7/25/2024) 30 tablet 0    potassium aminobenzoate 500 mg Cap Take by mouth once daily.      primidone (MYSOLINE) 50 MG Tab Take 50 mg by mouth 3 (three) times daily.       propranoloL (INDERAL LA) 80 MG 24 hr  capsule TAKE 1 CAPSULE (80 MG TOTAL) BY MOUTH ONCE DAILY. 90 capsule 3    ramipriL (ALTACE) 10 MG capsule Take 10 mg by mouth once daily.       vit C/E/zinc/lutein/zeaxanthin (OCUVITE EYE HEALTH ORAL) Take 1 tablet by mouth once daily.       XIIDRA 5 % Dpet Place into both eyes daily as needed.       Facility-Administered Encounter Medications as of 9/16/2024   Medication Dose Route Frequency Provider Last Rate Last Admin    lidocaine (PF) 10 mg/ml (1%) injection 10 mg  1 mL Intradermal Once Azeem Randhawa MD           Past Medical History:  Patient Active Problem List   Diagnosis    Aortic valve stenosis    Coronary artery disease of native artery of native heart with stable angina pectoris    Mixed hyperlipidemia    Type 2 diabetes mellitus    Acquired hypothyroidism    Epidural intraspinal abscess    Shoulder joint stiffness, bilateral    Bilateral shoulder pain    Essential tremor    S/P TAVR (transcatheter aortic valve replacement)    Insulin pump status    Chronic diastolic heart failure    Osteoarthritis of left knee    Nonrheumatic mitral valve stenosis    S/P coronary artery stent placement    Asthma    Diabetic peripheral neuropathy associated with type 2 diabetes mellitus    Uncontrolled type 2 diabetes mellitus    Primary osteoarthritis of right knee    Old myocardial infarction    Pulmonary fibrosis    Diabetic renal disease    Diabetic neuropathy    Hypertensive heart and renal disease    Chronic kidney disease, stage 3a    Atherosclerosis of aorta    Cognitive changes       Past Surgical History:  Past Surgical History:   Procedure Laterality Date    APPENDECTOMY      BREAST SURGERY Bilateral     lumpectomy    CARDIAC VALVE SURGERY  2019    EYE SURGERY Bilateral     cataract    HYSTERECTOMY      JOINT REPLACEMENT Right     hip    JOINT REPLACEMENT Left     knee    SPINE SURGERY      laminectomy    TRANSCATHETER AORTIC VALVE REPLACEMENT (TAVR) N/A 11/20/2019    Procedure: REPLACEMENT,  AORTIC VALVE, TRANSCATHETER (TAVR);  Surgeon: Balaji Shah MD;  Location: [facility] CATH LAB;  Service: Cardiology;  Laterality: N/A;       Social:  Social History     Socioeconomic History    Marital status:    Tobacco Use    Smoking status: Never    Smokeless tobacco: Never   Substance and Sexual Activity    Alcohol use: Yes     Comment: social    Drug use: No   Social History Narrative    Registered nurse now retired; 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       Family History:  Family History   Problem Relation Name Age of Onset    No Known Problems Mother      No Known Problems Father      No Known Problems Sister      No Known Problems Brother      No Known Problems Maternal Aunt      No Known Problems Maternal Uncle      No Known Problems Paternal Aunt      No Known Problems Paternal Uncle      No Known Problems Maternal Grandmother      No Known Problems Maternal Grandfather      No Known Problems Paternal Grandmother      No Known Problems Paternal Grandfather      Anemia Neg Hx      Arrhythmia Neg Hx      Asthma Neg Hx      Clotting disorder Neg Hx      Fainting Neg Hx      Heart attack Neg Hx      Heart disease Neg Hx      Heart failure Neg Hx      Hyperlipidemia Neg Hx      Hypertension Neg Hx      Stroke Neg Hx      Atrial Septal Defect Neg Hx         PHYSICAL:  There were no vitals taken for this visit.    Physical Exam  Constitutional: Well-developed, well-nourished, appears stated age  Eyes: No scleral icterus  ENT: Moist oral mucosa  Cardiovascular: No lower extremity edema   Respiratory: No labored breathing   Skin: No rash   Hematologic: No bruising  Other: GI/ deferred   Mental status: Alert and oriented to person, place, time, and situation;   Speech: normal (not dysarthric), no aphasia  Cranial nerves:            CN II: Pupils mid-position and equal, not tested light or accommodation  CN III, IV, VI: Extraocular  movements full, no nystagmus visualized  CN V: Not tested   CN VII: Face strong and symmetric bilaterally   CN VIII: Hearing intact to voice and conversation   CN IX, X: Palate raises midline and symmetric   CN XI: Strong shoulder shrug B/L  CN XII: Tongue appears midline   Motor: Normal bulk by appearance, no drift   Sensory: Not tested    Gait: Not tested  Deep tendon reflexes: Not tested  Movement/Coordination                    No hypophonic speech.                     No facial masking.  No bradykinesia.                              No other dystonia, chorea, athetosis, myoclonus, or tics visualized.    Bradykinesia  ? Finger taps Finger flicks SHIRA Heel taps   Left 0 0 0 0   Right 0 0 0 0       Tremor Exam   Arms extended Arms in wing position, fingers almost touching Re-emergent Arms extended wrists extended Intention Resting Kinetic   Left ?+++ ? ? ? ? ? ?   Right ?+++ ? ? ? ? ? ?   Head tremor: No-No     Archimedes Spirals   Left ?+++   Right ?+++       Laboratory Data:  NA    Imaging:  NA    Assessment//Plan:   Problem List Items Addressed This Visit          Neuro    Essential tremor - Primary    Current Assessment & Plan     Debilitating ET-like tremor  Discussed advanced therapies such as FUS and DBS  We discussed that these therapies may help tremor significantly but can also cause imbalance - I highly suggested unilater only DBS VIM due to standing balance dz (spinal abscess)  Multiple comorbidities to be considered in surgical risk  Will see Neuropsych and NSGY for candidacy assessment  Sent DBS expectation file         Relevant Orders    Ambulatory consult to Neuropsychology       Crystal Conde MD, MS [facility]  Department of Neurology  Movement Disorders  Liaison

## 2024-09-16 NOTE — ASSESSMENT & PLAN NOTE
Debilitating ET-like tremor  Discussed advanced therapies such as FUS and DBS  We discussed that these therapies may help tremor significantly but can also cause imbalance - I highly suggested unilater only DBS VIM due to standing balance dz (spinal abscess)  Multiple comorbidities to be considered in surgical risk  Will see Neuropsych and NSGY for candidacy assessment  Sent DBS expectation file

## 2024-09-16 NOTE — Clinical Note
DBS eval Spoke with patient Preop instructions reviewed.      Preop antibacterial shower instructions reviewed, no creams, lotions, deodorant, jewelry, make-up is to be removed day of procedure.    Pt instructed to take the following medications with sip of water on the day of procedure then remain NPO: Norvasc    Patient verbalized understanding.

## 2024-11-08 ENCOUNTER — TELEPHONE (OUTPATIENT)
Dept: NEUROLOGY | Facility: CLINIC | Age: 84
End: 2024-11-08
Payer: MEDICARE

## 2024-11-08 NOTE — TELEPHONE ENCOUNTER
----- Message from Lizzette sent at 11/6/2024 11:32 AM CST -----  Regarding: Appt Access  Contact: pt [phone]  Type:  Patient Returning Call    Who Called:pt   Who Left Message for Patient:not sure   Does the patient know what this is regarding?:yes , an appt she had Thursday that is supposed be rescheduled due to doctor being out  Would the patient rather a call back or a response via [facility]? callback  Best Call Back Number:[phone]

## 2024-11-26 ENCOUNTER — TELEPHONE (OUTPATIENT)
Dept: NEUROLOGY | Facility: CLINIC | Age: 84
End: 2024-11-26
Payer: MEDICARE

## 2024-11-27 ENCOUNTER — OFFICE VISIT (OUTPATIENT)
Dept: NEUROLOGY | Facility: CLINIC | Age: 84
End: 2024-11-27
Payer: MEDICARE

## 2024-11-27 DIAGNOSIS — G25.0 ESSENTIAL TREMOR: ICD-10-CM

## 2024-11-27 DIAGNOSIS — R41.9 COGNITIVE COMPLAINTS: Primary | ICD-10-CM

## 2024-11-27 PROCEDURE — 99499 UNLISTED E&M SERVICE: CPT | Mod: S$GLB,,, | Performed by: PSYCHIATRY & NEUROLOGY

## 2024-11-27 PROCEDURE — 96138 PSYCL/NRPSYC TECH 1ST: CPT | Mod: S$GLB,,, | Performed by: PSYCHIATRY & NEUROLOGY

## 2024-11-27 PROCEDURE — 96132 NRPSYC TST EVAL PHYS/QHP 1ST: CPT | Mod: S$GLB,,, | Performed by: PSYCHIATRY & NEUROLOGY

## 2024-11-27 PROCEDURE — 96139 PSYCL/NRPSYC TST TECH EA: CPT | Mod: S$GLB,,, | Performed by: PSYCHIATRY & NEUROLOGY

## 2024-11-27 PROCEDURE — 96133 NRPSYC TST EVAL PHYS/QHP EA: CPT | Mod: S$GLB,,, | Performed by: PSYCHIATRY & NEUROLOGY

## 2024-12-10 PROBLEM — R41.9 COGNITIVE COMPLAINTS: Status: ACTIVE | Noted: 2024-08-28

## 2024-12-10 NOTE — ASSESSMENT & PLAN NOTE
Level of Support: Appropriately managed.     Neurology: Scheduled with Dr. Conde.      Neuropsychology Follow-up: PRN

## 2024-12-10 NOTE — PROGRESS NOTES
NEUROPSYCHOLOGY CONSULT   Referral Information  Name: Mary Prince  MRN: [id number]  : 1940  Age: 84 y.o.  Race: White  Gender: female  REFERRAL SOURCE: Michaelle Ellis MD   DATE CONDUCTED: 2024  SOURCES OF INFORMATION:  The following was gathered from a clinical interview with Ms. Mary Prince and review of the available medical records. Ms. Prince expressed an understanding of the purpose of the evaluation and consented to all procedures. Total licensed billing psychologists professional time including clinical interview, test administration and interpretation of tests administered by the billing psychologist, integration of test results and other clinical data, preparing the final report, and personally reporting results to the patient   [place]illin - 60 minutes (2024), [id number] - 120 minutes (2024), [id number] - 160 minutes (2024)    NEUROPSYCHOLOGICAL EVALUATION - CONFIDENTIAL    SUMMARY/TREATMENT PLAN   Ms. Prince is an 83 year old female currently undergoing work-up for DBS in the setting of essential tremor.     ABILITY TO CONSENT: No concerns about her ability to consent to the proposed procedure.      SURGICAL EXPECTATIONS: Met with neurosurgery on . She seems to have realistic expectations, hoping that it can improve her daily functioning. However, she has considerable apprehension given a past poor outcome with back surgery.      COGNITION: Ms. Prince denied any cognitive changes. She indicated that functional limitations are secondary to tremor rather than cognitive decline. Performance on paper/pencil tests were interpreted with caution as she used her non-dominant hand due to tremor severity in her right hand. With this in mind, testing revealed mild cognitive dysfunction that may be nearing a diagnosis of mild cognitive impairment (MCI). Her cognitive profile was primarily remarkable for executive dysfunction. Cerebrovascular disease is a possible  "etiology. She did not present with deficits in learning/memory, expressive language, or visuospatial abilities. These findings are not a complete contraindication to the proposed procedure. VIM has been offered as a possible target.      NEUROPSYCHIATRIC SYMPTOMS: Unremarkable pre-morbid psychiatric history with no ICDs. She also denied current mood symptoms, but does seem anxious about surgical intervention. She mentioned this to the examiner during the intake as well as to the psychometrist during testing. No contraindications from a psychiatric perspective, but she may require further discussion before pursuing surgery.     Diagnoses  Problem List Items Addressed This Visit          Neuro    Essential tremor    Cognitive complaints - Primary    Current Assessment & Plan     Level of Support: Appropriately managed.     Neurology: Scheduled with Dr. Conde.      Neuropsychology Follow-up: PRN           Ms. Prince will be provided the results of the evaluation.     Thank you for allowing me to participate in Ms. Law care.  If you have any questions, please contact me at [phone].    Maverick Latham Psy.D., ABPP  Board Certified in Clinical Neuropsychology  Department of Neurology    HISTORY OF PRESENT ILLNESS: Ms. Mary Prince is an 84 y.o., right-handed, female with 15 years of education who was referred for a neuropsychological evaluation as part of pre-surgical DBS work-up for essential tremor (about 5 year onset). Worse in her dominant hand, to the extent that it's difficult to feed herself.  She indicated that she is "tired of dealing with it," noting that it causes functional difficulties and people tend to stare in public. With that said, she described herself as apprehensive about surgery, noting that she spoke with a DBS patient in the waiting room prior to her appointment with neurosurgery. That individual showed her multiple incisions in her head whereas she was under the impression that it " "was only 1 incision. She also had a very negative experience with surgery about 5 years ago where her back was much worse than pre-surgery.     Ms. Prince denied cognitive changes, feeling as though she is still "pretty sharp."    Per neurology note: "We discussed that these therapies may help tremor significantly but can also cause imbalance - I highly suggested unilater only DBS VIM due to standing balance dz (spinal abscess)"    Impulse Control Disorders:  Carbohydrate Cravings: Denied. In fact, she is eating less than usual.   Gambling/Spending: Denied. Never gambled.   Sexualized Behaviors: Denied    Neuropsychiatric Symptoms:  Hallucinations: Denied  Depression/Labile Mood: Denied   Anxiety: Denied, she can worry about getting to places on time since she can't drive.     DAILY FUNCTIONING:  BASIC ADLS:  Feeding: independent  Dressing: independent  Bathing: independent, converted bathroom to walk in shower due to difficulty stepping into her tub.     IADLS:  Support System: Resides independently. Her POA (Shayy Back) lives in [place] and is like a daughter to her. Raymond Bridger lives with her, provides transportation, and does chores around the house. She is not sure that she would consider him a significant other.   Appointment Management: independent, she keeps appointment details/print outs. Using a calendar is difficult since she has trouble writing due to tremor.   Medication Compliance: independent, she doesn't use a pillbox. She keeps her medications in the kitchen. She reported strong adherence, including gabapentin 4 times daily.   Financial Management: No trouble remembering to pay her bills, but writing checks is a challenge. As a result, she has someone managing for her.   Cooking: Simple meal preparation. No issues when she does cook. She has Meals on Wheels delivered to her house.   Driving: She stopped driving after back surgery about 4-5 years ago. She suspects she could drive, but she doesn't " "want to risk it.       BRAIN HEALTH RISK FACTORS:  Hearing Loss: Denied  Falls: Denied. She ambulates as a walker, which she described as her security blanket.   Sleep: Sleep initiation and maintenance can be an issue, improved with melatonin. She wakes up a few times due to nocturia.     MEDICAL HISTORY: Ms. Prince  has a past medical history of Anticoagulant long-term use, AVN (avascular necrosis of bone), Cancer (2010), Cancer, Cataract, CKD (chronic kidney disease) stage 3, GFR 30-59 ml/min, CKD (chronic kidney disease), stage III, Coronary artery disease, Diabetes mellitus, type 2, Diabetic neuropathy, HLD (hyperlipidemia), Hypertension, Insulin pump in place, Thyroid disease, and Tremor, essential.    NEUROIMAGIN2022 Brain MRI: "There is no acute infarct, intracranial hemorrhage, or mass. Confluent bilateral periventricular and subcortical white matter FLAIR hyperintensities, likely related to chronic microvascular ischemic angiopathy. Similar appearing FLAIR hyperintensity is present at the paramedian karlos. No hydrocephalus. No extra-axial collection. Expected appearance of the vascular flow voids. The visualized intraorbital contents are normal. Bilateral orbital lens replacement. The paranasal sinuses and mastoid air cells are clear. "    SUBSTANCE USE: Ms. Prince reports that she has never smoked. She has never used smokeless tobacco. She drinks alcohol once per week. She denied a history of substance abuse.     CURRENT MEDICATIONS:    Current Outpatient Medications:     acetaminophen (TYLENOL) 325 MG tablet, Take 2 tablets (650 mg total) by mouth every 6 (six) hours as needed for Pain., Disp: , Rfl: 0    ALPRAZolam (XANAX) 0.5 MG tablet, Take 0.5 mg by mouth nightly as needed.  (Patient not taking: Reported on 2024), Disp: , Rfl:     aspirin 81 MG Chew, Take 1 tablet (81 mg total) by mouth 2 (two) times daily. for 14 days (Patient taking differently: Take 81 mg by mouth once daily.), Disp: , " Rfl: 0    atorvastatin (LIPITOR) 80 MG tablet, Take 80 mg by mouth every evening. , Disp: , Rfl:     escitalopram oxalate (LEXAPRO) 10 MG tablet, Take 10 mg by mouth once daily. , Disp: , Rfl:     furosemide (LASIX) 20 MG tablet, Take 2 tablets (40 mg total) by mouth once daily. (Patient taking differently: Take 40 mg by mouth once daily. As needed), Disp: , Rfl:     gabapentin (NEURONTIN) 600 MG tablet, Take 2 tablets (1,200 mg total) by mouth 3 (three) times daily., Disp: 180 tablet, Rfl: 2    insulin aspart U-100 (NOVOLOG) 100 unit/mL injection, Insulin pump, Disp: , Rfl:     insulin lispro 100 unit/mL injection, Inject into the skin., Disp: , Rfl:     ipratropium (ATROVENT) 21 mcg (0.03 %) nasal spray, 2 sprays by Each Nostril route 3 (three) times daily as needed (runny nose)., Disp: 30 mL, Rfl: 2    letrozole (FEMARA) 2.5 mg Tab, Take 2.5 mg by mouth once daily . (Patient not taking: Reported on 7/25/2024.), Disp: , Rfl:     levothyroxine (SYNTHROID) 125 MCG tablet, Take 125 mcg by mouth before breakfast., Disp: , Rfl:     mupirocin (BACTROBAN) 2 % ointment, Apply topically 3 (three) times daily. (Patient not taking: Reported on 7/25/2024), Disp: 30 g, Rfl: 0    oxyCODONE (ROXICODONE) 5 MG immediate release tablet, Take 1 tablet (5 mg total) by mouth every 4 (four) hours as needed for Pain. (Patient not taking: Reported on 7/25/2024), Disp: 30 tablet, Rfl: 0    potassium aminobenzoate 500 mg Cap, Take by mouth once daily., Disp: , Rfl:     primidone (MYSOLINE) 50 MG Tab, Take 50 mg by mouth 3 (three) times daily. , Disp: , Rfl:     propranoloL (INDERAL LA) 80 MG 24 hr capsule, TAKE 1 CAPSULE (80 MG TOTAL) BY MOUTH ONCE DAILY., Disp: 90 capsule, Rfl: 3    ramipriL (ALTACE) 10 MG capsule, Take 10 mg by mouth once daily. , Disp: , Rfl:     vit C/E/zinc/lutein/zeaxanthin (OCUVITE EYE HEALTH ORAL), Take 1 tablet by mouth once daily. , Disp: , Rfl:     XIIDRA 5 % Dpet, Place into both eyes daily as needed., Disp: ,  Rfl:   No current facility-administered medications for this visit.    Facility-Administered Medications Ordered in Other Visits:     lidocaine (PF) 10 mg/ml (1%) injection 10 mg, 1 mL, Intradermal, Once, Azeem Randhawa MD     PSYCHIATRIC HISTORY: She reported an unremarkable pre-morbid psychiatric history with no past mental health treatment. Grief related to 's death in . Her whole life changed at that time.    SUICIDAL IDEATION:  History: Denied.   Active Suicidal Ideation:Denied  Plan/Intent: Denied    FAMILY HISTORY: family history includes No Known Problems in her brother, father, maternal aunt, maternal grandfather, maternal grandmother, maternal uncle, mother, paternal aunt, paternal grandfather, paternal grandmother, paternal uncle, and sister.    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    Learning Difficulties: Denied   Repeated Grade: Denied     Vocation:   Highest Attained: OBGYN nurse. .     Relationship Status:  (,  by suicide, seemingly self-inflicted gun shot wound, but someone also reportedly saw someone in the building). No biological children. She has someone who is like a daughter to her and her children are like her grandchildren.     MENTAL STATUS AND OBSERVATIONS:  APPEARANCE: Appropriately dressed/groomed.   ALERTNESS/ORIENTATION: Attentive and alert. She demonstrated intact episodic memory for recent events and presented as a reliable historian.   GAIT/MOTOR: Transported to evaluation in a wheelchair and able to ambulate with an assistive device. Tremor.   SENSORY: Unremarkable.   SPEECH/LANGUAGE: Normal in rate, rhythm, tone, and volume. Expressive and receptive language were grossly intact.  STATED MOOD/AFFECT: Mood was euthymic during the intake. She was emotional at the beginning of the testing appointment, telling the psychometrist about tremor impact  on her QOL. She expressed interest in discussing surgical outcome with another patient.   INTERPERSONAL BEHAVIOR: Rapport was quickly and easily established   THOUGHT PROCESSES: Thoughts seemed logical and goal-directed.   BEHAVIORAL OBSERVATIONS: She used her left hand for most paper/pencil tasks due to right hand tremor. Scores on these tests will be interpreted with caution.  Otherwise, scores appear to be a valid/reliable measure of her current cognitive abilities.     APPENDIX/TEST RESULTS:  TESTS ADMINISTERED:  Clinical Interview and Review of Records, [place] Cognitive Assessment (MoCA), Test of Premorbid Functioning (TOPF), selected subtests from the Wechsler Adult Intelligence Scale - 4th edition (WAIS-IV), Coleman Auditory Verbal Learning Test (RAVLT, [place] Norms), Logical Memory subtest from the WMS-IV, Naming subtest from the [facility], Controlled Oral Word Association Test ([facility] Norms), Animal Fluency ([facility] Norms), Trailmaking Test ([facility] Norms), [place] Complex Figure (Copy Trial), Symbol Digit Modalities Test (SDMT), [place] Card Sorting Test - 64 card version (WCST-64), and the Geriatric Depression Scale (GDS).      Score Label T-Score Standard Score Z-Score Scaled Score %ile Rank   Exceptionally High > 70 > 130 > 2.0  > 16 > 98   Above Average 64-69 120-129 1.4-1.9 15 91-97   High Average 57-63 110-119 0.7-1.3 12-14 75-90   Average 44-56  0.6 to -0.6 8-11 25-74   Low Average 37-43 80-89 -1.3 to -0.7 6-7 9-24   Below Average 30-36 70-79 -2.0 to -1.4 4-5 2-8   Exceptionally Low < 30 < 70  < -2.0 < 4 < 2      Mental Status:  She was oriented to the month, year, and day of the week.  She was 1 off the exact date.  Fully oriented to location.  11/2024 MoCA = 17/30     Pre-morbid/Baseline: Estimated to be in the average range.  A composite score of her overall cognitive abilities was in the below average range.     Language: She repeated 0/2 sentences verbatim.  Average semantic verbal fluency.  Below  average letter verbal fluency.  Average confrontation naming.     Visuospatial: Tremor impacted her drawing of a clock face, but her drawing looked accurate and she seemed to correctly place the clock hands.  Below average performance on a test of line orientation/visual acuity.  Copy a complex figure was impacted by tremor and using her non dominant hand.  With that said, her copy demonstrated a largely intact appreciation for the gestalt of the figure with no obvious visual spatial dysfunction.     Learning/Memory: Overall encoding of a supraspan word list was average as she recalled 5, 4, 5, and 6 of 10 words across the learning trials.  She did not accurately recall any words following a long delay, instead offering 1 word from the previously administered [place].  Recognition was average (9/10 hits, 0 fps).  She encoded 4/5 words after 2 trials on the MoCA, freely recalling 0 words following a brief delay.  She recalled 3 words categorical cuing and correctly identified the remaining 3 words with multiple choice cuing.  Overall encoding of a short story was average.  She retained 8/9 previously encoded details following a long delay and her overall recall was average.  She did not recall any details from a previously copied figure.     Executive Functioning: One trial learning/encoding was WNL.  She provided 2/5 correct responses on a serial 7 subtraction task.  Performance on tasks of processing speed ranged from exceptionally low to low average.  She was unable to complete a task requiring her to maintain a complex set in the a lot of time.  Performance on a card sorting test was below expectation.  She quickly solve the 1st category and seemed to quickly identify the 2nd category, but had 3 set loss errors.  She never completed the 2nd category.     Mood: She did not endorsed clinical depression.      Raw Score Type of Standardized Score Standardized Score Percentile/CP   RBANS EI 3 - -    PREMORBID FUNCTIONING  Raw Score Type of Standardized Score Standardized Score Percentile/CP   TOPF simple dem. eFSIQ -  63   TOPF pred. eFSIQ - SS 99 47   TOPF simple + pred. eFSIQ -  61   COGNITIVE SCREENING Raw Score Type of Standardized Score Standardized Score Percentile/CP   MoCA 17 - - -   Orientation - Place 2/2 - - -   Orientation - Date 3/4 - - -   RBANS       Immediate Memory - SS 94 34   VS/Construction - SS 66 1   Language -  58   Attention - SS 60 0.4   Delayed Memory - SS 88 21   Total Scale - SS 78 7   Subtests       List Learning 20 ss 8 25   Story Memory 15 ss 10 50   Figure Copy 13 ss 4 2   Line Orientation 10 ss - 3-9   Naming 10 ss - >75   Fluency 17 ss 10 50   Digit Span 6 ss 5 5   Coding 17 ss 3 1   List Recall 0 ss - 3-9   List Recognition 19 ss - 26-50   Story Recall 8 ss 10 50   Figure Recall 0 ss 1 0   Story Recognition  10 ss - 53-67   Figure Recognition 0 - - -   LANGUAGE FUNCTIONING Raw Score Type of Standardized Score Standardized Score Percentile/CP   RBANS Naming 10 ss - >75   RBANS Semantic Fluency 17 ss 10 50   TOPF Word Reading 33 SS 96 39   NAB Naming 29 Tscore 53 62   NAB Naming Percent Correct After Semantic Cuing 100 - - 100   NAB Naming Percent Correct After Phonemic Cuing N/A - -    COWAT 23 Tscore 36 8   Animal Naming 15 Tscore 46 34   VISUOSPATIAL FUNCTIONING Raw Score Type of Standardized Score Standardized Score Percentile/CP   RBANS Line Orientation  10 ss - 3-9   RBANS Figure Copy 13 ss 4 2   RCFT Copy 21.5 - - 2-5   RCFT Time to Copy 214 - - >16   LEARNING & MEMORY Raw Score Type of Standardized Score Standardized Score Percentile/CP   RBANS       Immediate Memory - SS 94 34   Delayed Memory - SS 88 21   List Learning 20 ss 8 25   List Recall 0 ss - 3-9   List Recognition 19 ss - 26-50   Story Memory 15 ss 10 50   Story Recall 8 ss 10 50   Story Recognition  10 - - 53-67   Figure Recall 0 ss 1 0.1   Figure Recognition 0 - - -   ATTENTION/WORKING MEMORY Raw Score Type of  Standardized Score Standardized Score Percentile/CP   RBANS Digit Span  6 ss 5 5   MENTAL PROCESSING SPEED Raw Score Type of Standardized Score Standardized Score Percentile/CP   SMDT Written 19 zscore -2.29 1   SMDT Oral 27 zscore -3.43 0   RBANS Coding 17 ss 3 1   TMT A  51 Tscore 43 24   TMT A Total Err. 0 - - -   EXECUTIVE FUNCTIONING Raw Score Type of Standardized Score Standardized Score Percentile/CP   TMT B D/C Time 139 - - -   TMT B Seq Err. 5 - - -   TMT B Set-Loss Err. 0 - - -   TMT B Time-DC Err. 10 - - -   WCST-64       Total Correct 49 SS - -   Total Errors 15  99   Perseverative Resp. 10  99.6   Perseverative Err. 10  97   Nonperseverative Err. 5  84   Concept. Level Response 49 SS >145 >99.9   Categories Completed 1 - - 11-16   FMS 3 - -    Learning to Learn N/A - -    MOOD & PERSONALITY Raw Score Type of Standardized Score Standardized Score Percentile/CP   GDS-30 1 - - -

## 2024-12-23 ENCOUNTER — OFFICE VISIT (OUTPATIENT)
Dept: NEUROLOGY | Facility: CLINIC | Age: 84
End: 2024-12-23
Payer: MEDICARE

## 2024-12-23 DIAGNOSIS — G25.0 ESSENTIAL TREMOR: Primary | ICD-10-CM

## 2024-12-23 DIAGNOSIS — R41.9 COGNITIVE COMPLAINTS: ICD-10-CM

## 2024-12-23 PROCEDURE — 99499 UNLISTED E&M SERVICE: CPT | Mod: S$GLB,,, | Performed by: PSYCHIATRY & NEUROLOGY

## 2024-12-23 NOTE — PROGRESS NOTES
NEUROPSYCHOLOGICAL EVALUATION - CONFIDENTIAL  FEEDBACK NOTE    On 12/23/2024, I provided Ms. Mary Prince neuropsychological evaluation results. Please see the full report for a comprehensive overview of the findings.      Maverick Latham Psy.D., ABPP  Board Certified in Clinical Neuropsychology  [facility] - Department of Neurology

## 2024-12-27 ENCOUNTER — TELEPHONE (OUTPATIENT)
Dept: NEUROSURGERY | Facility: CLINIC | Age: 84
End: 2024-12-27
Payer: MEDICARE

## 2024-12-27 NOTE — TELEPHONE ENCOUNTER
Scheduled for 1/23, letter sent home as well     ----- Message from Michaelle Ellis MD sent at 12/23/2024  1:07 PM CST -----  Thanks, happy to see her back   [place], 30-minute appointment should suffice  ----- Message -----  From: Maverick Latham, PsyD  Sent: 12/23/2024   1:03 PM CST  To: Michaelle Ellis MD; Crystal Conde MD    DBS candidate. Neuropsych completed. No cognitive or behavioral concerns, but has a ton of questions about surgery still. Michaelle may remember, but she apparently had a really bad outcome with a Martin Prince surgery and has general skepticism about surgery. She is still interested in DBS, but has a lot of questions that I couldn't answer. I think she would benefit from a follow-up with Michaelle as the next step. Thanks,    Maverick

## 2025-01-02 ENCOUNTER — PATIENT MESSAGE (OUTPATIENT)
Dept: NEUROLOGY | Facility: CLINIC | Age: 85
End: 2025-01-02
Payer: MEDICARE

## 2025-01-24 ENCOUNTER — TELEPHONE (OUTPATIENT)
Dept: NEUROSURGERY | Facility: CLINIC | Age: 85
End: 2025-01-24
Payer: MEDICARE

## 2025-01-24 ENCOUNTER — TELEPHONE (OUTPATIENT)
Dept: NEUROLOGY | Facility: CLINIC | Age: 85
End: 2025-01-24
Payer: MEDICARE

## 2025-01-27 ENCOUNTER — TELEPHONE (OUTPATIENT)
Dept: NEUROSURGERY | Facility: CLINIC | Age: 85
End: 2025-01-27
Payer: MEDICARE

## 2025-01-27 NOTE — TELEPHONE ENCOUNTER
Spoke w pt, scheduled appt for 2/6 11:30am     ----- Message from Gabriella sent at 1/27/2025 10:46 AM CST -----  Regarding: Pt called states need to resche appt  Contact: [phone]  Name of Who is Calling:TONY KASPER [[id number]]        What is the request in detail:Pt called states need to resche appt . Please advise         Can the clinic reply by [facility]:No        What Number to Call Back if not [facility]: Telephone Information:  Mobile           [phone]

## 2025-02-05 ENCOUNTER — TELEPHONE (OUTPATIENT)
Dept: NEUROSURGERY | Facility: CLINIC | Age: 85
End: 2025-02-05
Payer: MEDICARE

## 2025-02-06 ENCOUNTER — OFFICE VISIT (OUTPATIENT)
Dept: NEUROSURGERY | Facility: CLINIC | Age: 85
End: 2025-02-06
Payer: MEDICARE

## 2025-02-06 VITALS — HEART RATE: 79 BPM | SYSTOLIC BLOOD PRESSURE: 125 MMHG | DIASTOLIC BLOOD PRESSURE: 65 MMHG

## 2025-02-06 DIAGNOSIS — G25.0 ESSENTIAL TREMOR: Primary | ICD-10-CM

## 2025-02-06 PROCEDURE — 3074F SYST BP LT 130 MM HG: CPT | Mod: CPTII,S$GLB,, | Performed by: NEUROLOGICAL SURGERY

## 2025-02-06 PROCEDURE — 99999 PR PBB SHADOW E&M-EST. PATIENT-LVL III: CPT | Mod: PBBFAC,,, | Performed by: NEUROLOGICAL SURGERY

## 2025-02-06 PROCEDURE — 99214 OFFICE O/P EST MOD 30 MIN: CPT | Mod: S$GLB,,, | Performed by: NEUROLOGICAL SURGERY

## 2025-02-06 PROCEDURE — 1159F MED LIST DOCD IN RCRD: CPT | Mod: CPTII,S$GLB,, | Performed by: NEUROLOGICAL SURGERY

## 2025-02-06 PROCEDURE — 1101F PT FALLS ASSESS-DOCD LE1/YR: CPT | Mod: CPTII,S$GLB,, | Performed by: NEUROLOGICAL SURGERY

## 2025-02-06 PROCEDURE — 3078F DIAST BP <80 MM HG: CPT | Mod: CPTII,S$GLB,, | Performed by: NEUROLOGICAL SURGERY

## 2025-02-06 PROCEDURE — 3288F FALL RISK ASSESSMENT DOCD: CPT | Mod: CPTII,S$GLB,, | Performed by: NEUROLOGICAL SURGERY

## 2025-02-06 RX ORDER — GLUCAGON 1 MG
VIAL (EA) INJECTION
COMMUNITY
Start: 2024-11-20

## 2025-02-06 NOTE — PROGRESS NOTES
"Neurosurgery  Follow up     SUBJECTIVE:     Chief Complaint: essential tremor     History of Present Illness:  Mary Prince is a 84 y.o. right-handed female who is referred by Dr. Gardiner for consideration of DBS therapy for essential tremor. The tremor first began about 10 years. The tremor is mostly present in her hands, and the right side is more bothersome than the left. She also endorses some gait difficulty. She fell out of the front door into the shrubs two days ago. She denies head trauma but needed assistance from Juan and two neighbors to get up.     She has tried propranolol and primidone. She thinks that the tremor may respond to EtOH. Her mother's family had tremor. They have good longevity (e.g. her maternal aunt with tremor lived to be 96). She has felt "strange" since the recent increase in primidone dosing; she is no longer taking them because of the side effect and is taking a smaller dose instead.     Some days, she cannot feed herself and must depend on her significant other, Juan, who presents with her today and helps to provide some of the history. (He is her power of .) She can no longer write or do her own checkbook.     She cannot drive. She is tearful at times during our interview.     Her goals for surgery are to be able to write, drive (she just bought a new car, but Juan drives it). She also has trouble sleeping through the night (and often wakes up at 3 or 4 am). She and Juan live together in a 2-story home that has a chairlift.     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     The patient denies any bleeding or anesthetic complications with any previous surgery. She takes ASA81 for TAVR (2019 with Dr. Shah).     She has history of breast cancer; during the course of treatment she lost her hair and is not interested in having her head shaved. She also has history of lumbar " fusion with Dr. Anguiano that was complicated by Klebsiella and ultimately fixed by Dr. Maurer.     As of 7/25/24, the patient returns after having been referred back by Dr. Maurer. She does not recall having met me previously.     Dr. Gardiner is no longer with [facility], so Ms. Prince has been seeing Dr. Figueroa. She is frustrated that her medications are not working well. She is embarrassed that she has to eat standing up at a counter because she cannot sit at a dining table and feed herself.     She cannot write birthday cards to her grandchildren. She wants independence back.     She does PT two days a week. Juan continues to drive.     Her most recent A1C was 8.2. Her insulin pump (Medtronic Mini-Med) needs to be re-programmed. Dr. Cabello is her endocrinologist.     She has some vocal tremor as well. She is no longer interested in driving.     As of 2/6/25, the patient returns to discuss possible DBS therapy. She saw Dr. Latham. She has been seeing Dr. Conde here at [facility] for neurology.     She still does not recall having met me previously. Her right hand continues to be the more bothersome. She returns at her request with questions regarding the proposed surgery.     She is accompanied today by her nurse friend, Felicitas. Mr. Martinez is no longer able to help take care of her.     Review of patient's allergies indicates:   Allergen Reactions    Prozac [fluoxetine] Other (See Comments)    Sulfa (sulfonamide antibiotics)      Other reaction(s): Unknown       Current Outpatient Medications   Medication Sig Dispense Refill    GLUCAGON EMERGENCY KIT, HUMAN, 1 mg injection Inject into the skin.      acetaminophen (TYLENOL) 325 MG tablet Take 2 tablets (650 mg total) by mouth every 6 (six) hours as needed for Pain.  0    ALPRAZolam (XANAX) 0.5 MG tablet Take 0.5 mg by mouth nightly as needed.  (Patient not taking: Reported on 7/25/2024)      aspirin 81 MG Chew Take 1 tablet (81 mg total) by mouth 2 (two) times daily. for 14 days  (Patient taking differently: Take 81 mg by mouth once daily.)  0    atorvastatin (LIPITOR) 80 MG tablet Take 80 mg by mouth every evening.       escitalopram oxalate (LEXAPRO) 10 MG tablet Take 10 mg by mouth once daily.       furosemide (LASIX) 20 MG tablet Take 2 tablets (40 mg total) by mouth once daily. (Patient taking differently: Take 40 mg by mouth once daily. As needed)      gabapentin (NEURONTIN) 600 MG tablet Take 2 tablets (1,200 mg total) by mouth 3 (three) times daily. 180 tablet 2    insulin aspart U-100 (NOVOLOG) 100 unit/mL injection Insulin pump      insulin lispro 100 unit/mL injection Inject into the skin.      ipratropium (ATROVENT) 21 mcg (0.03 %) nasal spray 2 sprays by Each Nostril route 3 (three) times daily as needed (runny nose). 30 mL 2    letrozole (FEMARA) 2.5 mg Tab Take 2.5 mg by mouth once daily . (Patient not taking: Reported on 7/25/2024.)      levothyroxine (SYNTHROID) 125 MCG tablet Take 125 mcg by mouth before breakfast.      mupirocin (BACTROBAN) 2 % ointment Apply topically 3 (three) times daily. (Patient not taking: Reported on 7/25/2024) 30 g 0    oxyCODONE (ROXICODONE) 5 MG immediate release tablet Take 1 tablet (5 mg total) by mouth every 4 (four) hours as needed for Pain. (Patient not taking: Reported on 7/25/2024) 30 tablet 0    potassium aminobenzoate 500 mg Cap Take by mouth once daily.      primidone (MYSOLINE) 50 MG Tab Take 50 mg by mouth 3 (three) times daily.       propranoloL (INDERAL LA) 80 MG 24 hr capsule TAKE 1 CAPSULE (80 MG TOTAL) BY MOUTH ONCE DAILY. 90 capsule 3    ramipriL (ALTACE) 10 MG capsule Take 10 mg by mouth once daily.       vit C/E/zinc/lutein/zeaxanthin (OCUVITE EYE HEALTH ORAL) Take 1 tablet by mouth once daily.       XIIDRA 5 % Dpet Place into both eyes daily as needed.       No current facility-administered medications for this visit.     Facility-Administered Medications Ordered in Other Visits   Medication Dose Route Frequency Provider Last  Rate Last Admin    lidocaine (PF) 10 mg/ml (1%) injection 10 mg  1 mL Intradermal Once Azeem Randhawa MD           Past Medical History:   Diagnosis Date    Anticoagulant long-term use     asa    AVN (avascular necrosis of bone)     Cancer 2010    left breast XRT + lumpectomy; right lunpectomy + Chemotherapy (Dr. Santana) 8 of 12 treatments, stopped for side effects    Cancer     right breast    Cataract     right eye    CKD (chronic kidney disease) stage 3, GFR 30-59 ml/min     CKD (chronic kidney disease), stage III     Coronary artery disease     total 4 stents over several years, last 2015    Diabetes mellitus, type 2     Insulin pump    Diabetic neuropathy     HLD (hyperlipidemia)     Hypertension     Insulin pump in place     Thyroid disease     Tremor, essential      Past Surgical History:   Procedure Laterality Date    APPENDECTOMY      BREAST SURGERY Bilateral     lumpectomy    CARDIAC VALVE SURGERY  2019    EYE SURGERY Bilateral     cataract    HYSTERECTOMY      JOINT REPLACEMENT Right     hip    JOINT REPLACEMENT Left     knee    SPINE SURGERY      laminectomy    TRANSCATHETER AORTIC VALVE REPLACEMENT (TAVR) N/A 11/20/2019    Procedure: REPLACEMENT, AORTIC VALVE, TRANSCATHETER (TAVR);  Surgeon: Balaji Shah MD;  Location: [facility] CATH LAB;  Service: Cardiology;  Laterality: N/A;     Family History       Problem Relation (Age of Onset)    No Known Problems Mother, Father, Sister, Brother, Maternal Aunt, Maternal Uncle, Paternal Aunt, Paternal Uncle, Maternal Grandmother, Maternal Grandfather, Paternal Grandmother, Paternal Grandfather          Social History     Socioeconomic History    Marital status:    Tobacco Use    Smoking status: Never    Smokeless tobacco: Never   Substance and Sexual Activity    Alcohol use: Yes     Comment: social    Drug use: No   Social History Narrative    Registered nurse now retired; 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   HENT:  Negative for nosebleeds.         Denies anosmia   Eyes:  Negative for visual disturbance.   Respiratory:  Negative for shortness of breath.    Cardiovascular:  Negative for chest pain.   Gastrointestinal:  Positive for constipation.        She has explosive bowel movements every three days or so    Endocrine: Positive for cold intolerance.   Genitourinary:  Negative for difficulty urinating.   Musculoskeletal:  Negative for neck pain.   Skin:  Negative for color change.   Neurological:  Positive for tremors.   Hematological:  Bruises/bleeds easily.   Psychiatric/Behavioral:  Negative for hallucinations. The patient is nervous/anxious.        OBJECTIVE:     Vital Signs  Pulse: 79  BP: 125/65  There is no height or weight on file to calculate BMI.      Physical Exam:    Constitutional: She appears well-developed and well-nourished. No distress.     Eyes: EOM are normal.     Abdominal: Soft.     Skin: Skin displays no rash on extremities.     Psych/Behavior: She is alert. She is oriented to person, place, and time.     Musculoskeletal:      Comments: Presents in wheelchair     Neurological:   Moderate tremor, worse on the right        Pulmonary: nonlabored respirations     Hematologic: no bruising noted       Diagnostic Results:  No imaging available for review     ASSESSMENT/PLAN:     Mary Gurwinder Suresh is a 84 y.o. female referred by Dr. Maurer for consideration of DBS placement for essential tremor. She is now agreeable to having Dr. Prince participate in her care.     We had a lengthy discussion about the risks, benefits, and alternatives to deep brain stimulation.  Risks include, but are not limited to, bleeding, pain, infection, scarring, need for further/repeat procedure, failure to slow the progression of neurodegenerative disease symptoms, speech difficulty, balance difficulty, cognitive changes, loss of  inhibition, intracranial hemorrhage, venous infarct, seizure, stroke, paralysis, and death. Hardware-related complications may also occur. This is an implanted device, meaning that any infection elsewhere in the body must be treated immediately to minimize the risk of blood stream infection seeding the device. Should this happen, it is possible that the entire system would require removal.  We also discussed that some patients become refractory to stimulation for ET about 5-10 years after implantation; the mechanism of this is not well understood but is an area of ongoing research.      We also reviewed the work flow employed at [facility] for placement of deep brain stimulation devices.  We discussed that the patient would be admitted Tuesday morning for placement of 5 fiducial screws.  We clarified this would require shaving the entire head.  After the fiducial screws are placed, the patient would be transferred to CT scan to obtain a fiducial registration CT scan.  This would be merged with the already obtained MRI.  The patient would be brought back to the operating room for definitive placement of the DBS lead.  This is to be done while awake. The patient expressed understanding thereof. The following week, the patient would be readmitted for asleep implantation of extension cable and pulse generator on an outpatient basis.  The device would subsequently be programmed by movement disorders neurology.      She will need to be off of Aspirin and all other blood thinners for 10 days prior to surgery (about 3 weeks altogether). I would like her to see Dr. Zendejas.     I look forward to discussing the patient further in interdisciplinary conference.      I have encouraged the patient to contact the clinic in interim with any questions, concerns, or adverse clinical change.

## 2025-02-18 RX ORDER — PROPRANOLOL HYDROCHLORIDE 80 MG/1
80 CAPSULE, EXTENDED RELEASE ORAL DAILY
Qty: 90 CAPSULE | Refills: 4 | Status: SHIPPED | OUTPATIENT
Start: 2025-02-18 | End: 2026-02-18

## 2025-02-24 ENCOUNTER — OFFICE VISIT (OUTPATIENT)
Dept: URGENT CARE | Facility: CLINIC | Age: 85
End: 2025-02-24
Payer: MEDICARE

## 2025-02-24 VITALS
WEIGHT: 153 LBS | BODY MASS INDEX: 27.54 KG/M2 | SYSTOLIC BLOOD PRESSURE: 180 MMHG | TEMPERATURE: 99 F | HEART RATE: 89 BPM | OXYGEN SATURATION: 97 % | RESPIRATION RATE: 16 BRPM | DIASTOLIC BLOOD PRESSURE: 68 MMHG

## 2025-02-24 DIAGNOSIS — J18.9 PNEUMONIA OF RIGHT LOWER LOBE DUE TO INFECTIOUS ORGANISM: ICD-10-CM

## 2025-02-24 DIAGNOSIS — R05.9 COUGH, UNSPECIFIED TYPE: Primary | ICD-10-CM

## 2025-02-24 DIAGNOSIS — J31.0 RHINITIS, UNSPECIFIED TYPE: ICD-10-CM

## 2025-02-24 DIAGNOSIS — R68.83 CHILLS: ICD-10-CM

## 2025-02-24 LAB
CTP QC/QA: YES
CTP QC/QA: YES
POC MOLECULAR INFLUENZA A AGN: NEGATIVE
POC MOLECULAR INFLUENZA B AGN: NEGATIVE
SARS CORONAVIRUS 2 ANTIGEN: NEGATIVE

## 2025-02-24 PROCEDURE — 71046 X-RAY EXAM CHEST 2 VIEWS: CPT | Mod: FY,S$GLB,, | Performed by: RADIOLOGY

## 2025-02-24 PROCEDURE — 87502 INFLUENZA DNA AMP PROBE: CPT | Mod: QW,S$GLB,, | Performed by: FAMILY MEDICINE

## 2025-02-24 PROCEDURE — 99214 OFFICE O/P EST MOD 30 MIN: CPT | Mod: S$GLB,,, | Performed by: FAMILY MEDICINE

## 2025-02-24 PROCEDURE — 87811 SARS-COV-2 COVID19 W/OPTIC: CPT | Mod: QW,S$GLB,, | Performed by: FAMILY MEDICINE

## 2025-02-24 RX ORDER — PROMETHAZINE HYDROCHLORIDE AND DEXTROMETHORPHAN HYDROBROMIDE 6.25; 15 MG/5ML; MG/5ML
5 SYRUP ORAL EVERY 4 HOURS PRN
Qty: 240 ML | Refills: 0 | Status: SHIPPED | OUTPATIENT
Start: 2025-02-24 | End: 2025-03-06

## 2025-02-24 RX ORDER — IPRATROPIUM BROMIDE 21 UG/1
2 SPRAY, METERED NASAL 2 TIMES DAILY PRN
Qty: 30 ML | Refills: 0 | Status: SHIPPED | OUTPATIENT
Start: 2025-02-24

## 2025-02-24 RX ORDER — AZITHROMYCIN 500 MG/1
500 TABLET, FILM COATED ORAL DAILY
Qty: 3 TABLET | Refills: 0 | Status: SHIPPED | OUTPATIENT
Start: 2025-02-24

## 2025-02-24 RX ORDER — AMOXICILLIN 875 MG/1
875 TABLET, FILM COATED ORAL EVERY 12 HOURS
Qty: 20 TABLET | Refills: 0 | Status: SHIPPED | OUTPATIENT
Start: 2025-02-24

## 2025-02-24 RX ORDER — ALBUTEROL SULFATE 90 UG/1
2 INHALANT RESPIRATORY (INHALATION) EVERY 6 HOURS PRN
Qty: 18 G | Refills: 0 | Status: SHIPPED | OUTPATIENT
Start: 2025-02-24

## 2025-02-24 NOTE — PROGRESS NOTES
Subjective:      Patient ID: Mary Prince is a 84 y.o. female.    Vitals:  vitals were not taken for this visit.     Chief Complaint: No chief complaint on file.    This is a 84 y.o. female who presents today with a chief complaint of non-productive cough, nasal congestion,  ear congestion and minor headache since yesterday. Pt also presents with legs shaking uncontrollably, making her not able to walk. Sx are still present, but better. No ear px, nausea, vomiting, diarrhea, abd px, fever or sore throat    Home tx: none    PPMH: asthma as child; central tremors    Cough  This is a new problem. The current episode started yesterday. The problem has been unchanged. The problem occurs every few minutes. The cough is Non-productive. Associated symptoms include ear congestion, headaches, nasal congestion, postnasal drip and rhinorrhea. Pertinent negatives include no chest pain, ear pain, fever or sore throat. Her past medical history is significant for asthma, bronchitis and pneumonia. There is no history of environmental allergies.     Constitution: Negative for fever.   HENT:  Positive for postnasal drip. Negative for ear pain and sore throat.    Cardiovascular:  Negative for chest pain.   Respiratory:  Positive for cough.    Skin:  Negative for erythema.   Allergic/Immunologic: Negative for environmental allergies.   Neurological:  Positive for headaches.    Objective:     Physical Exam   Constitutional: She is oriented to person, place, and time.   HENT:   Head: Normocephalic and atraumatic.   Ears:   Right Ear: Tympanic membrane, external ear and ear canal normal.   Left Ear: Tympanic membrane, external ear and ear canal normal.   Nose: Rhinorrhea and congestion present.   Mouth/Throat: Mucous membranes are dry. Oropharynx is clear.   Eyes: Conjunctivae are normal. Pupils are equal, round, and reactive to light. Extraocular movement intact   Neck: Neck supple.   Cardiovascular: Normal rate, regular rhythm,  normal heart sounds and normal pulses.   Pulmonary/Chest: Effort normal. She has wheezes. She has rhonchi.   Abdominal: Normal appearance. Soft. flat abdomen   Neurological: no focal deficit. She is alert, oriented to person, place, and time and at baseline.   Skin: Skin is warm and dry. Capillary refill takes less than 2 seconds. No erythema   Psychiatric: Her behavior is normal. Mood, judgment and thought content normal.   Nursing note and vitals reviewed.    Assessment:     Plan:   1. Cough, unspecified type  - SARS Coronavirus 2 Antigen, POCT Manual Read  - XR CHEST PA AND LATERAL; Future  - promethazine-dextromethorphan (PROMETHAZINE-DM) 6.25-15 mg/5 mL Syrp; Take 5 mLs by mouth every 4 (four) hours as needed.  Dispense: 240 mL; Refill: 0    2. Chills  - POCT Influenza A/B MOLECULAR    3. Pneumonia of right lower lobe due to infectious organism  - amoxicillin (AMOXIL) 875 MG tablet; Take 1 tablet (875 mg total) by mouth every 12 (twelve) hours.  Dispense: 20 tablet; Refill: 0  - albuterol (VENTOLIN HFA) 90 mcg/actuation inhaler; Inhale 2 puffs into the lungs every 6 (six) hours as needed for Wheezing. Rescue  Dispense: 18 g; Refill: 0  - azithromycin (ZITHROMAX) 500 MG tablet; Take 1 tablet (500 mg total) by mouth once daily.  Dispense: 3 tablet; Refill: 0    4. Rhinitis, unspecified type  - ipratropium (ATROVENT) 21 mcg (0.03 %) nasal spray; 2 sprays by Each Nostril route 2 (two) times daily as needed.  Dispense: 30 mL; Refill: 0   All results discussed with pt prior to discharge from clinic

## 2025-03-13 ENCOUNTER — TELEPHONE (OUTPATIENT)
Facility: CLINIC | Age: 85
End: 2025-03-13
Payer: MEDICARE

## 2025-03-13 NOTE — TELEPHONE ENCOUNTER
Called PT today to offer another appt per provider's orders. Pt picks up I offer April 4th at 1pm. PT has accepted the appt

## 2025-04-04 ENCOUNTER — TELEPHONE (OUTPATIENT)
Facility: CLINIC | Age: 85
End: 2025-04-04
Payer: MEDICARE

## 2025-04-04 ENCOUNTER — OFFICE VISIT (OUTPATIENT)
Facility: CLINIC | Age: 85
End: 2025-04-04
Payer: MEDICARE

## 2025-04-04 VITALS
HEIGHT: 63 IN | BODY MASS INDEX: 24.18 KG/M2 | HEART RATE: 67 BPM | DIASTOLIC BLOOD PRESSURE: 69 MMHG | WEIGHT: 136.44 LBS | SYSTOLIC BLOOD PRESSURE: 135 MMHG

## 2025-04-04 DIAGNOSIS — E66.01 MORBID (SEVERE) OBESITY DUE TO EXCESS CALORIES: Primary | ICD-10-CM

## 2025-04-04 DIAGNOSIS — G25.0 ESSENTIAL TREMOR: ICD-10-CM

## 2025-04-04 DIAGNOSIS — G20.A2 PARKINSON'S DISEASE WITHOUT DYSKINESIA, WITH FLUCTUATING MANIFESTATIONS: ICD-10-CM

## 2025-04-04 PROCEDURE — 99999 PR PBB SHADOW E&M-EST. PATIENT-LVL III: CPT | Mod: PBBFAC,,, | Performed by: PSYCHIATRY & NEUROLOGY

## 2025-04-04 RX ORDER — CARBIDOPA AND LEVODOPA 25; 100 MG/1; MG/1
1 TABLET ORAL 3 TIMES DAILY
Qty: 90 TABLET | Refills: 11 | Status: SHIPPED | OUTPATIENT
Start: 2025-04-04 | End: 2026-04-04

## 2025-04-04 NOTE — PROGRESS NOTES
"[facility]  PCP/Referring Provider: No referring provider defined for this encounter.  Date of Service: 4/4/2025    Chief Complaint: ET      Interval Hx    Since last visit,   Tremor has changed and is now resting  Losing weight slowly    Had been using a walker since her back surgery  Struggling to eat due to tremor    No hyposmia    "priorHPI: Mary Prince is a R HANDED 84 y.o. female with a medical issues significant for ET, breast cancer, spinal abscess, aortic valve replacement, Asthma, CKD, Pulmonary fibrosis, DM2, coming for ET workup. Saw Dr. Guthrie briefly. Notes a progressive b/l postural hand tremor since 4 years. R hand is worse than L. No resting tremor. She struggles to eat. Cannot write. Stopped driving.   Cousin has tremors    Noted issues walking since her spinal abscess 4-5 years ago. Balance worsening over time. Uses s walker. Falls occasionally.   Has a medtronic insulin pump"    Takes inderal 80mg ER, gagbapntin 1200mg QID, primidone 50mg TID  These medications imporve her tremor by 10%        Review of Systems: Review of Systems   Constitutional:  Negative for fever.   HENT:  Negative for congestion.    Eyes:  Negative for double vision.   Respiratory:  Negative for cough and shortness of breath.    Cardiovascular:  Negative for chest pain and leg swelling.   Gastrointestinal:  Negative for nausea.   Genitourinary:  Negative for dysuria.   Musculoskeletal:  Positive for falls.   Skin:  Negative for rash.   Neurological:  Positive for tremors. Negative for speech change and headaches.   Psychiatric/Behavioral:  Negative for depression.              Current Medications:  Outpatient Encounter Medications as of 4/4/2025   Medication Sig Dispense Refill    acetaminophen (TYLENOL) 325 MG tablet Take 2 tablets (650 mg total) by mouth every 6 (six) hours as needed for Pain.  0    albuterol (VENTOLIN HFA) 90 mcg/actuation inhaler Inhale 2 puffs into the lungs every 6 (six) hours " as needed for Wheezing. Rescue 18 g 0    amoxicillin (AMOXIL) 875 MG tablet Take 1 tablet (875 mg total) by mouth every 12 (twelve) hours. 20 tablet 0    atorvastatin (LIPITOR) 80 MG tablet Take 80 mg by mouth every evening.       azithromycin (ZITHROMAX) 500 MG tablet Take 1 tablet (500 mg total) by mouth once daily. 3 tablet 0    escitalopram oxalate (LEXAPRO) 10 MG tablet Take 10 mg by mouth once daily.      furosemide (LASIX) 20 MG tablet Take 2 tablets (40 mg total) by mouth once daily.      gabapentin (NEURONTIN) 600 MG tablet Take 2 tablets (1,200 mg total) by mouth 3 (three) times daily. 180 tablet 2    GLUCAGON EMERGENCY KIT, HUMAN, 1 mg injection Inject into the skin.      insulin aspart U-100 (NOVOLOG) 100 unit/mL injection Insulin pump      insulin lispro 100 unit/mL injection Inject into the skin.      ipratropium (ATROVENT) 21 mcg (0.03 %) nasal spray 2 sprays by Each Nostril route 2 (two) times daily as needed. 30 mL 0    levothyroxine (SYNTHROID) 125 MCG tablet Take 125 mcg by mouth before breakfast.      primidone (MYSOLINE) 50 MG Tab Take 50 mg by mouth 3 (three) times daily.       propranoloL (INDERAL LA) 80 MG 24 hr capsule TAKE 1 CAPSULE (80 MG TOTAL) BY MOUTH ONCE DAILY. 90 capsule 4    ramipriL (ALTACE) 10 MG capsule Take 10 mg by mouth once daily.      vit C/E/zinc/lutein/zeaxanthin (OCUVITE EYE HEALTH ORAL) Take 1 tablet by mouth once daily.       XIIDRA 5 % Dpet Place into both eyes daily as needed.      ALPRAZolam (XANAX) 0.5 MG tablet Take 0.5 mg by mouth nightly as needed.  (Patient not taking: Reported on 7/25/2024)      aspirin 81 MG Chew Take 1 tablet (81 mg total) by mouth 2 (two) times daily. for 14 days  0    carbidopa-levodopa  mg (SINEMET)  mg per tablet Take 1 tablet by mouth 3 (three) times daily. 90 tablet 11    ipratropium (ATROVENT) 21 mcg (0.03 %) nasal spray 2 sprays by Each Nostril route 3 (three) times daily as needed (runny nose). 30 mL 2    letrozole  (FEMARA) 2.5 mg Tab Take 2.5 mg by mouth once daily.      mupirocin (BACTROBAN) 2 % ointment Apply topically 3 (three) times daily. 30 g 0    oxyCODONE (ROXICODONE) 5 MG immediate release tablet Take 1 tablet (5 mg total) by mouth every 4 (four) hours as needed for Pain. 30 tablet 0    potassium aminobenzoate 500 mg Cap Take by mouth once daily.       Facility-Administered Encounter Medications as of 4/4/2025   Medication Dose Route Frequency Provider Last Rate Last Admin    lidocaine (PF) 10 mg/ml (1%) injection 10 mg  1 mL Intradermal Once Azeem Randhawa MD           Past Medical History:  Patient Active Problem List   Diagnosis    Aortic valve stenosis    Coronary artery disease of native artery of native heart with stable angina pectoris    Mixed hyperlipidemia    Type 2 diabetes mellitus    Acquired hypothyroidism    Epidural intraspinal abscess    Shoulder joint stiffness, bilateral    Bilateral shoulder pain    Essential tremor    S/P TAVR (transcatheter aortic valve replacement)    Insulin pump status    Chronic diastolic heart failure    Osteoarthritis of left knee    Nonrheumatic mitral valve stenosis    S/P coronary artery stent placement    Asthma    Diabetic peripheral neuropathy associated with type 2 diabetes mellitus    Uncontrolled type 2 diabetes mellitus    Primary osteoarthritis of right knee    Old myocardial infarction    Pulmonary fibrosis    Diabetic renal disease    Diabetic neuropathy    Hypertensive heart and renal disease    Chronic kidney disease, stage 3a    Atherosclerosis of aorta    Cognitive complaints    Rhinitis    Pneumonia of right lower lobe due to infectious organism    Chills    Cough    Morbid (severe) obesity due to excess calories    Parkinson's disease without dyskinesia, with fluctuating manifestations       Past Surgical History:  Past Surgical History:   Procedure Laterality Date    APPENDECTOMY      BREAST SURGERY Bilateral     lumpectomy    CARDIAC VALVE  "SURGERY  2019    EYE SURGERY Bilateral     cataract    HYSTERECTOMY      JOINT REPLACEMENT Right     hip    JOINT REPLACEMENT Left     knee    SPINE SURGERY      laminectomy    TRANSCATHETER AORTIC VALVE REPLACEMENT (TAVR) N/A 11/20/2019    Procedure: REPLACEMENT, AORTIC VALVE, TRANSCATHETER (TAVR);  Surgeon: Balaji Shah MD;  Location: [facility] CATH LAB;  Service: Cardiology;  Laterality: N/A;       Social:  Social History     Socioeconomic History    Marital status:    Tobacco Use    Smoking status: Never     Passive exposure: Never    Smokeless tobacco: Never   Substance and Sexual Activity    Alcohol use: Yes     Comment: social    Drug use: No   Social History Narrative    Registered nurse now retired; 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       Family History:  Family History   Problem Relation Name Age of Onset    No Known Problems Mother      No Known Problems Father      No Known Problems Sister      No Known Problems Brother      No Known Problems Maternal Aunt      No Known Problems Maternal Uncle      No Known Problems Paternal Aunt      No Known Problems Paternal Uncle      No Known Problems Maternal Grandmother      No Known Problems Maternal Grandfather      No Known Problems Paternal Grandmother      No Known Problems Paternal Grandfather      Anemia Neg Hx      Arrhythmia Neg Hx      Asthma Neg Hx      Clotting disorder Neg Hx      Fainting Neg Hx      Heart attack Neg Hx      Heart disease Neg Hx      Heart failure Neg Hx      Hyperlipidemia Neg Hx      Hypertension Neg Hx      Stroke Neg Hx      Atrial Septal Defect Neg Hx         PHYSICAL:  /69 (BP Location: Left arm, Patient Position: Sitting)   Pulse 67   Ht 5' 2.5" (1.588 m)   Wt 61.9 kg (136 lb 7.4 oz)   BMI 24.56 kg/m²     Physical Exam  Constitutional: Well-developed, well-nourished, appears stated age  Eyes: No scleral icterus  ENT: Moist oral " mucosa  Cardiovascular: No lower extremity edema   Respiratory: No labored breathing   Skin: No rash   Hematologic: No bruising  Other: GI/ deferred   Mental status: Alert and oriented to person, place, time, and situation;   Speech: normal (not dysarthric), no aphasia  Cranial nerves:            CN II: Pupils mid-position and equal, not tested light or accommodation  CN III, IV, VI: Extraocular movements full, no nystagmus visualized  CN V: Not tested   CN VII: Face strong and symmetric bilaterally   CN VIII: Hearing intact to voice and conversation   CN IX, X: Palate raises midline and symmetric   CN XI: Strong shoulder shrug B/L  CN XII: Tongue appears midline   Motor: Normal bulk by appearance, no drift   Sensory: Not tested    Gait: Not tested  Deep tendon reflexes: Not tested  Movement/Coordination                    No hypophonic speech.                     Mild facial masking.                    No other dystonia, chorea, athetosis, myoclonus, or tics visualized.    Bradykinesia  ? Finger taps Finger flicks SHIRA Heel taps   Left 1+ 0 0 0   Right 2+ 0 0 0     Tone R arm 1+    Tremor Exam   Arms extended Arms in wing position, fingers almost touching Re-emergent Arms extended wrists extended Intention Resting Kinetic   Left ?+++ ? ?++ ? ? ?++ ?   Right ?+++ ? ?++ ? ? ?++ ?   Head tremor: No-No     Archimedes Spirals   Left ?+++   Right ?+++       Laboratory Data:  NA    Imaging:  NA    Assessment//Plan:   Problem List Items Addressed This Visit          Neuro    Essential tremor    Current Assessment & Plan   Debilitating ET-like tremor  Prior had discussed DBS but has clear Pdism on exam today which may impact candidacy  Multiple comorbidities to be considered in surgical risk  W         Parkinson's disease without dyskinesia, with fluctuating manifestations    Current Assessment & Plan   New classic parkinsonian  pillrolling temor R>L  Suggested  trial of  Carbidopa/levodopa 25/100mg 1 tab PO TID   Syn-ONE  biopsy for dx confirmation              Endocrine    Morbid (severe) obesity due to excess calories - Primary         Crystal Conde MD, MS  [facility]  Department of Neurology  Movement Disorders

## 2025-04-04 NOTE — ASSESSMENT & PLAN NOTE
Debilitating ET-like tremor  Prior had discussed DBS but has clear Pdism on exam today which may impact candidacy  Multiple comorbidities to be considered in surgical risk  W

## 2025-04-04 NOTE — TELEPHONE ENCOUNTER
Pt called back she states that she does not want to come at 4pm. I informed her that the doctor will be coming at 1:20pm. Pt will be coming at 1:20pm.

## 2025-04-04 NOTE — ASSESSMENT & PLAN NOTE
New classic parkinsonian  pillrolling temor R>L  Suggested  trial of  Carbidopa/levodopa 25/100mg 1 tab PO TID   Syn-ONE biopsy for dx confirmation

## 2025-04-04 NOTE — TELEPHONE ENCOUNTER
Called Pt today to see if they could come in today at 4pm per providers orders. Pt did not  left a message.

## 2025-04-07 ENCOUNTER — TELEPHONE (OUTPATIENT)
Facility: CLINIC | Age: 85
End: 2025-04-07
Payer: MEDICARE

## 2025-04-07 NOTE — TELEPHONE ENCOUNTER
"----- Message from Jagruti sent at 4/7/2025  3:08 PM CDT -----  Regarding: pt adivce  Contact: [phone]  .Name Of Caller: Self Contact Preference?: [phone] What is the nature of the call?: in reference to provider prescribing medication on 4/4/25, for pt tremors  pt stating tremors are worst, pls call  Additional Notes:"Thank you for all that you do for our patients"  "

## 2025-04-08 NOTE — TELEPHONE ENCOUNTER
----- Message from Crystal Conde MD sent at 4/4/2025  1:56 PM CDT -----  Regarding: SYNONE  Needs a syn-one biopsy

## 2025-04-09 ENCOUNTER — TELEPHONE (OUTPATIENT)
Facility: CLINIC | Age: 85
End: 2025-04-09
Payer: MEDICARE

## 2025-04-09 NOTE — TELEPHONE ENCOUNTER
----- Message from María sent at 4/9/2025 10:17 AM CDT -----  Pt calling in regards to her medication , says its making her trimmers worse and wants to stop taking it , pt says she has called Monday with no return call from clinic , please call ASA carbidopa-levodopa  mg (SINEMET)  mg per tabletConfirmed patient's contact info below:Contact Name: Mary PrincePhone Number: [phone]

## 2025-04-09 NOTE — TELEPHONE ENCOUNTER
"Called pt and discussed that she reports her tremors are worse since taking carbidopa levodopa.  "My whole body is shaking"  Advised her to stop the carbidopa levodopa.     Pt's granddaughter (Pharmacist) told her to take more Lexapro. So pt doubled her current dosage from 10 mg per day  to 20 mg  per day starting on Monday.    She did state that Xanax helped her when she was in the hospital.  She was given 5 pills and still has some at home.    Initially scheduled DBS for June, and she canceled.     She states she is at a standstill now and would like to know what can help her now.      "

## 2025-04-11 ENCOUNTER — TELEPHONE (OUTPATIENT)
Dept: NEUROSURGERY | Facility: CLINIC | Age: 85
End: 2025-04-11
Payer: MEDICARE

## 2025-04-11 ENCOUNTER — TELEPHONE (OUTPATIENT)
Facility: CLINIC | Age: 85
End: 2025-04-11
Payer: MEDICARE

## 2025-04-11 NOTE — TELEPHONE ENCOUNTER
"----- Message from NO Jade sent at 4/4/2025 11:20 AM CDT -----  Regarding: FW: pt advice  Contact: [phone]    ----- Message -----  From: Jagruti Burris  Sent: 4/4/2025  10:36 AM CDT  To: [facility] Staff  Subject: pt advice                                        .Name Of Caller: Self Contact Preference?: [phone] What is the nature of the call?: in reference to needing to set up pt surgery, pls call  Additional Notes:"Thank you for all that you do for our patients"  "

## 2025-04-11 NOTE — TELEPHONE ENCOUNTER
----- Message from Crystal Cnode MD sent at 4/4/2025  1:56 PM CDT -----  Regarding: SYNONE  Needs a syn-one biopsy

## 2025-04-11 NOTE — TELEPHONE ENCOUNTER
Called to schedule for a VV to discuss plan of care. Left message on  voicemail with request to return call.

## 2025-04-14 ENCOUNTER — TELEPHONE (OUTPATIENT)
Dept: NEUROLOGY | Facility: CLINIC | Age: 85
End: 2025-04-14
Payer: MEDICARE

## 2025-04-14 NOTE — TELEPHONE ENCOUNTER
"----- Message from Jagruti sent at 4/11/2025  4:48 PM CDT -----  Regarding: pt adivce  Contact: [phone]  .Name Of Caller: Self Contact Preference?: [phone]  What is the nature of the call?: Returning call to EJ, pls call Additional Notes:"Thank you for all that you do for our patients"  "

## 2025-05-06 ENCOUNTER — TELEPHONE (OUTPATIENT)
Dept: NEUROLOGY | Facility: CLINIC | Age: 85
End: 2025-05-06
Payer: MEDICARE

## 2025-05-06 RX ORDER — ESCITALOPRAM OXALATE 20 MG/1
20 TABLET ORAL NIGHTLY
COMMUNITY
Start: 2025-04-28

## 2025-05-06 RX ORDER — LEVOTHYROXINE SODIUM 137 UG/1
137 TABLET ORAL
COMMUNITY
Start: 2025-02-18

## 2025-05-06 RX ORDER — GABAPENTIN 600 MG/1
1200 TABLET ORAL 4 TIMES DAILY
COMMUNITY
Start: 2025-04-22 | End: 2026-04-22

## 2025-05-06 NOTE — TELEPHONE ENCOUNTER
"Pt states her neurologist states she does not have PD. "I cannot handle these tremors anymore"  "I would like to have DBS surgery as quickly as I can."  Her neurologist is Alex Figueroa MD    "I'm really angry with [facility] right now. I don't feel like they have my back"  Pt voiced being upset because she wasn't called regarding the Syn One sofi't for June 4. She was under the impression she would have it sooner. She does not read portal messages because tremors are too bad to use the phone.   She inquired about the BV from [facility].  No results in chart. Email sent to [facility] and they did not received BV. Resent request for benefits verification.    She thought she was having DBS surgery in JUNE    She wants to go to [place] to see her granddaughter, but is too embarrassed to eat out because she can't eat with utensils without spilling.        I take Neurontin 1200 tid , propranolol  80 mg LA daily started Lexapro and If I take two at night it helps reduce the shaking.     "Some days it feels like I take nothing. Can't eat. Stand up in the kitchen and eat because the food ends up in my lap."    She is wondering if she could take propranolol 40 mg bid. Plans to discuss with her cardiologist.     Takes baby ASA daily. If there are any cancellations for Syn One in May, she would like to be contacted.      "

## 2025-05-08 ENCOUNTER — TELEPHONE (OUTPATIENT)
Facility: CLINIC | Age: 85
End: 2025-05-08
Payer: MEDICARE

## 2025-05-08 NOTE — TELEPHONE ENCOUNTER
Called patient to schedule virtual visit with Dr. Conde. Left a voice message with my name, reason for calling, and this clinic's call back number.

## 2025-05-08 NOTE — TELEPHONE ENCOUNTER
Called patient back in regards to scheduling an appt with Dr. Conde. patient confirmed appt time and date.

## 2025-05-12 ENCOUNTER — TELEPHONE (OUTPATIENT)
Facility: CLINIC | Age: 85
End: 2025-05-12
Payer: MEDICARE

## 2025-05-12 ENCOUNTER — OFFICE VISIT (OUTPATIENT)
Facility: CLINIC | Age: 85
End: 2025-05-12
Payer: MEDICARE

## 2025-05-12 ENCOUNTER — TELEPHONE (OUTPATIENT)
Facility: CLINIC | Age: 85
End: 2025-05-12

## 2025-05-12 ENCOUNTER — PATIENT MESSAGE (OUTPATIENT)
Dept: NEUROLOGY | Facility: CLINIC | Age: 85
End: 2025-05-12
Payer: MEDICARE

## 2025-05-12 ENCOUNTER — DOCUMENTATION ONLY (OUTPATIENT)
Dept: NEUROLOGY | Facility: CLINIC | Age: 85
End: 2025-05-12
Payer: MEDICARE

## 2025-05-12 DIAGNOSIS — G25.0 ESSENTIAL TREMOR: Primary | ICD-10-CM

## 2025-05-12 PROCEDURE — 98005 SYNCH AUDIO-VIDEO EST LOW 20: CPT | Mod: 95,,, | Performed by: PSYCHIATRY & NEUROLOGY

## 2025-05-12 NOTE — TELEPHONE ENCOUNTER
----- Message from Natalia sent at 5/12/2025  3:31 PM CDT -----  Regarding: Patient Callback  Attempted to assisted patient with logging into her account to join VV today at 4:20p. Patient is unable to navigate device to get log into acct. Patient is requesting a callback regarding appt, to see if she can speak with the doctor by phone. Patient can be reached at [phone].

## 2025-05-12 NOTE — ASSESSMENT & PLAN NOTE
Debilitating ET-like tremor  Prior had discussed DBS but has clear Pdism on exam prior which may impact candidacy  Multiple comorbidities to be considered in surgical risk    Failed Ldopa trial due to side effects. Tremor reportedly worsened. She is off this now.    I reiterated that DBS candidacy is not guaranteed to all seeking DBS and that all measures are being taken to prevent a potentially bad outcome by screening for risk factors.  I also clarified that no dates for DBS surgery were prior given.  She voiced understanding.  We will plan to meet after SYN-ONE biopsy results for further   Urged her to call tech support to fix her mychart as she may miss critical messages.

## 2025-05-12 NOTE — TELEPHONE ENCOUNTER
Called spouse to offer assistance with logging into patient appt. Left a voice message with my name, reason for calling, and Neuro call back number.

## 2025-05-12 NOTE — TELEPHONE ENCOUNTER
Attempted to reach patient to offer assistance with joining virtual visit with Dr. Conde. I left a detailed voice message with my name, reason for calling, and Neuro clinic's call back number.

## 2025-05-12 NOTE — TELEPHONE ENCOUNTER
Called patient to check if tech support has helped her log into her [facility] account. patient states the Doctor will just call her.

## 2025-05-12 NOTE — TELEPHONE ENCOUNTER
Called pt to discuss the upcoming appointment this afternoon. She is unable to log in to VV and got extremely agitated about not being able to FACEtime.      Informed MD that pt is unable to navigate VV due to tremors. Requested appt conversion from manager.    [employer] for Syn One attached to . Insurance covers 100%

## 2025-05-12 NOTE — PROGRESS NOTES
Missed her VV  Texted the tech support number for her to connect  Scheduled a VV same day at 4:20  Still cannot connect to VV

## 2025-05-12 NOTE — PROGRESS NOTES
"The patient location is: home  The chief complaint leading to consultation is: tremors    Visit type: audio only    Face to Face time with patient: 0  20 minutes of total time spent on the encounter, which includes face to face time and non-face to face time preparing to see the patient (eg, review of tests), Obtaining and/or reviewing separately obtained history, Documenting clinical information in the electronic or other health record, Independently interpreting results (not separately reported) and communicating results to the patient/family/caregiver, or Care coordination (not separately reported).         Each patient to whom he or she provides medical services by telemedicine is:  (1) informed of the relationship between the physician and patient and the respective role of any other health care provider with respect to management of the patient; and (2) notified that he or she may decline to receive medical services by telemedicine and may withdraw from such care at any time.    Notes:       Notes:   [facility]  PCP/Referring Provider: No referring provider defined for this encounter.  Date of Service: 5/12/2025    Chief Complaint: ET      Interval Hx    Since last visit,   Tremor has changed and is now resting    Had been using a walker since her back surgery  Struggling to eat due to tremor    No hyposmia  Awaiting Synone biopsy     She reports nobody reported to her re: her synone biopsy date - this was charted on both telephone encounters and mycdont  I Re- reported she has a SYN-ONE biopsy one June 4th    Reports Ldopa made her tremors worse - started and stopped it    Continue Gabapentin 600mg QID  Continues primidone 50mg TID  Inderal 80mg QHS    Uses a walker  Had not had a fall  Reports is she does proceed with DBS, and if candidacy is valid,   Only wants 1 side done - for R hand tremors    "priorHPI: Mary Prince is a R HANDED 84 y.o. female with a medical issues significant " "for ET, breast cancer, spinal abscess, aortic valve replacement, Asthma, CKD, Pulmonary fibrosis, DM2, coming for ET workup. Saw Dr. Guthrie briefly. Notes a progressive b/l postural hand tremor since 4 years. R hand is worse than L. No resting tremor. She struggles to eat. Cannot write. Stopped driving.   Cousin has tremors    Noted issues walking since her spinal abscess 4-5 years ago. Balance worsening over time. Uses s walker. Falls occasionally.   Has a medtronic insulin pump"    Takes inderal 80mg ER, gagbapntin 1200mg QID, primidone 50mg TID  These medications imporve her tremor by 10%        Review of Systems: Review of Systems   Constitutional:  Negative for fever.   HENT:  Negative for congestion.    Eyes:  Negative for double vision.   Respiratory:  Negative for cough and shortness of breath.    Cardiovascular:  Negative for chest pain and leg swelling.   Gastrointestinal:  Negative for nausea.   Genitourinary:  Negative for dysuria.   Musculoskeletal:  Positive for falls.   Skin:  Negative for rash.   Neurological:  Positive for tremors. Negative for speech change and headaches.   Psychiatric/Behavioral:  Negative for depression.              Current Medications:  Outpatient Encounter Medications as of 5/12/2025   Medication Sig Dispense Refill    acetaminophen (TYLENOL) 325 MG tablet Take 2 tablets (650 mg total) by mouth every 6 (six) hours as needed for Pain.  0    albuterol (VENTOLIN HFA) 90 mcg/actuation inhaler Inhale 2 puffs into the lungs every 6 (six) hours as needed for Wheezing. Rescue 18 g 0    ALPRAZolam (XANAX) 0.5 MG tablet Take 0.5 mg by mouth nightly as needed.  (Patient not taking: Reported on 7/25/2024)      amoxicillin (AMOXIL) 875 MG tablet Take 1 tablet (875 mg total) by mouth every 12 (twelve) hours. 20 tablet 0    aspirin 81 MG Chew Take 1 tablet (81 mg total) by mouth 2 (two) times daily. for 14 days  0    atorvastatin (LIPITOR) 80 MG tablet Take 80 mg by mouth every evening.    "    azithromycin (ZITHROMAX) 500 MG tablet Take 1 tablet (500 mg total) by mouth once daily. 3 tablet 0    carbidopa-levodopa  mg (SINEMET)  mg per tablet Take 1 tablet by mouth 3 (three) times daily. 90 tablet 11    escitalopram oxalate (LEXAPRO) 10 MG tablet Take 10 mg by mouth once daily.      EScitalopram oxalate (LEXAPRO) 20 MG tablet Take 20 mg by mouth every evening.      furosemide (LASIX) 20 MG tablet Take 2 tablets (40 mg total) by mouth once daily.      gabapentin (NEURONTIN) 600 MG tablet Take 2 tablets (1,200 mg total) by mouth 3 (three) times daily. 180 tablet 2    gabapentin (NEURONTIN) 600 MG tablet Take 1,200 mg by mouth 4 (four) times daily.      GLUCAGON EMERGENCY KIT, HUMAN, 1 mg injection Inject into the skin.      insulin aspart U-100 (NOVOLOG) 100 unit/mL injection Insulin pump      insulin lispro 100 unit/mL injection Inject into the skin.      ipratropium (ATROVENT) 21 mcg (0.03 %) nasal spray 2 sprays by Each Nostril route 3 (three) times daily as needed (runny nose). 30 mL 2    ipratropium (ATROVENT) 21 mcg (0.03 %) nasal spray 2 sprays by Each Nostril route 2 (two) times daily as needed. 30 mL 0    letrozole (FEMARA) 2.5 mg Tab Take 2.5 mg by mouth once daily.      levothyroxine (SYNTHROID) 125 MCG tablet Take 125 mcg by mouth before breakfast.      levothyroxine (SYNTHROID) 137 MCG Tab tablet Take 137 mcg by mouth before breakfast.      mupirocin (BACTROBAN) 2 % ointment Apply topically 3 (three) times daily. 30 g 0    oxyCODONE (ROXICODONE) 5 MG immediate release tablet Take 1 tablet (5 mg total) by mouth every 4 (four) hours as needed for Pain. 30 tablet 0    potassium aminobenzoate 500 mg Cap Take by mouth once daily.      primidone (MYSOLINE) 50 MG Tab Take 50 mg by mouth 3 (three) times daily.       propranoloL (INDERAL LA) 80 MG 24 hr capsule TAKE 1 CAPSULE (80 MG TOTAL) BY MOUTH ONCE DAILY. 90 capsule 4    ramipriL (ALTACE) 10 MG capsule Take 10 mg by mouth once daily.       vit C/E/zinc/lutein/zeaxanthin (OCUVITE EYE HEALTH ORAL) Take 1 tablet by mouth once daily.       XIIDRA 5 % Dpet Place into both eyes daily as needed.       Facility-Administered Encounter Medications as of 5/12/2025   Medication Dose Route Frequency Provider Last Rate Last Admin    lidocaine (PF) 10 mg/ml (1%) injection 10 mg  1 mL Intradermal Once Azeem Randhawa MD           Past Medical History:  Patient Active Problem List   Diagnosis    Aortic valve stenosis    Coronary artery disease of native artery of native heart with stable angina pectoris    Mixed hyperlipidemia    Type 2 diabetes mellitus    Acquired hypothyroidism    Epidural intraspinal abscess    Shoulder joint stiffness, bilateral    Bilateral shoulder pain    Essential tremor    S/P TAVR (transcatheter aortic valve replacement)    Insulin pump status    Chronic diastolic heart failure    Osteoarthritis of left knee    Nonrheumatic mitral valve stenosis    S/P coronary artery stent placement    Asthma    Diabetic peripheral neuropathy associated with type 2 diabetes mellitus    Uncontrolled type 2 diabetes mellitus    Primary osteoarthritis of right knee    Old myocardial infarction    Pulmonary fibrosis    Diabetic renal disease    Diabetic neuropathy    Hypertensive heart and renal disease    Chronic kidney disease, stage 3a    Atherosclerosis of aorta    Cognitive complaints    Rhinitis    Pneumonia of right lower lobe due to infectious organism    Chills    Cough    Morbid (severe) obesity due to excess calories    Parkinson's disease without dyskinesia, with fluctuating manifestations       Past Surgical History:  Past Surgical History:   Procedure Laterality Date    APPENDECTOMY      BREAST SURGERY Bilateral     lumpectomy    CARDIAC VALVE SURGERY  2019    EYE SURGERY Bilateral     cataract    HYSTERECTOMY      JOINT REPLACEMENT Right     hip    JOINT REPLACEMENT Left     knee    SPINE SURGERY      laminectomy    TRANSCATHETER  AORTIC VALVE REPLACEMENT (TAVR) N/A 11/20/2019    Procedure: REPLACEMENT, AORTIC VALVE, TRANSCATHETER (TAVR);  Surgeon: Balaji Shah MD;  Location: [facility] CATH LAB;  Service: Cardiology;  Laterality: N/A;       Social:  Social History     Socioeconomic History    Marital status:    Tobacco Use    Smoking status: Never     Passive exposure: Never    Smokeless tobacco: Never   Substance and Sexual Activity    Alcohol use: Yes     Comment: social    Drug use: No   Social History Narrative    Registered nurse now retired; 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       Family History:  Family History   Problem Relation Name Age of Onset    No Known Problems Mother      No Known Problems Father      No Known Problems Sister      No Known Problems Brother      No Known Problems Maternal Aunt      No Known Problems Maternal Uncle      No Known Problems Paternal Aunt      No Known Problems Paternal Uncle      No Known Problems Maternal Grandmother      No Known Problems Maternal Grandfather      No Known Problems Paternal Grandmother      No Known Problems Paternal Grandfather      Anemia Neg Hx      Arrhythmia Neg Hx      Asthma Neg Hx      Clotting disorder Neg Hx      Fainting Neg Hx      Heart attack Neg Hx      Heart disease Neg Hx      Heart failure Neg Hx      Hyperlipidemia Neg Hx      Hypertension Neg Hx      Stroke Neg Hx      Atrial Septal Defect Neg Hx         PHYSICAL:  There were no vitals taken for this visit.    Prior  Physical Exam  Constitutional: Well-developed, well-nourished, appears stated age  Eyes: No scleral icterus  ENT: Moist oral mucosa  Cardiovascular: No lower extremity edema   Respiratory: No labored breathing   Skin: No rash   Hematologic: No bruising  Other: GI/ deferred   Mental status: Alert and oriented to person, place, time, and situation;   Speech: normal (not dysarthric), no aphasia  Cranial nerves:           "  CN II: Pupils mid-position and equal, not tested light or accommodation  CN III, IV, VI: Extraocular movements full, no nystagmus visualized  CN V: Not tested   CN VII: Face strong and symmetric bilaterally   CN VIII: Hearing intact to voice and conversation   CN IX, X: Palate raises midline and symmetric   CN XI: Strong shoulder shrug B/L  CN XII: Tongue appears midline   Motor: Normal bulk by appearance, no drift   Sensory: Not tested    Gait: Not tested  Deep tendon reflexes: Not tested  Movement/Coordination                    No hypophonic speech.                     Mild facial masking.                    No other dystonia, chorea, athetosis, myoclonus, or tics visualized.    Bradykinesia  ? Finger taps Finger flicks SHIRA Heel taps   Left 1+ 0 0 0   Right 2+ 0 0 0     Tone R arm 1+    Tremor Exam   Arms extended Arms in wing position, fingers almost touching Re-emergent Arms extended wrists extended Intention Resting Kinetic   Left ?+++ ? ?++ ? ? ?++ ?   Right ?+++ ? ?++ ? ? ?++ ?   Head tremor: No-No     Archimedes Spirals   Left ?+++   Right ?+++   "    Laboratory Data:  NA    Imaging:  NA    Assessment//Plan:   Problem List Items Addressed This Visit          Neuro    Essential tremor - Primary    Current Assessment & Plan   Debilitating ET-like tremor  Prior had discussed DBS but has clear Pdism on exam prior which may impact candidacy  Multiple comorbidities to be considered in surgical risk    Failed Ldopa trial due to side effects. Tremor reportedly worsened. She is off this now.    I reiterated that DBS candidacy is not guaranteed to all seeking DBS and that all measures are being taken to prevent a potentially bad outcome by screening for risk factors.  I also clarified that no dates for DBS surgery were prior given.  She voiced understanding.  We will plan to meet after SYN-ONE biopsy results for further   Urged her to call tech support to fix her mychart as she may miss critical messages.      "           Crystal Conde MD, MS  [facility]  Department of Neurology  Movement Disorders

## 2025-05-13 ENCOUNTER — TELEPHONE (OUTPATIENT)
Facility: CLINIC | Age: 85
End: 2025-05-13
Payer: MEDICARE

## 2025-05-13 NOTE — TELEPHONE ENCOUNTER
----- Message from Crystal Conde MD sent at 5/12/2025  4:45 PM CDT -----  Regarding: results synone biopsy  Shamaya can you help me find an inperson visit sometime after June 20th? 20mins  
Attempted to reach patient to schedule an in person appt with Dr. mora as requested by . I left a voice message with my name, reason for calling, and the call back number for Neuro clinic.   
CALF PAIN

## 2025-05-14 ENCOUNTER — TELEPHONE (OUTPATIENT)
Facility: CLINIC | Age: 85
End: 2025-05-14
Payer: MEDICARE

## 2025-05-14 NOTE — TELEPHONE ENCOUNTER
Called patient in regards to scheduling appt with Dr. Conde. Patient confirmed appt time and date.

## 2025-05-14 NOTE — TELEPHONE ENCOUNTER
----- Message from Crystal Conde MD sent at 5/12/2025  4:45 PM CDT -----  Regarding: results synone biopsy  Shamaya can you help me find an inperson visit sometime after June 20th? 20mins

## 2025-05-14 NOTE — TELEPHONE ENCOUNTER
----- Message from Lylette sent at 5/14/2025  2:31 PM CDT -----  Regarding: Appt  Contact: [phone]  Pt calling to schedule recall appt. Attempted to schedule. Please call [phone]

## 2025-05-14 NOTE — TELEPHONE ENCOUNTER
Pt needs follow up to discuss Syn one biopsy done to be done June 4. It takes 30 days to process results. Routed to MA to request scheduling.

## 2025-05-30 ENCOUNTER — TELEPHONE (OUTPATIENT)
Facility: CLINIC | Age: 85
End: 2025-05-30
Payer: MEDICARE

## 2025-05-30 NOTE — TELEPHONE ENCOUNTER
This is a reminder about your appointment for the Syn-One biopsy on 6/4/25.     Please stop any blood thinners by .Saturday 5.31.25. This includes aspirin, naproxen, ibuprofen and fish oil for 4 days prior to your procedure. Please also hold Plavix, Xarelto or Coumadin.  Verbalized understanding and thanks.      Please take your shower prior to the procedure and wear loose comfortable clothing. Sarah will be doing a small punch biopsy on your upper back, thigh and ankle area. You may shower the next day.The results typically take about 3 weeks to come back.     The [employer] website has excellent information about the test. [email or web address]

## 2025-06-04 ENCOUNTER — PROCEDURE VISIT (OUTPATIENT)
Facility: CLINIC | Age: 85
End: 2025-06-04
Payer: MEDICARE

## 2025-06-04 ENCOUNTER — TELEPHONE (OUTPATIENT)
Facility: CLINIC | Age: 85
End: 2025-06-04

## 2025-06-04 VITALS — DIASTOLIC BLOOD PRESSURE: 58 MMHG | SYSTOLIC BLOOD PRESSURE: 110 MMHG | HEART RATE: 62 BPM

## 2025-06-04 DIAGNOSIS — G20.A2 PARKINSON'S DISEASE WITHOUT DYSKINESIA, WITH FLUCTUATING MANIFESTATIONS: Primary | ICD-10-CM

## 2025-06-04 PROCEDURE — 11105 PUNCH BX SKIN EA SEP/ADDL: CPT | Mod: S$GLB,,, | Performed by: PHYSICIAN ASSISTANT

## 2025-06-04 PROCEDURE — 11104 PUNCH BX SKIN SINGLE LESION: CPT | Mod: S$GLB,,, | Performed by: PHYSICIAN ASSISTANT

## 2025-06-20 ENCOUNTER — TELEPHONE (OUTPATIENT)
Facility: CLINIC | Age: 85
End: 2025-06-20
Payer: MEDICARE

## 2025-06-20 NOTE — TELEPHONE ENCOUNTER
Received Syn One results from [facility] and attached to chart.    Synucleinopathy: There is no pathologic evidence of phosphorylated alpha-synuclein deposition within cutaneous nerves. The absence of alpha-synuclein does not exclude a diagnosis of synucleinopathy.     Small Fiber Neuropathy: There was normal intraepidermal nerve fiber density. A normal intraepidermal nerve fiber density does not exclude a small fiber neuropathy or neurodegenerative process.     Amyloidosis: There is no pathologic evidence of amyloid deposition in cutaneous nerves. A normal [place] red stain does not exclude a diagnosis of amyloidosis.

## 2025-08-21 ENCOUNTER — OFFICE VISIT (OUTPATIENT)
Facility: CLINIC | Age: 85
End: 2025-08-21
Payer: MEDICARE

## 2025-08-21 VITALS
HEART RATE: 77 BPM | BODY MASS INDEX: 23.76 KG/M2 | DIASTOLIC BLOOD PRESSURE: 51 MMHG | WEIGHT: 132 LBS | SYSTOLIC BLOOD PRESSURE: 107 MMHG

## 2025-08-21 DIAGNOSIS — G25.0 ESSENTIAL TREMOR: Primary | ICD-10-CM

## 2025-08-21 PROBLEM — G20.A2 PARKINSON'S DISEASE WITHOUT DYSKINESIA, WITH FLUCTUATING MANIFESTATIONS: Status: RESOLVED | Noted: 2025-04-04 | Resolved: 2025-08-21

## 2025-08-21 PROCEDURE — 99999 PR PBB SHADOW E&M-EST. PATIENT-LVL II: CPT | Mod: PBBFAC,,, | Performed by: PSYCHIATRY & NEUROLOGY

## 2025-08-21 PROCEDURE — 3288F FALL RISK ASSESSMENT DOCD: CPT | Mod: CPTII,S$GLB,, | Performed by: PSYCHIATRY & NEUROLOGY

## 2025-08-21 PROCEDURE — 99214 OFFICE O/P EST MOD 30 MIN: CPT | Mod: S$GLB,,, | Performed by: PSYCHIATRY & NEUROLOGY

## 2025-08-21 PROCEDURE — 1126F AMNT PAIN NOTED NONE PRSNT: CPT | Mod: CPTII,S$GLB,, | Performed by: PSYCHIATRY & NEUROLOGY

## 2025-08-21 PROCEDURE — 3074F SYST BP LT 130 MM HG: CPT | Mod: CPTII,S$GLB,, | Performed by: PSYCHIATRY & NEUROLOGY

## 2025-08-21 PROCEDURE — 1159F MED LIST DOCD IN RCRD: CPT | Mod: CPTII,S$GLB,, | Performed by: PSYCHIATRY & NEUROLOGY

## 2025-08-21 PROCEDURE — 3078F DIAST BP <80 MM HG: CPT | Mod: CPTII,S$GLB,, | Performed by: PSYCHIATRY & NEUROLOGY

## 2025-08-21 PROCEDURE — 1101F PT FALLS ASSESS-DOCD LE1/YR: CPT | Mod: CPTII,S$GLB,, | Performed by: PSYCHIATRY & NEUROLOGY

## 2025-09-04 ENCOUNTER — OFFICE VISIT (OUTPATIENT)
Dept: CARDIOLOGY | Facility: CLINIC | Age: 85
End: 2025-09-04
Payer: MEDICARE

## 2025-09-04 VITALS
DIASTOLIC BLOOD PRESSURE: 79 MMHG | SYSTOLIC BLOOD PRESSURE: 160 MMHG | HEART RATE: 66 BPM | HEIGHT: 61 IN | BODY MASS INDEX: 26.85 KG/M2 | WEIGHT: 142.19 LBS

## 2025-09-04 DIAGNOSIS — I50.32 CHRONIC DIASTOLIC HEART FAILURE: ICD-10-CM

## 2025-09-04 DIAGNOSIS — I13.0 HYPERTENSIVE HEART AND RENAL DISEASE WITH RENAL FAILURE, STAGE 1 THROUGH STAGE 4 OR UNSPECIFIED CHRONIC KIDNEY DISEASE, WITH HEART FAILURE: ICD-10-CM

## 2025-09-04 DIAGNOSIS — E78.2 MIXED HYPERLIPIDEMIA: ICD-10-CM

## 2025-09-04 DIAGNOSIS — I34.2 NONRHEUMATIC MITRAL VALVE STENOSIS: ICD-10-CM

## 2025-09-04 DIAGNOSIS — Z95.5 S/P CORONARY ARTERY STENT PLACEMENT: ICD-10-CM

## 2025-09-04 DIAGNOSIS — Z95.3 S/P TAVR (TRANSCATHETER AORTIC VALVE REPLACEMENT): ICD-10-CM

## 2025-09-04 DIAGNOSIS — I25.2 OLD MYOCARDIAL INFARCTION: ICD-10-CM

## 2025-09-04 DIAGNOSIS — I25.118 CORONARY ARTERY DISEASE OF NATIVE ARTERY OF NATIVE HEART WITH STABLE ANGINA PECTORIS: ICD-10-CM

## 2025-09-04 DIAGNOSIS — Z01.810 PREOP CARDIOVASCULAR EXAM: Primary | ICD-10-CM

## 2025-09-04 DIAGNOSIS — I35.0 NONRHEUMATIC AORTIC VALVE STENOSIS: ICD-10-CM

## 2025-09-04 PROCEDURE — 99214 OFFICE O/P EST MOD 30 MIN: CPT | Mod: S$GLB,,, | Performed by: INTERNAL MEDICINE

## 2025-09-04 PROCEDURE — 99999 PR PBB SHADOW E&M-EST. PATIENT-LVL IV: CPT | Mod: PBBFAC,,, | Performed by: INTERNAL MEDICINE

## 2025-09-04 PROCEDURE — 3288F FALL RISK ASSESSMENT DOCD: CPT | Mod: CPTII,S$GLB,, | Performed by: INTERNAL MEDICINE

## 2025-09-04 PROCEDURE — 1126F AMNT PAIN NOTED NONE PRSNT: CPT | Mod: CPTII,S$GLB,, | Performed by: INTERNAL MEDICINE

## 2025-09-04 PROCEDURE — G2211 COMPLEX E/M VISIT ADD ON: HCPCS | Mod: S$GLB,,, | Performed by: INTERNAL MEDICINE

## 2025-09-04 PROCEDURE — 1101F PT FALLS ASSESS-DOCD LE1/YR: CPT | Mod: CPTII,S$GLB,, | Performed by: INTERNAL MEDICINE

## 2025-09-04 PROCEDURE — 1159F MED LIST DOCD IN RCRD: CPT | Mod: CPTII,S$GLB,, | Performed by: INTERNAL MEDICINE

## 2025-09-04 PROCEDURE — 3078F DIAST BP <80 MM HG: CPT | Mod: CPTII,S$GLB,, | Performed by: INTERNAL MEDICINE

## 2025-09-04 PROCEDURE — 3077F SYST BP >= 140 MM HG: CPT | Mod: CPTII,S$GLB,, | Performed by: INTERNAL MEDICINE

## 2025-09-04 RX ORDER — FUROSEMIDE 20 MG/1
40 TABLET ORAL DAILY PRN
Qty: 30 TABLET | Refills: 11
Start: 2025-09-04

## 2025-09-04 RX ORDER — NAPROXEN SODIUM 220 MG/1
81 TABLET, FILM COATED ORAL DAILY
COMMUNITY
Start: 2025-09-04

## (undated) DEVICE — SYS LOADING ENVEO PRO 14
Type: IMPLANTABLE DEVICE | Site: HEART | Status: NON-FUNCTIONAL
Removed: 2019-11-20

## (undated) DEVICE — PROTECTION STATION PLUS

## (undated) DEVICE — COVER BACK TABLE 72X21

## (undated) DEVICE — SEE MEDLINE ITEM 152530

## (undated) DEVICE — SUT VICRYL PLUS 2-0 CT1 18

## (undated) DEVICE — SEE MEDLINE ITEM 152523

## (undated) DEVICE — GOWN X-LARGE

## (undated) DEVICE — TOURNIQUET SB QC DP 34X4IN

## (undated) DEVICE — PULSAVAC ZIMMER

## (undated) DEVICE — ELECTRODE REM PLYHSV RETURN 9

## (undated) DEVICE — GAUZE SPONGE 4X4 12PLY

## (undated) DEVICE — DRESSING SPONGE 16PLY 4X4 NS

## (undated) DEVICE — SUT STRATAFIX PDS 1 CTX 18IN

## (undated) DEVICE — PAD CAST SPECIALIST STRL 6

## (undated) DEVICE — MASK FLYTE HOOD PEEL AWAY

## (undated) DEVICE — Device

## (undated) DEVICE — FLEXSHEATH DRYSEAL 14FR 33CM

## (undated) DEVICE — BLADE SAG DUAL 18MMX1.27MMX90M

## (undated) DEVICE — BLANKET HYPER ADULT 24X60IN

## (undated) DEVICE — SEE MEDLINE ITEM 153151

## (undated) DEVICE — SUT VICRYL+ 1 CTX 18IN VIOL

## (undated) DEVICE — KIT CUSTOM MANIFOLD

## (undated) DEVICE — DRAPE STERI INSTRUMENT 1018

## (undated) DEVICE — UNDERGLOVES BIOGEL PI SZ 7 LF

## (undated) DEVICE — PRESSURIZER BN CEMENT NOZZLE

## (undated) DEVICE — CONTAINER SPECIMEN STRL 4OZ

## (undated) DEVICE — SHEATH INTRODUCER 6FR 11CM

## (undated) DEVICE — DRAPE OPTIMA MAJOR PEDIATRIC

## (undated) DEVICE — DRESSING XEROFORM FOIL PK 1X8

## (undated) DEVICE — CABLE PACER

## (undated) DEVICE — PAD KNEE POLAR XL

## (undated) DEVICE — CATH DXTERITY AL20 100CM 6FR

## (undated) DEVICE — GLOVE BIOGEL SKINSENSE PI 8.5

## (undated) DEVICE — SEE MEDLINE ITEM 152622

## (undated) DEVICE — KIT TOTAL HIP OPTIVAC

## (undated) DEVICE — GOWN B1 X-LG X-LONG

## (undated) DEVICE — APPLICATOR CHLORAPREP ORN 26ML

## (undated) DEVICE — ALCOHOL 70% ISOP W/GREEN 16OZ

## (undated) DEVICE — SOL 9P NACL IRR PIC IL

## (undated) DEVICE — TRAY FOLEY 16FR INFECTION CONT

## (undated) DEVICE — SYS DELIVERY ENVEO PRO 18FR 14
Type: IMPLANTABLE DEVICE | Site: HEART | Status: NON-FUNCTIONAL
Removed: 2019-11-20

## (undated) DEVICE — GUIDEWIRE SUPRA CORE 035 190CM

## (undated) DEVICE — SOL NACL 0.9% INJ 250ML BG

## (undated) DEVICE — SEE MEDLINE ITEM 146231

## (undated) DEVICE — SEE L#120831

## (undated) DEVICE — BLADE RECIP DOUBLE SIDED

## (undated) DEVICE — STAPLER SKIN PROXIMATE WIDE

## (undated) DEVICE — SEE MEDLINE ITEM 146298

## (undated) DEVICE — SPONGE LAP 18X18 PREWASHED

## (undated) DEVICE — GLOVE BIOGEL SKINSENSE PI 6.5

## (undated) DEVICE — GUIDEWIRE EMERALD 150CM PTFE

## (undated) DEVICE — KIT PERCUTANEOUS SHEATH

## (undated) DEVICE — GUIDEWIRE STF .035X260CM STR

## (undated) DEVICE — DEVICE PERCLOSE SUT CLSR 6FR

## (undated) DEVICE — CATH MPA2 INFINITI 4FR 100CM

## (undated) DEVICE — UNDERGLOVES BIOGEL PI SIZE 8.5

## (undated) DEVICE — UNDERGLOVES BIOGEL PI SIZE 8

## (undated) DEVICE — SHEATH INTRODUCER 8FR 11CM

## (undated) DEVICE — NDL 21GA X1 1/2 REG BEVEL

## (undated) DEVICE — SPONGE COTTON TRAY 4X4IN

## (undated) DEVICE — SPIKE CONTRAST CONTROLLER

## (undated) DEVICE — ALCOHOL ISOPROPYL BLU 70% 16OZ

## (undated) DEVICE — STOPCOCK 3-WAY

## (undated) DEVICE — VEST O.R. COOLING/WARMING UNIV

## (undated) DEVICE — SEE MEDLINE ITEM 157171

## (undated) DEVICE — CATH 5FR BALLOON PT HEART PACE

## (undated) DEVICE — LINE 60IN PRESSURE MON.

## (undated) DEVICE — CATH DXTERITY PG145 110CM 6FR

## (undated) DEVICE — COVER BAND BAG 40 X 40

## (undated) DEVICE — OMNIPAQUE 350 200ML

## (undated) DEVICE — SEALER BIPOLAR TISSUE 6.0

## (undated) DEVICE — KIT MICROINTRO 4F .018X40X7CM

## (undated) DEVICE — PAD UNDERPAD 30X30

## (undated) DEVICE — SEE MEDLINE ITEM 156894

## (undated) DEVICE — GUIDEWIRE X SPORT .014IN 190CM

## (undated) DEVICE — BANDAGE ACE DOUBLE STER 6IN

## (undated) DEVICE — GUIDEWIRE CONFIDA BECKER CURVE

## (undated) DEVICE — GLOVE BIOGEL SKINSENSE PI 7.5

## (undated) DEVICE — SOL IRR NACL .9% 3000ML

## (undated) DEVICE — CATH ALII 4FR INFINITY

## (undated) DEVICE — PAD COLD THERAPY KNEE WRAP ON

## (undated) DEVICE — UNDERGLOVE BIOGEL PI SZ 6.5 LF

## (undated) DEVICE — TUBING HPCIL ROT M/F ADPT 10IN

## (undated) DEVICE — BLADE REAMER PILOT HOLE 35MM